# Patient Record
Sex: MALE | Race: WHITE | NOT HISPANIC OR LATINO | Employment: OTHER | URBAN - METROPOLITAN AREA
[De-identification: names, ages, dates, MRNs, and addresses within clinical notes are randomized per-mention and may not be internally consistent; named-entity substitution may affect disease eponyms.]

---

## 2017-02-22 ENCOUNTER — HOSPITAL ENCOUNTER (OUTPATIENT)
Dept: ONCOLOGY | Facility: CLINIC | Age: 62
Discharge: HOME | End: 2017-02-22

## 2017-02-24 LAB
CMV DNA SERPL NAA+PROBE-ACNC: <200 IU/ML
Lab: NORMAL

## 2018-04-17 ENCOUNTER — HOSPITAL ENCOUNTER (OUTPATIENT)
Dept: FAMILY MEDICINE CLINIC | Facility: CLINIC | Age: 63
Setting detail: SPECIMEN
Discharge: HOME | End: 2018-04-17
Attending: FAMILY MEDICINE | Admitting: FAMILY MEDICINE

## 2018-04-17 LAB
ALBUMIN SERPL-MCNC: 3.6 G/DL (ref 3.5–4.8)
ALBUMIN/GLOB SERPL: 1.5 {RATIO} (ref 1–1.7)
ALP SERPL-CCNC: 49 IU/L (ref 32–91)
ALT SERPL-CCNC: 35 IU/L (ref 17–63)
ANION GAP SERPL CALC-SCNC: 11.2 MMOL/L (ref 10–20)
AST SERPL-CCNC: 33 IU/L (ref 15–41)
BASOPHILS # BLD AUTO: 0.1 10*3/UL (ref 0–0.2)
BASOPHILS NFR BLD AUTO: 2 % (ref 0–2)
BILIRUB SERPL-MCNC: 0.7 MG/DL (ref 0.3–1.2)
BUN SERPL-MCNC: 15 MG/DL (ref 8–20)
BUN/CREAT SERPL: 10.7 (ref 6.2–20.3)
CALCIUM SERPL-MCNC: 9.2 MG/DL (ref 8.9–10.3)
CHLORIDE SERPL-SCNC: 103 MMOL/L (ref 101–111)
CHOLEST SERPL-MCNC: 227 MG/DL
CHOLEST/HDLC SERPL: 8.2 {RATIO}
CONV CO2: 28 MMOL/L (ref 22–32)
CONV LDL CHOLESTEROL DIRECT: 141 MG/DL (ref 0–100)
CONV PLATELETS GIANT IN BLOOD BY LIGHT MICROSCOPY: (no result)
CONV POLYCHROMASIA IN BLOOD BY LIGHT MICROSCOPY: SLIGHT
CONV TOTAL PROTEIN: 6 G/DL (ref 6.1–7.9)
CREAT UR-MCNC: 1.4 MG/DL (ref 0.7–1.2)
DIFFERENTIAL METHOD BLD: (no result)
EOSINOPHIL # BLD AUTO: 0.8 10*3/UL (ref 0–0.3)
EOSINOPHIL # BLD AUTO: 21 % (ref 0–3)
ERYTHROCYTE [DISTWIDTH] IN BLOOD BY AUTOMATED COUNT: 13 % (ref 11.5–14.5)
GLOBULIN UR ELPH-MCNC: 2.4 G/DL (ref 2.5–3.8)
GLUCOSE SERPL-MCNC: 92 MG/DL (ref 65–99)
HCT VFR BLD AUTO: 36.3 % (ref 40–54)
HDLC SERPL-MCNC: 28 MG/DL
HGB BLD-MCNC: 12.7 G/DL (ref 14–18)
LDLC/HDLC SERPL: 5.1 {RATIO}
LIPID INTERPRETATION: ABNORMAL
LYMPHOCYTES # BLD AUTO: 1.2 10*3/UL (ref 0.8–4.8)
LYMPHOCYTES NFR BLD AUTO: 29 % (ref 18–42)
MCH RBC QN AUTO: 33.2 PG (ref 26–32)
MCHC RBC AUTO-ENTMCNC: 35.1 G/DL (ref 32–36)
MCV RBC AUTO: 94.3 FL (ref 80–94)
MONOCYTES # BLD AUTO: 0.3 10*3/UL (ref 0.1–1.3)
MONOCYTES NFR BLD AUTO: 7 % (ref 2–11)
NEUTROPHILS # BLD AUTO: 1.6 10*3/UL (ref 2.3–8.6)
NEUTROPHILS NFR BLD AUTO: 41 % (ref 50–75)
PATHOLOGIST REVIEW: (no result)
PATHOLOGIST REVIEW: (no result)
PLATELET # BLD AUTO: 222 10*3/UL (ref 150–450)
PMV BLD AUTO: 7.5 FL (ref 7.4–10.4)
POTASSIUM SERPL-SCNC: 4.2 MMOL/L (ref 3.6–5.1)
RBC # BLD AUTO: 3.84 10*6/UL (ref 4.6–6)
SODIUM SERPL-SCNC: 138 MMOL/L (ref 136–144)
TRIGL SERPL-MCNC: 293 MG/DL
VLDLC SERPL CALC-MCNC: 58.5 MG/DL
WBC # BLD AUTO: 4 10*3/UL (ref 4.5–11.5)

## 2018-04-18 ENCOUNTER — HOSPITAL ENCOUNTER (OUTPATIENT)
Dept: CARDIOLOGY | Facility: HOSPITAL | Age: 63
Discharge: HOME | End: 2018-04-18
Attending: FAMILY MEDICINE | Admitting: FAMILY MEDICINE

## 2018-05-15 ENCOUNTER — HOSPITAL ENCOUNTER (OUTPATIENT)
Dept: CARDIOLOGY | Facility: HOSPITAL | Age: 63
Discharge: HOME | End: 2018-05-15
Attending: INTERNAL MEDICINE | Admitting: INTERNAL MEDICINE

## 2018-05-15 ENCOUNTER — HOSPITAL ENCOUNTER (OUTPATIENT)
Dept: PREADMISSION TESTING | Facility: HOSPITAL | Age: 63
Discharge: HOME | End: 2018-05-15
Attending: INTERNAL MEDICINE | Admitting: INTERNAL MEDICINE

## 2018-05-15 LAB
ANION GAP SERPL CALC-SCNC: 11 MMOL/L (ref 10–20)
BASOPHILS # BLD AUTO: 0 10*3/UL (ref 0–0.2)
BASOPHILS NFR BLD AUTO: 1 % (ref 0–2)
BUN SERPL-MCNC: 15 MG/DL (ref 8–20)
BUN/CREAT SERPL: 12.5 (ref 6.2–20.3)
CALCIUM SERPL-MCNC: 9.2 MG/DL (ref 8.9–10.3)
CHLORIDE SERPL-SCNC: 104 MMOL/L (ref 101–111)
CHOLEST SERPL-MCNC: 203 MG/DL
CHOLEST/HDLC SERPL: 7.8 {RATIO}
CONV CO2: 27 MMOL/L (ref 22–32)
CONV LDL CHOLESTEROL DIRECT: 119 MG/DL (ref 0–100)
CREAT UR-MCNC: 1.2 MG/DL (ref 0.7–1.2)
DIFFERENTIAL METHOD BLD: (no result)
EOSINOPHIL # BLD AUTO: 0.5 10*3/UL (ref 0–0.3)
EOSINOPHIL # BLD AUTO: 12 % (ref 0–3)
ERYTHROCYTE [DISTWIDTH] IN BLOOD BY AUTOMATED COUNT: 13.2 % (ref 11.5–14.5)
GLUCOSE SERPL-MCNC: 89 MG/DL (ref 65–99)
HCT VFR BLD AUTO: 35.5 % (ref 40–54)
HDLC SERPL-MCNC: 26 MG/DL
HGB BLD-MCNC: 12.5 G/DL (ref 14–18)
INR PPP: 1
LDLC/HDLC SERPL: 4.6 {RATIO}
LIPID INTERPRETATION: ABNORMAL
LYMPHOCYTES # BLD AUTO: 1.3 10*3/UL (ref 0.8–4.8)
LYMPHOCYTES NFR BLD AUTO: 31 % (ref 18–42)
MCH RBC QN AUTO: 32.6 PG (ref 26–32)
MCHC RBC AUTO-ENTMCNC: 35.3 G/DL (ref 32–36)
MCV RBC AUTO: 92.4 FL (ref 80–94)
MONOCYTES # BLD AUTO: 0.4 10*3/UL (ref 0.1–1.3)
MONOCYTES NFR BLD AUTO: 10 % (ref 2–11)
NEUTROPHILS # BLD AUTO: 1.9 10*3/UL (ref 2.3–8.6)
NEUTROPHILS NFR BLD AUTO: 46 % (ref 50–75)
NRBC BLD AUTO-RTO: 0 /100{WBCS}
NRBC/RBC NFR BLD MANUAL: 0 10*3/UL
PLATELET # BLD AUTO: 256 10*3/UL (ref 150–450)
PMV BLD AUTO: 7.6 FL (ref 7.4–10.4)
POTASSIUM SERPL-SCNC: 4 MMOL/L (ref 3.6–5.1)
PROTHROMBIN TIME: 11.2 SEC (ref 9.6–11.7)
RBC # BLD AUTO: 3.85 10*6/UL (ref 4.6–6)
SODIUM SERPL-SCNC: 138 MMOL/L (ref 136–144)
TRIGL SERPL-MCNC: 329 MG/DL
VLDLC SERPL CALC-MCNC: 58 MG/DL
WBC # BLD AUTO: 4.1 10*3/UL (ref 4.5–11.5)

## 2018-06-27 ENCOUNTER — HOSPITAL ENCOUNTER (OUTPATIENT)
Dept: HOME HEALTH SERVICES | Facility: HOME HEALTHCARE | Age: 63
Setting detail: SPECIMEN
Discharge: HOME | End: 2018-06-27
Attending: FAMILY MEDICINE | Admitting: FAMILY MEDICINE

## 2018-06-27 LAB
ANION GAP SERPL CALC-SCNC: 10.6 MMOL/L (ref 10–20)
BASOPHILS # BLD AUTO: 0 10*3/UL (ref 0–0.2)
BASOPHILS NFR BLD AUTO: 1 % (ref 0–2)
BUN SERPL-MCNC: 15 MG/DL (ref 8–20)
BUN/CREAT SERPL: 12.5 (ref 6.2–20.3)
CALCIUM SERPL-MCNC: 9.1 MG/DL (ref 8.9–10.3)
CHLORIDE SERPL-SCNC: 103 MMOL/L (ref 101–111)
CONV CO2: 24 MMOL/L (ref 22–32)
CREAT UR-MCNC: 1.2 MG/DL (ref 0.7–1.2)
DIFFERENTIAL METHOD BLD: (no result)
EOSINOPHIL # BLD AUTO: 0.5 10*3/UL (ref 0–0.3)
EOSINOPHIL # BLD AUTO: 10 % (ref 0–3)
ERYTHROCYTE [DISTWIDTH] IN BLOOD BY AUTOMATED COUNT: 14 % (ref 11.5–14.5)
ERYTHROCYTE [SEDIMENTATION RATE] IN BLOOD BY WESTERGREN METHOD: 30 MM/HR (ref 0–20)
GLUCOSE SERPL-MCNC: 135 MG/DL (ref 65–99)
HCT VFR BLD AUTO: 35.2 % (ref 40–54)
HGB BLD-MCNC: 12 G/DL (ref 14–18)
LYMPHOCYTES # BLD AUTO: 1.3 10*3/UL (ref 0.8–4.8)
LYMPHOCYTES NFR BLD AUTO: 26 % (ref 18–42)
MCH RBC QN AUTO: 31 PG (ref 26–32)
MCHC RBC AUTO-ENTMCNC: 34.1 G/DL (ref 32–36)
MCV RBC AUTO: 91 FL (ref 80–94)
MONOCYTES # BLD AUTO: 0.4 10*3/UL (ref 0.1–1.3)
MONOCYTES NFR BLD AUTO: 7 % (ref 2–11)
NEUTROPHILS # BLD AUTO: 2.9 10*3/UL (ref 2.3–8.6)
NEUTROPHILS NFR BLD AUTO: 56 % (ref 50–75)
NRBC BLD AUTO-RTO: 0 /100{WBCS}
NRBC/RBC NFR BLD MANUAL: 0 10*3/UL
PLATELET # BLD AUTO: 359 10*3/UL (ref 150–450)
PMV BLD AUTO: 7.7 FL (ref 7.4–10.4)
POTASSIUM SERPL-SCNC: 3.6 MMOL/L (ref 3.6–5.1)
RBC # BLD AUTO: 3.87 10*6/UL (ref 4.6–6)
SODIUM SERPL-SCNC: 134 MMOL/L (ref 136–144)
VANCOMYCIN TROUGH SERPL-MCNC: 9 UG/ML (ref 10–20)
WBC # BLD AUTO: 5.2 10*3/UL (ref 4.5–11.5)

## 2018-12-21 ENCOUNTER — HOSPITAL ENCOUNTER (OUTPATIENT)
Dept: FAMILY MEDICINE CLINIC | Facility: CLINIC | Age: 63
Setting detail: SPECIMEN
Discharge: HOME | End: 2018-12-21
Attending: PHYSICIAN ASSISTANT | Admitting: PHYSICIAN ASSISTANT

## 2018-12-21 ENCOUNTER — HOSPITAL ENCOUNTER (OUTPATIENT)
Dept: ULTRASOUND IMAGING | Facility: HOSPITAL | Age: 63
Discharge: HOME | End: 2018-12-21
Attending: PHYSICIAN ASSISTANT | Admitting: PHYSICIAN ASSISTANT

## 2018-12-21 LAB
ALBUMIN SERPL-MCNC: 3.8 G/DL (ref 3.5–4.8)
ALBUMIN/GLOB SERPL: 1.5 {RATIO} (ref 1–1.7)
ALP SERPL-CCNC: 58 IU/L (ref 32–91)
ALT SERPL-CCNC: 32 IU/L (ref 17–63)
ANION GAP SERPL CALC-SCNC: 14.8 MMOL/L (ref 10–20)
AST SERPL-CCNC: 33 IU/L (ref 15–41)
BASOPHILS # BLD AUTO: 0.1 10*3/UL (ref 0–0.2)
BASOPHILS NFR BLD AUTO: 1 % (ref 0–2)
BILIRUB SERPL-MCNC: 0.5 MG/DL (ref 0.3–1.2)
BUN SERPL-MCNC: 20 MG/DL (ref 8–20)
BUN/CREAT SERPL: 16.7 (ref 6.2–20.3)
CALCIUM SERPL-MCNC: 9.3 MG/DL (ref 8.9–10.3)
CHLORIDE SERPL-SCNC: 100 MMOL/L (ref 101–111)
CONV CO2: 23 MMOL/L (ref 22–32)
CONV TOTAL PROTEIN: 6.3 G/DL (ref 6.1–7.9)
CREAT UR-MCNC: 1.2 MG/DL (ref 0.7–1.2)
DIFFERENTIAL METHOD BLD: (no result)
EOSINOPHIL # BLD AUTO: 0.4 10*3/UL (ref 0–0.3)
EOSINOPHIL # BLD AUTO: 5 % (ref 0–3)
ERYTHROCYTE [DISTWIDTH] IN BLOOD BY AUTOMATED COUNT: 15 % (ref 11.5–14.5)
GLOBULIN UR ELPH-MCNC: 2.5 G/DL (ref 2.5–3.8)
GLUCOSE SERPL-MCNC: 110 MG/DL (ref 65–99)
HCT VFR BLD AUTO: 38.3 % (ref 40–54)
HETEROPH AB SER QL LA: NEGATIVE
HGB BLD-MCNC: 13.2 G/DL (ref 14–18)
L PNEUMO1 AG UR QL IA: NEGATIVE
LYMPHOCYTES # BLD AUTO: 1.9 10*3/UL (ref 0.8–4.8)
LYMPHOCYTES NFR BLD AUTO: 24 % (ref 18–42)
MCH RBC QN AUTO: 32 PG (ref 26–32)
MCHC RBC AUTO-ENTMCNC: 34.4 G/DL (ref 32–36)
MCV RBC AUTO: 93.2 FL (ref 80–94)
MONOCYTES # BLD AUTO: 0.7 10*3/UL (ref 0.1–1.3)
MONOCYTES NFR BLD AUTO: 9 % (ref 2–11)
NEUTROPHILS # BLD AUTO: 4.7 10*3/UL (ref 2.3–8.6)
NEUTROPHILS NFR BLD AUTO: 61 % (ref 50–75)
NRBC BLD AUTO-RTO: 0 /100{WBCS}
NRBC/RBC NFR BLD MANUAL: 0 10*3/UL
PLATELET # BLD AUTO: 373 10*3/UL (ref 150–450)
PMV BLD AUTO: 7.6 FL (ref 7.4–10.4)
POTASSIUM SERPL-SCNC: 3.8 MMOL/L (ref 3.6–5.1)
RBC # BLD AUTO: 4.11 10*6/UL (ref 4.6–6)
S PNEUM AG SPEC QL LA: NEGATIVE
SODIUM SERPL-SCNC: 134 MMOL/L (ref 136–144)
WBC # BLD AUTO: 7.7 10*3/UL (ref 4.5–11.5)

## 2018-12-22 LAB — CMV IGM SERPL IA-ACNC: NEGATIVE

## 2019-01-02 ENCOUNTER — HOSPITAL ENCOUNTER (OUTPATIENT)
Dept: FAMILY MEDICINE CLINIC | Facility: CLINIC | Age: 64
Setting detail: SPECIMEN
Discharge: HOME | End: 2019-01-02
Attending: FAMILY MEDICINE | Admitting: FAMILY MEDICINE

## 2019-05-30 ENCOUNTER — HOSPITAL ENCOUNTER (OUTPATIENT)
Dept: SLEEP MEDICINE | Facility: HOSPITAL | Age: 64
Discharge: HOME | End: 2019-05-30
Attending: INTERNAL MEDICINE | Admitting: INTERNAL MEDICINE

## 2019-06-06 ENCOUNTER — HOSPITAL ENCOUNTER (OUTPATIENT)
Dept: SLEEP MEDICINE | Facility: HOSPITAL | Age: 64
Discharge: HOME | End: 2019-06-06
Attending: INTERNAL MEDICINE | Admitting: INTERNAL MEDICINE

## 2019-06-24 ENCOUNTER — TRANSCRIBE ORDERS (OUTPATIENT)
Dept: SLEEP MEDICINE | Facility: HOSPITAL | Age: 64
End: 2019-06-24

## 2019-06-24 DIAGNOSIS — G47.33 OBSTRUCTIVE SLEEP APNEA (ADULT) (PEDIATRIC): Primary | ICD-10-CM

## 2019-06-25 ENCOUNTER — HOSPITAL ENCOUNTER (OUTPATIENT)
Dept: SLEEP MEDICINE | Facility: HOSPITAL | Age: 64
Discharge: HOME | End: 2019-06-25
Admitting: INTERNAL MEDICINE

## 2019-06-25 VITALS — HEIGHT: 67 IN | BODY MASS INDEX: 27.47 KG/M2 | WEIGHT: 175 LBS

## 2019-06-25 DIAGNOSIS — G47.33 OBSTRUCTIVE SLEEP APNEA (ADULT) (PEDIATRIC): ICD-10-CM

## 2019-06-25 PROCEDURE — 95811 POLYSOM 6/>YRS CPAP 4/> PARM: CPT

## 2019-06-26 DIAGNOSIS — G47.33 OBSTRUCTIVE SLEEP APNEA, ADULT: Primary | ICD-10-CM

## 2019-06-26 RX ORDER — FENOFIBRATE 145 MG/1
145 TABLET, COATED ORAL EVERY OTHER DAY
COMMUNITY
End: 2022-04-13

## 2019-06-26 RX ORDER — ISOSORBIDE MONONITRATE 30 MG/1
30 TABLET, EXTENDED RELEASE ORAL DAILY
COMMUNITY
End: 2020-02-17

## 2019-06-26 RX ORDER — PANTOPRAZOLE SODIUM 40 MG/1
40 TABLET, DELAYED RELEASE ORAL DAILY
COMMUNITY

## 2019-06-26 RX ORDER — CLONIDINE 0.1 MG/24H
1 PATCH, EXTENDED RELEASE TRANSDERMAL 3 TIMES DAILY
COMMUNITY
End: 2019-08-04

## 2019-06-26 RX ORDER — DAPSONE 100 MG/1
100 TABLET ORAL DAILY
COMMUNITY
End: 2020-02-17

## 2019-06-26 RX ORDER — ACYCLOVIR 800 MG/1
800 TABLET ORAL 2 TIMES DAILY
COMMUNITY
End: 2020-02-17

## 2019-06-26 RX ORDER — HYDROCHLOROTHIAZIDE 50 MG/1
50 TABLET ORAL DAILY
COMMUNITY
End: 2019-08-04

## 2019-06-26 RX ORDER — CLOPIDOGREL BISULFATE 75 MG/1
75 TABLET ORAL DAILY
COMMUNITY
End: 2019-07-27 | Stop reason: SDUPTHER

## 2019-06-26 RX ORDER — VORICONAZOLE 200 MG/1
200 TABLET, FILM COATED ORAL DAILY
COMMUNITY
End: 2022-04-13

## 2019-06-26 RX ORDER — ASPIRIN 81 MG/1
81 TABLET, CHEWABLE ORAL DAILY
COMMUNITY

## 2019-06-26 RX ORDER — LISINOPRIL 10 MG/1
10 TABLET ORAL DAILY
COMMUNITY
End: 2020-02-17

## 2019-06-26 RX ORDER — AZELASTINE 1 MG/ML
2 SPRAY, METERED NASAL DAILY
COMMUNITY
End: 2020-02-17

## 2019-06-26 RX ORDER — PREDNISONE 20 MG/1
10 TABLET ORAL DAILY
COMMUNITY
End: 2020-02-17

## 2019-06-26 RX ORDER — ESCITALOPRAM OXALATE 10 MG/1
10 TABLET ORAL DAILY
COMMUNITY

## 2019-06-26 RX ORDER — ROSUVASTATIN CALCIUM 20 MG/1
20 TABLET, COATED ORAL DAILY
COMMUNITY
End: 2019-11-12 | Stop reason: SDUPTHER

## 2019-06-26 RX ORDER — CARVEDILOL 12.5 MG/1
25 TABLET ORAL 2 TIMES DAILY WITH MEALS
COMMUNITY
End: 2022-04-13

## 2019-07-29 RX ORDER — CLOPIDOGREL BISULFATE 75 MG/1
TABLET ORAL
Qty: 90 TABLET | Refills: 5 | Status: SHIPPED | OUTPATIENT
Start: 2019-07-29 | End: 2020-08-12

## 2019-08-04 ENCOUNTER — HOSPITAL ENCOUNTER (EMERGENCY)
Facility: HOSPITAL | Age: 64
Discharge: HOME | End: 2019-08-05
Attending: EMERGENCY MEDICINE | Admitting: EMERGENCY MEDICINE

## 2019-08-04 DIAGNOSIS — M54.2 NECK PAIN: Primary | ICD-10-CM

## 2019-08-04 PROCEDURE — 93005 ELECTROCARDIOGRAM TRACING: CPT | Performed by: EMERGENCY MEDICINE

## 2019-08-04 PROCEDURE — 99284 EMERGENCY DEPT VISIT MOD MDM: CPT

## 2019-08-04 RX ORDER — ONDANSETRON 2 MG/ML
4 INJECTION INTRAMUSCULAR; INTRAVENOUS ONCE
Status: COMPLETED | OUTPATIENT
Start: 2019-08-04 | End: 2019-08-05

## 2019-08-04 RX ORDER — PANTOPRAZOLE SODIUM 40 MG/1
TABLET, DELAYED RELEASE ORAL
COMMUNITY
Start: 2014-12-15 | End: 2019-08-04 | Stop reason: SDUPTHER

## 2019-08-04 RX ORDER — KETOROLAC TROMETHAMINE 15 MG/ML
10 INJECTION, SOLUTION INTRAMUSCULAR; INTRAVENOUS ONCE
Status: COMPLETED | OUTPATIENT
Start: 2019-08-04 | End: 2019-08-05

## 2019-08-04 RX ORDER — LIFITEGRAST 50 MG/ML
SOLUTION/ DROPS OPHTHALMIC
Refills: 11 | COMMUNITY
Start: 2019-06-06 | End: 2022-04-13

## 2019-08-04 RX ORDER — LOTEPREDNOL ETABONATE 5 MG/ML
SUSPENSION/ DROPS OPHTHALMIC
Refills: 0 | COMMUNITY
Start: 2019-06-05 | End: 2022-04-13

## 2019-08-04 RX ORDER — MORPHINE SULFATE 4 MG/ML
4 INJECTION, SOLUTION INTRAMUSCULAR; INTRAVENOUS ONCE
Status: COMPLETED | OUTPATIENT
Start: 2019-08-04 | End: 2019-08-05

## 2019-08-04 RX ORDER — CLONIDINE HYDROCHLORIDE 0.1 MG/1
0.1 TABLET ORAL 2 TIMES DAILY
COMMUNITY
Start: 2019-04-04 | End: 2022-04-13 | Stop reason: SDUPTHER

## 2019-08-04 RX ORDER — FLUTICASONE PROPIONATE 50 MCG
1 SPRAY, SUSPENSION (ML) NASAL ONCE
COMMUNITY
Start: 2017-01-17

## 2019-08-04 RX ORDER — HYDROCHLOROTHIAZIDE 12.5 MG/1
25 TABLET ORAL DAILY
Refills: 5 | COMMUNITY
Start: 2019-06-14 | End: 2019-08-21 | Stop reason: ALTCHOICE

## 2019-08-04 RX ORDER — SODIUM CHLORIDE 0.9 % (FLUSH) 0.9 %
10 SYRINGE (ML) INJECTION AS NEEDED
Status: DISCONTINUED | OUTPATIENT
Start: 2019-08-04 | End: 2019-08-05 | Stop reason: HOSPADM

## 2019-08-05 ENCOUNTER — APPOINTMENT (OUTPATIENT)
Dept: CT IMAGING | Facility: HOSPITAL | Age: 64
End: 2019-08-05

## 2019-08-05 ENCOUNTER — APPOINTMENT (OUTPATIENT)
Dept: GENERAL RADIOLOGY | Facility: HOSPITAL | Age: 64
End: 2019-08-05

## 2019-08-05 ENCOUNTER — HOSPITAL ENCOUNTER (EMERGENCY)
Facility: HOSPITAL | Age: 64
Discharge: ANOTHER HOSPITAL | End: 2019-08-05
Attending: EMERGENCY MEDICINE | Admitting: EMERGENCY MEDICINE

## 2019-08-05 ENCOUNTER — APPOINTMENT (OUTPATIENT)
Dept: SLEEP MEDICINE | Facility: HOSPITAL | Age: 64
End: 2019-08-05

## 2019-08-05 VITALS
SYSTOLIC BLOOD PRESSURE: 179 MMHG | HEIGHT: 67 IN | TEMPERATURE: 98.7 F | BODY MASS INDEX: 28.35 KG/M2 | OXYGEN SATURATION: 94 % | RESPIRATION RATE: 18 BRPM | DIASTOLIC BLOOD PRESSURE: 81 MMHG | HEART RATE: 79 BPM | WEIGHT: 180.6 LBS

## 2019-08-05 VITALS
WEIGHT: 187 LBS | HEIGHT: 67 IN | OXYGEN SATURATION: 92 % | RESPIRATION RATE: 13 BRPM | HEART RATE: 96 BPM | DIASTOLIC BLOOD PRESSURE: 89 MMHG | SYSTOLIC BLOOD PRESSURE: 199 MMHG | TEMPERATURE: 99.6 F | BODY MASS INDEX: 29.35 KG/M2

## 2019-08-05 DIAGNOSIS — R06.00 DYSPNEA, UNSPECIFIED TYPE: Primary | ICD-10-CM

## 2019-08-05 LAB
ALBUMIN SERPL-MCNC: 3.7 G/DL (ref 3.5–4.8)
ALBUMIN/GLOB SERPL: 1.3 G/DL (ref 1–1.7)
ALP SERPL-CCNC: 43 U/L (ref 32–91)
ALT SERPL W P-5'-P-CCNC: 29 U/L (ref 17–63)
ANION GAP SERPL CALCULATED.3IONS-SCNC: 11.6 MMOL/L (ref 5–15)
ANION GAP SERPL CALCULATED.3IONS-SCNC: 16 MMOL/L (ref 5–15)
AST SERPL-CCNC: 24 U/L (ref 15–41)
BACTERIA UR QL AUTO: ABNORMAL /HPF
BASOPHILS # BLD AUTO: 0.1 10*3/MM3 (ref 0–0.2)
BASOPHILS # BLD AUTO: 0.1 10*3/MM3 (ref 0–0.2)
BASOPHILS NFR BLD AUTO: 0.6 % (ref 0–1.5)
BASOPHILS NFR BLD AUTO: 1.3 % (ref 0–1.5)
BILIRUB SERPL-MCNC: 0.9 MG/DL (ref 0.3–1.2)
BILIRUB UR QL STRIP: ABNORMAL
BNP SERPL-MCNC: 154 PG/ML
BUN BLD-MCNC: 20 MG/DL (ref 8–20)
BUN BLD-MCNC: 20 MG/DL (ref 8–20)
BUN/CREAT SERPL: 15.4 (ref 6.2–20.3)
BUN/CREAT SERPL: 18.2 (ref 6.2–20.3)
CALCIUM SPEC-SCNC: 8.6 MG/DL (ref 8.9–10.3)
CALCIUM SPEC-SCNC: 9 MG/DL (ref 8.9–10.3)
CHLORIDE SERPL-SCNC: 104 MMOL/L (ref 101–111)
CHLORIDE SERPL-SCNC: 99 MMOL/L (ref 101–111)
CK SERPL-CCNC: 48 U/L (ref 49–397)
CLARITY UR: CLEAR
CO2 SERPL-SCNC: 24 MMOL/L (ref 22–32)
CO2 SERPL-SCNC: 28 MMOL/L (ref 22–32)
COLOR UR: ABNORMAL
CREAT BLD-MCNC: 1.1 MG/DL (ref 0.7–1.2)
CREAT BLD-MCNC: 1.3 MG/DL (ref 0.7–1.2)
D-LACTATE SERPL-SCNC: 0.5 MMOL/L (ref 0.5–2)
DEPRECATED RDW RBC AUTO: 59.5 FL (ref 37–54)
DEPRECATED RDW RBC AUTO: 59.9 FL (ref 37–54)
EOSINOPHIL # BLD AUTO: 0.1 10*3/MM3 (ref 0–0.4)
EOSINOPHIL # BLD AUTO: 0.2 10*3/MM3 (ref 0–0.4)
EOSINOPHIL NFR BLD AUTO: 0.7 % (ref 0.3–6.2)
EOSINOPHIL NFR BLD AUTO: 1.6 % (ref 0.3–6.2)
ERYTHROCYTE [DISTWIDTH] IN BLOOD BY AUTOMATED COUNT: 16.6 % (ref 12.3–15.4)
ERYTHROCYTE [DISTWIDTH] IN BLOOD BY AUTOMATED COUNT: 16.7 % (ref 12.3–15.4)
GFR SERPL CREATININE-BSD FRML MDRD: 56 ML/MIN/1.73
GFR SERPL CREATININE-BSD FRML MDRD: 68 ML/MIN/1.73
GLOBULIN UR ELPH-MCNC: 2.8 GM/DL (ref 2.5–3.8)
GLUCOSE BLD-MCNC: 126 MG/DL (ref 65–99)
GLUCOSE BLD-MCNC: 95 MG/DL (ref 65–99)
GLUCOSE UR STRIP-MCNC: NEGATIVE MG/DL
HCT VFR BLD AUTO: 33.7 % (ref 37.5–51)
HCT VFR BLD AUTO: 38.3 % (ref 37.5–51)
HGB BLD-MCNC: 11.6 G/DL (ref 13–17.7)
HGB BLD-MCNC: 12.5 G/DL (ref 13–17.7)
HGB UR QL STRIP.AUTO: NEGATIVE
HYALINE CASTS UR QL AUTO: ABNORMAL /LPF
KETONES UR QL STRIP: NEGATIVE
LEUKOCYTE ESTERASE UR QL STRIP.AUTO: NEGATIVE
LYMPHOCYTES # BLD AUTO: 1.8 10*3/MM3 (ref 0.7–3.1)
LYMPHOCYTES # BLD AUTO: 2 10*3/MM3 (ref 0.7–3.1)
LYMPHOCYTES NFR BLD AUTO: 17 % (ref 19.6–45.3)
LYMPHOCYTES NFR BLD AUTO: 17.7 % (ref 19.6–45.3)
MCH RBC QN AUTO: 33 PG (ref 26.6–33)
MCH RBC QN AUTO: 34.4 PG (ref 26.6–33)
MCHC RBC AUTO-ENTMCNC: 32.5 G/DL (ref 31.5–35.7)
MCHC RBC AUTO-ENTMCNC: 34.4 G/DL (ref 31.5–35.7)
MCV RBC AUTO: 100.2 FL (ref 79–97)
MCV RBC AUTO: 101.5 FL (ref 79–97)
MONOCYTES # BLD AUTO: 1 10*3/MM3 (ref 0.1–0.9)
MONOCYTES # BLD AUTO: 1 10*3/MM3 (ref 0.1–0.9)
MONOCYTES NFR BLD AUTO: 10.1 % (ref 5–12)
MONOCYTES NFR BLD AUTO: 8.6 % (ref 5–12)
NEUTROPHILS # BLD AUTO: 7.2 10*3/MM3 (ref 1.7–7)
NEUTROPHILS # BLD AUTO: 8.4 10*3/MM3 (ref 1.7–7)
NEUTROPHILS NFR BLD AUTO: 70.2 % (ref 42.7–76)
NEUTROPHILS NFR BLD AUTO: 72.2 % (ref 42.7–76)
NITRITE UR QL STRIP: NEGATIVE
NRBC BLD AUTO-RTO: 0.1 /100 WBC (ref 0–0.2)
NRBC BLD AUTO-RTO: 0.1 /100 WBC (ref 0–0.2)
PH UR STRIP.AUTO: 6.5 [PH] (ref 5–8)
PLATELET # BLD AUTO: 361 10*3/MM3 (ref 140–450)
PLATELET # BLD AUTO: 398 10*3/MM3 (ref 140–450)
PMV BLD AUTO: 7.6 FL (ref 6–12)
PMV BLD AUTO: 8.1 FL (ref 6–12)
POTASSIUM BLD-SCNC: 3.6 MMOL/L (ref 3.6–5.1)
POTASSIUM BLD-SCNC: 4 MMOL/L (ref 3.6–5.1)
PROCALCITONIN SERPL-MCNC: 0.38 NG/ML (ref 0–0.5)
PROT SERPL-MCNC: 6.5 G/DL (ref 6.1–7.9)
PROT UR QL STRIP: ABNORMAL
RBC # BLD AUTO: 3.37 10*6/MM3 (ref 4.14–5.8)
RBC # BLD AUTO: 3.77 10*6/MM3 (ref 4.14–5.8)
RBC # UR: ABNORMAL /HPF
REF LAB TEST METHOD: ABNORMAL
SODIUM BLD-SCNC: 136 MMOL/L (ref 136–144)
SODIUM BLD-SCNC: 139 MMOL/L (ref 136–144)
SP GR UR STRIP: 1.02 (ref 1–1.03)
SQUAMOUS #/AREA URNS HPF: ABNORMAL /HPF
TROPONIN I SERPL-MCNC: <0.03 NG/ML (ref 0–0.03)
UROBILINOGEN UR QL STRIP: ABNORMAL
WBC NRBC COR # BLD: 10.3 10*3/MM3 (ref 3.4–10.8)
WBC NRBC COR # BLD: 11.7 10*3/MM3 (ref 3.4–10.8)
WBC UR QL AUTO: ABNORMAL /HPF

## 2019-08-05 PROCEDURE — 25010000002 MORPHINE PER 10 MG: Performed by: EMERGENCY MEDICINE

## 2019-08-05 PROCEDURE — 70450 CT HEAD/BRAIN W/O DYE: CPT

## 2019-08-05 PROCEDURE — 84145 PROCALCITONIN (PCT): CPT | Performed by: EMERGENCY MEDICINE

## 2019-08-05 PROCEDURE — 96374 THER/PROPH/DIAG INJ IV PUSH: CPT

## 2019-08-05 PROCEDURE — 96375 TX/PRO/DX INJ NEW DRUG ADDON: CPT

## 2019-08-05 PROCEDURE — 87040 BLOOD CULTURE FOR BACTERIA: CPT | Performed by: EMERGENCY MEDICINE

## 2019-08-05 PROCEDURE — 81001 URINALYSIS AUTO W/SCOPE: CPT | Performed by: EMERGENCY MEDICINE

## 2019-08-05 PROCEDURE — 71275 CT ANGIOGRAPHY CHEST: CPT

## 2019-08-05 PROCEDURE — 83605 ASSAY OF LACTIC ACID: CPT

## 2019-08-05 PROCEDURE — 82550 ASSAY OF CK (CPK): CPT | Performed by: EMERGENCY MEDICINE

## 2019-08-05 PROCEDURE — 71045 X-RAY EXAM CHEST 1 VIEW: CPT

## 2019-08-05 PROCEDURE — 84484 ASSAY OF TROPONIN QUANT: CPT | Performed by: EMERGENCY MEDICINE

## 2019-08-05 PROCEDURE — 96376 TX/PRO/DX INJ SAME DRUG ADON: CPT

## 2019-08-05 PROCEDURE — 99284 EMERGENCY DEPT VISIT MOD MDM: CPT

## 2019-08-05 PROCEDURE — 72125 CT NECK SPINE W/O DYE: CPT

## 2019-08-05 PROCEDURE — 0 IOPAMIDOL PER 1 ML: Performed by: EMERGENCY MEDICINE

## 2019-08-05 PROCEDURE — 80053 COMPREHEN METABOLIC PANEL: CPT | Performed by: EMERGENCY MEDICINE

## 2019-08-05 PROCEDURE — 25010000002 ONDANSETRON PER 1 MG: Performed by: EMERGENCY MEDICINE

## 2019-08-05 PROCEDURE — 83880 ASSAY OF NATRIURETIC PEPTIDE: CPT | Performed by: EMERGENCY MEDICINE

## 2019-08-05 PROCEDURE — 25010000002 HYDROMORPHONE PER 4 MG: Performed by: EMERGENCY MEDICINE

## 2019-08-05 PROCEDURE — 25010000002 KETOROLAC TROMETHAMINE PER 15 MG: Performed by: EMERGENCY MEDICINE

## 2019-08-05 PROCEDURE — 85025 COMPLETE CBC W/AUTO DIFF WBC: CPT | Performed by: EMERGENCY MEDICINE

## 2019-08-05 RX ORDER — ONDANSETRON 2 MG/ML
4 INJECTION INTRAMUSCULAR; INTRAVENOUS ONCE
Status: COMPLETED | OUTPATIENT
Start: 2019-08-05 | End: 2019-08-05

## 2019-08-05 RX ORDER — LISINOPRIL 5 MG/1
10 TABLET ORAL ONCE
Status: COMPLETED | OUTPATIENT
Start: 2019-08-05 | End: 2019-08-05

## 2019-08-05 RX ORDER — LABETALOL HYDROCHLORIDE 5 MG/ML
10 INJECTION, SOLUTION INTRAVENOUS ONCE
Status: COMPLETED | OUTPATIENT
Start: 2019-08-05 | End: 2019-08-05

## 2019-08-05 RX ORDER — HYDROCODONE BITARTRATE AND ACETAMINOPHEN 5; 325 MG/1; MG/1
1 TABLET ORAL EVERY 6 HOURS PRN
Qty: 12 TABLET | Refills: 0 | Status: SHIPPED | OUTPATIENT
Start: 2019-08-05 | End: 2020-02-17

## 2019-08-05 RX ORDER — HYDROMORPHONE HCL 110MG/55ML
0.5 PATIENT CONTROLLED ANALGESIA SYRINGE INTRAVENOUS ONCE
Status: COMPLETED | OUTPATIENT
Start: 2019-08-05 | End: 2019-08-05

## 2019-08-05 RX ORDER — CYCLOBENZAPRINE HCL 5 MG
5 TABLET ORAL 3 TIMES DAILY PRN
Qty: 12 TABLET | Refills: 0 | Status: SHIPPED | OUTPATIENT
Start: 2019-08-05 | End: 2020-02-17

## 2019-08-05 RX ORDER — HYDROMORPHONE HCL 110MG/55ML
1 PATIENT CONTROLLED ANALGESIA SYRINGE INTRAVENOUS ONCE
Status: COMPLETED | OUTPATIENT
Start: 2019-08-05 | End: 2019-08-05

## 2019-08-05 RX ORDER — SODIUM CHLORIDE 0.9 % (FLUSH) 0.9 %
10 SYRINGE (ML) INJECTION AS NEEDED
Status: DISCONTINUED | OUTPATIENT
Start: 2019-08-05 | End: 2019-08-05 | Stop reason: HOSPADM

## 2019-08-05 RX ORDER — CARVEDILOL 6.25 MG/1
6.25 TABLET ORAL ONCE
Status: COMPLETED | OUTPATIENT
Start: 2019-08-05 | End: 2019-08-05

## 2019-08-05 RX ADMIN — KETOROLAC TROMETHAMINE 10 MG: 15 INJECTION, SOLUTION INTRAMUSCULAR; INTRAVENOUS at 00:07

## 2019-08-05 RX ADMIN — LISINOPRIL 10 MG: 5 TABLET ORAL at 15:49

## 2019-08-05 RX ADMIN — ONDANSETRON 4 MG: 2 INJECTION INTRAMUSCULAR; INTRAVENOUS at 00:07

## 2019-08-05 RX ADMIN — ONDANSETRON 4 MG: 2 INJECTION INTRAMUSCULAR; INTRAVENOUS at 11:32

## 2019-08-05 RX ADMIN — LABETALOL 20 MG/4 ML (5 MG/ML) INTRAVENOUS SYRINGE 10 MG: at 10:57

## 2019-08-05 RX ADMIN — MORPHINE SULFATE 4 MG: 4 INJECTION INTRAVENOUS at 00:06

## 2019-08-05 RX ADMIN — ONDANSETRON 4 MG: 2 INJECTION INTRAMUSCULAR; INTRAVENOUS at 14:46

## 2019-08-05 RX ADMIN — IOPAMIDOL 100 ML: 755 INJECTION, SOLUTION INTRAVENOUS at 11:22

## 2019-08-05 RX ADMIN — HYDROMORPHONE HYDROCHLORIDE 0.5 MG: 2 INJECTION, SOLUTION INTRAMUSCULAR; INTRAVENOUS; SUBCUTANEOUS at 11:32

## 2019-08-05 RX ADMIN — HYDROMORPHONE HYDROCHLORIDE 1 MG: 2 INJECTION, SOLUTION INTRAMUSCULAR; INTRAVENOUS; SUBCUTANEOUS at 14:48

## 2019-08-05 RX ADMIN — CARVEDILOL 6.25 MG: 6.25 TABLET, FILM COATED ORAL at 15:50

## 2019-08-05 NOTE — ED PROVIDER NOTES
Subjective   History of Present Illness  Shortness of breath, low oxygen saturation  63-year-old male with history of leukemia seen last night for some right-sided neck pain was here today for having some low oxygen saturations at home this morning on the home pulse oximeter.  He also reports mild shortness of breath he reports no significant cough or congestion he states he still has some right sided posterior neck pain does not significantly change this morning from last night when he was seen.  He reports no focal numbness or weakness.  He is reported no fevers or chills at home or vomiting or diarrhea.  He denies trauma.  Review of Systems   Constitutional: Negative.    Eyes: Negative.    Respiratory: Positive for shortness of breath.    Cardiovascular: Negative.    Gastrointestinal: Negative.    Endocrine: Negative.    Genitourinary: Negative.    Musculoskeletal: Positive for neck pain.   Skin: Negative.    Neurological: Positive for headaches. Negative for dizziness, speech difficulty, weakness and light-headedness.   Psychiatric/Behavioral: Negative.        Past Medical History:   Diagnosis Date   • GERD (gastroesophageal reflux disease)    • Hyperlipidemia    • Hypertension    • Leukemia (CMS/HCC)        Allergies   Allergen Reactions   • Penicillins Rash   • Sulfa Antibiotics Rash       Past Surgical History:   Procedure Laterality Date   • BONE MARROW TRANSPLANT     • CARDIAC CATHETERIZATION     • SINUS SURGERY     • TONSILLECTOMY     • VASECTOMY         No family history on file.    Social History     Socioeconomic History   • Marital status:      Spouse name: Not on file   • Number of children: Not on file   • Years of education: Not on file   • Highest education level: Not on file   Tobacco Use   • Smoking status: Former Smoker   Substance and Sexual Activity   • Alcohol use: No     Frequency: Never   • Drug use: Defer   • Sexual activity: Defer       BP (!) 193/99   Pulse 93   Temp (!) 100.8  "°F (38.2 °C) (Oral)   Resp 19   Ht 170.2 cm (67\")   Wt 84.8 kg (187 lb)   SpO2 95%   BMI 29.29 kg/m²       Objective   Physical Exam  General: Well-developed well-appearing, no acute distress, alert and appropriate  Eyes: Pupils round and reactive, sclera nonicteric  HEENT: Mucous membranes moist, no mucosal swelling  Neck: Supple, no nuchal rigidity, no lymphadenopathy, he does have some muscular tenderness along the right posterior lateral aspect of his neck along the trapezius and paraspinous muscle there is no swelling or erythema or significant midline tenderness  Respirations: Respirations nonlabored, equal breath sounds bilaterally, clear lungs  Heart regular rate and rhythm, no murmurs rubs or gallops,   Abdomen soft nontender nondistended, no hepatosplenomegaly, no hernia, no mass, normal bowel sounds, no CVA tenderness  Extremities no clubbing cyanosis or edema, calves are symmetric and nontender  Neuro cranial nerves grossly intact, no focal limb deficits  Psych oriented, pleasant affect  Skin no rash, brisk cap refill  Procedures           ED Course      Results for orders placed or performed during the hospital encounter of 08/05/19   Comprehensive Metabolic Panel   Result Value Ref Range    Glucose 95 65 - 99 mg/dL    BUN 20 8 - 20 mg/dL    Creatinine 1.10 0.70 - 1.20 mg/dL    Sodium 139 136 - 144 mmol/L    Potassium 4.0 3.6 - 5.1 mmol/L    Chloride 99 (L) 101 - 111 mmol/L    CO2 28.0 22.0 - 32.0 mmol/L    Calcium 9.0 8.9 - 10.3 mg/dL    Total Protein 6.5 6.1 - 7.9 g/dL    Albumin 3.70 3.50 - 4.80 g/dL    ALT (SGPT) 29 17 - 63 U/L    AST (SGOT) 24 15 - 41 U/L    Alkaline Phosphatase 43 32 - 91 U/L    Total Bilirubin 0.9 0.3 - 1.2 mg/dL    eGFR Non African Amer 68 >60 mL/min/1.73    Globulin 2.8 2.5 - 3.8 gm/dL    A/G Ratio 1.3 1.0 - 1.7 g/dL    BUN/Creatinine Ratio 18.2 6.2 - 20.3    Anion Gap 16.0 (H) 5.0 - 15.0 mmol/L   Procalcitonin   Result Value Ref Range    Procalcitonin 0.38 0.00 - 0.50 " ng/mL   Urinalysis With Culture If Indicated - Urine, Clean Catch   Result Value Ref Range    Color, UA Dark Yellow (A) Yellow, Straw    Appearance, UA Clear Clear    pH, UA 6.5 5.0 - 8.0    Specific Gravity, UA 1.022 1.005 - 1.030    Glucose, UA Negative Negative    Ketones, UA Negative Negative    Bilirubin, UA Small (1+) (A) Negative    Blood, UA Negative Negative    Protein, UA >=300 mg/dL (3+) (A) Negative    Leuk Esterase, UA Negative Negative    Nitrite, UA Negative Negative    Urobilinogen, UA 1.0 E.U./dL 0.2 - 1.0 E.U./dL   CBC Auto Differential   Result Value Ref Range    WBC 11.70 (H) 3.40 - 10.80 10*3/mm3    RBC 3.77 (L) 4.14 - 5.80 10*6/mm3    Hemoglobin 12.5 (L) 13.0 - 17.7 g/dL    Hematocrit 38.3 37.5 - 51.0 %    .5 (H) 79.0 - 97.0 fL    MCH 33.0 26.6 - 33.0 pg    MCHC 32.5 31.5 - 35.7 g/dL    RDW 16.6 (H) 12.3 - 15.4 %    RDW-SD 59.5 (H) 37.0 - 54.0 fl    MPV 8.1 6.0 - 12.0 fL    Platelets 398 140 - 450 10*3/mm3    Neutrophil % 72.2 42.7 - 76.0 %    Lymphocyte % 17.0 (L) 19.6 - 45.3 %    Monocyte % 8.6 5.0 - 12.0 %    Eosinophil % 1.6 0.3 - 6.2 %    Basophil % 0.6 0.0 - 1.5 %    Neutrophils, Absolute 8.40 (H) 1.70 - 7.00 10*3/mm3    Lymphocytes, Absolute 2.00 0.70 - 3.10 10*3/mm3    Monocytes, Absolute 1.00 (H) 0.10 - 0.90 10*3/mm3    Eosinophils, Absolute 0.20 0.00 - 0.40 10*3/mm3    Basophils, Absolute 0.10 0.00 - 0.20 10*3/mm3    nRBC 0.1 0.0 - 0.2 /100 WBC   Urinalysis, Microscopic Only - Urine, Clean Catch   Result Value Ref Range    RBC, UA 0-2 (A) None Seen /HPF    WBC, UA 0-2 (A) None Seen /HPF    Bacteria, UA Trace (A) None Seen /HPF    Squamous Epithelial Cells, UA 0-2 None Seen, 0-2 /HPF    Hyaline Casts, UA 3-6 None Seen /LPF    Methodology Automated Microscopy    BNP   Result Value Ref Range    .0 (H) <=100.0 pg/mL   POC Lactate   Result Value Ref Range    Lactate 0.5 0.5 - 2.0 mmol/L     Ct Head Without Contrast    Result Date: 8/4/2019  CT HEAD IMPRESSION: No acute  intracranial abnormality is identified. CT CERVICAL SPINE IMPRESSION: 1. No acute cervical spine fracture or malalignment is identified. 2. Multilevel degenerative changes as described above. Isaac Yang M.D. Neuroradiologist  Electronically signed by:  Isaac Yang M.D.  8/4/2019 11:00 PM    Ct Cervical Spine Without Contrast    Result Date: 8/4/2019  CT HEAD IMPRESSION: No acute intracranial abnormality is identified. CT CERVICAL SPINE IMPRESSION: 1. No acute cervical spine fracture or malalignment is identified. 2. Multilevel degenerative changes as described above. Isaac Yang M.D. Neuroradiologist  Electronically signed by:  Isaac Yang M.D.  8/4/2019 11:00 PM    Xr Chest 1 View    Result Date: 8/5/2019  Subsegmental atelectasis in the left lower lobe at the costophrenic angle.  Electronically Signed By-Dr. Maritza Bullard MD On:8/5/2019 10:21 AM This report was finalized on [id number] by Dr. Maritza Bullard MD.    Ct Chest Pulmonary Embolism With Contrast    Result Date: 8/5/2019   1. No pulmonary embolism. 2. Bandlike scarring or subsegmental atelectasis in the lingula and left lower lobe. Minimal linear atelectasis in the right middle lobe. 3. Uncomplicated cholelithiasis. 4. Moderate coronary artery calcifications. Correlate with cardiac history.    Electronically Signed By-Dr. Maritza Bullard MD On:8/5/2019 11:27 AM This report was finalized on [id number] by Dr. Maritza Bullard MD.                MDM  Patient presented with history of leukemia and bone marrow transplant with some dyspnea.  He had a borderline low-grade fever.  His neck pain seems muscular skeletal and is very reproducible with palpation over the right trapezius, there is no nuchal rigidity or suggestion of meningitis or spinal tenderness to suggest discitis.  CT scan was negative for pulmonary embolus or pneumonia.  I discussed the findings with Dr. Couch at [facility] bone marrow transplant [facility] who did  advise that it would be best to transfer the patient to their facility under their care.  Patient is agreeable to that plan.  We were awaiting bed availability at the transplant center for transfer.  Patient's lactic acid and procalcitonin were normal.  Blood cultures are pending.  He is daily blood pressure medication was ordered.  He was given pain nausea medication as well.  He will be transferred by ambulance to the transplant center.  He is not having chest pain to suggest cardiac ischemia.    Final diagnoses:   Dyspnea, unspecified type            Vinicio Manzo MD  08/05/19 1002       Vinicio Manzo MD  08/05/19 1538

## 2019-08-05 NOTE — ED NOTES
RN from [facility] called for information on patient for transfer. Per Dr. Jovany Zabala called to get room for patient. Awaiting clean room at [facility]. Updated patient and patient wife.     Melida Swenson RN  08/05/19 2183

## 2019-08-05 NOTE — ED NOTES
"Pt c/o neck pain, SOA. Pt reports being seen in ED overnight and \"everything was normal\". Pt has hx of bone marrow transplant. Pt states his skin is being \"attacked\" which makes his skin thick and his abdomen tight     Melida Swenson RN  08/05/19 0944    "

## 2019-08-05 NOTE — ED PROVIDER NOTES
Subjective   Chief complaint neck pain    History of present illness 63-year-old with multiple health problems who complains of having some pain throbbing in nature in his right lower posterior neck trapezius area it is been present all day.  The patient states that he is taking Advil without relief.  He goes its constant continuous there is no chest pain or shortness of breath fever chills no trouble swallowing he is eating and drinking normally.  Is worse with movement better with rest and nonradiating.  Hurts when he turns his head from side to side.  Patient also states he is been having headaches for several days.  They are mild diffuse there is no dizziness speech difficulty or paralysis or visual changes.  Nothing really makes it better or worse.  No tick bites or rashes otherwise.  Denies any numbness or tingling to his extremities or weakness in his extremities he can walk and function as he normally does.  Moderate severe throbbing continuous nonradiating            Review of Systems   Constitutional: Negative for chills and fever.   HENT: Negative for congestion and sinus pressure.    Eyes: Negative for photophobia and visual disturbance.   Respiratory: Negative for chest tightness and shortness of breath.    Cardiovascular: Negative for chest pain and leg swelling.   Gastrointestinal: Negative for abdominal pain and vomiting.   Endocrine: Negative for cold intolerance and heat intolerance.   Genitourinary: Negative for difficulty urinating and dysuria.   Musculoskeletal: Negative for arthralgias and back pain.   Skin: Negative for color change and pallor.   Neurological: Positive for headaches. Negative for dizziness, speech difficulty and weakness.   Psychiatric/Behavioral: Negative for agitation and behavioral problems.       Past Medical History:   Diagnosis Date   • GERD (gastroesophageal reflux disease)    • Hyperlipidemia    • Hypertension    • Leukemia (CMS/HCC)        Allergies   Allergen  Reactions   • Penicillins Rash   • Sulfa Antibiotics Rash       Past Surgical History:   Procedure Laterality Date   • BONE MARROW TRANSPLANT     • CARDIAC CATHETERIZATION     • SINUS SURGERY     • TONSILLECTOMY     • VASECTOMY         History reviewed. No pertinent family history.    Social History     Socioeconomic History   • Marital status:      Spouse name: Not on file   • Number of children: Not on file   • Years of education: Not on file   • Highest education level: Not on file   Tobacco Use   • Smoking status: Former Smoker   Substance and Sexual Activity   • Alcohol use: No     Frequency: Never   • Drug use: Defer   • Sexual activity: Defer     Prior to Admission medications    Medication Sig Start Date End Date Taking? Authorizing Provider   CloNIDine (CATAPRES) 0.1 MG tablet Take 0.1 mg by mouth Every 8 (Eight) Hours. 4/4/19  Yes Gutierrez Pavon MD   fluticasone (FLONASE) 50 MCG/ACT nasal spray 1 spray into the nostril(s) as directed by provider 1 (One) Time. Once daily 1/17/17  Yes Gutierrez Pavon MD   acyclovir (ZOVIRAX) 800 MG tablet Take 800 mg by mouth 2 (Two) Times a Day.    Gutierrez Pavon MD   aspirin 81 MG chewable tablet Chew 81 mg Daily.    Gutierrez Pavon MD   azelastine (ASTELIN) 0.1 % nasal spray 2 sprays into the nostril(s) as directed by provider Daily. Use in each nostril as directed    Gutierrez Pavon MD   carvedilol (COREG) 12.5 MG tablet Take 12.5 mg by mouth 2 (Two) Times a Day With Meals.    Gutierrez Pavon MD   clopidogrel (PLAVIX) 75 MG tablet TAKE 1 TABLET BY MOUTH EVERY DAY IN THE MORNING 7/29/19   Michelle Hernández MD   dapsone 100 MG tablet Take 100 mg by mouth Daily.    Gutierrez Pavon MD   escitalopram (LEXAPRO) 10 MG tablet Take 10 mg by mouth Daily.    Gutierrez Pavon MD   fenofibrate (TRICOR) 145 MG tablet Take 145 mg by mouth Daily.    Gutierrez Pavon MD   hydrochlorothiazide (HYDRODIURIL) 12.5 MG tablet Take 25  mg by mouth Daily. 6/14/19   Gutierrez Pavon MD   isosorbide mononitrate (IMDUR) 30 MG 24 hr tablet Take 30 mg by mouth Daily.    Gutierrez Pavon MD   lisinopril (PRINIVIL,ZESTRIL) 10 MG tablet Take 10 mg by mouth 3 (Three) Times a Day.    Gutierrez Pavon MD   LOTEMAX 0.5 % ophthalmic suspension 1 DROP INTO BOTH EYES TWICE A DAY 6/5/19   Gutierrez Pavon MD   pantoprazole (PROTONIX) 40 MG EC tablet Take 40 mg by mouth Daily.    Gutierrez Pavon MD   predniSONE (DELTASONE) 20 MG tablet Take 10 mg by mouth Daily.    Gutierrez Pavon MD   rosuvastatin (CRESTOR) 20 MG tablet Take 20 mg by mouth Daily.    Gutierrez Pavon MD   voriconazole (VFEND) 200 MG tablet Take 200 mg by mouth Daily.    Gutierrez Pavon MD   XIIDRA 5 % solution 1 DROP INTO BOTH EYES TWICE A DAY 6/6/19   Gutierrez Pavon MD           Objective   Physical Exam  63-year-old awake alert no distress HEENT extraocular muscles intact pupils equal round react there is no photophobia disks are sharp mouth is clear no exudate abscess stridor drooling normal voice.  Patient has no direct cervical thoracic lumbar spine tenderness noted he is got some tenderness and spasm noted to the right paracervical right trapezius muscle is not red or hot there is no crepitus or subcutaneous air.  Good and equal carotid pulses no bruits trachea midline no evidence of liquids angina.  Lungs clear no retractions heart regular without murmur abdomen soft without tenderness or masses.  Extremities pulses are equal throughout upper and lower extremities no edema cords or Homans sign or evidence of DVT.  Patient's awake alert orientated x4 no facial asymmetry normal speech without focal weakness shoulder shrug is normal bicep tricep wrist  all normal.  Legs motor strength is all normal toes up-and-down including big toe dorsiflex plantarflex out difficulty.  Procedures           ED Course      Results for orders placed or  performed during the hospital encounter of 08/04/19   Basic Metabolic Panel   Result Value Ref Range    Glucose 126 (H) 65 - 99 mg/dL    BUN 20 8 - 20 mg/dL    Creatinine 1.30 (H) 0.70 - 1.20 mg/dL    Sodium 136 136 - 144 mmol/L    Potassium 3.6 3.6 - 5.1 mmol/L    Chloride 104 101 - 111 mmol/L    CO2 24.0 22.0 - 32.0 mmol/L    Calcium 8.6 (L) 8.9 - 10.3 mg/dL    eGFR Non African Amer 56 (L) >60 mL/min/1.73    BUN/Creatinine Ratio 15.4 6.2 - 20.3    Anion Gap 11.6 5.0 - 15.0 mmol/L   Troponin   Result Value Ref Range    Troponin I <0.030 0.000 - 0.030 ng/mL   CK   Result Value Ref Range    Creatine Kinase 48 (L) 49 - 397 U/L   CBC Auto Differential   Result Value Ref Range    WBC 10.30 3.40 - 10.80 10*3/mm3    RBC 3.37 (L) 4.14 - 5.80 10*6/mm3    Hemoglobin 11.6 (L) 13.0 - 17.7 g/dL    Hematocrit 33.7 (L) 37.5 - 51.0 %    .2 (H) 79.0 - 97.0 fL    MCH 34.4 (H) 26.6 - 33.0 pg    MCHC 34.4 31.5 - 35.7 g/dL    RDW 16.7 (H) 12.3 - 15.4 %    RDW-SD 59.9 (H) 37.0 - 54.0 fl    MPV 7.6 6.0 - 12.0 fL    Platelets 361 140 - 450 10*3/mm3    Neutrophil % 70.2 42.7 - 76.0 %    Lymphocyte % 17.7 (L) 19.6 - 45.3 %    Monocyte % 10.1 5.0 - 12.0 %    Eosinophil % 0.7 0.3 - 6.2 %    Basophil % 1.3 0.0 - 1.5 %    Neutrophils, Absolute 7.20 (H) 1.70 - 7.00 10*3/mm3    Lymphocytes, Absolute 1.80 0.70 - 3.10 10*3/mm3    Monocytes, Absolute 1.00 (H) 0.10 - 0.90 10*3/mm3    Eosinophils, Absolute 0.10 0.00 - 0.40 10*3/mm3    Basophils, Absolute 0.10 0.00 - 0.20 10*3/mm3    nRBC 0.1 0.0 - 0.2 /100 WBC     Ct Head Without Contrast    Result Date: 8/4/2019  CT HEAD IMPRESSION: No acute intracranial abnormality is identified. CT CERVICAL SPINE IMPRESSION: 1. No acute cervical spine fracture or malalignment is identified. 2. Multilevel degenerative changes as described above. Isaac Yang M.D. Neuroradiologist  Electronically signed by:  Isaac Yang M.D.  8/4/2019 11:00 PM    Ct Cervical Spine Without Contrast    Result Date:  8/4/2019  CT HEAD IMPRESSION: No acute intracranial abnormality is identified. CT CERVICAL SPINE IMPRESSION: 1. No acute cervical spine fracture or malalignment is identified. 2. Multilevel degenerative changes as described above. Isaac Yang M.D. Neuroradiologist  Electronically signed by:  Isaac Yang M.D.  8/4/2019 11:00 PM    Medications   sodium chloride 0.9 % flush 10 mL (not administered)   Morphine sulfate (PF) injection 4 mg (4 mg Intravenous Given 8/5/19 0006)   ketorolac (TORADOL) injection 10 mg (10 mg Intravenous Given 8/5/19 0007)   ondansetron (ZOFRAN) injection 4 mg (4 mg Intravenous Given 8/5/19 0007)                   MDM  Number of Diagnoses or Management Options  Neck pain:   Diagnosis management comments: Decision-making.  Patient had IV established placed on a monitor and had the above exam and evaluation.  Patient CBC unremarkable hemoglobin 11.9 chemistries troponin negative.  Patient's EKG revealed a normal sinus rhythm rate 74 normal axis no hypertrophy QTC of 4 and 47 normal EKG.  I see no evidence of acute cardiac ischemia CT scan shows a lot of degenerative changes with disc bulges and foraminal narrowing throughout.  Please see full radiology report.  The patient has no fracture or malalignment head CT without was no acute findings.  See no evidence to suggest meningitis.  This seems to be musculoskeletal is very isolated to the trapezius posterior neck see no signs of infection no evidence of cardiac ischemia or dissection or carotid artery dissection.  No evidence of meningitis encephalitis and abscess spinal cord compromise.  The patient was made aware the findings he will be discharged home for outpatient management stable.  Patient had been given morphine and Zofran IV.  He is already on prednisone at home.  Inspect reviewed        Final diagnoses:   Neck pain            Chao Camilo MD  08/05/19 0122

## 2019-08-05 NOTE — DISCHARGE INSTRUCTIONS
Follow-up primary care doctor next 1 to 2 days.  Heating pad.  May apply a pain patch over-the-counter..  Norco will make constipated and sleepy please increase fluid and fiber.  Flexeril will make sleepy.  Return for fever redness swelling chest pain shortness of breath status changes in his extremities or any other new or worsening problems or concerns.

## 2019-08-05 NOTE — ED NOTES
Report called to [facility], spoke with Ila French RN. EMTALA form completed. EMS activated. [place] updated     Swenson, Melida R, RN  08/05/19 4048

## 2019-08-10 LAB
BACTERIA SPEC AEROBE CULT: NORMAL
BACTERIA SPEC AEROBE CULT: NORMAL

## 2019-08-21 ENCOUNTER — OFFICE VISIT (OUTPATIENT)
Dept: SLEEP MEDICINE | Facility: HOSPITAL | Age: 64
End: 2019-08-21

## 2019-08-21 VITALS
DIASTOLIC BLOOD PRESSURE: 68 MMHG | HEART RATE: 68 BPM | SYSTOLIC BLOOD PRESSURE: 126 MMHG | HEIGHT: 67 IN | OXYGEN SATURATION: 96 % | WEIGHT: 166 LBS | BODY MASS INDEX: 26.06 KG/M2

## 2019-08-21 DIAGNOSIS — G47.33 OBSTRUCTIVE SLEEP APNEA, ADULT: Primary | ICD-10-CM

## 2019-08-21 PROCEDURE — G0463 HOSPITAL OUTPT CLINIC VISIT: HCPCS

## 2019-08-21 NOTE — PROGRESS NOTES
SLEEP MEDICINE CONSULT      Patient Identification:     Name: Anthony Ayoub   Age: 63 y.o.   Gender: male   : 1955   MRN: [id number]     Date of consult: 2019    PCP/Referring Physician(s):     PCP: Yifan Elizabeth MD  Referring Provider: Yifan Elizabeth*      Chief Complaint:     Selective sleep apnea.  4 weeks follow-up for compliance.  History of Present Illness:   This is a very pleasant 63 years old  gentleman was accompanied by his wife for this visit.  He underwent an unattended polysomnogram which showed presence of obstructive sleep apnea.  It was followed by CPAP titration.  He was given an auto BiPAP mode of therapy.  He is here today for follow-up for compliance.  Memory card has been downloaded.    He has used a full facemask which is key view.  He finds the mask not very comfortable.  He presumes he is not a mouth breather.  He would prefer a nasal mask.  He finds the pressures too high it for his comfort.  He wants to turn it down.  He uses humidifier on a regular basis.    He has slept poorly with the given pressures in the mask.  As mentioned above he finds the mask quite uncomfortable and pressures too high.  He has use the mask with given pressures for a few days and gave it up.  He has continued to snore at night.  He has felt tired and somnolent during the daytime.    He was recently discharged from [facility] in [place].  He suffered a myocardial infarction.  He remained in intensive care unit for several days.  An IABP pump was placed.  He was diagnosed with severe congestive heart failure.  He is scheduled for a cardiac catheterization and possible stent placement as per his wife.    Allergies, Medications, and Past History:   Allergies:    Allergies   Allergen Reactions   • Penicillins Rash   • Sulfa Antibiotics Rash     Current Medications:    Prior to Admission medications    Medication Sig Start Date End Date Taking? Authorizing  Provider   acyclovir (ZOVIRAX) 800 MG tablet Take 800 mg by mouth 2 (Two) Times a Day.   Yes Gutierrez Pavon MD   aspirin 81 MG chewable tablet Chew 81 mg Daily.   Yes ProviderGutierrez MD   carvedilol (COREG) 12.5 MG tablet Take 12.5 mg by mouth 2 (Two) Times a Day With Meals.   Yes Gutierrez Pavon MD   clopidogrel (PLAVIX) 75 MG tablet TAKE 1 TABLET BY MOUTH EVERY DAY IN THE MORNING 7/29/19  Yes Michelle Hernández MD   dapsone 100 MG tablet Take 100 mg by mouth Daily.   Yes ProviderGutierrez MD   escitalopram (LEXAPRO) 10 MG tablet Take 10 mg by mouth Daily.   Yes ProviderGutierrez MD   fenofibrate (TRICOR) 145 MG tablet Take 145 mg by mouth Every Other Day.   Yes ProviderGutierrez MD   isosorbide mononitrate (IMDUR) 30 MG 24 hr tablet Take 30 mg by mouth Daily.   Yes ProviderGutierrez MD   LOTEMAX 0.5 % ophthalmic suspension 1 DROP INTO BOTH EYES TWICE A DAY 6/5/19  Yes ProviderGutierrez MD   pantoprazole (PROTONIX) 40 MG EC tablet Take 40 mg by mouth Daily.   Yes ProviderGutierrez MD   predniSONE (DELTASONE) 20 MG tablet Take 10 mg by mouth Daily.   Yes ProviderGutierrez MD   rosuvastatin (CRESTOR) 20 MG tablet Take 20 mg by mouth Daily.   Yes ProviderGutierrez MD   voriconazole (VFEND) 200 MG tablet Take 200 mg by mouth Daily.   Yes Gutierrez Pavon MD   XIIDRA 5 % solution 1 DROP INTO BOTH EYES TWICE A DAY 6/6/19  Yes ProviderGutierrez MD   azelastine (ASTELIN) 0.1 % nasal spray 2 sprays into the nostril(s) as directed by provider Daily. Use in each nostril as directed    Gutierrez Pavon MD   CloNIDine (CATAPRES) 0.1 MG tablet Take 0.1 mg by mouth 2 (Two) Times a Day. 4/4/19   Gutierrez Pavon MD   cyclobenzaprine (FLEXERIL) 5 MG tablet Take 1 tablet by mouth 3 (Three) Times a Day As Needed for Muscle Spasms. 8/5/19   Chao Camilo MD   fluticasone (FLONASE) 50 MCG/ACT nasal spray 1 spray into the nostril(s) as directed by provider 1 (One)  Time. Once daily 1/17/17   ProviderGutierrez MD   HYDROcodone-acetaminophen (NORCO) 5-325 MG per tablet Take 1 tablet by mouth Every 6 (Six) Hours As Needed for Severe Pain . 8/5/19   Chao Camilo MD   lisinopril (PRINIVIL,ZESTRIL) 10 MG tablet Take 10 mg by mouth Daily.    Gutierrez Pavon MD   hydrochlorothiazide (HYDRODIURIL) 12.5 MG tablet Take 25 mg by mouth Daily. 6/14/19 8/21/19  ProviderGutierrez MD     Past Medical History:    Past Medical History:   Diagnosis Date   • CHF (congestive heart failure) (CMS/HCC)    • Coronary artery disease    • GERD (gastroesophageal reflux disease)    • Hyperlipidemia    • Hypertension    • Leukemia (CMS/HCC)       Past Surgical History:    Past Surgical History:   Procedure Laterality Date   • BONE MARROW TRANSPLANT     • CARDIAC CATHETERIZATION     • SINUS SURGERY     • TONSILLECTOMY     • VASECTOMY        Social History:    Social History     Tobacco Use   • Smoking status: Former Smoker   Substance Use Topics   • Alcohol use: No     Frequency: Never   • Drug use: Defer     Family Medical History:  No family history on file.      Review of Systems:   Constitutional:  Denies fever or chills and weight gain  Eyes:  Denies change in visual acuity   HENT: He has nasal congestion, sore throat or post nasal drainage takes Claritin on regular basis.  He has perforated left ear drum.  He is followed by ENT.  He also has chronic sinusitis.  Respiratory: Has  cough, shortness of breath or dyspnea on exertion .  He was diagnosed with pleural effusion and congestive heart failure.  Cardiovascular: He has coronary artery disease.  Recently suffered an MI.  He was admitted to [facility] in [place].  He was diagnosed with severe congestive heart failure.  IABP pump was placed.  GI:  Denies abdominal pain, nausea, vomiting, bloody stools or diarrhea   :  Denies dysuria or nocturia   Musculoskeletal: Symptoms of arthritis.  Integument:  Denies rash  "  Neurologic:  Denies headache, focal weakness or sensory changes. No history of stroke or seizures.   Endocrine:  Denies polyuria or polydipsia   Lymphatic: Underwent bone marrow transplant for leukemia  Psychiatric:  Denies depression, anxiety or claustrophobia      Physical Exam:     Vitals:    08/21/19 1406   BP: 126/68   BP Location: Left arm   Pulse: 68   SpO2: 96%   Weight: 75.3 kg (166 lb)   Height: 170.2 cm (67\")     HEENT: Head is normocephalic, atraumatic, normal distribution of hair. Pupils reactive to light. Ocular movements intact.  Mild nasal congestion on sniff test. No deviated nasal septum. No micrognathia.  Throat: Mallapatti stage 4, tongue midline, mucosa moist.  Neck: no JVD, thyromegaly, mass, +midline trachea, No lymphadenopathy.  Lungs: clear, no wheeze, rhonchi, crackles  Cardiovascular: S1, S2, RRR no murmurs, rubs or gallops  Abd: +BS's soft NT/ND, no CVA tenderness.    Ext: no edema, cyanosis, clubbing, pulses intact  Neuro: Awake alert, oriented to time place and person. Grossly intact to motor, sensory cerebral, and cerebellar (without focal deficit)  Psych: No overt josie, depression, psychosis or inappropriate behavior  Skin: No rash, cellulitis, or ulcerations.  No facial rash or erosions    DIAGNOSTIC DATA  1.  An unattended polysomnogram done on 6/6/2019 shows an ELISE of 21.4 events per hour  Oxygen saturation monitoring shows a oxygen saturation of 90% and minimum oxygen saturation of 69%.  2.  CPAP/BiPAP titration was attempted on 6/25/2019.  Respiratory events were not adequately controlled on given pressures.  Therefore an auto BiPAP mode of therapy was recommended with a pressure range between 14-22.  Pressure support between 4 and 5.  Assessment/Plan:   Obstructive sleep apnea:  This is a very pleasant 83 years old  gentleman who is accompanied by his wife for this visit.  He has tried to use the auto BiPAP mode of therapy.  He was unsuccessful in doing so because of " high pressures and uncomfortable mask.    The results of unattended polysomnogram and CPAP titration study was discussed in detail with the patient all questions were answered.  Complications of untreated sleep apnea were also discussed in detail including increased risk of stroke, coronary artery disease with associated myocardial infarction.  Cardiac arrhythmias.  Possibility of dying and sleep.  Increased risk of pulmonary as well as systemic hypertension.  Short-term complications included easy fatigability.  Daytime hypersomnolence with possibility of dozing off during sedentary activities such as driving with all attendant complications.  Memory as well as mood issues.    Different options of masks were discussed in detail with the patient.  Was advised to use a different full facemask.  The patient does not want to use big bulky mask.  He would like to give a try to nasal pillows.  He was shown different nasal pillows.  He chose DreamWear nasal pillows.  A sample has been given to him.      Recommendation.  Advised to use his mask with given pressures on a nightly basis.  Is advised to use the mask with given pressures for at least 4 hours a minimum benefit or as long as he sleeps on maximum benefit.  He is advised to use the mask with given pressures if he takes a nap during the daytime.  The auto BiPAP mode pressures has been decreased to the range of 8-22 CWP.  The pressure support remain between the range of 4-6 CWP.  Nocturnal pulse oximetry is recommended on the given pressures.  Allergic rhinitis:  He has remained symptomatic.    Recommendation.  Aggressive therapy is recommended.  Driving instructions. Patient is advised to avoid driving if tired or somnolent. To avoid all alcoholic beverages or medications causing somnolence.         Diagnosis Plan   1. Obstructive sleep apnea, adult  CPAP Therapy     No orders of the defined types were placed in this encounter.    Orders Placed This Encounter    Procedures   • CPAP Therapy     To  [facility].    Dream ware nasl pillows.    Nocturnal pulse oximetry with  Auto bipap mode.     Order Specific Question:   Equipment     Answer:    BiPAP     Order Specific Question:   PAP Machine Brand     Answer:   Respironics     Order Specific Question:   PAP Tubing (1 per 3 months)     Answer:    6' Standard tubing     Order Specific Question:   PAP Mask (1 per 3 months)     Answer:    Nasal mask     Order Specific Question:   Nasal cushion or pillow (2 per month)     Answer:    Pillow replacement for use on nasal cannula type     Order Specific Question:   PAP Accessories     Answer:    Water Chamber for PAP Humidifier (1 per 6 month) &  Filter PAP Disposable (2 per month) &  Filter PAP Non-Disposable (1 per 6 months) &  Chinstrap to be used with PAP (1 per 6 months) &  Headgear to be used with PAP (1 per 6 mo)     Order Specific Question:   The prescribed settings for the PAP ordered are     Answer:   to derease auto bopap pressure range to 8 to 22 cwp. the pressure support range 4 to 6 cwp.     Order Specific Question:   Does the patient have Obstructive Sleep Apnea or other qualifying diagnosis for PAP ordered?     Answer:   Yes     Order Specific Question:   Does the patient require humidification?     Answer:   Yes     Order Specific Question:   Humidifier     Answer:    Humidifier Heated for CPAP or BiPAP     Order Specific Question:   If ordering a BiPAP for BETY a CPAP has been tried and/or ruled out?     Answer:   Yes     Order Specific Question:   The patient requires a PAP Device & continued use of Supplies to administer effective PAP therapy at the frequency of use ordered above     Answer:   Yes     Order Specific Question:   Initial Supplies and refills authorized for 12 months?     Answer:   Yes     Order Specific Question:   The face to face evaluation was performed on     Answer:   8/21/2019     Order  Specific Question:   Length of Need (99 Months = Lifetime)     Answer:   99 Months = Lifetime         Evonne Corona MD  8/21/2019  7:04 PM

## 2019-09-17 ENCOUNTER — TRANSCRIBE ORDERS (OUTPATIENT)
Dept: CARDIAC REHAB | Facility: HOSPITAL | Age: 64
End: 2019-09-17

## 2019-09-17 DIAGNOSIS — I21.4 NSTEMI (NON-ST ELEVATED MYOCARDIAL INFARCTION) (HCC): Primary | ICD-10-CM

## 2019-09-18 ENCOUNTER — TRANSCRIBE ORDERS (OUTPATIENT)
Dept: CARDIAC REHAB | Facility: HOSPITAL | Age: 64
End: 2019-09-18

## 2019-09-18 DIAGNOSIS — I21.4 NSTEMI (NON-ST ELEVATION MYOCARDIAL INFARCTION) (HCC): Primary | ICD-10-CM

## 2019-09-20 ENCOUNTER — OFFICE VISIT (OUTPATIENT)
Dept: CARDIAC REHAB | Facility: HOSPITAL | Age: 64
End: 2019-09-20

## 2019-09-20 ENCOUNTER — TRANSCRIBE ORDERS (OUTPATIENT)
Dept: CARDIAC REHAB | Facility: HOSPITAL | Age: 64
End: 2019-09-20

## 2019-09-20 DIAGNOSIS — I21.4 NSTEMI (NON-ST ELEVATED MYOCARDIAL INFARCTION) (HCC): Primary | ICD-10-CM

## 2019-09-20 PROCEDURE — 93798 PHYS/QHP OP CAR RHAB W/ECG: CPT

## 2019-09-20 NOTE — PROGRESS NOTES
Initial evaluation. Oriented to department. Placed on schedule. Goals obtained. Pre-outcome measurement tools completed. Six minute walk without difficulty. SR. Cassie RN

## 2019-09-24 ENCOUNTER — TREATMENT (OUTPATIENT)
Dept: CARDIAC REHAB | Facility: HOSPITAL | Age: 64
End: 2019-09-24

## 2019-09-24 DIAGNOSIS — I21.4 NSTEMI (NON-ST ELEVATED MYOCARDIAL INFARCTION) (HCC): Primary | ICD-10-CM

## 2019-09-24 PROCEDURE — 93798 PHYS/QHP OP CAR RHAB W/ECG: CPT

## 2019-09-25 ENCOUNTER — TREATMENT (OUTPATIENT)
Dept: CARDIAC REHAB | Facility: HOSPITAL | Age: 64
End: 2019-09-25

## 2019-09-25 DIAGNOSIS — I21.4 NSTEMI (NON-ST ELEVATED MYOCARDIAL INFARCTION) (HCC): Primary | ICD-10-CM

## 2019-09-25 PROCEDURE — 93798 PHYS/QHP OP CAR RHAB W/ECG: CPT

## 2019-09-26 ENCOUNTER — TREATMENT (OUTPATIENT)
Dept: CARDIAC REHAB | Facility: HOSPITAL | Age: 64
End: 2019-09-26

## 2019-09-26 DIAGNOSIS — I21.4 NSTEMI (NON-ST ELEVATED MYOCARDIAL INFARCTION) (HCC): Primary | ICD-10-CM

## 2019-09-26 PROCEDURE — 93798 PHYS/QHP OP CAR RHAB W/ECG: CPT

## 2019-10-01 ENCOUNTER — TREATMENT (OUTPATIENT)
Dept: CARDIAC REHAB | Facility: HOSPITAL | Age: 64
End: 2019-10-01

## 2019-10-01 DIAGNOSIS — I21.4 NSTEMI (NON-ST ELEVATED MYOCARDIAL INFARCTION) (HCC): Primary | ICD-10-CM

## 2019-10-01 PROCEDURE — 93798 PHYS/QHP OP CAR RHAB W/ECG: CPT

## 2019-10-02 ENCOUNTER — TREATMENT (OUTPATIENT)
Dept: CARDIAC REHAB | Facility: HOSPITAL | Age: 64
End: 2019-10-02

## 2019-10-02 DIAGNOSIS — I21.4 NSTEMI (NON-ST ELEVATED MYOCARDIAL INFARCTION) (HCC): Primary | ICD-10-CM

## 2019-10-02 PROCEDURE — 93798 PHYS/QHP OP CAR RHAB W/ECG: CPT

## 2019-10-03 ENCOUNTER — TREATMENT (OUTPATIENT)
Dept: CARDIAC REHAB | Facility: HOSPITAL | Age: 64
End: 2019-10-03

## 2019-10-03 DIAGNOSIS — I21.4 NSTEMI (NON-ST ELEVATED MYOCARDIAL INFARCTION) (HCC): Primary | ICD-10-CM

## 2019-10-03 PROCEDURE — 93798 PHYS/QHP OP CAR RHAB W/ECG: CPT

## 2019-10-04 ENCOUNTER — APPOINTMENT (OUTPATIENT)
Dept: GENERAL RADIOLOGY | Facility: HOSPITAL | Age: 64
End: 2019-10-04

## 2019-10-04 ENCOUNTER — HOSPITAL ENCOUNTER (EMERGENCY)
Facility: HOSPITAL | Age: 64
Discharge: HOME | End: 2019-10-04
Admitting: EMERGENCY MEDICINE

## 2019-10-04 VITALS
HEIGHT: 67 IN | DIASTOLIC BLOOD PRESSURE: 70 MMHG | BODY MASS INDEX: 25.95 KG/M2 | TEMPERATURE: 97.7 F | OXYGEN SATURATION: 94 % | SYSTOLIC BLOOD PRESSURE: 135 MMHG | HEART RATE: 64 BPM | RESPIRATION RATE: 16 BRPM | WEIGHT: 165.34 LBS

## 2019-10-04 DIAGNOSIS — S16.1XXA STRAIN OF NECK MUSCLE, INITIAL ENCOUNTER: ICD-10-CM
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
## 2019-10-04 PROCEDURE — 72050 X-RAY EXAM NECK SPINE 4/5VWS: CPT

## 2019-10-04 PROCEDURE — 99283 EMERGENCY DEPT VISIT LOW MDM: CPT

## 2019-10-08 ENCOUNTER — APPOINTMENT (OUTPATIENT)
Dept: CARDIAC REHAB | Facility: HOSPITAL | Age: 64
End: 2019-10-08

## 2019-10-09 ENCOUNTER — APPOINTMENT (OUTPATIENT)
Dept: CARDIAC REHAB | Facility: HOSPITAL | Age: 64
End: 2019-10-09

## 2019-10-10 ENCOUNTER — APPOINTMENT (OUTPATIENT)
Dept: CARDIAC REHAB | Facility: HOSPITAL | Age: 64
End: 2019-10-10

## 2019-10-15 ENCOUNTER — TREATMENT (OUTPATIENT)
Dept: CARDIAC REHAB | Facility: HOSPITAL | Age: 64
End: 2019-10-15

## 2019-10-15 DIAGNOSIS — I21.4 NSTEMI (NON-ST ELEVATED MYOCARDIAL INFARCTION) (HCC): Primary | ICD-10-CM

## 2019-10-15 PROCEDURE — 93798 PHYS/QHP OP CAR RHAB W/ECG: CPT

## 2019-10-16 ENCOUNTER — APPOINTMENT (OUTPATIENT)
Dept: CARDIAC REHAB | Facility: HOSPITAL | Age: 64
End: 2019-10-16

## 2019-10-17 ENCOUNTER — OFFICE VISIT (OUTPATIENT)
Dept: FAMILY MEDICINE CLINIC | Facility: CLINIC | Age: 64
End: 2019-10-17

## 2019-10-17 ENCOUNTER — TELEPHONE (OUTPATIENT)
Dept: FAMILY MEDICINE CLINIC | Facility: CLINIC | Age: 64
End: 2019-10-17

## 2019-10-17 ENCOUNTER — TREATMENT (OUTPATIENT)
Dept: CARDIAC REHAB | Facility: HOSPITAL | Age: 64
End: 2019-10-17

## 2019-10-17 ENCOUNTER — OFFICE VISIT (OUTPATIENT)
Dept: SLEEP MEDICINE | Facility: HOSPITAL | Age: 64
End: 2019-10-17

## 2019-10-17 VITALS
HEART RATE: 71 BPM | HEIGHT: 67 IN | BODY MASS INDEX: 27.25 KG/M2 | DIASTOLIC BLOOD PRESSURE: 83 MMHG | OXYGEN SATURATION: 96 % | WEIGHT: 173.6 LBS | SYSTOLIC BLOOD PRESSURE: 170 MMHG

## 2019-10-17 DIAGNOSIS — G44.52 NEW DAILY PERSISTENT HEADACHE: Primary | ICD-10-CM

## 2019-10-17 DIAGNOSIS — G47.33 OBSTRUCTIVE SLEEP APNEA, ADULT: Primary | ICD-10-CM

## 2019-10-17 DIAGNOSIS — I21.4 NSTEMI (NON-ST ELEVATED MYOCARDIAL INFARCTION) (HCC): Primary | ICD-10-CM
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
## 2019-10-17 PROCEDURE — G0463 HOSPITAL OUTPT CLINIC VISIT: HCPCS

## 2019-10-17 PROCEDURE — 99213 OFFICE O/P EST LOW 20 MIN: CPT | Performed by: FAMILY MEDICINE

## 2019-10-17 PROCEDURE — 93798 PHYS/QHP OP CAR RHAB W/ECG: CPT
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
## 2019-10-17 NOTE — PROGRESS NOTES
SLEEP MEDICINE CONSULT      Patient Identification:     Name: Anthony Ayoub   Age: 63 y.o.   Gender: male   : 1955   MRN: [id number]     Date of consult: 10/17/2019    PCP/Referring Physician(s):     PCP: Yifan Elizabeth MD  Referring Provider: Evonne Corona MD      Chief Complaint:     Selective sleep apnea.  8 weeks follow-up for compliance.  History of Present Illness:   This is a very pleasant 63 years old  gentleman was accompanied by his wife for this visit.  He is here today for 2 months follow-up for compliance.  His compliance was somewhat suboptimal during previous visit.  He suffered an acute myocardial infarction and was hospitalized for several days.  Presently undergoing cardiac rehab.      He underwent an unattended polysomnogram which showed presence of obstructive sleep apnea.  It was followed by CPAP titration.  He was given an auto BiPAP mode of therapy.  He is here today for follow-up for compliance.  Memory card has been downloaded.    He uses  nasal pillows which he finds quite comfortable.  Has hasused a full facemask which was key view.  He found the mask very uncomfortable.   He finds the pressures quite comfortable.  He uses humidifier on a regular basis.    He has slept very well with decreasing pressures.  He continues to have dry mouth.  He denies symptoms of headache or heartburn at night or in the morning no symptoms of sinus congestion.  He does not snore with the mask in place he denies any symptoms of heartburn or palpitations at night or in the morning.  As per his wife he has slept much better and more soundly with the given pressures in the mask.      He has woken up in the morning awake alert and rested.  He likes to have 2 cups of half caffeinated coffee.  No soft drinks during the daytime.  Occasionally takes a nap in the afternoon lasting 30 minutes to an hour.  He has felt refreshed after the nap.  He uses his mask with given pressures  during his naps.      Allergies, Medications, and Past History:   Allergies:    Allergies   Allergen Reactions   • Penicillins Rash   • Sulfa Antibiotics Rash     Current Medications:    Prior to Admission medications    Medication Sig Start Date End Date Taking? Authorizing Provider   acyclovir (ZOVIRAX) 800 MG tablet Take 800 mg by mouth 2 (Two) Times a Day.   Yes Gutierrez Pavon MD   aspirin 81 MG chewable tablet Chew 81 mg Daily.   Yes Gutierrez Pavon MD   carvedilol (COREG) 12.5 MG tablet Take 12.5 mg by mouth 2 (Two) Times a Day With Meals.   Yes Gutierrez Pavon MD   clopidogrel (PLAVIX) 75 MG tablet TAKE 1 TABLET BY MOUTH EVERY DAY IN THE MORNING 7/29/19  Yes Michelle Hernández MD   dapsone 100 MG tablet Take 100 mg by mouth Daily.   Yes Gutierrez Pavon MD   escitalopram (LEXAPRO) 10 MG tablet Take 10 mg by mouth Daily.   Yes Gutierrez Pavon MD   fenofibrate (TRICOR) 145 MG tablet Take 145 mg by mouth Every Other Day.   Yes Gutierrez Pavon MD   isosorbide mononitrate (IMDUR) 30 MG 24 hr tablet Take 30 mg by mouth Daily.   Yes Gutierrez Pavon MD   LOTEMAX 0.5 % ophthalmic suspension 1 DROP INTO BOTH EYES TWICE A DAY 6/5/19  Yes Gutierrez Pavon MD   pantoprazole (PROTONIX) 40 MG EC tablet Take 40 mg by mouth Daily.   Yes Gutierrez Pavon MD   predniSONE (DELTASONE) 20 MG tablet Take 10 mg by mouth Daily.   Yes Gutierrez Pavon MD   rosuvastatin (CRESTOR) 20 MG tablet Take 20 mg by mouth Daily.   Yes Gutierrez Pavon MD   voriconazole (VFEND) 200 MG tablet Take 200 mg by mouth Daily.   Yes Gutierrez Pavon MD   XIIDRA 5 % solution 1 DROP INTO BOTH EYES TWICE A DAY 6/6/19  Yes Gutierrez Pavon MD   azelastine (ASTELIN) 0.1 % nasal spray 2 sprays into the nostril(s) as directed by provider Daily. Use in each nostril as directed    Gutierrez Pavon MD   CloNIDine (CATAPRES) 0.1 MG tablet Take 0.1 mg by mouth 2 (Two) Times a Day. 4/4/19    ProviderGutierrez MD   cyclobenzaprine (FLEXERIL) 5 MG tablet Take 1 tablet by mouth 3 (Three) Times a Day As Needed for Muscle Spasms. 8/5/19   Chao Camilo MD   fluticasone (FLONASE) 50 MCG/ACT nasal spray 1 spray into the nostril(s) as directed by provider 1 (One) Time. Once daily 1/17/17   Gutierrez Pavon MD   HYDROcodone-acetaminophen (NORCO) 5-325 MG per tablet Take 1 tablet by mouth Every 6 (Six) Hours As Needed for Severe Pain . 8/5/19   Chao Camilo MD   lisinopril (PRINIVIL,ZESTRIL) 10 MG tablet Take 10 mg by mouth Daily.    Gutierrez Pavon MD   hydrochlorothiazide (HYDRODIURIL) 12.5 MG tablet Take 25 mg by mouth Daily. 6/14/19 8/21/19  Gutierrez Pavon MD     Past Medical History:    Past Medical History:   Diagnosis Date   • CHF (congestive heart failure) (CMS/[facility])    • Coronary artery disease    • GERD (gastroesophageal reflux disease)    • Hyperlipidemia    • Hypertension    • Leukemia (CMS/[facility])    • Sleep apnea       Past Surgical History:    Past Surgical History:   Procedure Laterality Date   • BONE MARROW TRANSPLANT     • CARDIAC CATHETERIZATION     • SINUS SURGERY     • TONSILLECTOMY     • VASECTOMY        Social History:    Social History     Tobacco Use   • Smoking status: Former Smoker     Packs/day: 1.00     Years: 30.00     Pack years: 30.00     Last attempt to quit: 2016     Years since quitting: 3.7   • Smokeless tobacco: Never Used   Substance Use Topics   • Alcohol use: No     Frequency: Never   • Drug use: Defer     Family Medical History:  Family History   Problem Relation Age of Onset   • Hypertension Mother    • Hyperlipidemia Mother    • Heart disease Father    • Heart attack Father    • Hypertension Sister    • Hypertension Brother    • Heart disease Brother          Review of Systems:   Constitutional:  Denies fever or chills and weight gain  Eyes:  Denies change in visual acuity   HENT: He has nasal congestion, sore throat or post nasal drainage takes  "Claritin on regular basis.  He has perforated left ear drum.  He is followed by ENT.  He also has chronic sinusitis.  Respiratory: Has  cough, shortness of breath or dyspnea on exertion .  He was diagnosed with pleural effusion and congestive heart failure.  Cardiovascular: He has coronary artery disease.  Recently suffered an MI.  He was admitted to [facility] in [place].  He was diagnosed with severe congestive heart failure.  IABP pump was placed.  GI:  Denies abdominal pain, nausea, vomiting, bloody stools or diarrhea   :  Denies dysuria or nocturia   Musculoskeletal: Symptoms of arthritis.  Integument:  Denies rash   Neurologic:  Denies headache, focal weakness or sensory changes. No history of stroke or seizures.   Endocrine:  Denies polyuria or polydipsia   Lymphatic: Underwent bone marrow transplant for leukemia  Psychiatric:  Denies depression, anxiety or claustrophobia      Physical Exam:     Vitals:    10/17/19 1125   BP: 170/83   Pulse: 71   SpO2: 96%   Weight: 78.7 kg (173 lb 9.6 oz)   Height: 170.2 cm (67\")     HEENT: Head is normocephalic, atraumatic, normal distribution of hair. Pupils reactive to light. Ocular movements intact.  Mild nasal congestion on sniff test. No deviated nasal septum. No micrognathia.  Throat: Mallapatti stage 4, tongue midline, mucosa moist.  Neck: no JVD, thyromegaly, mass, +midline trachea, No lymphadenopathy.  Lungs: clear, no wheeze, rhonchi, crackles  Cardiovascular: S1, S2, RRR no murmurs, rubs or gallops  Abd: +BS's soft NT/ND, no CVA tenderness.    Ext: no edema, cyanosis, clubbing, pulses intact  Neuro: Awake alert, oriented to time place and person. Grossly intact to motor, sensory cerebral, and cerebellar (without focal deficit)  Psych: No overt josie, depression, psychosis or inappropriate behavior  Skin: No rash, cellulitis, or ulcerations.  No facial rash or erosions    DIAGNOSTIC DATA  1.  An unattended polysomnogram done on 6/6/2019 shows an ELISE " of 21.4 events per hour  Oxygen saturation monitoring shows a oxygen saturation of 90% and minimum oxygen saturation of 69%.  2.  CPAP/BiPAP titration was attempted on 6/25/2019.  Respiratory events were not adequately controlled on given pressures.  Therefore an auto BiPAP mode of therapy was recommended with a pressure range between 14-22.  Pressure support between 4 and 5.  3.  Memory card download shows data from 8/21/2019 to 10/16/2019 overall 57 days of data reviewed.  87.7% use noted.  Maximum hours use 10 hours 10 minutes.  Minimum time use 1 hour 23 minutes.  82.5% of the time the mask has been used for more than 4 hours.  The average apnea hypotony index is 1.4 events per hour on an auto BiPAP mode of therapy with a pressure range between 14-22 with pressure support between 4 and 5.  Assessment/Plan:   Obstructive sleep apnea:  This is a very pleasant 63 years old  gentleman who is accompanied by his wife for this visit.  He has  used the auto BiPAP mode of therapy.    He is compliant with therapy.  He is getting full benefit from it.    Results of memory card download were discussed in detail with the patient all questions were answered.  The pressures were never decreased as per orders during previous visit.  He has slowly acclimatized to the given pressures.    Recommendation.  Advised to use his mask with given pressures on a nightly basis.  Is advised to use the mask with given pressures for at least 4 hours a minimum benefit or as long as he sleeps on maximum benefit.  He is advised to use the mask with given pressures if he takes a nap during the daytime.  To decrease  auto BiPAP mode pressures  to the range of 10-2 CWP.  The pressure support remain between the range of 4-6 CWP.  Nocturnal pulse oximetry is recommended on the given pressures.  Allergic rhinitis:  He has remained symptomatic.    Recommendation.  Aggressive therapy is recommended.  Driving instructions. Patient is advised to  avoid driving if tired or somnolent. To avoid all alcoholic beverages or medications causing somnolence.         Diagnosis Plan   1. Obstructive sleep apnea, adult  CPAP Therapy     No orders of the defined types were placed in this encounter.    Orders Placed This Encounter   Procedures   • CPAP Therapy     Please provide dreamware ffull face mask.  Size to fit.     Order Specific Question:   PAP Tubing (1 per 3 months)     Answer:    6' Standard tubing     Order Specific Question:   PAP Mask (1 per 3 months)     Answer:    Full face mask     Order Specific Question:   Mask interface (1 per month)     Answer:    Face Mask Interface     Order Specific Question:   PAP Accessories     Answer:    Water Chamber for PAP Humidifier (1 per 6 month) &  Filter PAP Disposable (2 per month) &  Filter PAP Non-Disposable (1 per 6 months) &  Chinstrap to be used with PAP (1 per 6 months) &  Headgear to be used with PAP (1 per 6 mo)     Order Specific Question:   Does the patient have Obstructive Sleep Apnea or other qualifying diagnosis for PAP ordered?     Answer:   Yes     Order Specific Question:   Does the patient require humidification?     Answer:   Yes     Order Specific Question:   Humidifier     Answer:    Humidifier Heated for CPAP or BiPAP     Order Specific Question:   If ordering a BiPAP for BETY a CPAP has been tried and/or ruled out?     Answer:   Yes     Order Specific Question:   The patient requires a PAP Device & continued use of Supplies to administer effective PAP therapy at the frequency of use ordered above     Answer:   Yes     Order Specific Question:   Initial Supplies and refills authorized for 12 months?     Answer:   Yes     Order Specific Question:   The face to face evaluation was performed on     Answer:   10/17/2019     Order Specific Question:   Which [employer] company is this being sent to?     Answer:   [employer] [[id number]]     Order Specific Question:    Length of Need (99 Months = Lifetime)     Answer:   99 Months = Lifetime         Evonne Corona MD  10/17/2019  7:35 PM

## 2019-10-18 NOTE — TELEPHONE ENCOUNTER
He did not want a shot, he wanted his testosterone and cholesterol results from months ago. These were given to him

## 2019-10-22 ENCOUNTER — TREATMENT (OUTPATIENT)
Dept: CARDIAC REHAB | Facility: HOSPITAL | Age: 64
End: 2019-10-22

## 2019-10-22 DIAGNOSIS — I21.4 NSTEMI (NON-ST ELEVATED MYOCARDIAL INFARCTION) (HCC): Primary | ICD-10-CM

## 2019-10-22 PROCEDURE — 93798 PHYS/QHP OP CAR RHAB W/ECG: CPT

## 2019-10-23 ENCOUNTER — TREATMENT (OUTPATIENT)
Dept: CARDIAC REHAB | Facility: HOSPITAL | Age: 64
End: 2019-10-23

## 2019-10-23 DIAGNOSIS — I21.4 NSTEMI (NON-ST ELEVATED MYOCARDIAL INFARCTION) (HCC): Primary | ICD-10-CM

## 2019-10-23 PROCEDURE — 93798 PHYS/QHP OP CAR RHAB W/ECG: CPT

## 2019-10-24 ENCOUNTER — TREATMENT (OUTPATIENT)
Dept: CARDIAC REHAB | Facility: HOSPITAL | Age: 64
End: 2019-10-24

## 2019-10-24 DIAGNOSIS — I21.4 NSTEMI (NON-ST ELEVATED MYOCARDIAL INFARCTION) (HCC): Primary | ICD-10-CM

## 2019-10-24 PROCEDURE — 93798 PHYS/QHP OP CAR RHAB W/ECG: CPT

## 2019-10-25 ENCOUNTER — HOSPITAL ENCOUNTER (OUTPATIENT)
Dept: CT IMAGING | Facility: HOSPITAL | Age: 64
Discharge: HOME | End: 2019-10-25
Admitting: FAMILY MEDICINE

## 2019-10-25 ENCOUNTER — APPOINTMENT (OUTPATIENT)
Dept: CT IMAGING | Facility: HOSPITAL | Age: 64
End: 2019-10-25

## 2019-10-25 ENCOUNTER — TELEPHONE (OUTPATIENT)
Dept: FAMILY MEDICINE CLINIC | Facility: CLINIC | Age: 64
End: 2019-10-25

## 2019-10-25 DIAGNOSIS — G44.52 NEW DAILY PERSISTENT HEADACHE: ICD-10-CM
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
## 2019-10-25 PROCEDURE — 70450 CT HEAD/BRAIN W/O DYE: CPT

## 2019-10-27 VITALS
HEART RATE: 62 BPM | SYSTOLIC BLOOD PRESSURE: 148 MMHG | OXYGEN SATURATION: 95 % | BODY MASS INDEX: 27.4 KG/M2 | TEMPERATURE: 97.8 F | DIASTOLIC BLOOD PRESSURE: 78 MMHG | WEIGHT: 174.6 LBS | HEIGHT: 67 IN

## 2019-10-27 PROBLEM — I25.10 CORONARY ARTERY DISEASE: Status: ACTIVE | Noted: 2018-05-29

## 2019-10-27 PROBLEM — C93.10 CHRONIC MYELOMONOCYTIC LEUKEMIA: Status: ACTIVE | Noted: 2019-10-27

## 2019-10-27 PROBLEM — C92.91: Status: ACTIVE | Noted: 2019-10-27

## 2019-10-27 PROBLEM — E78.5 HYPERLIPIDEMIA: Status: ACTIVE | Noted: 2019-10-27

## 2019-10-27 PROBLEM — R94.39 ABNORMAL CARDIOVASCULAR STRESS TEST: Status: ACTIVE | Noted: 2018-05-15

## 2019-10-27 PROBLEM — Z72.0 TOBACCO USE: Status: ACTIVE | Noted: 2019-10-27

## 2019-10-27 PROBLEM — R59.9 ENLARGED LYMPH NODES: Status: ACTIVE | Noted: 2018-12-21

## 2019-10-27 PROBLEM — E03.9 HYPOTHYROIDISM: Status: ACTIVE | Noted: 2019-10-27

## 2019-10-27 PROBLEM — C95.90 LEUKEMIA: Status: ACTIVE | Noted: 2018-04-16

## 2019-10-27 PROBLEM — I10 BENIGN ESSENTIAL HYPERTENSION: Status: ACTIVE | Noted: 2018-12-21

## 2019-10-29 ENCOUNTER — TREATMENT (OUTPATIENT)
Dept: CARDIAC REHAB | Facility: HOSPITAL | Age: 64
End: 2019-10-29

## 2019-10-29 DIAGNOSIS — I21.4 NSTEMI (NON-ST ELEVATED MYOCARDIAL INFARCTION) (HCC): Primary | ICD-10-CM

## 2019-10-29 PROCEDURE — 93798 PHYS/QHP OP CAR RHAB W/ECG: CPT

## 2019-10-30 ENCOUNTER — APPOINTMENT (OUTPATIENT)
Dept: CARDIAC REHAB | Facility: HOSPITAL | Age: 64
End: 2019-10-30

## 2019-10-31 ENCOUNTER — APPOINTMENT (OUTPATIENT)
Dept: CARDIAC REHAB | Facility: HOSPITAL | Age: 64
End: 2019-10-31

## 2019-11-05 ENCOUNTER — TRANSCRIBE ORDERS (OUTPATIENT)
Dept: ADMINISTRATIVE | Facility: HOSPITAL | Age: 64
End: 2019-11-05

## 2019-11-05 ENCOUNTER — LAB (OUTPATIENT)
Dept: LAB | Facility: HOSPITAL | Age: 64
End: 2019-11-05

## 2019-11-05 ENCOUNTER — TREATMENT (OUTPATIENT)
Dept: CARDIAC REHAB | Facility: HOSPITAL | Age: 64
End: 2019-11-05

## 2019-11-05 DIAGNOSIS — Z79.899 ENCOUNTER FOR LONG-TERM (CURRENT) USE OF OTHER MEDICATIONS: Primary | ICD-10-CM

## 2019-11-05 DIAGNOSIS — I21.4 NSTEMI (NON-ST ELEVATED MYOCARDIAL INFARCTION) (HCC): Primary | ICD-10-CM

## 2019-11-05 DIAGNOSIS — Z79.899 ENCOUNTER FOR LONG-TERM (CURRENT) USE OF OTHER MEDICATIONS: ICD-10-CM

## 2019-11-05 LAB
ANION GAP SERPL CALCULATED.3IONS-SCNC: 9.3 MMOL/L (ref 5–15)
BUN BLD-MCNC: 20 MG/DL (ref 8–23)
BUN/CREAT SERPL: 15.3 (ref 7–25)
CALCIUM SPEC-SCNC: 9.5 MG/DL (ref 8.6–10.5)
CHLORIDE SERPL-SCNC: 99 MMOL/L (ref 98–107)
CO2 SERPL-SCNC: 31.7 MMOL/L (ref 22–29)
CREAT BLD-MCNC: 1.31 MG/DL (ref 0.76–1.27)
GFR SERPL CREATININE-BSD FRML MDRD: 56 ML/MIN/1.73
GLUCOSE BLD-MCNC: 110 MG/DL (ref 65–99)
POTASSIUM BLD-SCNC: 4.6 MMOL/L (ref 3.5–5.2)
SODIUM BLD-SCNC: 140 MMOL/L (ref 136–145)

## 2019-11-05 PROCEDURE — 93798 PHYS/QHP OP CAR RHAB W/ECG: CPT

## 2019-11-05 PROCEDURE — 80048 BASIC METABOLIC PNL TOTAL CA: CPT

## 2019-11-05 PROCEDURE — 36415 COLL VENOUS BLD VENIPUNCTURE: CPT

## 2019-11-06 ENCOUNTER — TREATMENT (OUTPATIENT)
Dept: CARDIAC REHAB | Facility: HOSPITAL | Age: 64
End: 2019-11-06

## 2019-11-06 DIAGNOSIS — I21.4 NSTEMI (NON-ST ELEVATED MYOCARDIAL INFARCTION) (HCC): Primary | ICD-10-CM

## 2019-11-06 PROCEDURE — 93798 PHYS/QHP OP CAR RHAB W/ECG: CPT

## 2019-11-07 ENCOUNTER — APPOINTMENT (OUTPATIENT)
Dept: CARDIAC REHAB | Facility: HOSPITAL | Age: 64
End: 2019-11-07

## 2019-11-07 DIAGNOSIS — I21.4 NSTEMI (NON-ST ELEVATED MYOCARDIAL INFARCTION) (HCC): Primary | ICD-10-CM

## 2019-11-07 PROCEDURE — 93798 PHYS/QHP OP CAR RHAB W/ECG: CPT

## 2019-11-12 ENCOUNTER — TREATMENT (OUTPATIENT)
Dept: CARDIAC REHAB | Facility: HOSPITAL | Age: 64
End: 2019-11-12

## 2019-11-12 DIAGNOSIS — I21.4 NSTEMI (NON-ST ELEVATED MYOCARDIAL INFARCTION) (HCC): Primary | ICD-10-CM

## 2019-11-12 PROCEDURE — 93798 PHYS/QHP OP CAR RHAB W/ECG: CPT

## 2019-11-12 RX ORDER — ROSUVASTATIN CALCIUM 20 MG/1
TABLET, COATED ORAL
Qty: 90 TABLET | Refills: 1 | Status: SHIPPED | OUTPATIENT
Start: 2019-11-12 | End: 2020-02-18

## 2019-11-13 ENCOUNTER — APPOINTMENT (OUTPATIENT)
Dept: CARDIAC REHAB | Facility: HOSPITAL | Age: 64
End: 2019-11-13

## 2019-11-13 DIAGNOSIS — I21.4 NSTEMI (NON-ST ELEVATED MYOCARDIAL INFARCTION) (HCC): Primary | ICD-10-CM

## 2019-11-13 PROCEDURE — 93798 PHYS/QHP OP CAR RHAB W/ECG: CPT

## 2019-11-14 ENCOUNTER — TREATMENT (OUTPATIENT)
Dept: CARDIAC REHAB | Facility: HOSPITAL | Age: 64
End: 2019-11-14

## 2019-11-14 DIAGNOSIS — I21.4 NSTEMI (NON-ST ELEVATED MYOCARDIAL INFARCTION) (HCC): Primary | ICD-10-CM

## 2019-11-14 PROCEDURE — 93798 PHYS/QHP OP CAR RHAB W/ECG: CPT

## 2019-11-19 ENCOUNTER — TREATMENT (OUTPATIENT)
Dept: CARDIAC REHAB | Facility: HOSPITAL | Age: 64
End: 2019-11-19

## 2019-11-19 DIAGNOSIS — I21.4 NSTEMI (NON-ST ELEVATED MYOCARDIAL INFARCTION) (HCC): Primary | ICD-10-CM

## 2019-11-19 PROCEDURE — 93798 PHYS/QHP OP CAR RHAB W/ECG: CPT

## 2019-11-20 ENCOUNTER — TREATMENT (OUTPATIENT)
Dept: CARDIAC REHAB | Facility: HOSPITAL | Age: 64
End: 2019-11-20

## 2019-11-20 DIAGNOSIS — I21.4 NSTEMI (NON-ST ELEVATED MYOCARDIAL INFARCTION) (HCC): Primary | ICD-10-CM

## 2019-11-20 PROCEDURE — 93798 PHYS/QHP OP CAR RHAB W/ECG: CPT

## 2019-11-21 ENCOUNTER — TREATMENT (OUTPATIENT)
Dept: CARDIAC REHAB | Facility: HOSPITAL | Age: 64
End: 2019-11-21

## 2019-11-21 DIAGNOSIS — I21.4 NSTEMI (NON-ST ELEVATED MYOCARDIAL INFARCTION) (HCC): Primary | ICD-10-CM

## 2019-11-21 PROCEDURE — 93798 PHYS/QHP OP CAR RHAB W/ECG: CPT

## 2019-11-26 ENCOUNTER — TREATMENT (OUTPATIENT)
Dept: CARDIAC REHAB | Facility: HOSPITAL | Age: 64
End: 2019-11-26

## 2019-11-26 DIAGNOSIS — I21.4 NSTEMI (NON-ST ELEVATED MYOCARDIAL INFARCTION) (HCC): Primary | ICD-10-CM

## 2019-11-26 PROCEDURE — 93798 PHYS/QHP OP CAR RHAB W/ECG: CPT

## 2019-11-27 ENCOUNTER — TREATMENT (OUTPATIENT)
Dept: CARDIAC REHAB | Facility: HOSPITAL | Age: 64
End: 2019-11-27

## 2019-11-27 DIAGNOSIS — I21.4 NSTEMI (NON-ST ELEVATED MYOCARDIAL INFARCTION) (HCC): Primary | ICD-10-CM

## 2019-11-27 PROCEDURE — 93798 PHYS/QHP OP CAR RHAB W/ECG: CPT

## 2019-11-28 ENCOUNTER — APPOINTMENT (OUTPATIENT)
Dept: CARDIAC REHAB | Facility: HOSPITAL | Age: 64
End: 2019-11-28

## 2019-12-03 ENCOUNTER — TREATMENT (OUTPATIENT)
Dept: CARDIAC REHAB | Facility: HOSPITAL | Age: 64
End: 2019-12-03

## 2019-12-03 DIAGNOSIS — I21.4 NSTEMI (NON-ST ELEVATED MYOCARDIAL INFARCTION) (HCC): Primary | ICD-10-CM

## 2019-12-03 PROCEDURE — 93798 PHYS/QHP OP CAR RHAB W/ECG: CPT

## 2019-12-04 ENCOUNTER — TREATMENT (OUTPATIENT)
Dept: CARDIAC REHAB | Facility: HOSPITAL | Age: 64
End: 2019-12-04

## 2019-12-04 DIAGNOSIS — I21.4 NSTEMI (NON-ST ELEVATED MYOCARDIAL INFARCTION) (HCC): Primary | ICD-10-CM

## 2019-12-04 PROCEDURE — 93798 PHYS/QHP OP CAR RHAB W/ECG: CPT

## 2019-12-04 RX ORDER — ISOSORBIDE MONONITRATE 60 MG/1
TABLET, EXTENDED RELEASE ORAL
Qty: 90 TABLET | Refills: 1 | Status: SHIPPED | OUTPATIENT
Start: 2019-12-04 | End: 2022-04-13

## 2019-12-05 ENCOUNTER — TREATMENT (OUTPATIENT)
Dept: CARDIAC REHAB | Facility: HOSPITAL | Age: 64
End: 2019-12-05

## 2019-12-05 DIAGNOSIS — I21.4 NSTEMI (NON-ST ELEVATED MYOCARDIAL INFARCTION) (HCC): Primary | ICD-10-CM

## 2019-12-05 PROCEDURE — 93798 PHYS/QHP OP CAR RHAB W/ECG: CPT

## 2019-12-10 ENCOUNTER — TREATMENT (OUTPATIENT)
Dept: CARDIAC REHAB | Facility: HOSPITAL | Age: 64
End: 2019-12-10

## 2019-12-10 DIAGNOSIS — I21.4 NSTEMI (NON-ST ELEVATED MYOCARDIAL INFARCTION) (HCC): Primary | ICD-10-CM

## 2019-12-10 PROCEDURE — 93798 PHYS/QHP OP CAR RHAB W/ECG: CPT

## 2019-12-12 ENCOUNTER — TREATMENT (OUTPATIENT)
Dept: CARDIAC REHAB | Facility: HOSPITAL | Age: 64
End: 2019-12-12

## 2019-12-12 DIAGNOSIS — I21.4 NSTEMI (NON-ST ELEVATED MYOCARDIAL INFARCTION) (HCC): Primary | ICD-10-CM

## 2019-12-12 PROCEDURE — 93798 PHYS/QHP OP CAR RHAB W/ECG: CPT

## 2019-12-16 ENCOUNTER — APPOINTMENT (OUTPATIENT)
Dept: CARDIAC REHAB | Facility: HOSPITAL | Age: 64
End: 2019-12-16

## 2019-12-17 ENCOUNTER — TREATMENT (OUTPATIENT)
Dept: CARDIAC REHAB | Facility: HOSPITAL | Age: 64
End: 2019-12-17

## 2019-12-17 DIAGNOSIS — I21.4 NSTEMI (NON-ST ELEVATED MYOCARDIAL INFARCTION) (HCC): Primary | ICD-10-CM

## 2019-12-17 PROCEDURE — 93798 PHYS/QHP OP CAR RHAB W/ECG: CPT

## 2019-12-19 ENCOUNTER — TREATMENT (OUTPATIENT)
Dept: CARDIAC REHAB | Facility: HOSPITAL | Age: 64
End: 2019-12-19

## 2019-12-19 DIAGNOSIS — I21.4 NSTEMI (NON-ST ELEVATED MYOCARDIAL INFARCTION) (HCC): Primary | ICD-10-CM

## 2019-12-19 PROCEDURE — 93798 PHYS/QHP OP CAR RHAB W/ECG: CPT

## 2019-12-23 ENCOUNTER — APPOINTMENT (OUTPATIENT)
Dept: CARDIAC REHAB | Facility: HOSPITAL | Age: 64
End: 2019-12-23

## 2019-12-26 ENCOUNTER — TREATMENT (OUTPATIENT)
Dept: CARDIAC REHAB | Facility: HOSPITAL | Age: 64
End: 2019-12-26

## 2019-12-26 DIAGNOSIS — I21.4 NSTEMI (NON-ST ELEVATED MYOCARDIAL INFARCTION) (HCC): Primary | ICD-10-CM

## 2019-12-26 PROCEDURE — 93798 PHYS/QHP OP CAR RHAB W/ECG: CPT

## 2019-12-31 ENCOUNTER — TREATMENT (OUTPATIENT)
Dept: CARDIAC REHAB | Facility: HOSPITAL | Age: 64
End: 2019-12-31

## 2019-12-31 DIAGNOSIS — I21.4 NSTEMI (NON-ST ELEVATED MYOCARDIAL INFARCTION) (HCC): Primary | ICD-10-CM

## 2019-12-31 PROCEDURE — 93798 PHYS/QHP OP CAR RHAB W/ECG: CPT

## 2020-01-02 ENCOUNTER — TREATMENT (OUTPATIENT)
Dept: CARDIAC REHAB | Facility: HOSPITAL | Age: 65
End: 2020-01-02

## 2020-01-02 DIAGNOSIS — I21.4 NSTEMI (NON-ST ELEVATED MYOCARDIAL INFARCTION) (HCC): Primary | ICD-10-CM

## 2020-01-02 PROCEDURE — 93798 PHYS/QHP OP CAR RHAB W/ECG: CPT

## 2020-01-07 ENCOUNTER — TREATMENT (OUTPATIENT)
Dept: CARDIAC REHAB | Facility: HOSPITAL | Age: 65
End: 2020-01-07

## 2020-01-07 DIAGNOSIS — I21.4 NSTEMI (NON-ST ELEVATED MYOCARDIAL INFARCTION) (HCC): Primary | ICD-10-CM

## 2020-01-07 PROCEDURE — 93798 PHYS/QHP OP CAR RHAB W/ECG: CPT

## 2020-02-17 ENCOUNTER — OFFICE VISIT (OUTPATIENT)
Dept: FAMILY MEDICINE CLINIC | Facility: CLINIC | Age: 65
End: 2020-02-17

## 2020-02-17 VITALS
SYSTOLIC BLOOD PRESSURE: 182 MMHG | WEIGHT: 179 LBS | DIASTOLIC BLOOD PRESSURE: 95 MMHG | BODY MASS INDEX: 28.04 KG/M2 | OXYGEN SATURATION: 95 % | HEART RATE: 68 BPM

## 2020-02-17 DIAGNOSIS — E03.9 HYPOTHYROIDISM, UNSPECIFIED TYPE: ICD-10-CM

## 2020-02-17 DIAGNOSIS — E78.2 MIXED HYPERLIPIDEMIA: ICD-10-CM

## 2020-02-17 DIAGNOSIS — M54.2 CERVICALGIA: Primary | ICD-10-CM
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
## 2020-02-17 LAB
ALBUMIN SERPL-MCNC: 4.2 G/DL (ref 3.5–5.2)
ALBUMIN/GLOB SERPL: 2 G/DL
ALP SERPL-CCNC: 76 U/L (ref 39–117)
ALT SERPL W P-5'-P-CCNC: 23 U/L (ref 1–41)
ANION GAP SERPL CALCULATED.3IONS-SCNC: 10.5 MMOL/L (ref 5–15)
ARTICHOKE IGE QN: 154 MG/DL (ref 0–100)
AST SERPL-CCNC: 19 U/L (ref 1–40)
BASOPHILS # BLD AUTO: 0.07 10*3/MM3 (ref 0–0.2)
BASOPHILS NFR BLD AUTO: 0.6 % (ref 0–1.5)
BILIRUB SERPL-MCNC: 0.4 MG/DL (ref 0.2–1.2)
BUN BLD-MCNC: 26 MG/DL (ref 8–23)
BUN/CREAT SERPL: 19.7 (ref 7–25)
CALCIUM SPEC-SCNC: 9.2 MG/DL (ref 8.6–10.5)
CHLORIDE SERPL-SCNC: 100 MMOL/L (ref 98–107)
CHOLEST SERPL-MCNC: 258 MG/DL (ref 0–200)
CO2 SERPL-SCNC: 28.5 MMOL/L (ref 22–29)
CREAT BLD-MCNC: 1.32 MG/DL (ref 0.76–1.27)
DEPRECATED RDW RBC AUTO: 50.4 FL (ref 37–54)
EOSINOPHIL # BLD AUTO: 0.24 10*3/MM3 (ref 0–0.4)
EOSINOPHIL NFR BLD AUTO: 2.1 % (ref 0.3–6.2)
ERYTHROCYTE [DISTWIDTH] IN BLOOD BY AUTOMATED COUNT: 14.1 % (ref 12.3–15.4)
GFR SERPL CREATININE-BSD FRML MDRD: 55 ML/MIN/1.73
GLOBULIN UR ELPH-MCNC: 2.1 GM/DL
GLUCOSE BLD-MCNC: 103 MG/DL (ref 65–99)
HCT VFR BLD AUTO: 33.3 % (ref 37.5–51)
HDLC SERPL-MCNC: 37 MG/DL (ref 40–60)
HGB BLD-MCNC: 11 G/DL (ref 13–17.7)
IMM GRANULOCYTES # BLD AUTO: 0.22 10*3/MM3 (ref 0–0.05)
IMM GRANULOCYTES NFR BLD AUTO: 1.9 % (ref 0–0.5)
LDLC SERPL CALC-MCNC: ABNORMAL MG/DL
LDLC/HDLC SERPL: ABNORMAL {RATIO}
LYMPHOCYTES # BLD AUTO: 2.66 10*3/MM3 (ref 0.7–3.1)
LYMPHOCYTES NFR BLD AUTO: 23.4 % (ref 19.6–45.3)
MCH RBC QN AUTO: 32.8 PG (ref 26.6–33)
MCHC RBC AUTO-ENTMCNC: 33 G/DL (ref 31.5–35.7)
MCV RBC AUTO: 99.4 FL (ref 79–97)
MONOCYTES # BLD AUTO: 0.84 10*3/MM3 (ref 0.1–0.9)
MONOCYTES NFR BLD AUTO: 7.4 % (ref 5–12)
NEUTROPHILS # BLD AUTO: 7.35 10*3/MM3 (ref 1.7–7)
NEUTROPHILS NFR BLD AUTO: 64.6 % (ref 42.7–76)
NRBC BLD AUTO-RTO: 0.1 /100 WBC (ref 0–0.2)
PLATELET # BLD AUTO: 311 10*3/MM3 (ref 140–450)
PMV BLD AUTO: 10.4 FL (ref 6–12)
POTASSIUM BLD-SCNC: 5.2 MMOL/L (ref 3.5–5.2)
PROT SERPL-MCNC: 6.3 G/DL (ref 6–8.5)
RBC # BLD AUTO: 3.35 10*6/MM3 (ref 4.14–5.8)
SODIUM BLD-SCNC: 139 MMOL/L (ref 136–145)
T3FREE SERPL-MCNC: 2.49 PG/ML (ref 2–4.4)
T4 FREE SERPL-MCNC: 0.92 NG/DL (ref 0.93–1.7)
TRIGL SERPL-MCNC: 429 MG/DL (ref 0–150)
TSH SERPL DL<=0.05 MIU/L-ACNC: 2.25 UIU/ML (ref 0.27–4.2)
VLDLC SERPL-MCNC: ABNORMAL MG/DL
WBC NRBC COR # BLD: 11.38 10*3/MM3 (ref 3.4–10.8)

## 2020-02-17 PROCEDURE — 80053 COMPREHEN METABOLIC PANEL: CPT | Performed by: FAMILY MEDICINE

## 2020-02-17 PROCEDURE — 84481 FREE ASSAY (FT-3): CPT | Performed by: FAMILY MEDICINE

## 2020-02-17 PROCEDURE — 85025 COMPLETE CBC W/AUTO DIFF WBC: CPT | Performed by: FAMILY MEDICINE

## 2020-02-17 PROCEDURE — 84439 ASSAY OF FREE THYROXINE: CPT | Performed by: FAMILY MEDICINE

## 2020-02-17 PROCEDURE — 99214 OFFICE O/P EST MOD 30 MIN: CPT | Performed by: FAMILY MEDICINE

## 2020-02-17 PROCEDURE — 84443 ASSAY THYROID STIM HORMONE: CPT | Performed by: FAMILY MEDICINE

## 2020-02-17 PROCEDURE — 83721 ASSAY OF BLOOD LIPOPROTEIN: CPT | Performed by: FAMILY MEDICINE

## 2020-02-17 PROCEDURE — 36415 COLL VENOUS BLD VENIPUNCTURE: CPT | Performed by: FAMILY MEDICINE

## 2020-02-17 PROCEDURE — 80061 LIPID PANEL: CPT | Performed by: FAMILY MEDICINE

## 2020-02-17 RX ORDER — LISINOPRIL 20 MG/1
20 TABLET ORAL DAILY
COMMUNITY
Start: 2020-02-03 | End: 2022-04-13
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
## 2020-02-18 RX ORDER — ROSUVASTATIN CALCIUM 40 MG/1
40 TABLET, COATED ORAL
Qty: 90 TABLET | Refills: 1 | Status: SHIPPED | OUTPATIENT
Start: 2020-02-18 | End: 2020-08-08

## 2020-02-18 RX ORDER — LEVOTHYROXINE SODIUM 0.05 MG/1
50 TABLET ORAL DAILY
Qty: 90 TABLET | Refills: 1 | Status: SHIPPED | OUTPATIENT
Start: 2020-02-18 | End: 2020-08-08

## 2020-02-24 ENCOUNTER — TELEPHONE (OUTPATIENT)
Dept: FAMILY MEDICINE CLINIC | Facility: CLINIC | Age: 65
End: 2020-02-24

## 2020-02-24 DIAGNOSIS — E03.9 HYPOTHYROIDISM, UNSPECIFIED TYPE: Primary | ICD-10-CM

## 2020-02-24 NOTE — TELEPHONE ENCOUNTER
On the lab results it has U/S of the thyroid to check his gland?   The 4 medicine are from his bone marrow transplant and he still takes them.

## 2020-02-24 NOTE — TELEPHONE ENCOUNTER
He I saw my note now about the ultrasound and I have ordered it  He is fine to keep taking those medications, there was no indication that they were related to his transplant or I would not have even remove them from his list

## 2020-02-25 ENCOUNTER — HOSPITAL ENCOUNTER (OUTPATIENT)
Dept: MRI IMAGING | Facility: HOSPITAL | Age: 65
Discharge: HOME | End: 2020-02-25
Admitting: FAMILY MEDICINE

## 2020-02-25 DIAGNOSIS — M54.2 CERVICALGIA: ICD-10-CM

## 2020-02-25 PROCEDURE — 72141 MRI NECK SPINE W/O DYE: CPT

## 2020-02-26 DIAGNOSIS — M54.2 CERVICALGIA: Primary | ICD-10-CM

## 2020-03-02 ENCOUNTER — HOSPITAL ENCOUNTER (OUTPATIENT)
Dept: ULTRASOUND IMAGING | Facility: HOSPITAL | Age: 65
Discharge: HOME | End: 2020-03-02
Admitting: FAMILY MEDICINE

## 2020-03-02 DIAGNOSIS — E03.9 HYPOTHYROIDISM, UNSPECIFIED TYPE: ICD-10-CM

## 2020-03-02 PROCEDURE — 76536 US EXAM OF HEAD AND NECK: CPT

## 2020-05-14 RX ORDER — ROSUVASTATIN CALCIUM 20 MG/1
TABLET, COATED ORAL
Qty: 90 TABLET | Refills: 1 | OUTPATIENT
Start: 2020-05-14

## 2020-07-27 ENCOUNTER — OFFICE VISIT (OUTPATIENT)
Dept: FAMILY MEDICINE CLINIC | Facility: CLINIC | Age: 65
End: 2020-07-27

## 2020-07-27 VITALS
WEIGHT: 177 LBS | TEMPERATURE: 98 F | SYSTOLIC BLOOD PRESSURE: 169 MMHG | BODY MASS INDEX: 27.72 KG/M2 | DIASTOLIC BLOOD PRESSURE: 89 MMHG | OXYGEN SATURATION: 96 % | HEART RATE: 62 BPM

## 2020-07-27 DIAGNOSIS — M47.812 CERVICAL SPONDYLOSIS: Primary | ICD-10-CM

## 2020-07-27 PROCEDURE — 99213 OFFICE O/P EST LOW 20 MIN: CPT | Performed by: FAMILY MEDICINE
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
## 2020-08-08 RX ORDER — ROSUVASTATIN CALCIUM 40 MG/1
TABLET, COATED ORAL
Qty: 90 TABLET | Refills: 1 | Status: SHIPPED | OUTPATIENT
Start: 2020-08-08 | End: 2021-02-02

## 2020-08-08 RX ORDER — LEVOTHYROXINE SODIUM 0.05 MG/1
TABLET ORAL
Qty: 90 TABLET | Refills: 1 | Status: SHIPPED | OUTPATIENT
Start: 2020-08-08 | End: 2021-02-02

## 2020-08-12 RX ORDER — CLOPIDOGREL BISULFATE 75 MG/1
TABLET ORAL
Qty: 30 TABLET | Refills: 0 | Status: SHIPPED | OUTPATIENT
Start: 2020-08-12 | End: 2020-09-03

## 2020-09-03 RX ORDER — CLOPIDOGREL BISULFATE 75 MG/1
TABLET ORAL
Qty: 30 TABLET | Refills: 0 | Status: SHIPPED | OUTPATIENT
Start: 2020-09-03 | End: 2020-10-05

## 2020-09-11 ENCOUNTER — OFFICE VISIT (OUTPATIENT)
Dept: FAMILY MEDICINE CLINIC | Facility: CLINIC | Age: 65
End: 2020-09-11

## 2020-09-11 VITALS
DIASTOLIC BLOOD PRESSURE: 101 MMHG | HEART RATE: 63 BPM | HEIGHT: 67 IN | WEIGHT: 179 LBS | OXYGEN SATURATION: 96 % | SYSTOLIC BLOOD PRESSURE: 196 MMHG | BODY MASS INDEX: 28.09 KG/M2 | TEMPERATURE: 98 F

## 2020-09-11 DIAGNOSIS — R30.0 DYSURIA: Primary | ICD-10-CM

## 2020-09-11 DIAGNOSIS — H61.92 SKIN LESION OF LEFT EAR: ICD-10-CM

## 2020-09-11 DIAGNOSIS — I10 ESSENTIAL HYPERTENSION: ICD-10-CM

## 2020-09-11 LAB
BILIRUB BLD-MCNC: NEGATIVE MG/DL
CLARITY, POC: CLEAR
COLOR UR: YELLOW
GLUCOSE UR STRIP-MCNC: NEGATIVE MG/DL
KETONES UR QL: NEGATIVE
LEUKOCYTE EST, POC: NEGATIVE
NITRITE UR-MCNC: NEGATIVE MG/ML
PH UR: 6.5 [PH] (ref 5–8)
PROT UR STRIP-MCNC: ABNORMAL MG/DL
RBC # UR STRIP: ABNORMAL /UL
SP GR UR: 1.02 (ref 1–1.03)
UROBILINOGEN UR QL: NORMAL

## 2020-09-11 PROCEDURE — 81003 URINALYSIS AUTO W/O SCOPE: CPT | Performed by: NURSE PRACTITIONER

## 2020-09-11 PROCEDURE — 87086 URINE CULTURE/COLONY COUNT: CPT | Performed by: NURSE PRACTITIONER

## 2020-09-11 PROCEDURE — 99214 OFFICE O/P EST MOD 30 MIN: CPT | Performed by: NURSE PRACTITIONER

## 2020-09-11 RX ORDER — NITROFURANTOIN 25; 75 MG/1; MG/1
100 CAPSULE ORAL 2 TIMES DAILY
Qty: 14 CAPSULE | Refills: 0 | Status: CANCELLED | OUTPATIENT
Start: 2020-09-11 | End: 2020-09-18

## 2020-09-11 RX ORDER — PREDNISONE 10 MG/1
10 TABLET ORAL DAILY
COMMUNITY
Start: 2020-08-27 | End: 2020-09-11

## 2020-09-11 RX ORDER — CIPROFLOXACIN 500 MG/1
500 TABLET, FILM COATED ORAL 2 TIMES DAILY
Qty: 14 TABLET | Refills: 0 | Status: SHIPPED | OUTPATIENT
Start: 2020-09-11 | End: 2020-09-18

## 2020-09-11 RX ORDER — DAPSONE 100 MG/1
100 TABLET ORAL DAILY
COMMUNITY
Start: 2020-08-27

## 2020-09-11 RX ORDER — ACYCLOVIR 800 MG/1
800 TABLET ORAL 2 TIMES DAILY
COMMUNITY
Start: 2020-09-07 | End: 2022-04-13

## 2020-09-11 NOTE — PATIENT INSTRUCTIONS
"DASH Eating Plan  DASH stands for \"Dietary Approaches to Stop Hypertension.\" The DASH eating plan is a healthy eating plan that has been shown to reduce high blood pressure (hypertension). It may also reduce your risk for type 2 diabetes, heart disease, and stroke. The DASH eating plan may also help with weight loss.  What are tips for following this plan?    General guidelines  · Avoid eating more than 2,300 mg (milligrams) of salt (sodium) a day. If you have hypertension, you may need to reduce your sodium intake to 1,500 mg a day.  · Limit alcohol intake to no more than 1 drink a day for nonpregnant women and 2 drinks a day for men. One drink equals 12 oz of beer, 5 oz of wine, or 1½ oz of hard liquor.  · Work with your health care provider to maintain a healthy body weight or to lose weight. Ask what an ideal weight is for you.  · Get at least 30 minutes of exercise that causes your heart to beat faster (aerobic exercise) most days of the week. Activities may include walking, swimming, or biking.  · Work with your health care provider or diet and nutrition specialist (dietitian) to adjust your eating plan to your individual calorie needs.  Reading food labels    · Check food labels for the amount of sodium per serving. Choose foods with less than 5 percent of the Daily Value of sodium. Generally, foods with less than 300 mg of sodium per serving fit into this eating plan.  · To find whole grains, look for the word \"whole\" as the first word in the ingredient list.  Shopping  · Buy products labeled as \"low-sodium\" or \"no salt added.\"  · Buy fresh foods. Avoid canned foods and premade or frozen meals.  Cooking  · Avoid adding salt when cooking. Use salt-free seasonings or herbs instead of table salt or sea salt. Check with your health care provider or pharmacist before using salt substitutes.  · Do not wagner foods. Cook foods using healthy methods such as baking, boiling, grilling, and broiling instead.  · Cook with " heart-healthy oils, such as olive, canola, soybean, or sunflower oil.  Meal planning  · Eat a balanced diet that includes:  ? 5 or more servings of fruits and vegetables each day. At each meal, try to fill half of your plate with fruits and vegetables.  ? Up to 6-8 servings of whole grains each day.  ? Less than 6 oz of lean meat, poultry, or fish each day. A 3-oz serving of meat is about the same size as a deck of cards. One egg equals 1 oz.  ? 2 servings of low-fat dairy each day.  ? A serving of nuts, seeds, or beans 5 times each week.  ? Heart-healthy fats. Healthy fats called Omega-3 fatty acids are found in foods such as flaxseeds and coldwater fish, like sardines, salmon, and mackerel.  · Limit how much you eat of the following:  ? Canned or prepackaged foods.  ? Food that is high in trans fat, such as fried foods.  ? Food that is high in saturated fat, such as fatty meat.  ? Sweets, desserts, sugary drinks, and other foods with added sugar.  ? Full-fat dairy products.  · Do not salt foods before eating.  · Try to eat at least 2 vegetarian meals each week.  · Eat more home-cooked food and less restaurant, buffet, and fast food.  · When eating at a restaurant, ask that your food be prepared with less salt or no salt, if possible.  What foods are recommended?  The items listed may not be a complete list. Talk with your dietitian about what dietary choices are best for you.  Grains  Whole-grain or whole-wheat bread. Whole-grain or whole-wheat pasta. Brown rice. Oatmeal. Quinoa. Bulgur. Whole-grain and low-sodium cereals. Tanya bread. Low-fat, low-sodium crackers. Whole-wheat flour tortillas.  Vegetables  Fresh or frozen vegetables (raw, steamed, roasted, or grilled). Low-sodium or reduced-sodium tomato and vegetable juice. Low-sodium or reduced-sodium tomato sauce and tomato paste. Low-sodium or reduced-sodium canned vegetables.  Fruits  All fresh, dried, or frozen fruit. Canned fruit in natural juice (without  added sugar).  Meat and other protein foods  Skinless chicken or turkey. Ground chicken or turkey. Pork with fat trimmed off. Fish and seafood. Egg whites. Dried beans, peas, or lentils. Unsalted nuts, nut butters, and seeds. Unsalted canned beans. Lean cuts of beef with fat trimmed off. Low-sodium, lean deli meat.  Dairy  Low-fat (1%) or fat-free (skim) milk. Fat-free, low-fat, or reduced-fat cheeses. Nonfat, low-sodium ricotta or cottage cheese. Low-fat or nonfat yogurt. Low-fat, low-sodium cheese.  Fats and oils  Soft margarine without trans fats. Vegetable oil. Low-fat, reduced-fat, or light mayonnaise and salad dressings (reduced-sodium). Canola, safflower, olive, soybean, and sunflower oils. Avocado.  Seasoning and other foods  Herbs. Spices. Seasoning mixes without salt. Unsalted popcorn and pretzels. Fat-free sweets.  What foods are not recommended?  The items listed may not be a complete list. Talk with your dietitian about what dietary choices are best for you.  Grains  Baked goods made with fat, such as croissants, muffins, or some breads. Dry pasta or rice meal packs.  Vegetables  Creamed or fried vegetables. Vegetables in a cheese sauce. Regular canned vegetables (not low-sodium or reduced-sodium). Regular canned tomato sauce and paste (not low-sodium or reduced-sodium). Regular tomato and vegetable juice (not low-sodium or reduced-sodium). Pickles. Olives.  Fruits  Canned fruit in a light or heavy syrup. Fried fruit. Fruit in cream or butter sauce.  Meat and other protein foods  Fatty cuts of meat. Ribs. Fried meat. Edouard. Sausage. Bologna and other processed lunch meats. Salami. Fatback. Hotdogs. Bratwurst. Salted nuts and seeds. Canned beans with added salt. Canned or smoked fish. Whole eggs or egg yolks. Chicken or turkey with skin.  Dairy  Whole or 2% milk, cream, and half-and-half. Whole or full-fat cream cheese. Whole-fat or sweetened yogurt. Full-fat cheese. Nondairy creamers. Whipped toppings.  Processed cheese and cheese spreads.  Fats and oils  Butter. Stick margarine. Lard. Shortening. Ghee. Edouard fat. Tropical oils, such as coconut, palm kernel, or palm oil.  Seasoning and other foods  Salted popcorn and pretzels. Onion salt, garlic salt, seasoned salt, table salt, and sea salt. Worcestershire sauce. Tartar sauce. Barbecue sauce. Teriyaki sauce. Soy sauce, including reduced-sodium. Steak sauce. Canned and packaged gravies. Fish sauce. Oyster sauce. Cocktail sauce. Horseradish that you find on the shelf. Ketchup. Mustard. Meat flavorings and tenderizers. Bouillon cubes. Hot sauce and Tabasco sauce. Premade or packaged marinades. Premade or packaged taco seasonings. Relishes. Regular salad dressings.  Where to find more information:  · National Heart, Lung, and Blood [place]: www.nhlbi.nih.gov  · American Heart Association: www.heart.org  Summary  · The DASH eating plan is a healthy eating plan that has been shown to reduce high blood pressure (hypertension). It may also reduce your risk for type 2 diabetes, heart disease, and stroke.  · With the DASH eating plan, you should limit salt (sodium) intake to 2,300 mg a day. If you have hypertension, you may need to reduce your sodium intake to 1,500 mg a day.  · When on the DASH eating plan, aim to eat more fresh fruits and vegetables, whole grains, lean proteins, low-fat dairy, and heart-healthy fats.  · Work with your health care provider or diet and nutrition specialist (dietitian) to adjust your eating plan to your individual calorie needs.  This information is not intended to replace advice given to you by your health care provider. Make sure you discuss any questions you have with your health care provider.  Document Released: 12/06/2012 Document Revised: 11/30/2018 Document Reviewed: 12/11/2017  Elsevier Patient Education © 2020 [employer]

## 2020-09-11 NOTE — PROGRESS NOTES
"Subjective   Anthony Ayoub is a 64 y.o. male.       HPI   Pt. Is here today with concern of a \"spot\" on his left ear.  Has had for several months.  It has been dry but not itchy.  Never goes away completely.  No drainage or bleeding.  Slight itch.  Hasn't noticed change in size.     Also has concern that he may have a UTI.  UA shows trace blood today.   He has noticed a mild lower intermittent back ache and more frequency with urination. No blood seen in urine but sometimes urine is dark.  No fevers.  No abdominal pain, N/V.      BP is also noted to be high today.BP last night at home was 174/78.   He has run out of one BP med (not sure which one) and has to pick it up today at pharmacy.  He denies any CP; palpitations; SOA; dizziness; headache; trouble with vision.    Followed by cardiology in [place] out of [place]; has appt next month.        The following portions of the patient's history were reviewed and updated as appropriate: allergies, current medications, past family history, past medical history, past social history, past surgical history and problem list.    Review of Systems   Constitutional: Negative for activity change, appetite change, chills, diaphoresis, fatigue, fever, unexpected weight gain and unexpected weight loss.   HENT: Negative for congestion, ear discharge, ear pain, postnasal drip, rhinorrhea, sinus pressure, sneezing, sore throat, swollen glands and trouble swallowing.    Eyes: Negative for visual disturbance.   Respiratory: Negative for cough, chest tightness, shortness of breath and wheezing.    Cardiovascular: Negative for chest pain, palpitations and leg swelling.   Gastrointestinal: Negative for abdominal distention, abdominal pain, blood in stool, constipation, diarrhea, nausea, vomiting and indigestion.   Genitourinary: Positive for dysuria and frequency. Negative for decreased urine volume, difficulty urinating, flank pain, hematuria, nocturia and urgency.   Musculoskeletal: " Negative for arthralgias, back pain and myalgias.   Skin: Positive for skin lesions (left ear ).   Neurological: Negative for dizziness, weakness, light-headedness, numbness, headache and confusion.   Psychiatric/Behavioral: Negative for depressed mood. The patient is not nervous/anxious.        Objective   Physical Exam   Constitutional: He is oriented to person, place, and time. He appears well-developed and well-nourished. No distress.   HENT:   Head: Normocephalic and atraumatic.   Nose: Nose normal.   Mouth/Throat: Oropharynx is clear and moist.   Eyes: Pupils are equal, round, and reactive to light. Conjunctivae are normal.   Neck: Normal range of motion. Neck supple. No thyromegaly present.   Cardiovascular: Normal rate, regular rhythm, normal heart sounds and intact distal pulses.   No murmur heard.  Pulmonary/Chest: Effort normal and breath sounds normal. No respiratory distress. He has no wheezes. He exhibits no tenderness.   Abdominal: Soft. Bowel sounds are normal. He exhibits no distension. There is no tenderness.   Musculoskeletal: He exhibits no edema.   Lymphadenopathy:     He has no cervical adenopathy.   Neurological: He is alert and oriented to person, place, and time.   Skin: Skin is warm and dry. Capillary refill takes less than 2 seconds. No erythema.   Left ear:  Yellow crusted lesion on top of left ear; 5-6 mm in size.  No drainage; no bleeding.    Psychiatric: He has a normal mood and affect.   Vitals reviewed.        Assessment/Plan   Anthony was seen today for skin problem and urinary tract infection.    Diagnoses and all orders for this visit:    Dysuria  Comments:  UA shows trace blood and protein. Sent for cx.   Given Cipro.   Increase fluids    Orders:  -     Urine Culture - Urine, Urine, Clean Catch  -     POC Urinalysis Dipstick, Automated  -     ciprofloxacin (Cipro) 500 MG tablet; Take 1 tablet by mouth 2 (Two) Times a Day for 7 days.    Skin lesion of left ear  Comments:  Referral  to Derm for evaluation.   Orders:  -     Ambulatory Referral to Dermatology    Essential hypertension  Comments:  Discussed importance of taking all medications daily as directed.    Pt. to  missing script now and take when he gets home.       Advised to monitor BP at home and call in readings on Monday for review.    Make follow up appt with cardiology.

## 2020-09-12 LAB — BACTERIA SPEC AEROBE CULT: NO GROWTH

## 2020-10-05 RX ORDER — CLOPIDOGREL BISULFATE 75 MG/1
TABLET ORAL
Qty: 30 TABLET | Refills: 5 | Status: SHIPPED | OUTPATIENT
Start: 2020-10-05 | End: 2022-04-13

## 2020-10-19 ENCOUNTER — FLU SHOT (OUTPATIENT)
Dept: FAMILY MEDICINE CLINIC | Facility: CLINIC | Age: 65
End: 2020-10-19

## 2020-10-19 DIAGNOSIS — Z23 NEED FOR IMMUNIZATION AGAINST INFLUENZA: Primary | ICD-10-CM

## 2020-10-19 PROCEDURE — G0008 ADMIN INFLUENZA VIRUS VAC: HCPCS | Performed by: FAMILY MEDICINE

## 2020-10-19 PROCEDURE — 90686 IIV4 VACC NO PRSV 0.5 ML IM: CPT | Performed by: FAMILY MEDICINE

## 2020-12-09 ENCOUNTER — TELEPHONE (OUTPATIENT)
Dept: SLEEP MEDICINE | Facility: HOSPITAL | Age: 65
End: 2020-12-09

## 2020-12-09 NOTE — TELEPHONE ENCOUNTER
PT'S WIFE, CHAPARRO, CALLED REQUESTING A NEW MASK FOR PT. HE RECEIVED A SAMPLE MASK (MEDIUM-DREAMWEAR FULL FACE MASK)  FROM PREVIOUS VISIT AND WOULD LIKE AN ORDER FOR THIS  MASK. PT USES [employer]. NOTE ROUTED TO DR. WALTER

## 2020-12-10 ENCOUNTER — TELEPHONE (OUTPATIENT)
Dept: SLEEP MEDICINE | Facility: HOSPITAL | Age: 65
End: 2020-12-10

## 2020-12-10 DIAGNOSIS — G47.33 OBSTRUCTIVE SLEEP APNEA, ADULT: Primary | ICD-10-CM

## 2021-02-02 RX ORDER — LEVOTHYROXINE SODIUM 0.05 MG/1
TABLET ORAL
Qty: 90 TABLET | Refills: 1 | Status: SHIPPED | OUTPATIENT
Start: 2021-02-02 | End: 2022-04-13

## 2021-02-02 RX ORDER — ROSUVASTATIN CALCIUM 40 MG/1
TABLET, COATED ORAL
Qty: 90 TABLET | Refills: 1 | Status: SHIPPED | OUTPATIENT
Start: 2021-02-02 | End: 2021-08-08

## 2021-03-01 ENCOUNTER — TRANSCRIBE ORDERS (OUTPATIENT)
Dept: LAB | Facility: HOSPITAL | Age: 66
End: 2021-03-01

## 2021-03-01 ENCOUNTER — LAB (OUTPATIENT)
Dept: LAB | Facility: HOSPITAL | Age: 66
End: 2021-03-01

## 2021-03-01 DIAGNOSIS — Z94.81 STATUS POST BONE MARROW TRANSPLANT (HCC): Primary | ICD-10-CM

## 2021-03-01 DIAGNOSIS — Z94.81 STATUS POST BONE MARROW TRANSPLANT (HCC): ICD-10-CM

## 2021-03-01 LAB
ALBUMIN SERPL-MCNC: 3.9 G/DL (ref 3.5–5.2)
ALBUMIN/GLOB SERPL: 2 G/DL
ALP SERPL-CCNC: 77 U/L (ref 39–117)
ALT SERPL W P-5'-P-CCNC: 12 U/L (ref 1–41)
ANION GAP SERPL CALCULATED.3IONS-SCNC: 7.2 MMOL/L (ref 5–15)
AST SERPL-CCNC: 15 U/L (ref 1–40)
BASOPHILS # BLD AUTO: 0.05 10*3/MM3 (ref 0–0.2)
BASOPHILS NFR BLD AUTO: 0.5 % (ref 0–1.5)
BILIRUB SERPL-MCNC: 0.5 MG/DL (ref 0–1.2)
BUN SERPL-MCNC: 20 MG/DL (ref 8–23)
BUN/CREAT SERPL: 14.1 (ref 7–25)
CALCIUM SPEC-SCNC: 9.2 MG/DL (ref 8.6–10.5)
CHLORIDE SERPL-SCNC: 101 MMOL/L (ref 98–107)
CO2 SERPL-SCNC: 29.8 MMOL/L (ref 22–29)
CREAT SERPL-MCNC: 1.42 MG/DL (ref 0.76–1.27)
DEPRECATED RDW RBC AUTO: 52.1 FL (ref 37–54)
EOSINOPHIL # BLD AUTO: 0.19 10*3/MM3 (ref 0–0.4)
EOSINOPHIL NFR BLD AUTO: 1.9 % (ref 0.3–6.2)
ERYTHROCYTE [DISTWIDTH] IN BLOOD BY AUTOMATED COUNT: 15 % (ref 12.3–15.4)
GFR SERPL CREATININE-BSD FRML MDRD: 50 ML/MIN/1.73
GLOBULIN UR ELPH-MCNC: 2 GM/DL
GLUCOSE SERPL-MCNC: 82 MG/DL (ref 65–99)
HCT VFR BLD AUTO: 33.9 % (ref 37.5–51)
HGB BLD-MCNC: 11.4 G/DL (ref 13–17.7)
IMM GRANULOCYTES # BLD AUTO: 0.15 10*3/MM3 (ref 0–0.05)
IMM GRANULOCYTES NFR BLD AUTO: 1.5 % (ref 0–0.5)
LYMPHOCYTES # BLD AUTO: 2.22 10*3/MM3 (ref 0.7–3.1)
LYMPHOCYTES NFR BLD AUTO: 22.8 % (ref 19.6–45.3)
MCH RBC QN AUTO: 32.7 PG (ref 26.6–33)
MCHC RBC AUTO-ENTMCNC: 33.6 G/DL (ref 31.5–35.7)
MCV RBC AUTO: 97.1 FL (ref 79–97)
MONOCYTES # BLD AUTO: 0.89 10*3/MM3 (ref 0.1–0.9)
MONOCYTES NFR BLD AUTO: 9.1 % (ref 5–12)
NEUTROPHILS NFR BLD AUTO: 6.25 10*3/MM3 (ref 1.7–7)
NEUTROPHILS NFR BLD AUTO: 64.2 % (ref 42.7–76)
NRBC BLD AUTO-RTO: 0.1 /100 WBC (ref 0–0.2)
PLATELET # BLD AUTO: 228 10*3/MM3 (ref 140–450)
PMV BLD AUTO: 10.8 FL (ref 6–12)
POTASSIUM SERPL-SCNC: 4 MMOL/L (ref 3.5–5.2)
PROT SERPL-MCNC: 5.9 G/DL (ref 6–8.5)
RBC # BLD AUTO: 3.49 10*6/MM3 (ref 4.14–5.8)
SODIUM SERPL-SCNC: 138 MMOL/L (ref 136–145)
WBC # BLD AUTO: 9.75 10*3/MM3 (ref 3.4–10.8)

## 2021-03-01 PROCEDURE — 80053 COMPREHEN METABOLIC PANEL: CPT

## 2021-03-01 PROCEDURE — 36415 COLL VENOUS BLD VENIPUNCTURE: CPT

## 2021-03-01 PROCEDURE — 85025 COMPLETE CBC W/AUTO DIFF WBC: CPT

## 2021-03-04 LAB
CMV DNA SERPL NAA+PROBE-ACNC: NEGATIVE IU/ML
CMV DNA SERPL NAA+PROBE-LOG IU: NORMAL {LOG_IU}/ML

## 2021-08-08 RX ORDER — ROSUVASTATIN CALCIUM 40 MG/1
TABLET, COATED ORAL
Qty: 90 TABLET | Refills: 1 | Status: SHIPPED | OUTPATIENT
Start: 2021-08-08 | End: 2022-01-06

## 2021-09-20 ENCOUNTER — TRANSCRIBE ORDERS (OUTPATIENT)
Dept: ADMINISTRATIVE | Facility: HOSPITAL | Age: 66
End: 2021-09-20

## 2021-09-20 ENCOUNTER — LAB (OUTPATIENT)
Dept: LAB | Facility: HOSPITAL | Age: 66
End: 2021-09-20

## 2021-09-20 DIAGNOSIS — Z94.81 STATUS POST BONE MARROW TRANSPLANT (HCC): ICD-10-CM

## 2021-09-20 DIAGNOSIS — D89.811 CHRONIC GRAFT-VERSUS-HOST DISEASE (HCC): ICD-10-CM

## 2021-09-20 DIAGNOSIS — C93.10 CHRONIC MONOCYTOID LEUKEMIA (HCC): Primary | ICD-10-CM

## 2021-09-20 DIAGNOSIS — C93.10 CHRONIC MONOCYTOID LEUKEMIA (HCC): ICD-10-CM

## 2021-09-20 LAB
ALBUMIN SERPL-MCNC: 4.2 G/DL (ref 3.5–5.2)
ALBUMIN/GLOB SERPL: 2.1 G/DL
ALP SERPL-CCNC: 98 U/L (ref 39–117)
ALT SERPL W P-5'-P-CCNC: 10 U/L (ref 1–41)
ANION GAP SERPL CALCULATED.3IONS-SCNC: 10.3 MMOL/L (ref 5–15)
AST SERPL-CCNC: 17 U/L (ref 1–40)
BILIRUB SERPL-MCNC: 0.4 MG/DL (ref 0–1.2)
BUN SERPL-MCNC: 24 MG/DL (ref 8–23)
BUN/CREAT SERPL: 16.7 (ref 7–25)
CALCIUM SPEC-SCNC: 8.9 MG/DL (ref 8.6–10.5)
CHLORIDE SERPL-SCNC: 100 MMOL/L (ref 98–107)
CO2 SERPL-SCNC: 24.7 MMOL/L (ref 22–29)
CREAT SERPL-MCNC: 1.44 MG/DL (ref 0.76–1.27)
GFR SERPL CREATININE-BSD FRML MDRD: 50 ML/MIN/1.73
GLOBULIN UR ELPH-MCNC: 2 GM/DL
GLUCOSE SERPL-MCNC: 92 MG/DL (ref 65–99)
POTASSIUM SERPL-SCNC: 4.6 MMOL/L (ref 3.5–5.2)
PROT SERPL-MCNC: 6.2 G/DL (ref 6–8.5)
SODIUM SERPL-SCNC: 135 MMOL/L (ref 136–145)

## 2021-09-20 PROCEDURE — 36415 COLL VENOUS BLD VENIPUNCTURE: CPT

## 2021-09-20 PROCEDURE — 80053 COMPREHEN METABOLIC PANEL: CPT

## 2021-10-19 ENCOUNTER — HOSPITAL ENCOUNTER (OUTPATIENT)
Dept: GENERAL RADIOLOGY | Facility: HOSPITAL | Age: 66
Discharge: HOME | End: 2021-10-19
Admitting: FAMILY MEDICINE

## 2021-10-19 ENCOUNTER — OFFICE VISIT (OUTPATIENT)
Dept: FAMILY MEDICINE CLINIC | Facility: CLINIC | Age: 66
End: 2021-10-19

## 2021-10-19 ENCOUNTER — LAB (OUTPATIENT)
Dept: FAMILY MEDICINE CLINIC | Facility: CLINIC | Age: 66
End: 2021-10-19

## 2021-10-19 ENCOUNTER — PATIENT MESSAGE (OUTPATIENT)
Dept: FAMILY MEDICINE CLINIC | Facility: CLINIC | Age: 66
End: 2021-10-19

## 2021-10-19 VITALS
SYSTOLIC BLOOD PRESSURE: 181 MMHG | DIASTOLIC BLOOD PRESSURE: 91 MMHG | OXYGEN SATURATION: 97 % | WEIGHT: 177.4 LBS | TEMPERATURE: 98 F | HEART RATE: 61 BPM | BODY MASS INDEX: 27.78 KG/M2

## 2021-10-19 DIAGNOSIS — M54.42 CHRONIC MIDLINE LOW BACK PAIN WITH BILATERAL SCIATICA: ICD-10-CM

## 2021-10-19 DIAGNOSIS — N40.1 BENIGN PROSTATIC HYPERPLASIA WITH NOCTURIA: ICD-10-CM

## 2021-10-19 DIAGNOSIS — M54.41 CHRONIC MIDLINE LOW BACK PAIN WITH BILATERAL SCIATICA: ICD-10-CM

## 2021-10-19 DIAGNOSIS — R35.1 BENIGN PROSTATIC HYPERPLASIA WITH NOCTURIA: ICD-10-CM

## 2021-10-19 DIAGNOSIS — R30.0 DYSURIA: Primary | ICD-10-CM

## 2021-10-19 DIAGNOSIS — I10 ESSENTIAL HYPERTENSION: ICD-10-CM

## 2021-10-19 DIAGNOSIS — G89.29 CHRONIC MIDLINE LOW BACK PAIN WITH BILATERAL SCIATICA: ICD-10-CM

## 2021-10-19 LAB
ALBUMIN SERPL-MCNC: 4.4 G/DL (ref 3.5–5.2)
ALBUMIN/GLOB SERPL: 2.3 G/DL
ALP SERPL-CCNC: 119 U/L (ref 39–117)
ALT SERPL W P-5'-P-CCNC: 10 U/L (ref 1–41)
ANION GAP SERPL CALCULATED.3IONS-SCNC: 11 MMOL/L (ref 5–15)
AST SERPL-CCNC: 20 U/L (ref 1–40)
BASOPHILS # BLD AUTO: 0.08 10*3/MM3 (ref 0–0.2)
BASOPHILS NFR BLD AUTO: 1 % (ref 0–1.5)
BILIRUB BLD-MCNC: NEGATIVE MG/DL
BILIRUB SERPL-MCNC: 0.4 MG/DL (ref 0–1.2)
BUN SERPL-MCNC: 22 MG/DL (ref 8–23)
BUN/CREAT SERPL: 16.1 (ref 7–25)
CALCIUM SPEC-SCNC: 9.2 MG/DL (ref 8.6–10.5)
CHLORIDE SERPL-SCNC: 104 MMOL/L (ref 98–107)
CHOLEST SERPL-MCNC: 202 MG/DL (ref 0–200)
CLARITY, POC: CLEAR
CO2 SERPL-SCNC: 24 MMOL/L (ref 22–29)
COLOR UR: YELLOW
CREAT SERPL-MCNC: 1.37 MG/DL (ref 0.76–1.27)
DEPRECATED RDW RBC AUTO: 51.7 FL (ref 37–54)
EOSINOPHIL # BLD AUTO: 0.48 10*3/MM3 (ref 0–0.4)
EOSINOPHIL NFR BLD AUTO: 6.1 % (ref 0.3–6.2)
ERYTHROCYTE [DISTWIDTH] IN BLOOD BY AUTOMATED COUNT: 15 % (ref 12.3–15.4)
EXPIRATION DATE: ABNORMAL
GFR SERPL CREATININE-BSD FRML MDRD: 52 ML/MIN/1.73
GLOBULIN UR ELPH-MCNC: 1.9 GM/DL
GLUCOSE SERPL-MCNC: 128 MG/DL (ref 65–99)
GLUCOSE UR STRIP-MCNC: NEGATIVE MG/DL
HCT VFR BLD AUTO: 30.1 % (ref 37.5–51)
HDLC SERPL-MCNC: 25 MG/DL (ref 40–60)
HGB BLD-MCNC: 10 G/DL (ref 13–17.7)
IMM GRANULOCYTES # BLD AUTO: 0.06 10*3/MM3 (ref 0–0.05)
IMM GRANULOCYTES NFR BLD AUTO: 0.8 % (ref 0–0.5)
KETONES UR QL: NEGATIVE
LDLC SERPL CALC-MCNC: 82 MG/DL (ref 0–100)
LDLC/HDLC SERPL: 2.39 {RATIO}
LEUKOCYTE EST, POC: NEGATIVE
LYMPHOCYTES # BLD AUTO: 1.43 10*3/MM3 (ref 0.7–3.1)
LYMPHOCYTES NFR BLD AUTO: 18.3 % (ref 19.6–45.3)
Lab: ABNORMAL
MCH RBC QN AUTO: 31.7 PG (ref 26.6–33)
MCHC RBC AUTO-ENTMCNC: 33.2 G/DL (ref 31.5–35.7)
MCV RBC AUTO: 95.6 FL (ref 79–97)
MONOCYTES # BLD AUTO: 0.67 10*3/MM3 (ref 0.1–0.9)
MONOCYTES NFR BLD AUTO: 8.6 % (ref 5–12)
NEUTROPHILS NFR BLD AUTO: 5.1 10*3/MM3 (ref 1.7–7)
NEUTROPHILS NFR BLD AUTO: 65.2 % (ref 42.7–76)
NITRITE UR-MCNC: NEGATIVE MG/ML
NRBC BLD AUTO-RTO: 0 /100 WBC (ref 0–0.2)
PH UR: 5 [PH] (ref 5–8)
PLATELET # BLD AUTO: 258 10*3/MM3 (ref 140–450)
PMV BLD AUTO: 10.4 FL (ref 6–12)
POTASSIUM SERPL-SCNC: 4.4 MMOL/L (ref 3.5–5.2)
PROT SERPL-MCNC: 6.3 G/DL (ref 6–8.5)
PROT UR STRIP-MCNC: ABNORMAL MG/DL
PSA SERPL-MCNC: 3.83 NG/ML (ref 0–4)
RBC # BLD AUTO: 3.15 10*6/MM3 (ref 4.14–5.8)
RBC # UR STRIP: ABNORMAL /UL
SODIUM SERPL-SCNC: 139 MMOL/L (ref 136–145)
SP GR UR: 1.02 (ref 1–1.03)
TRIGL SERPL-MCNC: 586 MG/DL (ref 0–150)
UROBILINOGEN UR QL: NORMAL
VLDLC SERPL-MCNC: 95 MG/DL (ref 5–40)
WBC # BLD AUTO: 7.82 10*3/MM3 (ref 3.4–10.8)

## 2021-10-19 PROCEDURE — 99214 OFFICE O/P EST MOD 30 MIN: CPT | Performed by: FAMILY MEDICINE

## 2021-10-19 PROCEDURE — 84153 ASSAY OF PSA TOTAL: CPT | Performed by: FAMILY MEDICINE

## 2021-10-19 PROCEDURE — 36415 COLL VENOUS BLD VENIPUNCTURE: CPT | Performed by: FAMILY MEDICINE

## 2021-10-19 PROCEDURE — 80053 COMPREHEN METABOLIC PANEL: CPT | Performed by: FAMILY MEDICINE

## 2021-10-19 PROCEDURE — 85025 COMPLETE CBC W/AUTO DIFF WBC: CPT | Performed by: FAMILY MEDICINE

## 2021-10-19 PROCEDURE — 87086 URINE CULTURE/COLONY COUNT: CPT | Performed by: FAMILY MEDICINE

## 2021-10-19 PROCEDURE — 80061 LIPID PANEL: CPT | Performed by: FAMILY MEDICINE

## 2021-10-19 PROCEDURE — 81003 URINALYSIS AUTO W/O SCOPE: CPT | Performed by: FAMILY MEDICINE

## 2021-10-19 PROCEDURE — 72110 X-RAY EXAM L-2 SPINE 4/>VWS: CPT

## 2021-10-19 NOTE — PROGRESS NOTES
Subjective   Anthony Ayoub is a 65 y.o. male.     Anthony Ayoub is in for a couple concerns.  He has been having nighttime urination and daytime urinary frequency for several weeks.  He has not noticed any blood in his urine.  He has some chronic low back pain and he has numbness in both feet in the toe boxes.  He has not had any fever or chills.  He has not had any night sweats or weight loss of note.  He does take his medication regularly but continues to run high blood pressures. There is no history of chest pain or dyspnea. There is no history of issue with bowel  dysfunction. There is no history of dizziness or lightheadedness. There is no history of issue with sleep or mood. There is no history of issue with present medication.            BP (!) 181/91 (BP Location: Left arm, Patient Position: Sitting, Cuff Size: Large Adult)   Pulse 61   Temp 98 °F (36.7 °C) (Temporal)   Wt 80.5 kg (177 lb 6.4 oz)   SpO2 97%   BMI 27.78 kg/m²       Chief Complaint   Patient presents with   • Urinary Frequency     problem - says several months - comes and goes - urges to go in the middle of time all the time    • Back Pain     lower back/hip area where it meets - mostly in the mornings - sets on heating pad for awhile - once he moves around feels better    • Numbness     both feet all of his toes - neuropathy            Current Outpatient Medications:   •  acyclovir (ZOVIRAX) 800 MG tablet, Take 800 mg by mouth 2 (Two) Times a Day., Disp: , Rfl:   •  aspirin 81 MG chewable tablet, Chew 81 mg Daily., Disp: , Rfl:   •  carvedilol (COREG) 12.5 MG tablet, Take 12.5 mg by mouth 2 (Two) Times a Day With Meals., Disp: , Rfl:   •  CloNIDine (CATAPRES) 0.1 MG tablet, Take 0.1 mg by mouth 2 (Two) Times a Day., Disp: , Rfl:   •  dapsone 100 MG tablet, Take 100 mg by mouth Daily., Disp: , Rfl:   •  escitalopram (LEXAPRO) 10 MG tablet, Take 10 mg by mouth Daily., Disp: , Rfl:   •  fluticasone (FLONASE) 50 MCG/ACT nasal spray,  1 spray into the nostril(s) as directed by provider 1 (One) Time. Once daily, Disp: , Rfl:   •  isosorbide mononitrate (IMDUR) 60 MG 24 hr tablet, TAKE 1 TABLET BY MOUTH EVERY DAY IN THE MORNING (Patient taking differently: 30 mg Daily.), Disp: 90 tablet, Rfl: 1  •  lisinopril (PRINIVIL,ZESTRIL) 20 MG tablet, Take 20 mg by mouth Daily., Disp: , Rfl:   •  pantoprazole (PROTONIX) 40 MG EC tablet, Take 40 mg by mouth Daily., Disp: , Rfl:   •  rosuvastatin (CRESTOR) 40 MG tablet, TAKE 1 TABLET BY MOUTH EVERYDAY AT BEDTIME, Disp: 90 tablet, Rfl: 1  •  voriconazole (VFEND) 200 MG tablet, Take 200 mg by mouth Daily., Disp: , Rfl:   •  clopidogrel (PLAVIX) 75 MG tablet, TAKE 1 TABLET BY MOUTH EVERY DAY IN THE MORNING, Disp: 30 tablet, Rfl: 5  •  fenofibrate (TRICOR) 145 MG tablet, Take 145 mg by mouth Every Other Day., Disp: , Rfl:   •  levothyroxine (SYNTHROID, LEVOTHROID) 50 MCG tablet, TAKE 1 TABLET BY MOUTH EVERY DAY, Disp: 90 tablet, Rfl: 1  •  LOTEMAX 0.5 % ophthalmic suspension, 1 DROP INTO BOTH EYES TWICE A DAY, Disp: , Rfl: 0  •  XIIDRA 5 % solution, 1 DROP INTO BOTH EYES TWICE A DAY, Disp: , Rfl: 11        The following portions of the patient's history were reviewed and updated as appropriate: allergies, current medications, past family history, past medical history, past social history, past surgical history, and problem list.    Review of Systems   Constitutional: Negative for activity change, fatigue and fever.   HENT: Negative for congestion, sinus pressure, sinus pain, sore throat and trouble swallowing.    Eyes: Negative for visual disturbance.   Respiratory: Negative for chest tightness, shortness of breath and wheezing.    Cardiovascular: Negative for chest pain.   Gastrointestinal: Negative for abdominal distention, abdominal pain, constipation, diarrhea, nausea and vomiting.   Genitourinary: Positive for frequency and urgency. Negative for difficulty urinating and dysuria.   Musculoskeletal: Positive  for back pain and neck pain.   Neurological: Positive for numbness. Negative for dizziness, weakness, light-headedness and headaches.   Psychiatric/Behavioral: Positive for sleep disturbance. Negative for agitation, hallucinations and suicidal ideas.       Objective   Physical Exam  Vitals and nursing note reviewed.   Constitutional:       Appearance: Normal appearance.   Cardiovascular:      Rate and Rhythm: Normal rate and regular rhythm.      Heart sounds: Normal heart sounds. No murmur heard.      Pulmonary:      Effort: Pulmonary effort is normal.      Breath sounds: No wheezing or rales.   Abdominal:      General: Bowel sounds are normal.      Palpations: Abdomen is soft.      Tenderness: There is no abdominal tenderness. There is no guarding.   Genitourinary:     Comments: Prostate enlarged but no nodules or suspicious findings  Musculoskeletal:      Cervical back: Neck supple.      Right lower leg: No edema.      Left lower leg: No edema.   Lymphadenopathy:      Cervical: No cervical adenopathy.   Skin:     Findings: No bruising, lesion or rash.   Neurological:      General: No focal deficit present.      Mental Status: He is alert and oriented to person, place, and time.   Psychiatric:         Mood and Affect: Mood normal.           Assessment/Plan   Problems Addressed this Visit     None      Visit Diagnoses     Dysuria    -  Primary    Relevant Orders    POC Urinalysis Dipstick, Automated (Completed)    Urine Culture - Urine, Urine, Clean Catch    CBC w AUTO Differential    Benign prostatic hyperplasia with nocturia        Relevant Orders    PSA DIAGNOSTIC ONLY    Essential hypertension        Relevant Orders    Comprehensive metabolic panel    Lipid panel    Chronic midline low back pain with bilateral sciatica        Relevant Orders    XR Spine Lumbar 4+ View      Diagnoses       Codes Comments    Dysuria    -  Primary ICD-10-CM: R30.0  ICD-9-CM: 788.1     Benign prostatic hyperplasia with nocturia      ICD-10-CM: N40.1, R35.1  ICD-9-CM: 600.01, 788.43     Essential hypertension     ICD-10-CM: I10  ICD-9-CM: 401.9     Chronic midline low back pain with bilateral sciatica     ICD-10-CM: M54.41, M54.42, G89.29  ICD-9-CM: 724.2, 724.3, 338.29         I will send for x-rays of the low back issue  I will get some labs to explore the enlarged prostate  I will treat once have seen his lab results or refer if indicated  I will see him back in a few weeks to make sure we have made adequate progress in his symptom relief and he is to call for new concerns

## 2021-10-20 ENCOUNTER — PATIENT MESSAGE (OUTPATIENT)
Dept: FAMILY MEDICINE CLINIC | Facility: CLINIC | Age: 66
End: 2021-10-20

## 2021-10-20 LAB — BACTERIA SPEC AEROBE CULT: NO GROWTH

## 2021-10-28 DIAGNOSIS — N18.30 STAGE 3 CHRONIC KIDNEY DISEASE, UNSPECIFIED WHETHER STAGE 3A OR 3B CKD (HCC): Primary | ICD-10-CM

## 2021-10-28 DIAGNOSIS — M54.41 CHRONIC MIDLINE LOW BACK PAIN WITH BILATERAL SCIATICA: ICD-10-CM

## 2021-10-28 DIAGNOSIS — M54.42 CHRONIC MIDLINE LOW BACK PAIN WITH BILATERAL SCIATICA: ICD-10-CM

## 2021-10-28 DIAGNOSIS — G89.29 CHRONIC MIDLINE LOW BACK PAIN WITH BILATERAL SCIATICA: ICD-10-CM

## 2021-10-28 RX ORDER — EZETIMIBE 10 MG/1
10 TABLET ORAL DAILY
Qty: 30 TABLET | Refills: 3 | Status: SHIPPED | OUTPATIENT
Start: 2021-10-28 | End: 2021-11-27

## 2021-10-28 RX ORDER — PNV NO.95/FERROUS FUM/FOLIC AC 28MG-0.8MG
325 TABLET ORAL
Qty: 30 TABLET | Refills: 3 | Status: SHIPPED | OUTPATIENT
Start: 2021-10-28 | End: 2022-04-13

## 2021-11-17 ENCOUNTER — PATIENT MESSAGE (OUTPATIENT)
Dept: FAMILY MEDICINE CLINIC | Facility: CLINIC | Age: 66
End: 2021-11-17

## 2021-11-23 ENCOUNTER — TELEPHONE (OUTPATIENT)
Dept: PHYSICAL THERAPY | Facility: CLINIC | Age: 66
End: 2021-11-23

## 2021-11-27 RX ORDER — EZETIMIBE 10 MG/1
TABLET ORAL
Qty: 30 TABLET | Refills: 3 | Status: SHIPPED | OUTPATIENT
Start: 2021-11-27 | End: 2022-01-11

## 2021-12-14 ENCOUNTER — LAB (OUTPATIENT)
Dept: LAB | Facility: HOSPITAL | Age: 66
End: 2021-12-14

## 2021-12-14 ENCOUNTER — TRANSCRIBE ORDERS (OUTPATIENT)
Dept: ADMINISTRATIVE | Facility: HOSPITAL | Age: 66
End: 2021-12-14

## 2021-12-14 DIAGNOSIS — N40.1 ENLARGED PROSTATE WITH URINARY OBSTRUCTION: ICD-10-CM

## 2021-12-14 DIAGNOSIS — N13.8 ENLARGED PROSTATE WITH URINARY OBSTRUCTION: ICD-10-CM

## 2021-12-14 DIAGNOSIS — N18.31 CHRONIC KIDNEY DISEASE (CKD) STAGE G3A/A1, MODERATELY DECREASED GLOMERULAR FILTRATION RATE (GFR) BETWEEN 45-59 ML/MIN/1.73 SQUARE METER AND ALBUMINURIA CREATININE RATIO LESS THAN 30 MG/G (CMS/H* (HCC): Primary | ICD-10-CM

## 2021-12-14 DIAGNOSIS — Z85.6 PERSONAL HISTORY OF UNSPECIFIED LEUKEMIA: ICD-10-CM

## 2021-12-14 DIAGNOSIS — I10 ESSENTIAL HYPERTENSION: ICD-10-CM

## 2021-12-14 DIAGNOSIS — N18.31 CHRONIC KIDNEY DISEASE (CKD) STAGE G3A/A1, MODERATELY DECREASED GLOMERULAR FILTRATION RATE (GFR) BETWEEN 45-59 ML/MIN/1.73 SQUARE METER AND ALBUMINURIA CREATININE RATIO LESS THAN 30 MG/G (CMS/H* (HCC): ICD-10-CM

## 2021-12-14 LAB
ALBUMIN SERPL-MCNC: 4.3 G/DL (ref 3.5–5.2)
ALBUMIN UR-MCNC: 115.4 MG/DL
ANION GAP SERPL CALCULATED.3IONS-SCNC: 9.5 MMOL/L (ref 5–15)
BACTERIA UR QL AUTO: NORMAL /HPF
BASOPHILS # BLD AUTO: 0.07 10*3/MM3 (ref 0–0.2)
BASOPHILS NFR BLD AUTO: 0.9 % (ref 0–1.5)
BILIRUB UR QL STRIP: NEGATIVE
BUN SERPL-MCNC: 22 MG/DL (ref 8–23)
BUN/CREAT SERPL: 17.7 (ref 7–25)
CALCIUM SPEC-SCNC: 9.1 MG/DL (ref 8.6–10.5)
CHLORIDE SERPL-SCNC: 104 MMOL/L (ref 98–107)
CLARITY UR: CLEAR
CO2 SERPL-SCNC: 25.5 MMOL/L (ref 22–29)
COLOR UR: YELLOW
CREAT SERPL-MCNC: 1.24 MG/DL (ref 0.76–1.27)
CREAT UR-MCNC: 82.4 MG/DL
DEPRECATED RDW RBC AUTO: 49.9 FL (ref 37–54)
EOSINOPHIL # BLD AUTO: 0.39 10*3/MM3 (ref 0–0.4)
EOSINOPHIL NFR BLD AUTO: 5.2 % (ref 0.3–6.2)
ERYTHROCYTE [DISTWIDTH] IN BLOOD BY AUTOMATED COUNT: 14.5 % (ref 12.3–15.4)
GFR SERPL CREATININE-BSD FRML MDRD: 59 ML/MIN/1.73
GLUCOSE SERPL-MCNC: 107 MG/DL (ref 65–99)
GLUCOSE UR STRIP-MCNC: NEGATIVE MG/DL
HCT VFR BLD AUTO: 32.7 % (ref 37.5–51)
HGB BLD-MCNC: 10.7 G/DL (ref 13–17.7)
HGB UR QL STRIP.AUTO: NEGATIVE
HYALINE CASTS UR QL AUTO: NORMAL /LPF
IMM GRANULOCYTES # BLD AUTO: 0.04 10*3/MM3 (ref 0–0.05)
IMM GRANULOCYTES NFR BLD AUTO: 0.5 % (ref 0–0.5)
KETONES UR QL STRIP: NEGATIVE
LEUKOCYTE ESTERASE UR QL STRIP.AUTO: NEGATIVE
LYMPHOCYTES # BLD AUTO: 1.69 10*3/MM3 (ref 0.7–3.1)
LYMPHOCYTES NFR BLD AUTO: 22.4 % (ref 19.6–45.3)
MCH RBC QN AUTO: 30.8 PG (ref 26.6–33)
MCHC RBC AUTO-ENTMCNC: 32.7 G/DL (ref 31.5–35.7)
MCV RBC AUTO: 94.2 FL (ref 79–97)
MICROALBUMIN/CREAT UR: 1400.5 MG/G
MONOCYTES # BLD AUTO: 0.64 10*3/MM3 (ref 0.1–0.9)
MONOCYTES NFR BLD AUTO: 8.5 % (ref 5–12)
NEUTROPHILS NFR BLD AUTO: 4.7 10*3/MM3 (ref 1.7–7)
NEUTROPHILS NFR BLD AUTO: 62.5 % (ref 42.7–76)
NITRITE UR QL STRIP: NEGATIVE
NRBC BLD AUTO-RTO: 0.1 /100 WBC (ref 0–0.2)
PH UR STRIP.AUTO: 6 [PH] (ref 5–8)
PHOSPHATE SERPL-MCNC: 4.3 MG/DL (ref 2.5–4.5)
PLATELET # BLD AUTO: 268 10*3/MM3 (ref 140–450)
PMV BLD AUTO: 10.6 FL (ref 6–12)
POTASSIUM SERPL-SCNC: 4.7 MMOL/L (ref 3.5–5.2)
PROT UR QL STRIP: ABNORMAL
PSA SERPL-MCNC: 4.08 NG/ML (ref 0–4)
RBC # BLD AUTO: 3.47 10*6/MM3 (ref 4.14–5.8)
RBC # UR STRIP: NORMAL /HPF
REF LAB TEST METHOD: NORMAL
SODIUM SERPL-SCNC: 139 MMOL/L (ref 136–145)
SP GR UR STRIP: 1.01 (ref 1–1.03)
SQUAMOUS #/AREA URNS HPF: NORMAL /HPF
UROBILINOGEN UR QL STRIP: ABNORMAL
WBC # UR STRIP: NORMAL /HPF
WBC NRBC COR # BLD: 7.53 10*3/MM3 (ref 3.4–10.8)

## 2021-12-14 PROCEDURE — 82043 UR ALBUMIN QUANTITATIVE: CPT

## 2021-12-14 PROCEDURE — 84165 PROTEIN E-PHORESIS SERUM: CPT

## 2021-12-14 PROCEDURE — 36415 COLL VENOUS BLD VENIPUNCTURE: CPT

## 2021-12-14 PROCEDURE — 81001 URINALYSIS AUTO W/SCOPE: CPT

## 2021-12-14 PROCEDURE — 80069 RENAL FUNCTION PANEL: CPT

## 2021-12-14 PROCEDURE — 86334 IMMUNOFIX E-PHORESIS SERUM: CPT

## 2021-12-14 PROCEDURE — 82570 ASSAY OF URINE CREATININE: CPT

## 2021-12-14 PROCEDURE — 85025 COMPLETE CBC W/AUTO DIFF WBC: CPT

## 2021-12-14 PROCEDURE — 82784 ASSAY IGA/IGD/IGG/IGM EACH: CPT

## 2021-12-14 PROCEDURE — 84155 ASSAY OF PROTEIN SERUM: CPT

## 2021-12-14 PROCEDURE — G0103 PSA SCREENING: HCPCS

## 2021-12-15 LAB
ALBUMIN SERPL ELPH-MCNC: 3.9 G/DL (ref 2.9–4.4)
ALBUMIN/GLOB SERPL: 1.7 {RATIO} (ref 0.7–1.7)
ALPHA1 GLOB SERPL ELPH-MCNC: 0.2 G/DL (ref 0–0.4)
ALPHA2 GLOB SERPL ELPH-MCNC: 0.7 G/DL (ref 0.4–1)
B-GLOBULIN SERPL ELPH-MCNC: 0.8 G/DL (ref 0.7–1.3)
GAMMA GLOB SERPL ELPH-MCNC: 0.6 G/DL (ref 0.4–1.8)
GLOBULIN SER-MCNC: 2.4 G/DL (ref 2.2–3.9)
IGA SERPL-MCNC: 42 MG/DL (ref 61–437)
IGG SERPL-MCNC: 617 MG/DL (ref 603–1613)
IGM SERPL-MCNC: 47 MG/DL (ref 20–172)
INTERPRETATION SERPL IEP-IMP: ABNORMAL
LABORATORY COMMENT REPORT: ABNORMAL
M PROTEIN SERPL ELPH-MCNC: ABNORMAL G/DL
PROT SERPL-MCNC: 6.3 G/DL (ref 6–8.5)

## 2021-12-21 ENCOUNTER — TREATMENT (OUTPATIENT)
Dept: PHYSICAL THERAPY | Facility: CLINIC | Age: 66
End: 2021-12-21

## 2021-12-21 DIAGNOSIS — M54.42 CHRONIC MIDLINE LOW BACK PAIN WITH BILATERAL SCIATICA: Primary | ICD-10-CM

## 2021-12-21 DIAGNOSIS — M54.41 CHRONIC MIDLINE LOW BACK PAIN WITH BILATERAL SCIATICA: Primary | ICD-10-CM

## 2021-12-21 DIAGNOSIS — G89.29 CHRONIC MIDLINE LOW BACK PAIN WITH BILATERAL SCIATICA: Primary | ICD-10-CM

## 2021-12-21 PROCEDURE — 97161 PT EVAL LOW COMPLEX 20 MIN: CPT | Performed by: PHYSICAL THERAPIST

## 2021-12-21 PROCEDURE — 97140 MANUAL THERAPY 1/> REGIONS: CPT | Performed by: PHYSICAL THERAPIST

## 2021-12-21 PROCEDURE — 97110 THERAPEUTIC EXERCISES: CPT | Performed by: PHYSICAL THERAPIST

## 2021-12-21 NOTE — PROGRESS NOTES
Physical Therapy Initial Evaluation and Plan of Care    Patient: Anthony Ayoub   : 1955  Diagnosis/ICD-10 Code:  Chronic midline low back pain with bilateral sciatica [M54.41, M54.42, G89.29]  Referring practitioner: Yifan Elizabeth,*  Date of Initial Visit: 2021  Today's Date: 2021  Patient seen for 1 sessions           Subjective Questionnaire: ESTELA 14%      Subjective 65 yo male with c/o LBP. Pt with a long history of LBP with exacerbation ~2 years ago. Symptoms never really improved with time. Symptoms worse in the AM, prolonged stooping. Symptoms improve with heat. Primary pain is along the bilateral and central belt line. Denies LE pain. Does have forefoot numbness and tingling, relates this to dx neuropathy. 0/10 pain now 8/10 at worst.  MD follow up: 21  Social hx: On disability      Objective          Neurological Testing     Sensation     Lumbar   Left   Intact: light touch    Right   Intact: light touch    Active Range of Motion     Additional Active Range of Motion Details  Mildly limited with forward and bilateral SB, central pain provocation. Moderately limited with extension.    Strength/Myotome Testing     Lumbar   Left   Normal strength    Right   Normal strength    Tests     Lumbar     Left   Negative passive SLR.     Right   Negative passive SLR.     Additional Tests Details  Negative SI provocation signs. Further testing negative.   Bilateral hip extension and IR PROM limited.  Guarded posture.       Assessment & Plan     Assessment  Impairments: abnormal coordination, abnormal gait, abnormal muscle tone, abnormal or restricted ROM, activity intolerance, impaired physical strength, lacks appropriate home exercise program and pain with function  Functional Limitations: carrying objects, lifting, sleeping, walking, pulling, pushing, moving in bed, sitting, standing, stooping and unable to perform repetitive tasks  Assessment details: 65 yo male with c/o LBP. Pt  is with a good response to treatment this date and would benefit from further evaluation and treatment to address the above impairments.    Prognosis: good    Goals  Plan Goals: Short term goals, 1 week: Tolerate HEP progression.  Voice compliance with activity modification.  Report improvement in symptoms.    Long term goals, 6 weeks: Improve ESTELA score to 6%.  AROM to be wfl.  Rate worst pain at 4/10.  Independent with HEP.    Plan  Therapy options: will be seen for skilled therapy services  Other planned modality interventions: modalities prn  Planned therapy interventions: abdominal trunk stabilization, body mechanics training, flexibility, functional ROM exercises, home exercise program, manual therapy, neuromuscular re-education, postural training, strengthening, stretching and therapeutic activities  Frequency: 1x week  Duration in weeks: 6  Treatment plan discussed with: patient and family      Timed:         Manual Therapy:    8     mins  30023;     Therapeutic Exercise:    15     mins  24841;     Neuromuscular Roney:        mins  65865;    Therapeutic Activity:          mins  56669;     Gait Training:           mins  29774;     Ultrasound:          mins  11123;    Ionto                                   mins   75244  Self Care                            mins   92104    Un-Timed:  Electrical Stimulation:         mins  06191 ( );  Traction          mins 81142  Low Eval     15     Mins  67888  Mod Eval          Mins  39800  High Eval                            Mins  80678  Re-Eval                               mins  25021        Timed Treatment:   23   mins   Total Treatment:     38   mins      PT SIGNATURE: Georges Adkins PT, DPT, OCS  [place] license: [id number]  DATE TREATMENT INITIATED: 12/21/2021    Initial Certification  Certification Period: 3/21/2022  I certify that the therapy services are furnished while this patient is under my care.  The services outlined above are required for this patient and will  be reviewed every 90 days.     PHYSICIAN: _____________________________    Yifan Elizabeth MD      DATE:     Please sign and return via fax to [phone]. Thank you, [facility].

## 2021-12-27 ENCOUNTER — TRANSCRIBE ORDERS (OUTPATIENT)
Dept: ADMINISTRATIVE | Facility: HOSPITAL | Age: 66
End: 2021-12-27

## 2021-12-27 DIAGNOSIS — N18.31 CHRONIC KIDNEY DISEASE (CKD) STAGE G3A/A1, MODERATELY DECREASED GLOMERULAR FILTRATION RATE (GFR) BETWEEN 45-59 ML/MIN/1.73 SQUARE METER AND ALBUMINURIA CREATININE RATIO LESS THAN 30 MG/G (CMS/H* (HCC): Primary | ICD-10-CM

## 2021-12-28 ENCOUNTER — TREATMENT (OUTPATIENT)
Dept: PHYSICAL THERAPY | Facility: CLINIC | Age: 66
End: 2021-12-28

## 2021-12-28 DIAGNOSIS — M54.42 CHRONIC MIDLINE LOW BACK PAIN WITH BILATERAL SCIATICA: Primary | ICD-10-CM

## 2021-12-28 DIAGNOSIS — G89.29 CHRONIC MIDLINE LOW BACK PAIN WITH BILATERAL SCIATICA: Primary | ICD-10-CM

## 2021-12-28 DIAGNOSIS — M54.41 CHRONIC MIDLINE LOW BACK PAIN WITH BILATERAL SCIATICA: Primary | ICD-10-CM

## 2021-12-28 PROCEDURE — 97112 NEUROMUSCULAR REEDUCATION: CPT | Performed by: PHYSICAL THERAPIST

## 2021-12-28 PROCEDURE — 97110 THERAPEUTIC EXERCISES: CPT | Performed by: PHYSICAL THERAPIST

## 2021-12-28 PROCEDURE — 97140 MANUAL THERAPY 1/> REGIONS: CPT | Performed by: PHYSICAL THERAPIST

## 2021-12-28 NOTE — PROGRESS NOTES
Physical Therapy Daily Progress Note  Anthony Ayoub   1955   Primary Diagnosis: Chronic midline low back pain with bilateral sciatica [M54.41, M54.42, G89.29]   Type: THERAPY  Noted: 12/21/2021 12/28/2021   Visits: 2       Subjective   Pt reports: LBP and stiffness improving. HEP going well.      Objective     See Exercise, Manual, and Modality Logs for complete treatment.     Patient Education: HEP    Assessment/Plan Less stiffness post visit. Progressing well.      Progress per Plan of Care            Timed:         Manual Therapy:    10     mins  93997;     Therapeutic Exercise:    15     mins  22064;     Neuromuscular Roney:    15    mins  97153;    Therapeutic Activity:          mins  48221;     Gait Training:           mins  39192;     Ultrasound:          mins  23782;    Ionto                                   mins   34513  Self Care                            mins   04404    Un-Timed:  Electrical Stimulation:         mins  93093 ( );  Traction          mins 47446  Low Eval          Mins  35233  Mod Eval          Mins  81503  High Eval                            Mins  81582  Re-Eval                               mins  46943    Timed Treatment:   40   mins   Total Treatment:     40   mins    Georges Adkins, PT, DPT, OCS  [place] license: [id number]  Physical Therapist

## 2021-12-29 ENCOUNTER — OFFICE VISIT (OUTPATIENT)
Dept: FAMILY MEDICINE CLINIC | Facility: CLINIC | Age: 66
End: 2021-12-29

## 2021-12-29 VITALS
SYSTOLIC BLOOD PRESSURE: 182 MMHG | WEIGHT: 173 LBS | OXYGEN SATURATION: 95 % | TEMPERATURE: 100 F | HEART RATE: 60 BPM | DIASTOLIC BLOOD PRESSURE: 91 MMHG | BODY MASS INDEX: 27.1 KG/M2

## 2021-12-29 DIAGNOSIS — M54.42 CHRONIC MIDLINE LOW BACK PAIN WITH BILATERAL SCIATICA: Primary | ICD-10-CM

## 2021-12-29 DIAGNOSIS — M54.41 CHRONIC MIDLINE LOW BACK PAIN WITH BILATERAL SCIATICA: Primary | ICD-10-CM

## 2021-12-29 DIAGNOSIS — G89.29 CHRONIC MIDLINE LOW BACK PAIN WITH BILATERAL SCIATICA: Primary | ICD-10-CM

## 2021-12-29 DIAGNOSIS — N18.30 STAGE 3 CHRONIC KIDNEY DISEASE, UNSPECIFIED WHETHER STAGE 3A OR 3B CKD (HCC): ICD-10-CM

## 2021-12-29 PROCEDURE — 99213 OFFICE O/P EST LOW 20 MIN: CPT | Performed by: FAMILY MEDICINE

## 2021-12-29 RX ORDER — PREDNISONE 10 MG/1
10 TABLET ORAL
COMMUNITY
End: 2022-04-13

## 2021-12-29 RX ORDER — TERAZOSIN 5 MG/1
5 CAPSULE ORAL NIGHTLY
COMMUNITY
Start: 2021-12-14 | End: 2022-12-14

## 2021-12-29 NOTE — PROGRESS NOTES
Subjective   Anthony Ayoub is a 66 y.o. male.     Anthony Ayoub is in for follow up on his chronic low back pain and CKD.  He has an appointment for a renal ultrasound tomorrow that should give us some more information.  In general he feels well.  He has tolerated some physical therapy and has improved his mobility.  There is no history of chest pain or dyspnea. There is no history of issue with bowel or bladder dysfunction. There is no history of dizziness or lightheadedness. There is no history of issue with sleep or mood. There is no history of issue with present medication.            BP (!) 182/91 (BP Location: Left arm, Patient Position: Sitting, Cuff Size: Large Adult)   Pulse 60   Temp 100 °F (37.8 °C) (Temporal)   Wt 78.5 kg (173 lb)   SpO2 95%   BMI 27.10 kg/m²       Chief Complaint   Patient presents with   • Difficulty Urinating     4 wk f/u            Current Outpatient Medications:   •  acyclovir (ZOVIRAX) 800 MG tablet, Take 800 mg by mouth 2 (Two) Times a Day., Disp: , Rfl:   •  aspirin 81 MG chewable tablet, Chew 81 mg Daily., Disp: , Rfl:   •  carvedilol (COREG) 12.5 MG tablet, Take 25 mg by mouth 2 (Two) Times a Day With Meals., Disp: , Rfl:   •  CloNIDine (CATAPRES) 0.1 MG tablet, Take 0.1 mg by mouth 2 (Two) Times a Day., Disp: , Rfl:   •  clopidogrel (PLAVIX) 75 MG tablet, TAKE 1 TABLET BY MOUTH EVERY DAY IN THE MORNING, Disp: 30 tablet, Rfl: 5  •  dapsone 100 MG tablet, Take 100 mg by mouth Daily., Disp: , Rfl:   •  escitalopram (LEXAPRO) 10 MG tablet, Take 10 mg by mouth Daily., Disp: , Rfl:   •  ezetimibe (ZETIA) 10 MG tablet, TAKE 1 TABLET BY MOUTH EVERY DAY, Disp: 30 tablet, Rfl: 3  •  fluticasone (FLONASE) 50 MCG/ACT nasal spray, 1 spray into the nostril(s) as directed by provider 1 (One) Time. Once daily, Disp: , Rfl:   •  isosorbide mononitrate (IMDUR) 60 MG 24 hr tablet, TAKE 1 TABLET BY MOUTH EVERY DAY IN THE MORNING (Patient taking differently: 30 mg Daily.), Disp: 90  tablet, Rfl: 1  •  lisinopril (PRINIVIL,ZESTRIL) 20 MG tablet, Take 20 mg by mouth Daily. Half a tablet a day, Disp: , Rfl:   •  pantoprazole (PROTONIX) 40 MG EC tablet, Take 40 mg by mouth Daily., Disp: , Rfl:   •  predniSONE (DELTASONE) 10 MG tablet, Take 10 mg by mouth., Disp: , Rfl:   •  rosuvastatin (CRESTOR) 40 MG tablet, TAKE 1 TABLET BY MOUTH EVERYDAY AT BEDTIME, Disp: 90 tablet, Rfl: 1  •  terazosin (HYTRIN) 5 MG capsule, Take 5 mg by mouth., Disp: , Rfl:   •  fenofibrate (TRICOR) 145 MG tablet, Take 145 mg by mouth Every Other Day., Disp: , Rfl:   •  ferrous sulfate 325 (65 Fe) MG tablet, Take 1 tablet by mouth Daily With Breakfast., Disp: 30 tablet, Rfl: 3  •  levothyroxine (SYNTHROID, LEVOTHROID) 50 MCG tablet, TAKE 1 TABLET BY MOUTH EVERY DAY, Disp: 90 tablet, Rfl: 1  •  LOTEMAX 0.5 % ophthalmic suspension, 1 DROP INTO BOTH EYES TWICE A DAY, Disp: , Rfl: 0  •  voriconazole (VFEND) 200 MG tablet, Take 200 mg by mouth Daily., Disp: , Rfl:   •  XIIDRA 5 % solution, 1 DROP INTO BOTH EYES TWICE A DAY, Disp: , Rfl: 11        The following portions of the patient's history were reviewed and updated as appropriate: allergies, current medications, past family history, past medical history, past social history, past surgical history, and problem list.    Review of Systems   Constitutional: Negative for activity change, fatigue and fever.   HENT: Negative for congestion, sinus pressure, sinus pain, sore throat and trouble swallowing.    Eyes: Negative for visual disturbance.   Respiratory: Negative for chest tightness, shortness of breath and wheezing.    Cardiovascular: Negative for chest pain.   Gastrointestinal: Negative for abdominal distention, abdominal pain, constipation, diarrhea, nausea and vomiting.   Genitourinary: Positive for frequency and urgency. Negative for difficulty urinating and dysuria.   Musculoskeletal: Positive for back pain and neck pain.   Neurological: Positive for numbness. Negative for  dizziness, weakness, light-headedness and headaches.   Psychiatric/Behavioral: Positive for sleep disturbance. Negative for agitation, hallucinations and suicidal ideas.       Objective   Physical Exam  Vitals and nursing note reviewed.   Constitutional:       Appearance: Normal appearance.   Cardiovascular:      Rate and Rhythm: Normal rate and regular rhythm.      Heart sounds: Normal heart sounds. No murmur heard.      Pulmonary:      Effort: Pulmonary effort is normal.      Breath sounds: No wheezing or rales.   Abdominal:      General: Bowel sounds are normal.      Palpations: Abdomen is soft.      Tenderness: There is no abdominal tenderness. There is no guarding.   Genitourinary:     Comments: Prostate enlarged but no nodules or suspicious findings  Musculoskeletal:      Cervical back: Neck supple.      Right lower leg: No edema.      Left lower leg: No edema.   Lymphadenopathy:      Cervical: No cervical adenopathy.   Skin:     Findings: No bruising, lesion or rash.   Neurological:      General: No focal deficit present.      Mental Status: He is alert and oriented to person, place, and time.   Psychiatric:         Mood and Affect: Mood normal.           Assessment/Plan   Problems Addressed this Visit     None      Visit Diagnoses     Chronic midline low back pain with bilateral sciatica    -  Primary    Stage 3 chronic kidney disease, unspecified whether stage 3a or 3b CKD ([facility])          Diagnoses       Codes Comments    Chronic midline low back pain with bilateral sciatica    -  Primary ICD-10-CM: M54.41, M54.42, G89.29  ICD-9-CM: 724.2, 724.3, 338.29     Stage 3 chronic kidney disease, unspecified whether stage 3a or 3b CKD ([facility])     ICD-10-CM: N18.30  ICD-9-CM: 585.3           I will ask him to be more physically active, especially getting some exercise  He is fully vaccinated for Covid  I will not change medication today but I will ask him to come back in the spring and bring his blood pressure cuff  with him  I am hopeful that with some exercise he will see a natural reduction in his blood pressure and may even lead to a reduction in his medication

## 2021-12-30 ENCOUNTER — HOSPITAL ENCOUNTER (OUTPATIENT)
Dept: ULTRASOUND IMAGING | Facility: HOSPITAL | Age: 66
Discharge: HOME | End: 2021-12-30
Admitting: INTERNAL MEDICINE

## 2021-12-30 DIAGNOSIS — N18.31 CHRONIC KIDNEY DISEASE (CKD) STAGE G3A/A1, MODERATELY DECREASED GLOMERULAR FILTRATION RATE (GFR) BETWEEN 45-59 ML/MIN/1.73 SQUARE METER AND ALBUMINURIA CREATININE RATIO LESS THAN 30 MG/G (CMS/H* (HCC): ICD-10-CM

## 2021-12-30 PROCEDURE — 76775 US EXAM ABDO BACK WALL LIM: CPT

## 2022-01-05 ENCOUNTER — TELEPHONE (OUTPATIENT)
Dept: FAMILY MEDICINE CLINIC | Facility: CLINIC | Age: 67
End: 2022-01-05

## 2022-01-05 ENCOUNTER — TREATMENT (OUTPATIENT)
Dept: PHYSICAL THERAPY | Facility: CLINIC | Age: 67
End: 2022-01-05

## 2022-01-05 DIAGNOSIS — G89.29 CHRONIC MIDLINE LOW BACK PAIN WITH BILATERAL SCIATICA: Primary | ICD-10-CM

## 2022-01-05 DIAGNOSIS — M54.41 CHRONIC MIDLINE LOW BACK PAIN WITH BILATERAL SCIATICA: Primary | ICD-10-CM

## 2022-01-05 DIAGNOSIS — M54.42 CHRONIC MIDLINE LOW BACK PAIN WITH BILATERAL SCIATICA: Primary | ICD-10-CM

## 2022-01-05 PROCEDURE — 97140 MANUAL THERAPY 1/> REGIONS: CPT | Performed by: PHYSICAL THERAPIST

## 2022-01-05 PROCEDURE — 97110 THERAPEUTIC EXERCISES: CPT | Performed by: PHYSICAL THERAPIST

## 2022-01-05 NOTE — PROGRESS NOTES
Physical Therapy Daily Progress Note  Anthony Ayoub   1955   Primary Diagnosis: Chronic midline low back pain with bilateral sciatica [M54.41, M54.42, G89.29]   Type: THERAPY  Noted: 12/21/2021 1/5/2022   Visits: 3       Subjective   Pt reports: Stiffness improving. Limited HEP participation.      Objective     See Exercise, Manual, and Modality Logs for complete treatment.     Patient Education: HEP    Assessment/Plan Flexibility improving. Encouraging pt to improve HEP participation.      Progress per Plan of Care            Timed:         Manual Therapy:    15     mins  69171;     Therapeutic Exercise:    23     mins  24392;     Neuromuscular Roney:        mins  37618;    Therapeutic Activity:          mins  03403;     Gait Training:           mins  08451;     Ultrasound:          mins  43916;    Ionto                                   mins   50950  Self Care                            mins   46842    Un-Timed:  Electrical Stimulation:         mins  82404 ( );  Traction          mins 56022  Low Eval          Mins  01172  Mod Eval          Mins  84617  High Eval                            Mins  69300  Re-Eval                               mins  90853    Timed Treatment:   38   mins   Total Treatment:     38   mins    Georges Adkins, PT, DPT, OCS  [place] license: [id number]  Physical Therapist

## 2022-01-06 ENCOUNTER — TELEPHONE (OUTPATIENT)
Dept: FAMILY MEDICINE CLINIC | Facility: CLINIC | Age: 67
End: 2022-01-06

## 2022-01-06 RX ORDER — ROSUVASTATIN CALCIUM 40 MG/1
TABLET, COATED ORAL
Qty: 90 TABLET | Refills: 1 | Status: SHIPPED | OUTPATIENT
Start: 2022-01-06 | End: 2022-08-02 | Stop reason: SDUPTHER

## 2022-01-10 ENCOUNTER — TRANSCRIBE ORDERS (OUTPATIENT)
Dept: ADMINISTRATIVE | Facility: HOSPITAL | Age: 67
End: 2022-01-10

## 2022-01-10 ENCOUNTER — LAB (OUTPATIENT)
Dept: LAB | Facility: HOSPITAL | Age: 67
End: 2022-01-10

## 2022-01-10 DIAGNOSIS — D89.811 CHRONIC GRAFT-VERSUS-HOST DISEASE: ICD-10-CM

## 2022-01-10 DIAGNOSIS — C93.10 CHRONIC MONOCYTOID LEUKEMIA: Primary | ICD-10-CM

## 2022-01-10 DIAGNOSIS — C93.10 CHRONIC MYELOMONOCYTIC LEUKEMIA NOT HAVING ACHIEVED REMISSION: ICD-10-CM

## 2022-01-10 DIAGNOSIS — Z94.81 BONE MARROW TRANSPLANT STATUS: ICD-10-CM

## 2022-01-10 LAB
ALBUMIN SERPL-MCNC: 4.2 G/DL (ref 3.5–5.2)
ALBUMIN/GLOB SERPL: 2.1 G/DL
ALP SERPL-CCNC: 132 U/L (ref 39–117)
ALT SERPL W P-5'-P-CCNC: 9 U/L (ref 1–41)
ANION GAP SERPL CALCULATED.3IONS-SCNC: 7.6 MMOL/L (ref 5–15)
AST SERPL-CCNC: 17 U/L (ref 1–40)
BASOPHILS # BLD AUTO: 0.08 10*3/MM3 (ref 0–0.2)
BASOPHILS NFR BLD AUTO: 1 % (ref 0–1.5)
BILIRUB SERPL-MCNC: 0.5 MG/DL (ref 0–1.2)
BUN SERPL-MCNC: 28 MG/DL (ref 8–23)
BUN/CREAT SERPL: 20.6 (ref 7–25)
CALCIUM SPEC-SCNC: 9.1 MG/DL (ref 8.6–10.5)
CHLORIDE SERPL-SCNC: 100 MMOL/L (ref 98–107)
CO2 SERPL-SCNC: 26.4 MMOL/L (ref 22–29)
CREAT SERPL-MCNC: 1.36 MG/DL (ref 0.76–1.27)
DEPRECATED RDW RBC AUTO: 52.2 FL (ref 37–54)
EOSINOPHIL # BLD AUTO: 0.41 10*3/MM3 (ref 0–0.4)
EOSINOPHIL NFR BLD AUTO: 5.1 % (ref 0.3–6.2)
ERYTHROCYTE [DISTWIDTH] IN BLOOD BY AUTOMATED COUNT: 15.2 % (ref 12.3–15.4)
GFR SERPL CREATININE-BSD FRML MDRD: 53 ML/MIN/1.73
GLOBULIN UR ELPH-MCNC: 2 GM/DL
GLUCOSE SERPL-MCNC: 106 MG/DL (ref 65–99)
HCT VFR BLD AUTO: 31.2 % (ref 37.5–51)
HGB BLD-MCNC: 9.9 G/DL (ref 13–17.7)
IMM GRANULOCYTES # BLD AUTO: 0.05 10*3/MM3 (ref 0–0.05)
IMM GRANULOCYTES NFR BLD AUTO: 0.6 % (ref 0–0.5)
LYMPHOCYTES # BLD AUTO: 1.65 10*3/MM3 (ref 0.7–3.1)
LYMPHOCYTES NFR BLD AUTO: 20.6 % (ref 19.6–45.3)
MCH RBC QN AUTO: 30.2 PG (ref 26.6–33)
MCHC RBC AUTO-ENTMCNC: 31.7 G/DL (ref 31.5–35.7)
MCV RBC AUTO: 95.1 FL (ref 79–97)
MONOCYTES # BLD AUTO: 0.59 10*3/MM3 (ref 0.1–0.9)
MONOCYTES NFR BLD AUTO: 7.4 % (ref 5–12)
NEUTROPHILS NFR BLD AUTO: 5.23 10*3/MM3 (ref 1.7–7)
NEUTROPHILS NFR BLD AUTO: 65.3 % (ref 42.7–76)
NRBC BLD AUTO-RTO: 0.1 /100 WBC (ref 0–0.2)
PLATELET # BLD AUTO: 237 10*3/MM3 (ref 140–450)
PMV BLD AUTO: 10.8 FL (ref 6–12)
POTASSIUM SERPL-SCNC: 5.4 MMOL/L (ref 3.5–5.2)
PROT SERPL-MCNC: 6.2 G/DL (ref 6–8.5)
RBC # BLD AUTO: 3.28 10*6/MM3 (ref 4.14–5.8)
SODIUM SERPL-SCNC: 134 MMOL/L (ref 136–145)
WBC NRBC COR # BLD: 8.01 10*3/MM3 (ref 3.4–10.8)

## 2022-01-10 PROCEDURE — 85025 COMPLETE CBC W/AUTO DIFF WBC: CPT

## 2022-01-10 PROCEDURE — 80053 COMPREHEN METABOLIC PANEL: CPT

## 2022-01-10 PROCEDURE — 36415 COLL VENOUS BLD VENIPUNCTURE: CPT

## 2022-01-11 RX ORDER — EZETIMIBE 10 MG/1
TABLET ORAL
Qty: 90 TABLET | Refills: 1 | Status: SHIPPED | OUTPATIENT
Start: 2022-01-11 | End: 2022-10-10

## 2022-01-12 LAB
CMV DNA SERPL NAA+PROBE-ACNC: NEGATIVE IU/ML
CMV DNA SERPL NAA+PROBE-LOG IU: NORMAL {LOG_IU}/ML

## 2022-01-19 ENCOUNTER — TREATMENT (OUTPATIENT)
Dept: PHYSICAL THERAPY | Facility: CLINIC | Age: 67
End: 2022-01-19

## 2022-01-19 DIAGNOSIS — M54.41 CHRONIC MIDLINE LOW BACK PAIN WITH BILATERAL SCIATICA: Primary | ICD-10-CM

## 2022-01-19 DIAGNOSIS — G89.29 CHRONIC MIDLINE LOW BACK PAIN WITH BILATERAL SCIATICA: Primary | ICD-10-CM

## 2022-01-19 DIAGNOSIS — M54.42 CHRONIC MIDLINE LOW BACK PAIN WITH BILATERAL SCIATICA: Primary | ICD-10-CM

## 2022-01-19 PROCEDURE — 97110 THERAPEUTIC EXERCISES: CPT | Performed by: PHYSICAL THERAPIST

## 2022-01-19 PROCEDURE — 97140 MANUAL THERAPY 1/> REGIONS: CPT | Performed by: PHYSICAL THERAPIST

## 2022-01-19 NOTE — PROGRESS NOTES
Physical Therapy Daily Treatment Note  Patient: Anthony Ayoub   : 1955   Referring practitioner: Yifan Elizabeth,*  Date of initial visit: Type: THERAPY  Noted: 2021   Today's date: 2022   Patient seen for 4 visits    Visit Diagnoses:    ICD-10-CM ICD-9-CM   1. Chronic midline low back pain with bilateral sciatica  M54.41 724.2    M54.42 724.3    G89.29 338.29       Subjective   Pt reports: Pt feels he is near his prior level of function and HEP is going well.      Objective     See Exercise, Manual, and Modality Logs for complete treatment.     Patient Education: HEP    Assessment/Plan Pt doing well to this point. Pt to try 2-3 weeks of independent HEP then follow up here if needed. Will DC if this goes well.      Progress per Plan of Care            Timed:         Manual Therapy:    15     mins  82192;     Therapeutic Exercise:    25     mins  05263;     Neuromuscular Roney:        mins  06416;    Therapeutic Activity:          mins  94285;     Gait Training:           mins  78594;     Ultrasound:          mins  69798;    Ionto                                   mins   13248  Self Care                            mins   82819    Un-Timed:  Electrical Stimulation:         mins  81187 ( );  Traction          mins 28414  Low Eval          Mins  06732  Mod Eval          Mins  35366  High Eval                            Mins  53803  Re-Eval                               mins  62842    Timed Treatment:   40   mins   Total Treatment:     40   mins      Georges Adkins, PT, DPT, OCS  [place] license: [id number]  Physical Therapist

## 2022-03-08 ENCOUNTER — LAB (OUTPATIENT)
Dept: LAB | Facility: HOSPITAL | Age: 67
End: 2022-03-08

## 2022-03-08 ENCOUNTER — TRANSCRIBE ORDERS (OUTPATIENT)
Dept: ADMINISTRATIVE | Facility: HOSPITAL | Age: 67
End: 2022-03-08

## 2022-03-08 DIAGNOSIS — N13.8 ENLARGED PROSTATE WITH URINARY OBSTRUCTION: ICD-10-CM

## 2022-03-08 DIAGNOSIS — N40.1 ENLARGED PROSTATE WITH URINARY OBSTRUCTION: ICD-10-CM

## 2022-03-08 DIAGNOSIS — I10 ESSENTIAL HYPERTENSION: ICD-10-CM

## 2022-03-08 DIAGNOSIS — Z85.6 PERSONAL HISTORY OF UNSPECIFIED LEUKEMIA: ICD-10-CM

## 2022-03-08 DIAGNOSIS — N18.31 CHRONIC KIDNEY DISEASE (CKD) STAGE G3A/A1, MODERATELY DECREASED GLOMERULAR FILTRATION RATE (GFR) BETWEEN 45-59 ML/MIN/1.73 SQUARE METER AND ALBUMINURIA CREATININE RATIO LESS THAN 30 MG/G (CMS/H*: ICD-10-CM

## 2022-03-08 DIAGNOSIS — N18.31 CHRONIC KIDNEY DISEASE (CKD) STAGE G3A/A1, MODERATELY DECREASED GLOMERULAR FILTRATION RATE (GFR) BETWEEN 45-59 ML/MIN/1.73 SQUARE METER AND ALBUMINURIA CREATININE RATIO LESS THAN 30 MG/G (CMS/H*: Primary | ICD-10-CM

## 2022-03-08 LAB
ALBUMIN SERPL-MCNC: 4.1 G/DL (ref 3.5–5.2)
ANION GAP SERPL CALCULATED.3IONS-SCNC: 10.6 MMOL/L (ref 5–15)
BACTERIA UR QL AUTO: NORMAL /HPF
BILIRUB UR QL STRIP: NEGATIVE
BUN SERPL-MCNC: 23 MG/DL (ref 8–23)
BUN/CREAT SERPL: 16 (ref 7–25)
CALCIUM SPEC-SCNC: 9.2 MG/DL (ref 8.6–10.5)
CHLORIDE SERPL-SCNC: 103 MMOL/L (ref 98–107)
CLARITY UR: CLEAR
CO2 SERPL-SCNC: 24.4 MMOL/L (ref 22–29)
COLOR UR: YELLOW
CREAT SERPL-MCNC: 1.44 MG/DL (ref 0.76–1.27)
EGFRCR SERPLBLD CKD-EPI 2021: 54.3 ML/MIN/1.73
GLUCOSE SERPL-MCNC: 101 MG/DL (ref 65–99)
GLUCOSE UR STRIP-MCNC: NEGATIVE MG/DL
HGB UR QL STRIP.AUTO: NEGATIVE
HYALINE CASTS UR QL AUTO: NORMAL /LPF
KETONES UR QL STRIP: NEGATIVE
LEUKOCYTE ESTERASE UR QL STRIP.AUTO: NEGATIVE
NITRITE UR QL STRIP: NEGATIVE
PH UR STRIP.AUTO: 6 [PH] (ref 5–8)
PHOSPHATE SERPL-MCNC: 3.5 MG/DL (ref 2.5–4.5)
POTASSIUM SERPL-SCNC: 4.7 MMOL/L (ref 3.5–5.2)
PROT UR QL STRIP: ABNORMAL
RBC # UR STRIP: NORMAL /HPF
REF LAB TEST METHOD: NORMAL
SODIUM SERPL-SCNC: 138 MMOL/L (ref 136–145)
SP GR UR STRIP: 1.02 (ref 1–1.03)
SQUAMOUS #/AREA URNS HPF: NORMAL /HPF
UROBILINOGEN UR QL STRIP: ABNORMAL
WBC # UR STRIP: NORMAL /HPF

## 2022-03-08 PROCEDURE — 80069 RENAL FUNCTION PANEL: CPT

## 2022-03-08 PROCEDURE — 81001 URINALYSIS AUTO W/SCOPE: CPT

## 2022-03-08 PROCEDURE — 36415 COLL VENOUS BLD VENIPUNCTURE: CPT

## 2022-03-22 ENCOUNTER — OFFICE VISIT (OUTPATIENT)
Dept: SLEEP MEDICINE | Facility: CLINIC | Age: 67
End: 2022-03-22

## 2022-03-22 VITALS
HEIGHT: 67 IN | OXYGEN SATURATION: 95 % | DIASTOLIC BLOOD PRESSURE: 80 MMHG | WEIGHT: 173 LBS | SYSTOLIC BLOOD PRESSURE: 149 MMHG | HEART RATE: 63 BPM | BODY MASS INDEX: 27.15 KG/M2

## 2022-03-22 DIAGNOSIS — G47.33 OSA TREATED WITH BIPAP: Primary | ICD-10-CM

## 2022-03-22 PROCEDURE — G0463 HOSPITAL OUTPT CLINIC VISIT: HCPCS

## 2022-03-22 PROCEDURE — 99203 OFFICE O/P NEW LOW 30 MIN: CPT | Performed by: INTERNAL MEDICINE

## 2022-03-22 NOTE — PROGRESS NOTES
[address] [address]  Phone   Fax       Anthony Ayoub  1955  66 y.o.  male      PCP:Yifan Elizabeth MD    Type of service: Initial New Patient Office Visit  Date of service: 3/22/2022    Chief Complaint   Patient presents with   • Sleep Apnea       History of present illness;  Anthony Ayoub 66 y.o.  is a new patient for me and was seen today for management of obstructive sleep apnea.  He had a sleep test in 2019 found to have moderate sleep apnea with AHI of 21.4/h.  He underwent a in lab BiPAP titration he is on auto BiPAP.  Unfortunately has not had a follow-up.  Patient also has a bone marrow transplant for leukemia used to use the machine on a regular basis but last month came to know that the machine is on recall and stopped using it.    Patient gives the following sleep history.  Sleep schedule:  Bedtime: 11:30 PM  Wake time: 8 AM  Normally takes about 20-30 minutes to fall asleep    The Smart card downloaded on 3/22/2022 has been independently reviewed by me and discussed the data with the patient. It shows the following..  Month of January compliance was excellent  Residual AHI: 0.6 /hr (normal less than 5)  Mask type: Nasal mask  Device: [employer]Station BiPAP  DME: [employer]    Past medical history: (Relevant to sleep medicine)  1. Leukemia  2. Sleep apnea  3. Hypothyroidism  4. Hypertension    • Medications are reviewed by me and documented in the encounter  • Allergies reviewed and documented in encounter    Social history:  Do you drive a commercial vehicle:  No   Shift work:  No   Tobacco use:  No   Alcohol use:  0 per week  Caffeinated drinks: 2    FAMILY HISTORY (Your mother, father, brothers and sisters)  1. No sleep apnea    REVIEW OF SYSTEMS.  Full review of systems available on the intake form which is scanned in the media tab.  The relevant positive are noted below  1. Daytime excessive sleepiness with  "[place] Sleepiness Scale :Total score: 6   2. Snoring, resolved  3. Dry nose and mouth      Physical exam:  Vitals:    03/22/22 1036   BP: 149/80   BP Location: Left arm   Patient Position: Sitting   Cuff Size: Adult   Pulse: 63   SpO2: 95%   Weight: 78.5 kg (173 lb)   Height: 170.2 cm (67\")    Body mass index is 27.1 kg/m².    HEENT: Head is atraumatic, normocephalic  Eyes: pupils are round equal and reacting to light and accommodation, conjunctiva normal  Nose: no nasal septal defects or deviation and the nasal passages are clear, no nasal polyps,  Throat: tonsils are not enlarged, tongue normal, oral airway Mallampati class 3  NECK: , trachea is in the midline, thyroid not enlarged  RESPIRATORY SYSTEM: Breath sounds are equal on both sides, there are no wheezes   CARDIOVASULAR SYSTEM: Heart sounds are regular rhythm and yuliana rate, no edema  EXTREMITES: No cyanosis, clubbing  NEUROLOGICAL SYSTEM: Oriented x 3, no gross motor defects, gait normal      Assessment and plan:  Obstructive sleep apnea ( G 47.33).  The symptoms of sleep apnea have improved with the device and the treatment.  Patient's compliance with the device is excellent for treatment of sleep apnea.  I have independently reviewed the smart card down load and discussed with the patient the download data and encouarged the patient to continue to use the device.The residual AHI is acceptable. The device is benefiting the patient and the device is medically necessary. Without proper control of sleep apnea and good compliance there is a increased risk for hypertension, diabetes mellitus and nonrestorative sleep with hypersomnia which can increase risk for motor vehicle accidents.  Untreated sleep apnea is also a risk factor for development of atrial fibrillation, pulmonary hypertension and stroke.The patient is also instructed to get the supplies from the [employer] and and change them on a regular basis.  A prescription for supplies has been sent to " the DME company.  I have also discussed the good sleep hygiene habits and adequate amount of sleep needed for good health.  I have turned on the humidity as the patient was complaining of dry mouth and nose but the humidity was off.  I have also decreased the BiPAP pressure to 17/13 for maximum pressures on the BiPAP  · t status post bone marrow transplant for leukemia,     I have also discussed with the patient the following  • Sleep hygiene: Maintaining a regular bedtime and wake time, not to watch television or work in bed, limit caffeine-containing beverages before bed time and avoid naps during the day  • Adequate amount of sleep.  Generally most people needs about 7 to 8 hours of sleep.  • Return in about 1 year (around 3/22/2023) for Annual visit with smartcard download..  Patient's questions were answered.        Peggy Solis MD  Sleep Medicine.  Medical Director, [facility] sleep [facility]  3/22/2022 ,

## 2022-03-25 ENCOUNTER — TRANSCRIBE ORDERS (OUTPATIENT)
Dept: ADMINISTRATIVE | Facility: HOSPITAL | Age: 67
End: 2022-03-25

## 2022-03-25 DIAGNOSIS — N28.89 RENAL MASS: Primary | ICD-10-CM

## 2022-03-30 ENCOUNTER — HOSPITAL ENCOUNTER (OUTPATIENT)
Dept: CT IMAGING | Facility: HOSPITAL | Age: 67
Discharge: HOME | End: 2022-03-30
Admitting: INTERNAL MEDICINE

## 2022-03-30 ENCOUNTER — HOSPITAL ENCOUNTER (OUTPATIENT)
Dept: CT IMAGING | Facility: HOSPITAL | Age: 67
End: 2022-03-30

## 2022-03-30 ENCOUNTER — TRANSCRIBE ORDERS (OUTPATIENT)
Dept: ADMINISTRATIVE | Facility: HOSPITAL | Age: 67
End: 2022-03-30

## 2022-03-30 DIAGNOSIS — N28.89 RENAL MASS: ICD-10-CM

## 2022-03-30 DIAGNOSIS — N28.89 RENAL MASS: Primary | ICD-10-CM

## 2022-03-30 PROCEDURE — 74170 CT ABD WO CNTRST FLWD CNTRST: CPT

## 2022-03-30 PROCEDURE — 0 IOPAMIDOL PER 1 ML: Performed by: INTERNAL MEDICINE

## 2022-03-30 RX ADMIN — IOPAMIDOL 100 ML: 755 INJECTION, SOLUTION INTRAVENOUS at 15:58

## 2022-04-12 ENCOUNTER — TELEPHONE (OUTPATIENT)
Dept: SLEEP MEDICINE | Facility: CLINIC | Age: 67
End: 2022-04-12

## 2022-04-12 NOTE — TELEPHONE ENCOUNTER
Pt's wife (Mildred) left vm that Mr. Ayoub would like BiPAP changed back to prior pressure settings before recent office visit.    12:11 pm:  I spoke with wife and patient.  He reports he feels like he isn't getting enough air, states it is difficult to both inhale and exhale on current pressures.  Would like changed back to what they were before visit.  Most recent compliance report and this note given to Dr. Solis to review.

## 2022-04-13 ENCOUNTER — HOSPITAL ENCOUNTER (OUTPATIENT)
Dept: GENERAL RADIOLOGY | Facility: HOSPITAL | Age: 67
Discharge: HOME | End: 2022-04-13
Admitting: FAMILY MEDICINE

## 2022-04-13 ENCOUNTER — OFFICE VISIT (OUTPATIENT)
Dept: FAMILY MEDICINE CLINIC | Facility: CLINIC | Age: 67
End: 2022-04-13

## 2022-04-13 ENCOUNTER — TELEPHONE (OUTPATIENT)
Dept: FAMILY MEDICINE CLINIC | Facility: CLINIC | Age: 67
End: 2022-04-13

## 2022-04-13 VITALS
SYSTOLIC BLOOD PRESSURE: 131 MMHG | HEART RATE: 62 BPM | HEIGHT: 67 IN | WEIGHT: 165 LBS | BODY MASS INDEX: 25.9 KG/M2 | DIASTOLIC BLOOD PRESSURE: 71 MMHG | OXYGEN SATURATION: 93 % | TEMPERATURE: 98.4 F
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
## 2022-04-13 DIAGNOSIS — K80.20 GALLSTONES: ICD-10-CM

## 2022-04-13 DIAGNOSIS — N30.00 ACUTE CYSTITIS WITHOUT HEMATURIA: Primary | ICD-10-CM

## 2022-04-13 DIAGNOSIS — R07.81 RIB PAIN ON RIGHT SIDE: ICD-10-CM

## 2022-04-13 DIAGNOSIS — M54.42 CHRONIC MIDLINE LOW BACK PAIN WITH BILATERAL SCIATICA: ICD-10-CM

## 2022-04-13 DIAGNOSIS — N12 PYELONEPHRITIS: ICD-10-CM

## 2022-04-13 DIAGNOSIS — G89.29 CHRONIC MIDLINE LOW BACK PAIN WITH BILATERAL SCIATICA: ICD-10-CM

## 2022-04-13 DIAGNOSIS — M54.41 CHRONIC MIDLINE LOW BACK PAIN WITH BILATERAL SCIATICA: ICD-10-CM

## 2022-04-13 PROBLEM — H02.889 MEIBOMIAN GLAND DYSFUNCTION (MGD): Status: ACTIVE | Noted: 2022-04-13

## 2022-04-13 PROBLEM — H16.143 PUNCTATE KERATITIS OF BOTH EYES: Status: ACTIVE | Noted: 2022-04-13

## 2022-04-13 PROBLEM — M35.00 SICCA (HCC): Status: ACTIVE | Noted: 2022-04-13

## 2022-04-13 PROBLEM — I25.2 H/O NON-ST ELEVATION MYOCARDIAL INFARCTION (NSTEMI): Status: ACTIVE | Noted: 2022-04-13

## 2022-04-13 PROBLEM — I10 HYPERTENSION: Status: ACTIVE | Noted: 2022-03-14

## 2022-04-13 PROBLEM — H16.9 KERATITIS: Status: ACTIVE | Noted: 2022-04-13

## 2022-04-13 PROBLEM — I50.32 CHRONIC DIASTOLIC HEART FAILURE: Status: ACTIVE | Noted: 2022-04-13

## 2022-04-13 PROBLEM — R80.9 PROTEINURIA: Status: ACTIVE | Noted: 2022-03-14

## 2022-04-13 PROBLEM — M35.01 KERATOCONJUNCTIVITIS SICCA: Status: ACTIVE | Noted: 2022-04-13

## 2022-04-13 PROBLEM — Z86.72 HISTORY OF PHLEBITIS: Status: ACTIVE | Noted: 2022-04-13

## 2022-04-13 PROBLEM — E78.00 PURE HYPERCHOLESTEROLEMIA: Status: ACTIVE | Noted: 2022-04-13

## 2022-04-13 PROBLEM — G47.33 OBSTRUCTIVE SLEEP APNEA SYNDROME: Status: ACTIVE | Noted: 2022-04-13

## 2022-04-13 PROBLEM — N40.1 LOWER URINARY TRACT SYMPTOMS DUE TO BENIGN PROSTATIC HYPERPLASIA: Status: ACTIVE | Noted: 2022-03-14

## 2022-04-13 PROBLEM — H02.883 MEIBOMIAN GLAND DYSFUNCTION (MGD) OF BOTH EYES: Status: ACTIVE | Noted: 2022-04-13

## 2022-04-13 PROBLEM — H16.149 PUNCTATE KERATITIS: Status: ACTIVE | Noted: 2022-04-13

## 2022-04-13 PROBLEM — H16.229 KERATOCONJUNCTIVITIS SICCA: Status: ACTIVE | Noted: 2022-04-13

## 2022-04-13 PROBLEM — H02.886 MEIBOMIAN GLAND DYSFUNCTION (MGD) OF BOTH EYES: Status: ACTIVE | Noted: 2022-04-13

## 2022-04-13 PROBLEM — N28.89 RENAL MASS: Status: ACTIVE | Noted: 2022-03-14

## 2022-04-13 PROBLEM — Z94.81 S/P ALLOGENEIC BONE MARROW TRANSPLANT: Status: ACTIVE | Noted: 2020-05-06

## 2022-04-13 PROBLEM — N18.31 STAGE 3A CHRONIC KIDNEY DISEASE: Status: ACTIVE | Noted: 2022-03-14

## 2022-04-13 PROBLEM — Z85.6 HISTORY OF LEUKEMIA: Status: ACTIVE | Noted: 2022-03-14

## 2022-04-13 PROBLEM — D89.811 CHRONIC GVHD: Status: ACTIVE | Noted: 2020-05-06

## 2022-04-13 PROBLEM — Z96.1 PSEUDOPHAKIA: Status: ACTIVE | Noted: 2022-04-13

## 2022-04-13 LAB
BILIRUB BLD-MCNC: ABNORMAL MG/DL
CLARITY, POC: CLEAR
COLOR UR: ABNORMAL
EXPIRATION DATE: ABNORMAL
GLUCOSE UR STRIP-MCNC: NEGATIVE MG/DL
KETONES UR QL: ABNORMAL
LEUKOCYTE EST, POC: NEGATIVE
Lab: ABNORMAL
NITRITE UR-MCNC: POSITIVE MG/ML
PH UR: 5.5 [PH] (ref 5–8)
PROT UR STRIP-MCNC: ABNORMAL MG/DL
RBC # UR STRIP: NEGATIVE /UL
SP GR UR: 1.03 (ref 1–1.03)
UROBILINOGEN UR QL: NORMAL

## 2022-04-13 PROCEDURE — 71046 X-RAY EXAM CHEST 2 VIEWS: CPT

## 2022-04-13 PROCEDURE — 87086 URINE CULTURE/COLONY COUNT: CPT | Performed by: FAMILY MEDICINE

## 2022-04-13 PROCEDURE — 99213 OFFICE O/P EST LOW 20 MIN: CPT | Performed by: FAMILY MEDICINE

## 2022-04-13 PROCEDURE — 81003 URINALYSIS AUTO W/O SCOPE: CPT | Performed by: FAMILY MEDICINE

## 2022-04-13 RX ORDER — CARVEDILOL 25 MG/1
25 TABLET ORAL 2 TIMES DAILY
COMMUNITY
Start: 2022-03-03

## 2022-04-13 RX ORDER — NITROFURANTOIN 25; 75 MG/1; MG/1
100 CAPSULE ORAL 2 TIMES DAILY
Qty: 14 CAPSULE | Refills: 0 | Status: SHIPPED | OUTPATIENT
Start: 2022-04-13 | End: 2022-04-20

## 2022-04-13 RX ORDER — VORICONAZOLE 200 MG/1
200 TABLET, FILM COATED ORAL 2 TIMES DAILY
COMMUNITY
End: 2022-10-17

## 2022-04-13 RX ORDER — ISOSORBIDE MONONITRATE 30 MG/1
30 TABLET, EXTENDED RELEASE ORAL DAILY
COMMUNITY
Start: 2022-02-11

## 2022-04-13 RX ORDER — LISINOPRIL 40 MG/1
40 TABLET ORAL DAILY
COMMUNITY
Start: 2022-04-05

## 2022-04-13 RX ORDER — CIPROFLOXACIN AND DEXAMETHASONE 3; 1 MG/ML; MG/ML
4 SUSPENSION/ DROPS AURICULAR (OTIC) AS NEEDED
COMMUNITY
Start: 2022-04-06

## 2022-04-13 RX ORDER — CLONIDINE HYDROCHLORIDE 0.1 MG/1
0.1 TABLET ORAL 2 TIMES DAILY
COMMUNITY
End: 2022-04-26 | Stop reason: HOSPADM

## 2022-04-13 NOTE — PROGRESS NOTES
Subjective   Anthony Ayoub is a 66 y.o. male.     History of Present Illness   The patient is present today with complaints of abdominal pain that's in his left side up under his ribs for the last 3 days. He admits that he had a fall where he slipped in the shower and is now having right rib pain. He also admits that he is having some elevation of his blood pressure which started last night. He states that he is also having back pain and would like to get a referral to receive shots in his hips and back for pain management. He admits that it does hurt when he takes a deep breath. He admits that he took some  Gas-X and Advil before he went to bed and that has helped.  He denies feeling nauseated, vomiting, diarrhea or constipation. He admits that he did take a mild laxative yesterday.  He admits that he did have a CT scan done on his stomach and pelvis showing that he has gallstones. He admits that he has had chronic arthritis pain across his lower back and  top of his hips.     The following portions of the patient's history were reviewed and updated as appropriate: allergies, current medications, past family history, past medical history, past social history, past surgical history, and problem list.  Past Medical History:   Diagnosis Date   • CHF (congestive heart failure) (HCC)    • Coronary artery disease    • GERD (gastroesophageal reflux disease)    • Hyperlipidemia    • Hypertension    • Leukemia (HCC)    • Sleep apnea      Past Surgical History:   Procedure Laterality Date   • BONE MARROW TRANSPLANT     • CARDIAC CATHETERIZATION     • SINUS SURGERY     • TONSILLECTOMY     • VASECTOMY       Family History   Problem Relation Age of Onset   • Hypertension Mother    • Hyperlipidemia Mother    • Heart disease Father    • Heart attack Father    • Hypertension Sister    • Hypertension Brother    • Heart disease Brother      Social History     Socioeconomic History   • Marital status:    Tobacco Use   •  Smoking status: Former Smoker     Packs/day: 1.00     Years: 30.00     Pack years: 30.00     Quit date:      Years since quittin.2   • Smokeless tobacco: Never Used   Substance and Sexual Activity   • Alcohol use: No   • Drug use: Defer   • Sexual activity: Defer         Current Outpatient Medications:   •  aspirin 81 MG chewable tablet, Chew 81 mg Daily., Disp: , Rfl:   •  carvedilol (COREG) 25 MG tablet, Take 25 mg by mouth 2 (Two) Times a Day., Disp: , Rfl:   •  ciprofloxacin-dexamethasone (CIPRODEX) 0.3-0.1 % otic suspension, INSTILL 4 DROPS INTO AFFECTED EAR(S) EVERY 12 HOURS FOR 7 DAYS, Disp: , Rfl:   •  cloNIDine (CATAPRES) 0.1 MG tablet, Take 0.1 mg by mouth 2 (Two) Times a Day., Disp: , Rfl:   •  dapsone 100 MG tablet, Take 100 mg by mouth Daily., Disp: , Rfl:   •  escitalopram (LEXAPRO) 10 MG tablet, Take 10 mg by mouth Daily., Disp: , Rfl:   •  ezetimibe (ZETIA) 10 MG tablet, TAKE 1 TABLET BY MOUTH EVERY DAY, Disp: 90 tablet, Rfl: 1  •  fluticasone (FLONASE) 50 MCG/ACT nasal spray, 1 spray into the nostril(s) as directed by provider 1 (One) Time. Once daily, Disp: , Rfl:   •  isosorbide mononitrate (IMDUR) 30 MG 24 hr tablet, Take 30 mg by mouth Daily., Disp: , Rfl:   •  lisinopril (PRINIVIL,ZESTRIL) 40 MG tablet, Take 40 mg by mouth Daily., Disp: , Rfl:   •  pantoprazole (PROTONIX) 40 MG EC tablet, Take 40 mg by mouth Daily., Disp: , Rfl:   •  rosuvastatin (CRESTOR) 40 MG tablet, TAKE 1 TABLET BY MOUTH EVERYDAY AT BEDTIME, Disp: 90 tablet, Rfl: 1  •  terazosin (HYTRIN) 5 MG capsule, Take 5 mg by mouth., Disp: , Rfl:   •  voriconazole (VFEND) 200 MG tablet, Take 200 mg by mouth 2 (Two) Times a Day., Disp: , Rfl:   •  nitrofurantoin, macrocrystal-monohydrate, (Macrobid) 100 MG capsule, Take 1 capsule by mouth 2 (Two) Times a Day for 7 days., Disp: 14 capsule, Rfl: 0    Review of Systems   Review of systems was performed, and pertinent findings are noted in the HPI.    /71 (BP Location: Right  "arm, Patient Position: Sitting, Cuff Size: Adult)   Pulse 62   Temp 98.4 °F (36.9 °C) (Temporal)   Ht 170.2 cm (67\")   Wt 74.8 kg (165 lb)   SpO2 93%   BMI 25.84 kg/m²       Objective   Physical Exam  Vitals and nursing note reviewed.   Constitutional:       General: He is not in acute distress.     Appearance: He is well-developed and well-groomed.   HENT:      Head: Normocephalic and atraumatic.   Neck:      Thyroid: No thyromegaly.   Cardiovascular:      Rate and Rhythm: Normal rate and regular rhythm.      Heart sounds: Normal heart sounds. No murmur heard.    No friction rub. No gallop.   Pulmonary:      Effort: Pulmonary effort is normal. No respiratory distress.      Breath sounds: Normal breath sounds. No wheezing or rales.   Abdominal:      General: Bowel sounds are normal.      Palpations: Abdomen is soft. There is no mass.      Tenderness: There is no right CVA tenderness, left CVA tenderness, guarding or rebound.      Comments: No focal tenderness. He is tender over the mid axillary line on the right rib cage. There is no bruising over that area.   Musculoskeletal:      Cervical back: Neck supple.      Right lower leg: No edema.      Left lower leg: No edema.   Lymphadenopathy:      Cervical: No cervical adenopathy.   Skin:     General: Skin is warm and dry.   Neurological:      Mental Status: He is alert.      Comments: He is moving well.    Psychiatric:         Mood and Affect: Mood normal.         Behavior: Behavior is cooperative.          Brief Urine Lab Results  (Last result in the past 365 days)      Color   Clarity   Blood   Leuk Est   Nitrite   Protein   CREAT   Urine HCG        04/13/22 1454 Orange   Clear   Negative   Negative   Positive   300 mg/dL                 DATE OF EXAM:  3/30/2022 3:30 PM     PROCEDURE:  CT ABDOMEN W WO CONTRAST-     INDICATIONS:   Abnormal renal ultrasound, suggestion of a hypoechoic mass in the right  kidney.     COMPARISON:   Renal ultrasound performed on " 12/30/2021 and a previous CT of the  abdomen from 11/07/2015.     TECHNIQUE:  Routine transaxial slices were obtained through the abdomen before and  after the intravenous administration of 100 mL of Isovue 370.  Reconstructed coronal and sagittal images were also obtained. Automated  exposure control and iterative construction methods were used.        FINDINGS:  There is no mass in the right kidney. There is an incidental tiny  cortical cyst in the upper pole of the left kidney measuring 8 mm in  diameter. On the unenhanced images, there are no renal calculi. The  patient does have several calcified gallstones within a contracted  gallbladder. The gallbladder wall may be slightly thickened with  questionable pericholecystic inflammatory changes. If the patient has  right upper quadrant abdominal pain, this could signify acute  cholecystitis. The liver, spleen, adrenal glands, and pancreas are  normal. There are atherosclerotic vascular calcifications within the  abdomen. The common iliac arteries are ectatic bilaterally. The appendix  is normal. There is mild colonic diverticulosis without acute  diverticulitis. There are no enlarged lymph nodes or abnormal fluid  collections. There are patchy densities in the dependent portions of the  lung bases felt to represent subsegmental atelectasis. There are no  suspicious osteolytic or sclerotic lesions within the bony structures.      IMPRESSION:     1. No right renal mass. There is a tiny cortical cyst in the upper pole  left kidney.  2. Cholelithiasis. The gallbladder is contracted and the wall appears  slightly thickened with questionable pericholecystic inflammatory  changes. I would recommend clinical correlation as this could signify  acute cholecystitis.  3. Additional incidental findings as noted above.     Electronically Signed By-Sandeep Prabhakar MD On:3/30/2022 4:13 PM  This report was finalized on [id number] by  Sandeep Prabhakar MD.    Assessment/Plan   Problems  Addressed this Visit        Gastrointestinal Abdominal     Gallstones  - I reassured him at this point.      Other Visit Diagnoses     Acute cystitis without hematuria    -  Primary  -Urinalysis shows positive nitrates, 300+ protein.  -His recent CT scan of his abdomen with and without contrast showed no evidence of any renal mass but did show cholelithiasis with possible cholecystitis. At this time, I doubt that his gallstones are causing his pain but we will continue to monitor that.     Relevant Orders    POCT urinalysis dipstick, automated (Completed)    Urine Culture - Urine, Urine, Clean Catch    Rib pain on right side    - I have sent him for an x-ray of his chest attention to the right ribs.    Relevant Orders    XR Chest 2 View    Accidental fall, initial encounter    - I have sent him for an x-ray of his chest attention to the right ribs.    Relevant Orders    XR Chest 2 View        Low back pain         -He has had an x-ray and has completed several weeks of physical therapy. I will discuss his request for       pain management with Dr. Horta and see how he wants to proceed.   Diagnoses       Codes Comments    Acute cystitis without hematuria    -  Primary ICD-10-CM: N30.00  ICD-9-CM: 595.0     Rib pain on right side     ICD-10-CM: R07.81  ICD-9-CM: 786.50     Accidental fall, initial encounter     ICD-10-CM: W19.XXXA  ICD-9-CM: E888.9     Gallstones     ICD-10-CM: K80.20  ICD-9-CM: 574.20                   Transcribed from ambient dictation for Cheryl Elizabeth MD by Elida Horton.  04/13/22   18:02 EDT    Patient verbalized consent to the visit recording.

## 2022-04-14 LAB — BACTERIA SPEC AEROBE CULT: NO GROWTH

## 2022-04-14 NOTE — TELEPHONE ENCOUNTER
"Per Dr. Solis, \"it is back to previous pressure\", pt's wife notified.  She reports he has been ill for the past couple days and unable to use his PAP, but they will let us know if he has any issues once he resumes use.  "

## 2022-04-22 ENCOUNTER — HOSPITAL ENCOUNTER (OUTPATIENT)
Facility: HOSPITAL | Age: 67
Discharge: HOME | End: 2022-04-26
Attending: INTERNAL MEDICINE | Admitting: SURGERY

## 2022-04-22 ENCOUNTER — APPOINTMENT (OUTPATIENT)
Dept: MRI IMAGING | Facility: HOSPITAL | Age: 67
End: 2022-04-22

## 2022-04-22 ENCOUNTER — APPOINTMENT (OUTPATIENT)
Dept: CT IMAGING | Facility: HOSPITAL | Age: 67
End: 2022-04-22

## 2022-04-22 DIAGNOSIS — K81.0 ACUTE CHOLECYSTITIS: Primary | ICD-10-CM

## 2022-04-22 DIAGNOSIS — D64.9 ANEMIA, UNSPECIFIED TYPE: ICD-10-CM

## 2022-04-22 DIAGNOSIS — K81.9 CHOLECYSTITIS: ICD-10-CM

## 2022-04-22 DIAGNOSIS — R10.11 RIGHT UPPER QUADRANT ABDOMINAL PAIN: ICD-10-CM

## 2022-04-22 LAB
ALBUMIN SERPL-MCNC: 4.2 G/DL (ref 3.5–5.2)
ALBUMIN/GLOB SERPL: 1.4 G/DL
ALP SERPL-CCNC: 201 U/L (ref 39–117)
ALT SERPL W P-5'-P-CCNC: 10 U/L (ref 1–41)
ANION GAP SERPL CALCULATED.3IONS-SCNC: 12 MMOL/L (ref 5–15)
AST SERPL-CCNC: 22 U/L (ref 1–40)
BACTERIA UR QL AUTO: ABNORMAL /HPF
BASOPHILS # BLD AUTO: 0.1 10*3/MM3 (ref 0–0.2)
BASOPHILS NFR BLD AUTO: 1.2 % (ref 0–1.5)
BILIRUB SERPL-MCNC: 0.6 MG/DL (ref 0–1.2)
BILIRUB UR QL STRIP: NEGATIVE
BUN SERPL-MCNC: 23 MG/DL (ref 8–23)
BUN/CREAT SERPL: 16.4 (ref 7–25)
CALCIUM SPEC-SCNC: 9 MG/DL (ref 8.6–10.5)
CHLORIDE SERPL-SCNC: 101 MMOL/L (ref 98–107)
CLARITY UR: CLEAR
CO2 SERPL-SCNC: 23 MMOL/L (ref 22–29)
COLOR UR: ABNORMAL
CREAT SERPL-MCNC: 1.4 MG/DL (ref 0.76–1.27)
DEPRECATED RDW RBC AUTO: 52.5 FL (ref 37–54)
EGFRCR SERPLBLD CKD-EPI 2021: 56.1 ML/MIN/1.73
EOSINOPHIL # BLD AUTO: 0.4 10*3/MM3 (ref 0–0.4)
EOSINOPHIL NFR BLD AUTO: 3.7 % (ref 0.3–6.2)
ERYTHROCYTE [DISTWIDTH] IN BLOOD BY AUTOMATED COUNT: 16.1 % (ref 12.3–15.4)
GLOBULIN UR ELPH-MCNC: 3 GM/DL
GLUCOSE SERPL-MCNC: 126 MG/DL (ref 65–99)
GLUCOSE UR STRIP-MCNC: NEGATIVE MG/DL
HCT VFR BLD AUTO: 28.9 % (ref 37.5–51)
HGB BLD-MCNC: 9.4 G/DL (ref 13–17.7)
HGB UR QL STRIP.AUTO: NEGATIVE
HYALINE CASTS UR QL AUTO: ABNORMAL /LPF
KETONES UR QL STRIP: ABNORMAL
LEUKOCYTE ESTERASE UR QL STRIP.AUTO: ABNORMAL
LIPASE SERPL-CCNC: 24 U/L (ref 13–60)
LYMPHOCYTES # BLD AUTO: 1.4 10*3/MM3 (ref 0.7–3.1)
LYMPHOCYTES NFR BLD AUTO: 13.9 % (ref 19.6–45.3)
MCH RBC QN AUTO: 30.2 PG (ref 26.6–33)
MCHC RBC AUTO-ENTMCNC: 32.6 G/DL (ref 31.5–35.7)
MCV RBC AUTO: 92.5 FL (ref 79–97)
MONOCYTES # BLD AUTO: 0.8 10*3/MM3 (ref 0.1–0.9)
MONOCYTES NFR BLD AUTO: 8.1 % (ref 5–12)
NEUTROPHILS NFR BLD AUTO: 7.5 10*3/MM3 (ref 1.7–7)
NEUTROPHILS NFR BLD AUTO: 73.1 % (ref 42.7–76)
NITRITE UR QL STRIP: NEGATIVE
NRBC BLD AUTO-RTO: 0.1 /100 WBC (ref 0–0.2)
PH UR STRIP.AUTO: 5.5 [PH] (ref 5–8)
PLATELET # BLD AUTO: 307 10*3/MM3 (ref 140–450)
PMV BLD AUTO: 7.8 FL (ref 6–12)
POTASSIUM SERPL-SCNC: 4.7 MMOL/L (ref 3.5–5.2)
PROT SERPL-MCNC: 7.2 G/DL (ref 6–8.5)
PROT UR QL STRIP: ABNORMAL
RBC # BLD AUTO: 3.13 10*6/MM3 (ref 4.14–5.8)
RBC # UR STRIP: ABNORMAL /HPF
REF LAB TEST METHOD: ABNORMAL
SARS-COV-2 RNA RESP QL NAA+PROBE: NOT DETECTED
SODIUM SERPL-SCNC: 136 MMOL/L (ref 136–145)
SP GR UR STRIP: 1.02 (ref 1–1.03)
SQUAMOUS #/AREA URNS HPF: ABNORMAL /HPF
UROBILINOGEN UR QL STRIP: ABNORMAL
WBC # UR STRIP: ABNORMAL /HPF
WBC NRBC COR # BLD: 10.2 10*3/MM3 (ref 3.4–10.8)

## 2022-04-22 PROCEDURE — 99204 OFFICE O/P NEW MOD 45 MIN: CPT | Performed by: SURGERY

## 2022-04-22 PROCEDURE — C9803 HOPD COVID-19 SPEC COLLECT: HCPCS

## 2022-04-22 PROCEDURE — G0378 HOSPITAL OBSERVATION PER HR: HCPCS

## 2022-04-22 PROCEDURE — 96376 TX/PRO/DX INJ SAME DRUG ADON: CPT

## 2022-04-22 PROCEDURE — 25010000002 MORPHINE PER 10 MG: Performed by: NURSE PRACTITIONER

## 2022-04-22 PROCEDURE — 96375 TX/PRO/DX INJ NEW DRUG ADDON: CPT

## 2022-04-22 PROCEDURE — 0 IOPAMIDOL PER 1 ML

## 2022-04-22 PROCEDURE — 99285 EMERGENCY DEPT VISIT HI MDM: CPT

## 2022-04-22 PROCEDURE — 83690 ASSAY OF LIPASE: CPT

## 2022-04-22 PROCEDURE — 85025 COMPLETE CBC W/AUTO DIFF WBC: CPT

## 2022-04-22 PROCEDURE — 74177 CT ABD & PELVIS W/CONTRAST: CPT

## 2022-04-22 PROCEDURE — 96365 THER/PROPH/DIAG IV INF INIT: CPT

## 2022-04-22 PROCEDURE — 80053 COMPREHEN METABOLIC PANEL: CPT

## 2022-04-22 PROCEDURE — 99220 PR INITIAL OBSERVATION CARE/DAY 70 MINUTES: CPT | Performed by: INTERNAL MEDICINE

## 2022-04-22 PROCEDURE — 25010000002 ONDANSETRON PER 1 MG

## 2022-04-22 PROCEDURE — 81001 URINALYSIS AUTO W/SCOPE: CPT

## 2022-04-22 PROCEDURE — U0003 INFECTIOUS AGENT DETECTION BY NUCLEIC ACID (DNA OR RNA); SEVERE ACUTE RESPIRATORY SYNDROME CORONAVIRUS 2 (SARS-COV-2) (CORONAVIRUS DISEASE [COVID-19]), AMPLIFIED PROBE TECHNIQUE, MAKING USE OF HIGH THROUGHPUT TECHNOLOGIES AS DESCRIBED BY CMS-2020-01-R: HCPCS | Performed by: INTERNAL MEDICINE

## 2022-04-22 PROCEDURE — 0 MORPHINE SULFATE 4 MG/ML SOLUTION

## 2022-04-22 PROCEDURE — 74181 MRI ABDOMEN W/O CONTRAST: CPT

## 2022-04-22 PROCEDURE — 25010000002 CEFTRIAXONE PER 250 MG

## 2022-04-22 PROCEDURE — 96374 THER/PROPH/DIAG INJ IV PUSH: CPT

## 2022-04-22 RX ORDER — ACETAMINOPHEN 325 MG/1
650 TABLET ORAL EVERY 4 HOURS PRN
Status: DISCONTINUED | OUTPATIENT
Start: 2022-04-22 | End: 2022-04-23

## 2022-04-22 RX ORDER — ONDANSETRON 2 MG/ML
4 INJECTION INTRAMUSCULAR; INTRAVENOUS EVERY 6 HOURS PRN
Status: DISCONTINUED | OUTPATIENT
Start: 2022-04-22 | End: 2022-04-26 | Stop reason: HOSPADM

## 2022-04-22 RX ORDER — SODIUM CHLORIDE 0.9 % (FLUSH) 0.9 %
10 SYRINGE (ML) INJECTION EVERY 12 HOURS SCHEDULED
Status: DISCONTINUED | OUTPATIENT
Start: 2022-04-22 | End: 2022-04-26 | Stop reason: HOSPADM

## 2022-04-22 RX ORDER — METOPROLOL TARTRATE 5 MG/5ML
5 INJECTION INTRAVENOUS EVERY 6 HOURS PRN
Status: DISCONTINUED | OUTPATIENT
Start: 2022-04-22 | End: 2022-04-26 | Stop reason: HOSPADM

## 2022-04-22 RX ORDER — ACETAMINOPHEN 650 MG/1
650 SUPPOSITORY RECTAL EVERY 4 HOURS PRN
Status: DISCONTINUED | OUTPATIENT
Start: 2022-04-22 | End: 2022-04-23

## 2022-04-22 RX ORDER — SODIUM CHLORIDE 0.9 % (FLUSH) 0.9 %
10 SYRINGE (ML) INJECTION AS NEEDED
Status: DISCONTINUED | OUTPATIENT
Start: 2022-04-22 | End: 2022-04-26 | Stop reason: HOSPADM

## 2022-04-22 RX ORDER — MORPHINE SULFATE 2 MG/ML
2 INJECTION, SOLUTION INTRAMUSCULAR; INTRAVENOUS ONCE AS NEEDED
Status: DISCONTINUED | OUTPATIENT
Start: 2022-04-22 | End: 2022-04-26 | Stop reason: HOSPADM

## 2022-04-22 RX ORDER — MORPHINE SULFATE 2 MG/ML
2 INJECTION, SOLUTION INTRAMUSCULAR; INTRAVENOUS EVERY 4 HOURS PRN
Status: DISPENSED | OUTPATIENT
Start: 2022-04-22 | End: 2022-04-24

## 2022-04-22 RX ORDER — ONDANSETRON 2 MG/ML
4 INJECTION INTRAMUSCULAR; INTRAVENOUS ONCE
Status: COMPLETED | OUTPATIENT
Start: 2022-04-22 | End: 2022-04-22

## 2022-04-22 RX ORDER — ACETAMINOPHEN 500 MG
1000 TABLET ORAL ONCE
Status: COMPLETED | OUTPATIENT
Start: 2022-04-22 | End: 2022-04-22

## 2022-04-22 RX ORDER — ACETAMINOPHEN 160 MG/5ML
650 SOLUTION ORAL EVERY 4 HOURS PRN
Status: DISCONTINUED | OUTPATIENT
Start: 2022-04-22 | End: 2022-04-23

## 2022-04-22 RX ORDER — MORPHINE SULFATE 2 MG/ML
2 INJECTION, SOLUTION INTRAMUSCULAR; INTRAVENOUS ONCE
Status: DISCONTINUED | OUTPATIENT
Start: 2022-04-22 | End: 2022-04-26 | Stop reason: HOSPADM

## 2022-04-22 RX ORDER — ONDANSETRON 4 MG/1
4 TABLET, FILM COATED ORAL EVERY 6 HOURS PRN
Status: DISCONTINUED | OUTPATIENT
Start: 2022-04-22 | End: 2022-04-26 | Stop reason: HOSPADM

## 2022-04-22 RX ORDER — MORPHINE SULFATE 4 MG/ML
4 INJECTION, SOLUTION INTRAMUSCULAR; INTRAVENOUS ONCE
Status: DISCONTINUED | OUTPATIENT
Start: 2022-04-22 | End: 2022-04-22

## 2022-04-22 RX ADMIN — MORPHINE SULFATE 2 MG: 4 INJECTION INTRAVENOUS at 11:48

## 2022-04-22 RX ADMIN — ACETAMINOPHEN 1000 MG: 500 TABLET, FILM COATED ORAL at 11:48

## 2022-04-22 RX ADMIN — MORPHINE SULFATE 2 MG: 2 INJECTION, SOLUTION INTRAMUSCULAR; INTRAVENOUS at 18:11

## 2022-04-22 RX ADMIN — Medication 10 ML: at 20:21

## 2022-04-22 RX ADMIN — CEFTRIAXONE 1 G: 1 INJECTION, POWDER, FOR SOLUTION INTRAMUSCULAR; INTRAVENOUS at 18:13

## 2022-04-22 RX ADMIN — METOPROLOL TARTRATE 5 MG: 5 INJECTION, SOLUTION INTRAVENOUS at 21:24

## 2022-04-22 RX ADMIN — ACETAMINOPHEN 650 MG: 325 TABLET ORAL at 20:19

## 2022-04-22 RX ADMIN — IOPAMIDOL 100 ML: 755 INJECTION, SOLUTION INTRAVENOUS at 13:01

## 2022-04-22 RX ADMIN — ONDANSETRON 4 MG: 2 INJECTION INTRAMUSCULAR; INTRAVENOUS at 11:47

## 2022-04-23 ENCOUNTER — ANESTHESIA EVENT (OUTPATIENT)
Dept: PERIOP | Facility: HOSPITAL | Age: 67
End: 2022-04-23

## 2022-04-23 ENCOUNTER — APPOINTMENT (OUTPATIENT)
Dept: MRI IMAGING | Facility: HOSPITAL | Age: 67
End: 2022-04-23

## 2022-04-23 ENCOUNTER — ANESTHESIA (OUTPATIENT)
Dept: PERIOP | Facility: HOSPITAL | Age: 67
End: 2022-04-23

## 2022-04-23 LAB
ABO GROUP BLD: NORMAL
ANION GAP SERPL CALCULATED.3IONS-SCNC: 14 MMOL/L (ref 5–15)
APTT PPP: 29.9 SECONDS (ref 24–31)
BASOPHILS # BLD AUTO: 0.1 10*3/MM3 (ref 0–0.2)
BASOPHILS NFR BLD AUTO: 0.6 % (ref 0–1.5)
BLD GP AB SCN SERPL QL: NEGATIVE
BUN SERPL-MCNC: 23 MG/DL (ref 8–23)
BUN/CREAT SERPL: 15.8 (ref 7–25)
CALCIUM SPEC-SCNC: 9.2 MG/DL (ref 8.6–10.5)
CHLORIDE SERPL-SCNC: 102 MMOL/L (ref 98–107)
CO2 SERPL-SCNC: 23 MMOL/L (ref 22–29)
CREAT SERPL-MCNC: 1.46 MG/DL (ref 0.76–1.27)
DEPRECATED RDW RBC AUTO: 51.6 FL (ref 37–54)
EGFRCR SERPLBLD CKD-EPI 2021: 53.4 ML/MIN/1.73
EOSINOPHIL # BLD AUTO: 0.5 10*3/MM3 (ref 0–0.4)
EOSINOPHIL NFR BLD AUTO: 4.1 % (ref 0.3–6.2)
ERYTHROCYTE [DISTWIDTH] IN BLOOD BY AUTOMATED COUNT: 15.7 % (ref 12.3–15.4)
GLUCOSE SERPL-MCNC: 97 MG/DL (ref 65–99)
HCT VFR BLD AUTO: 28.8 % (ref 37.5–51)
HGB BLD-MCNC: 9.5 G/DL (ref 13–17.7)
INR PPP: 1.11 (ref 0.93–1.1)
LYMPHOCYTES # BLD AUTO: 1.5 10*3/MM3 (ref 0.7–3.1)
LYMPHOCYTES NFR BLD AUTO: 13.8 % (ref 19.6–45.3)
MCH RBC QN AUTO: 30.6 PG (ref 26.6–33)
MCHC RBC AUTO-ENTMCNC: 33 G/DL (ref 31.5–35.7)
MCV RBC AUTO: 92.6 FL (ref 79–97)
MONOCYTES # BLD AUTO: 1 10*3/MM3 (ref 0.1–0.9)
MONOCYTES NFR BLD AUTO: 8.9 % (ref 5–12)
NEUTROPHILS NFR BLD AUTO: 72.6 % (ref 42.7–76)
NEUTROPHILS NFR BLD AUTO: 8.1 10*3/MM3 (ref 1.7–7)
NRBC BLD AUTO-RTO: 0 /100 WBC (ref 0–0.2)
PLATELET # BLD AUTO: 303 10*3/MM3 (ref 140–450)
PMV BLD AUTO: 7.7 FL (ref 6–12)
POTASSIUM SERPL-SCNC: 5 MMOL/L (ref 3.5–5.2)
PROTHROMBIN TIME: 11.4 SECONDS (ref 9.6–11.7)
RBC # BLD AUTO: 3.11 10*6/MM3 (ref 4.14–5.8)
RH BLD: POSITIVE
SODIUM SERPL-SCNC: 139 MMOL/L (ref 136–145)
T&S EXPIRATION DATE: NORMAL
WBC NRBC COR # BLD: 11.2 10*3/MM3 (ref 3.4–10.8)

## 2022-04-23 PROCEDURE — 99226 PR SBSQ OBSERVATION CARE/DAY 35 MINUTES: CPT | Performed by: INTERNAL MEDICINE

## 2022-04-23 PROCEDURE — 88304 TISSUE EXAM BY PATHOLOGIST: CPT | Performed by: SURGERY

## 2022-04-23 PROCEDURE — 96375 TX/PRO/DX INJ NEW DRUG ADDON: CPT

## 2022-04-23 PROCEDURE — 87070 CULTURE OTHR SPECIMN AEROBIC: CPT | Performed by: SURGERY

## 2022-04-23 PROCEDURE — C1889 IMPLANT/INSERT DEVICE, NOC: HCPCS | Performed by: SURGERY

## 2022-04-23 PROCEDURE — 96376 TX/PRO/DX INJ SAME DRUG ADON: CPT

## 2022-04-23 PROCEDURE — 25010000002 FENTANYL CITRATE (PF) 100 MCG/2ML SOLUTION: Performed by: ANESTHESIOLOGY

## 2022-04-23 PROCEDURE — 85025 COMPLETE CBC W/AUTO DIFF WBC: CPT | Performed by: NURSE PRACTITIONER

## 2022-04-23 PROCEDURE — A9270 NON-COVERED ITEM OR SERVICE: HCPCS | Performed by: SURGERY

## 2022-04-23 PROCEDURE — 96361 HYDRATE IV INFUSION ADD-ON: CPT

## 2022-04-23 PROCEDURE — G0378 HOSPITAL OBSERVATION PER HR: HCPCS

## 2022-04-23 PROCEDURE — 63710000001 ACETAMINOPHEN 500 MG TABLET: Performed by: INTERNAL MEDICINE

## 2022-04-23 PROCEDURE — 63710000001 CLONIDINE 0.1 MG TABLET: Performed by: SURGERY

## 2022-04-23 PROCEDURE — 63710000001 TERAZOSIN 5 MG CAPSULE: Performed by: SURGERY

## 2022-04-23 PROCEDURE — 36415 COLL VENOUS BLD VENIPUNCTURE: CPT | Performed by: NURSE PRACTITIONER

## 2022-04-23 PROCEDURE — 25010000002 CEFTRIAXONE PER 250 MG: Performed by: SURGERY

## 2022-04-23 PROCEDURE — 63710000001 CARVEDILOL 25 MG TABLET: Performed by: SURGERY

## 2022-04-23 PROCEDURE — 85610 PROTHROMBIN TIME: CPT | Performed by: SURGERY

## 2022-04-23 PROCEDURE — 87015 SPECIMEN INFECT AGNT CONCNTJ: CPT | Performed by: SURGERY

## 2022-04-23 PROCEDURE — 80048 BASIC METABOLIC PNL TOTAL CA: CPT | Performed by: NURSE PRACTITIONER

## 2022-04-23 PROCEDURE — 86901 BLOOD TYPING SEROLOGIC RH(D): CPT | Performed by: SURGERY

## 2022-04-23 PROCEDURE — 85730 THROMBOPLASTIN TIME PARTIAL: CPT | Performed by: SURGERY

## 2022-04-23 PROCEDURE — 47562 LAPAROSCOPIC CHOLECYSTECTOMY: CPT | Performed by: SURGERY

## 2022-04-23 PROCEDURE — 25010000002 ONDANSETRON PER 1 MG: Performed by: NURSE PRACTITIONER

## 2022-04-23 PROCEDURE — 87077 CULTURE AEROBIC IDENTIFY: CPT | Performed by: SURGERY

## 2022-04-23 PROCEDURE — 25010000002 MORPHINE PER 10 MG: Performed by: NURSE PRACTITIONER

## 2022-04-23 PROCEDURE — 86850 RBC ANTIBODY SCREEN: CPT | Performed by: SURGERY

## 2022-04-23 PROCEDURE — 63710000001 ESCITALOPRAM 10 MG TABLET: Performed by: SURGERY

## 2022-04-23 PROCEDURE — 86901 BLOOD TYPING SEROLOGIC RH(D): CPT

## 2022-04-23 PROCEDURE — 25010000002 PROPOFOL 200 MG/20ML EMULSION: Performed by: ANESTHESIOLOGY

## 2022-04-23 PROCEDURE — 63710000001 PANTOPRAZOLE 40 MG TABLET DELAYED-RELEASE: Performed by: SURGERY

## 2022-04-23 PROCEDURE — 86900 BLOOD TYPING SEROLOGIC ABO: CPT | Performed by: SURGERY

## 2022-04-23 PROCEDURE — 25010000002 HYDRALAZINE PER 20 MG: Performed by: NURSE PRACTITIONER

## 2022-04-23 PROCEDURE — 87205 SMEAR GRAM STAIN: CPT | Performed by: SURGERY

## 2022-04-23 PROCEDURE — 25010000002 DEXAMETHASONE PER 1 MG: Performed by: ANESTHESIOLOGY

## 2022-04-23 PROCEDURE — 87186 SC STD MICRODIL/AGAR DIL: CPT | Performed by: SURGERY

## 2022-04-23 PROCEDURE — 86900 BLOOD TYPING SEROLOGIC ABO: CPT

## 2022-04-23 PROCEDURE — 0 MORPHINE SULFATE 4 MG/ML SOLUTION: Performed by: ANESTHESIOLOGY

## 2022-04-23 PROCEDURE — 63710000001 ROSUVASTATIN 10 MG TABLET: Performed by: SURGERY

## 2022-04-23 PROCEDURE — A9270 NON-COVERED ITEM OR SERVICE: HCPCS | Performed by: INTERNAL MEDICINE

## 2022-04-23 DEVICE — SEAL HEMO SURG ARISTA/AH ABS/PWDR 3GM: Type: IMPLANTABLE DEVICE | Site: ABDOMEN | Status: FUNCTIONAL

## 2022-04-23 RX ORDER — IPRATROPIUM BROMIDE AND ALBUTEROL SULFATE 2.5; .5 MG/3ML; MG/3ML
3 SOLUTION RESPIRATORY (INHALATION) ONCE AS NEEDED
Status: DISCONTINUED | OUTPATIENT
Start: 2022-04-23 | End: 2022-04-23 | Stop reason: HOSPADM

## 2022-04-23 RX ORDER — ISOSORBIDE MONONITRATE 30 MG/1
30 TABLET, EXTENDED RELEASE ORAL DAILY
Status: DISCONTINUED | OUTPATIENT
Start: 2022-04-23 | End: 2022-04-26 | Stop reason: HOSPADM

## 2022-04-23 RX ORDER — DEXAMETHASONE SODIUM PHOSPHATE 4 MG/ML
8 INJECTION, SOLUTION INTRA-ARTICULAR; INTRALESIONAL; INTRAMUSCULAR; INTRAVENOUS; SOFT TISSUE ONCE AS NEEDED
Status: DISCONTINUED | OUTPATIENT
Start: 2022-04-23 | End: 2022-04-23 | Stop reason: HOSPADM

## 2022-04-23 RX ORDER — DEXAMETHASONE SODIUM PHOSPHATE 4 MG/ML
INJECTION, SOLUTION INTRA-ARTICULAR; INTRALESIONAL; INTRAMUSCULAR; INTRAVENOUS; SOFT TISSUE AS NEEDED
Status: DISCONTINUED | OUTPATIENT
Start: 2022-04-23 | End: 2022-04-23 | Stop reason: SURG

## 2022-04-23 RX ORDER — SODIUM CHLORIDE 9 MG/ML
75 INJECTION, SOLUTION INTRAVENOUS CONTINUOUS
Status: DISCONTINUED | OUTPATIENT
Start: 2022-04-23 | End: 2022-04-26

## 2022-04-23 RX ORDER — ROSUVASTATIN CALCIUM 10 MG/1
40 TABLET, COATED ORAL NIGHTLY
Status: DISCONTINUED | OUTPATIENT
Start: 2022-04-23 | End: 2022-04-26 | Stop reason: HOSPADM

## 2022-04-23 RX ORDER — PROPOFOL 10 MG/ML
INJECTION, EMULSION INTRAVENOUS AS NEEDED
Status: DISCONTINUED | OUTPATIENT
Start: 2022-04-23 | End: 2022-04-23 | Stop reason: SURG

## 2022-04-23 RX ORDER — CARVEDILOL 25 MG/1
25 TABLET ORAL
Status: DISCONTINUED | OUTPATIENT
Start: 2022-04-23 | End: 2022-04-26 | Stop reason: HOSPADM

## 2022-04-23 RX ORDER — ESCITALOPRAM OXALATE 10 MG/1
10 TABLET ORAL DAILY
Status: DISCONTINUED | OUTPATIENT
Start: 2022-04-23 | End: 2022-04-26 | Stop reason: HOSPADM

## 2022-04-23 RX ORDER — FENTANYL CITRATE 50 UG/ML
50 INJECTION, SOLUTION INTRAMUSCULAR; INTRAVENOUS
Status: DISCONTINUED | OUTPATIENT
Start: 2022-04-23 | End: 2022-04-23 | Stop reason: HOSPADM

## 2022-04-23 RX ORDER — SODIUM CHLORIDE, SODIUM LACTATE, POTASSIUM CHLORIDE, CALCIUM CHLORIDE 600; 310; 30; 20 MG/100ML; MG/100ML; MG/100ML; MG/100ML
INJECTION, SOLUTION INTRAVENOUS CONTINUOUS PRN
Status: DISCONTINUED | OUTPATIENT
Start: 2022-04-23 | End: 2022-04-23 | Stop reason: SURG

## 2022-04-23 RX ORDER — ROCURONIUM BROMIDE 10 MG/ML
INJECTION, SOLUTION INTRAVENOUS AS NEEDED
Status: DISCONTINUED | OUTPATIENT
Start: 2022-04-23 | End: 2022-04-23 | Stop reason: SURG

## 2022-04-23 RX ORDER — ONDANSETRON 2 MG/ML
4 INJECTION INTRAMUSCULAR; INTRAVENOUS ONCE AS NEEDED
Status: DISCONTINUED | OUTPATIENT
Start: 2022-04-23 | End: 2022-04-23 | Stop reason: HOSPADM

## 2022-04-23 RX ORDER — MORPHINE SULFATE 4 MG/ML
INJECTION, SOLUTION INTRAMUSCULAR; INTRAVENOUS AS NEEDED
Status: DISCONTINUED | OUTPATIENT
Start: 2022-04-23 | End: 2022-04-23 | Stop reason: SURG

## 2022-04-23 RX ORDER — TERAZOSIN 5 MG/1
5 CAPSULE ORAL NIGHTLY
Status: DISCONTINUED | OUTPATIENT
Start: 2022-04-23 | End: 2022-04-26 | Stop reason: HOSPADM

## 2022-04-23 RX ORDER — LISINOPRIL 20 MG/1
40 TABLET ORAL DAILY
Status: DISCONTINUED | OUTPATIENT
Start: 2022-04-23 | End: 2022-04-23

## 2022-04-23 RX ORDER — METRONIDAZOLE 500 MG/100ML
500 INJECTION, SOLUTION INTRAVENOUS EVERY 8 HOURS
Status: DISCONTINUED | OUTPATIENT
Start: 2022-04-23 | End: 2022-04-26 | Stop reason: HOSPADM

## 2022-04-23 RX ORDER — CIPROFLOXACIN AND DEXAMETHASONE 3; 1 MG/ML; MG/ML
4 SUSPENSION/ DROPS AURICULAR (OTIC) 2 TIMES DAILY
Status: DISCONTINUED | OUTPATIENT
Start: 2022-04-23 | End: 2022-04-23

## 2022-04-23 RX ORDER — MEPERIDINE HYDROCHLORIDE 25 MG/ML
12.5 INJECTION INTRAMUSCULAR; INTRAVENOUS; SUBCUTANEOUS
Status: DISCONTINUED | OUTPATIENT
Start: 2022-04-23 | End: 2022-04-23 | Stop reason: HOSPADM

## 2022-04-23 RX ORDER — PHENYLEPHRINE HCL IN 0.9% NACL 1 MG/10 ML
SYRINGE (ML) INTRAVENOUS AS NEEDED
Status: DISCONTINUED | OUTPATIENT
Start: 2022-04-23 | End: 2022-04-23 | Stop reason: SURG

## 2022-04-23 RX ORDER — FENTANYL CITRATE 50 UG/ML
INJECTION, SOLUTION INTRAMUSCULAR; INTRAVENOUS AS NEEDED
Status: DISCONTINUED | OUTPATIENT
Start: 2022-04-23 | End: 2022-04-23 | Stop reason: SURG

## 2022-04-23 RX ORDER — HYDRALAZINE HYDROCHLORIDE 20 MG/ML
10 INJECTION INTRAMUSCULAR; INTRAVENOUS ONCE
Status: COMPLETED | OUTPATIENT
Start: 2022-04-23 | End: 2022-04-23

## 2022-04-23 RX ORDER — BUPIVACAINE HYDROCHLORIDE 2.5 MG/ML
INJECTION, SOLUTION EPIDURAL; INFILTRATION; INTRACAUDAL AS NEEDED
Status: DISCONTINUED | OUTPATIENT
Start: 2022-04-23 | End: 2022-04-23 | Stop reason: HOSPADM

## 2022-04-23 RX ORDER — CLONIDINE HYDROCHLORIDE 0.1 MG/1
0.1 TABLET ORAL 2 TIMES DAILY
Status: DISCONTINUED | OUTPATIENT
Start: 2022-04-23 | End: 2022-04-26

## 2022-04-23 RX ORDER — PANTOPRAZOLE SODIUM 40 MG/1
40 TABLET, DELAYED RELEASE ORAL DAILY
Status: DISCONTINUED | OUTPATIENT
Start: 2022-04-23 | End: 2022-04-26 | Stop reason: HOSPADM

## 2022-04-23 RX ORDER — ASPIRIN 81 MG/1
81 TABLET, CHEWABLE ORAL DAILY
Status: DISCONTINUED | OUTPATIENT
Start: 2022-04-24 | End: 2022-04-26 | Stop reason: HOSPADM

## 2022-04-23 RX ORDER — ACETAMINOPHEN 500 MG
1000 TABLET ORAL EVERY 8 HOURS
Status: DISCONTINUED | OUTPATIENT
Start: 2022-04-23 | End: 2022-04-26 | Stop reason: HOSPADM

## 2022-04-23 RX ADMIN — SODIUM CHLORIDE 100 ML/HR: 9 INJECTION, SOLUTION INTRAVENOUS at 10:49

## 2022-04-23 RX ADMIN — MORPHINE SULFATE 2 MG: 2 INJECTION, SOLUTION INTRAMUSCULAR; INTRAVENOUS at 11:03

## 2022-04-23 RX ADMIN — DEXAMETHASONE SODIUM PHOSPHATE 4 MG: 4 INJECTION, SOLUTION INTRAMUSCULAR; INTRAVENOUS at 15:45

## 2022-04-23 RX ADMIN — SODIUM CHLORIDE 100 ML/HR: 9 INJECTION, SOLUTION INTRAVENOUS at 14:41

## 2022-04-23 RX ADMIN — PROPOFOL 200 MG: 10 INJECTION, EMULSION INTRAVENOUS at 15:10

## 2022-04-23 RX ADMIN — ONDANSETRON 4 MG: 2 INJECTION INTRAMUSCULAR; INTRAVENOUS at 16:13

## 2022-04-23 RX ADMIN — Medication 100 MCG: at 15:27

## 2022-04-23 RX ADMIN — CARVEDILOL 25 MG: 25 TABLET, FILM COATED ORAL at 11:00

## 2022-04-23 RX ADMIN — SODIUM CHLORIDE, SODIUM LACTATE, POTASSIUM CHLORIDE, AND CALCIUM CHLORIDE: .6; .31; .03; .02 INJECTION, SOLUTION INTRAVENOUS at 15:05

## 2022-04-23 RX ADMIN — METRONIDAZOLE 500 MG: 500 INJECTION, SOLUTION INTRAVENOUS at 10:55

## 2022-04-23 RX ADMIN — TERAZOSIN HYDROCHLORIDE 5 MG: 5 CAPSULE ORAL at 21:43

## 2022-04-23 RX ADMIN — Medication 100 MCG: at 15:24

## 2022-04-23 RX ADMIN — MORPHINE SULFATE 4 MG: 4 INJECTION INTRAVENOUS at 15:44

## 2022-04-23 RX ADMIN — HYDRALAZINE HYDROCHLORIDE 10 MG: 20 INJECTION INTRAMUSCULAR; INTRAVENOUS at 01:14

## 2022-04-23 RX ADMIN — ROSUVASTATIN 40 MG: 10 TABLET, FILM COATED ORAL at 21:43

## 2022-04-23 RX ADMIN — CARVEDILOL 25 MG: 25 TABLET, FILM COATED ORAL at 17:57

## 2022-04-23 RX ADMIN — CEFAZOLIN SODIUM 2 G: 1 INJECTION, POWDER, FOR SOLUTION INTRAMUSCULAR; INTRAVENOUS at 15:05

## 2022-04-23 RX ADMIN — Medication 10 ML: at 21:43

## 2022-04-23 RX ADMIN — ESCITALOPRAM OXALATE 10 MG: 10 TABLET ORAL at 17:57

## 2022-04-23 RX ADMIN — METOPROLOL TARTRATE 5 MG: 5 INJECTION, SOLUTION INTRAVENOUS at 08:53

## 2022-04-23 RX ADMIN — CLONIDINE HYDROCHLORIDE 0.1 MG: 0.1 TABLET ORAL at 21:43

## 2022-04-23 RX ADMIN — ACETAMINOPHEN 1000 MG: 500 TABLET, FILM COATED ORAL at 21:42

## 2022-04-23 RX ADMIN — PANTOPRAZOLE SODIUM 40 MG: 40 TABLET, DELAYED RELEASE ORAL at 17:57

## 2022-04-23 RX ADMIN — Medication 10 ML: at 08:54

## 2022-04-23 RX ADMIN — FENTANYL CITRATE 100 MCG: 50 INJECTION, SOLUTION INTRAMUSCULAR; INTRAVENOUS at 15:05

## 2022-04-23 RX ADMIN — CLONIDINE HYDROCHLORIDE 0.1 MG: 0.1 TABLET ORAL at 11:00

## 2022-04-23 RX ADMIN — METRONIDAZOLE 500 MG: 500 INJECTION, SOLUTION INTRAVENOUS at 22:45

## 2022-04-23 RX ADMIN — CEFTRIAXONE 1 G: 1 INJECTION, POWDER, FOR SOLUTION INTRAMUSCULAR; INTRAVENOUS at 21:42

## 2022-04-23 RX ADMIN — SUGAMMADEX 200 MG: 100 INJECTION, SOLUTION INTRAVENOUS at 16:15

## 2022-04-23 RX ADMIN — ACETAMINOPHEN 650 MG: 325 TABLET ORAL at 02:47

## 2022-04-23 RX ADMIN — ISOSORBIDE MONONITRATE 30 MG: 30 TABLET, EXTENDED RELEASE ORAL at 11:00

## 2022-04-23 RX ADMIN — ROCURONIUM BROMIDE 50 MG: 10 SOLUTION INTRAVENOUS at 15:11

## 2022-04-23 NOTE — ANESTHESIA POSTPROCEDURE EVALUATION
Patient: Anthony Ayoub    Procedure Summary     Date: 04/23/22 Room / Location: [facility] OR 06 / [facility] MAIN OR    Anesthesia Start: 1505 Anesthesia Stop: 1630    Procedure: CHOLECYSTECTOMY LAPAROSCOPIC (N/A Abdomen) Diagnosis:       Acute cholecystitis      (Acute cholecystitis [K81.0])    Surgeons: Kale Acuna MD Provider: Benoit Harris MD    Anesthesia Type: general ASA Status: 3          Anesthesia Type: general    Vitals  Vitals Value Taken Time   /75 04/23/22 1727   Temp 99.1 °F (37.3 °C) 04/23/22 1725   Pulse 86 04/23/22 1729   Resp 16 04/23/22 1715   SpO2 93 % 04/23/22 1729   Vitals shown include unvalidated device data.        Post Anesthesia Care and Evaluation    Patient location during evaluation: PACU  Patient participation: complete - patient participated  Level of consciousness: awake  Pain scale: See nurse's notes for pain score.  Pain management: adequate  Airway patency: patent  Anesthetic complications: No anesthetic complications  PONV Status: none  Cardiovascular status: acceptable  Respiratory status: acceptable  Hydration status: acceptable    Comments: Patient seen and examined postoperatively; vital signs stable; SpO2 greater than or equal to 90%; cardiopulmonary status stable; nausea/vomiting adequately controlled; pain adequately controlled; no apparent anesthesia complications; patient discharged from anesthesia care when discharge criteria were met

## 2022-04-23 NOTE — ANESTHESIA PREPROCEDURE EVALUATION
Anesthesia Evaluation     NPO Solid Status: > 8 hours  NPO Liquid Status: > 8 hours           Airway   Mallampati: II  TM distance: >3 FB  No difficulty expected  Dental      Pulmonary    (+) a smoker Former, sleep apnea,   Cardiovascular     (+) hypertension, CAD, CHF Diastolic >=55%, hyperlipidemia,       Neuro/Psych  (+) psychiatric history,    GI/Hepatic/Renal/Endo    (+)  GERD,  renal disease CRI, thyroid problem     Musculoskeletal     Abdominal    Substance History      OB/GYN          Other   arthritis, blood dyscrasia anemia,   history of cancer    ROS/Med Hx Other: CML    Echo  Comments  Left ventricular size and contractility is within normal limits. Ejection  Fraction is 55-60%. The right ventricle is normal in size and function. The  left atrial size is normal. Right atrial size is normal. The mitral valve is  normal. There is no mitral regurgitation noted. The tricuspid valve is normal.  No tricuspid regurgitation. Aortic valve is normal. No aortic regurgitation is  present. The pulmonic valve is not well visualized. The aortic root is normal  size. No pericardial effusion or intra-cardiac thrombus is seen.        Interpretation  Admitted left ventricular ejection fraction is 60%  Normal echo-doppler study.     [facility]  BONE MARROW TRANSPLANT CARDIAC CATHETERIZATION  VASECTOMY SINUS SURGERY  TONSILLECTOMY                     Anesthesia Plan    ASA 3     general     intravenous induction     Anesthetic plan, all risks, benefits, and alternatives have been provided, discussed and informed consent has been obtained with: patient.    Plan discussed with CAA.        CODE STATUS:    Level Of Support Discussed With: Patient  Code Status (Patient has no pulse and is not breathing): CPR (Attempt to Resuscitate)  Medical Interventions (Patient has pulse or is breathing): Full Support

## 2022-04-24 PROCEDURE — G0378 HOSPITAL OBSERVATION PER HR: HCPCS

## 2022-04-24 PROCEDURE — 63710000001 VORICONAZOLE 200 MG TABLET: Performed by: INTERNAL MEDICINE

## 2022-04-24 PROCEDURE — 63710000001 CARVEDILOL 25 MG TABLET: Performed by: SURGERY

## 2022-04-24 PROCEDURE — A9270 NON-COVERED ITEM OR SERVICE: HCPCS | Performed by: SURGERY

## 2022-04-24 PROCEDURE — 63710000001: Performed by: INTERNAL MEDICINE

## 2022-04-24 PROCEDURE — A9270 NON-COVERED ITEM OR SERVICE: HCPCS | Performed by: INTERNAL MEDICINE

## 2022-04-24 PROCEDURE — 63710000001 ROSUVASTATIN 10 MG TABLET: Performed by: SURGERY

## 2022-04-24 PROCEDURE — 63710000001 TERAZOSIN 5 MG CAPSULE: Performed by: SURGERY

## 2022-04-24 PROCEDURE — 63710000001 CLONIDINE 0.1 MG TABLET: Performed by: SURGERY

## 2022-04-24 PROCEDURE — 63710000001 ASPIRIN 81 MG CHEWABLE TABLET: Performed by: SURGERY

## 2022-04-24 PROCEDURE — 63710000001 ISOSORBIDE MONONITRATE 30 MG TABLET SUSTAINED-RELEASE 24 HOUR: Performed by: SURGERY

## 2022-04-24 PROCEDURE — 63710000001 PANTOPRAZOLE 40 MG TABLET DELAYED-RELEASE: Performed by: SURGERY

## 2022-04-24 PROCEDURE — 63710000001 ESCITALOPRAM 10 MG TABLET: Performed by: SURGERY

## 2022-04-24 PROCEDURE — 63710000001 ACETAMINOPHEN 500 MG TABLET: Performed by: INTERNAL MEDICINE

## 2022-04-24 PROCEDURE — 99225 PR SBSQ OBSERVATION CARE/DAY 25 MINUTES: CPT | Performed by: INTERNAL MEDICINE

## 2022-04-24 PROCEDURE — 99024 POSTOP FOLLOW-UP VISIT: CPT | Performed by: SURGERY

## 2022-04-24 RX ORDER — DAPSONE 100 MG/1
100 TABLET ORAL
Status: DISCONTINUED | OUTPATIENT
Start: 2022-04-24 | End: 2022-04-26 | Stop reason: HOSPADM

## 2022-04-24 RX ORDER — VORICONAZOLE 200 MG/1
200 TABLET, FILM COATED ORAL EVERY 12 HOURS SCHEDULED
Status: DISCONTINUED | OUTPATIENT
Start: 2022-04-24 | End: 2022-04-26 | Stop reason: HOSPADM

## 2022-04-24 RX ADMIN — CARVEDILOL 25 MG: 25 TABLET, FILM COATED ORAL at 09:21

## 2022-04-24 RX ADMIN — METRONIDAZOLE 500 MG: 500 INJECTION, SOLUTION INTRAVENOUS at 04:43

## 2022-04-24 RX ADMIN — TERAZOSIN HYDROCHLORIDE 5 MG: 5 CAPSULE ORAL at 22:42

## 2022-04-24 RX ADMIN — ISOSORBIDE MONONITRATE 30 MG: 30 TABLET, EXTENDED RELEASE ORAL at 09:20

## 2022-04-24 RX ADMIN — DAPSONE 100 MG: 100 TABLET ORAL at 16:58

## 2022-04-24 RX ADMIN — CLONIDINE HYDROCHLORIDE 0.1 MG: 0.1 TABLET ORAL at 09:21

## 2022-04-24 RX ADMIN — VORICONAZOLE 200 MG: 200 TABLET, COATED ORAL at 22:42

## 2022-04-24 RX ADMIN — CARVEDILOL 25 MG: 25 TABLET, FILM COATED ORAL at 16:58

## 2022-04-24 RX ADMIN — ROSUVASTATIN 40 MG: 10 TABLET, FILM COATED ORAL at 22:42

## 2022-04-24 RX ADMIN — CLONIDINE HYDROCHLORIDE 0.1 MG: 0.1 TABLET ORAL at 19:46

## 2022-04-24 RX ADMIN — METRONIDAZOLE 500 MG: 500 INJECTION, SOLUTION INTRAVENOUS at 09:20

## 2022-04-24 RX ADMIN — ACETAMINOPHEN 1000 MG: 500 TABLET, FILM COATED ORAL at 19:35

## 2022-04-24 RX ADMIN — PANTOPRAZOLE SODIUM 40 MG: 40 TABLET, DELAYED RELEASE ORAL at 09:21

## 2022-04-24 RX ADMIN — Medication 10 ML: at 22:42

## 2022-04-24 RX ADMIN — ACETAMINOPHEN 1000 MG: 500 TABLET, FILM COATED ORAL at 04:44

## 2022-04-24 RX ADMIN — METRONIDAZOLE 500 MG: 500 INJECTION, SOLUTION INTRAVENOUS at 19:35

## 2022-04-24 RX ADMIN — METOPROLOL TARTRATE 5 MG: 5 INJECTION, SOLUTION INTRAVENOUS at 19:23

## 2022-04-24 RX ADMIN — ASPIRIN 81 MG: 81 TABLET, CHEWABLE ORAL at 09:20

## 2022-04-24 RX ADMIN — ESCITALOPRAM OXALATE 10 MG: 10 TABLET ORAL at 09:21

## 2022-04-25 LAB
ALBUMIN SERPL-MCNC: 3.4 G/DL (ref 3.5–5.2)
ALBUMIN/GLOB SERPL: 1.3 G/DL
ALP SERPL-CCNC: 165 U/L (ref 39–117)
ALT SERPL W P-5'-P-CCNC: 17 U/L (ref 1–41)
ANION GAP SERPL CALCULATED.3IONS-SCNC: 12 MMOL/L (ref 5–15)
AST SERPL-CCNC: 37 U/L (ref 1–40)
BASOPHILS # BLD AUTO: 0.1 10*3/MM3 (ref 0–0.2)
BASOPHILS NFR BLD AUTO: 0.9 % (ref 0–1.5)
BILIRUB SERPL-MCNC: 0.3 MG/DL (ref 0–1.2)
BUN SERPL-MCNC: 31 MG/DL (ref 8–23)
BUN/CREAT SERPL: 27.2 (ref 7–25)
CALCIUM SPEC-SCNC: 8.1 MG/DL (ref 8.6–10.5)
CHLORIDE SERPL-SCNC: 110 MMOL/L (ref 98–107)
CO2 SERPL-SCNC: 21 MMOL/L (ref 22–29)
CREAT SERPL-MCNC: 1.14 MG/DL (ref 0.76–1.27)
DEPRECATED RDW RBC AUTO: 53.8 FL (ref 37–54)
EGFRCR SERPLBLD CKD-EPI 2021: 71.8 ML/MIN/1.73
EOSINOPHIL # BLD AUTO: 0.1 10*3/MM3 (ref 0–0.4)
EOSINOPHIL NFR BLD AUTO: 0.6 % (ref 0.3–6.2)
ERYTHROCYTE [DISTWIDTH] IN BLOOD BY AUTOMATED COUNT: 16.3 % (ref 12.3–15.4)
GLOBULIN UR ELPH-MCNC: 2.6 GM/DL
GLUCOSE SERPL-MCNC: 133 MG/DL (ref 65–99)
HCT VFR BLD AUTO: 24.2 % (ref 37.5–51)
HGB BLD-MCNC: 7.9 G/DL (ref 13–17.7)
LYMPHOCYTES # BLD AUTO: 1.3 10*3/MM3 (ref 0.7–3.1)
LYMPHOCYTES NFR BLD AUTO: 13 % (ref 19.6–45.3)
MCH RBC QN AUTO: 30.3 PG (ref 26.6–33)
MCHC RBC AUTO-ENTMCNC: 32.6 G/DL (ref 31.5–35.7)
MCV RBC AUTO: 93.1 FL (ref 79–97)
MONOCYTES # BLD AUTO: 0.6 10*3/MM3 (ref 0.1–0.9)
MONOCYTES NFR BLD AUTO: 6.5 % (ref 5–12)
NEUTROPHILS NFR BLD AUTO: 7.6 10*3/MM3 (ref 1.7–7)
NEUTROPHILS NFR BLD AUTO: 79 % (ref 42.7–76)
NRBC BLD AUTO-RTO: 0.1 /100 WBC (ref 0–0.2)
PLATELET # BLD AUTO: 309 10*3/MM3 (ref 140–450)
PMV BLD AUTO: 8 FL (ref 6–12)
POTASSIUM SERPL-SCNC: 4.5 MMOL/L (ref 3.5–5.2)
PROT SERPL-MCNC: 6 G/DL (ref 6–8.5)
RBC # BLD AUTO: 2.6 10*6/MM3 (ref 4.14–5.8)
SODIUM SERPL-SCNC: 143 MMOL/L (ref 136–145)
WBC NRBC COR # BLD: 9.7 10*3/MM3 (ref 3.4–10.8)

## 2022-04-25 PROCEDURE — A9270 NON-COVERED ITEM OR SERVICE: HCPCS | Performed by: SURGERY

## 2022-04-25 PROCEDURE — 63710000001 ROSUVASTATIN 10 MG TABLET: Performed by: SURGERY

## 2022-04-25 PROCEDURE — 99024 POSTOP FOLLOW-UP VISIT: CPT | Performed by: SURGERY

## 2022-04-25 PROCEDURE — 63710000001 VORICONAZOLE 200 MG TABLET: Performed by: INTERNAL MEDICINE

## 2022-04-25 PROCEDURE — 63710000001 ISOSORBIDE MONONITRATE 30 MG TABLET SUSTAINED-RELEASE 24 HOUR: Performed by: SURGERY

## 2022-04-25 PROCEDURE — A9270 NON-COVERED ITEM OR SERVICE: HCPCS | Performed by: INTERNAL MEDICINE

## 2022-04-25 PROCEDURE — 63710000001 ESCITALOPRAM 10 MG TABLET: Performed by: SURGERY

## 2022-04-25 PROCEDURE — 97161 PT EVAL LOW COMPLEX 20 MIN: CPT

## 2022-04-25 PROCEDURE — 63710000001 TERAZOSIN 5 MG CAPSULE: Performed by: SURGERY

## 2022-04-25 PROCEDURE — 63710000001 CLONIDINE 0.1 MG TABLET: Performed by: SURGERY

## 2022-04-25 PROCEDURE — 63710000001 ASPIRIN 81 MG CHEWABLE TABLET: Performed by: SURGERY

## 2022-04-25 PROCEDURE — 25010000002 HYDRALAZINE PER 20 MG: Performed by: INTERNAL MEDICINE

## 2022-04-25 PROCEDURE — 80053 COMPREHEN METABOLIC PANEL: CPT | Performed by: SURGERY

## 2022-04-25 PROCEDURE — 63710000001 LISINOPRIL 20 MG TABLET: Performed by: INTERNAL MEDICINE

## 2022-04-25 PROCEDURE — 63710000001 PANTOPRAZOLE 40 MG TABLET DELAYED-RELEASE: Performed by: SURGERY

## 2022-04-25 PROCEDURE — 63710000001 ACETAMINOPHEN 500 MG TABLET: Performed by: INTERNAL MEDICINE

## 2022-04-25 PROCEDURE — 63710000001: Performed by: INTERNAL MEDICINE

## 2022-04-25 PROCEDURE — 63710000001 CARVEDILOL 25 MG TABLET: Performed by: SURGERY

## 2022-04-25 PROCEDURE — 99225 PR SBSQ OBSERVATION CARE/DAY 25 MINUTES: CPT | Performed by: INTERNAL MEDICINE

## 2022-04-25 PROCEDURE — G0378 HOSPITAL OBSERVATION PER HR: HCPCS

## 2022-04-25 PROCEDURE — 85025 COMPLETE CBC W/AUTO DIFF WBC: CPT | Performed by: SURGERY

## 2022-04-25 PROCEDURE — 25010000002 CEFTRIAXONE PER 250 MG: Performed by: SURGERY

## 2022-04-25 RX ORDER — CEFDINIR 300 MG/1
300 CAPSULE ORAL 2 TIMES DAILY
Qty: 10 CAPSULE | Refills: 0 | Status: CANCELLED | OUTPATIENT
Start: 2022-04-25 | End: 2022-04-30

## 2022-04-25 RX ORDER — ONDANSETRON 4 MG/1
4 TABLET, FILM COATED ORAL EVERY 6 HOURS PRN
Qty: 30 TABLET | Refills: 0 | Status: CANCELLED | OUTPATIENT
Start: 2022-04-25

## 2022-04-25 RX ORDER — METRONIDAZOLE 500 MG/1
500 TABLET ORAL 3 TIMES DAILY
Qty: 21 TABLET | Refills: 0 | Status: CANCELLED | OUTPATIENT
Start: 2022-04-25 | End: 2022-05-02

## 2022-04-25 RX ORDER — LISINOPRIL 20 MG/1
20 TABLET ORAL
Status: DISCONTINUED | OUTPATIENT
Start: 2022-04-25 | End: 2022-04-26

## 2022-04-25 RX ORDER — HYDRALAZINE HYDROCHLORIDE 20 MG/ML
10 INJECTION INTRAMUSCULAR; INTRAVENOUS EVERY 6 HOURS PRN
Status: DISCONTINUED | OUTPATIENT
Start: 2022-04-25 | End: 2022-04-26 | Stop reason: HOSPADM

## 2022-04-25 RX ORDER — HYDROCODONE BITARTRATE AND ACETAMINOPHEN 5; 325 MG/1; MG/1
1 TABLET ORAL EVERY 6 HOURS PRN
Qty: 20 TABLET | Refills: 0 | Status: CANCELLED | OUTPATIENT
Start: 2022-04-25

## 2022-04-25 RX ORDER — HYDRALAZINE HYDROCHLORIDE 10 MG/1
10 TABLET, FILM COATED ORAL 3 TIMES DAILY
Qty: 90 TABLET | Refills: 0 | Status: CANCELLED | OUTPATIENT
Start: 2022-04-25 | End: 2022-05-25

## 2022-04-25 RX ADMIN — HYDRALAZINE HYDROCHLORIDE 10 MG: 20 INJECTION INTRAMUSCULAR; INTRAVENOUS at 09:49

## 2022-04-25 RX ADMIN — ACETAMINOPHEN 1000 MG: 500 TABLET, FILM COATED ORAL at 18:38

## 2022-04-25 RX ADMIN — CEFTRIAXONE 1 G: 1 INJECTION, POWDER, FOR SOLUTION INTRAMUSCULAR; INTRAVENOUS at 18:38

## 2022-04-25 RX ADMIN — METRONIDAZOLE 500 MG: 500 INJECTION, SOLUTION INTRAVENOUS at 21:39

## 2022-04-25 RX ADMIN — ESCITALOPRAM OXALATE 10 MG: 10 TABLET ORAL at 09:31

## 2022-04-25 RX ADMIN — ACETAMINOPHEN 1000 MG: 500 TABLET, FILM COATED ORAL at 03:55

## 2022-04-25 RX ADMIN — TERAZOSIN HYDROCHLORIDE 5 MG: 5 CAPSULE ORAL at 21:39

## 2022-04-25 RX ADMIN — LISINOPRIL 20 MG: 20 TABLET ORAL at 15:06

## 2022-04-25 RX ADMIN — ASPIRIN 81 MG: 81 TABLET, CHEWABLE ORAL at 09:31

## 2022-04-25 RX ADMIN — ISOSORBIDE MONONITRATE 30 MG: 30 TABLET, EXTENDED RELEASE ORAL at 07:51

## 2022-04-25 RX ADMIN — CLONIDINE HYDROCHLORIDE 0.1 MG: 0.1 TABLET ORAL at 07:51

## 2022-04-25 RX ADMIN — ROSUVASTATIN 40 MG: 10 TABLET, FILM COATED ORAL at 21:39

## 2022-04-25 RX ADMIN — PANTOPRAZOLE SODIUM 40 MG: 40 TABLET, DELAYED RELEASE ORAL at 09:31

## 2022-04-25 RX ADMIN — ACETAMINOPHEN 1000 MG: 500 TABLET, FILM COATED ORAL at 11:27

## 2022-04-25 RX ADMIN — METRONIDAZOLE 500 MG: 500 INJECTION, SOLUTION INTRAVENOUS at 11:27

## 2022-04-25 RX ADMIN — METOPROLOL TARTRATE 5 MG: 5 INJECTION, SOLUTION INTRAVENOUS at 03:55

## 2022-04-25 RX ADMIN — METRONIDAZOLE 500 MG: 500 INJECTION, SOLUTION INTRAVENOUS at 03:56

## 2022-04-25 RX ADMIN — VORICONAZOLE 200 MG: 200 TABLET, COATED ORAL at 09:31

## 2022-04-25 RX ADMIN — CARVEDILOL 25 MG: 25 TABLET, FILM COATED ORAL at 07:51

## 2022-04-25 RX ADMIN — VORICONAZOLE 200 MG: 200 TABLET, COATED ORAL at 21:39

## 2022-04-25 RX ADMIN — Medication 10 ML: at 09:31

## 2022-04-25 RX ADMIN — CARVEDILOL 25 MG: 25 TABLET, FILM COATED ORAL at 18:38

## 2022-04-25 RX ADMIN — CLONIDINE HYDROCHLORIDE 0.1 MG: 0.1 TABLET ORAL at 21:39

## 2022-04-25 RX ADMIN — DAPSONE 100 MG: 100 TABLET ORAL at 09:31

## 2022-04-26 ENCOUNTER — READMISSION MANAGEMENT (OUTPATIENT)
Dept: CALL CENTER | Facility: HOSPITAL | Age: 67
End: 2022-04-26

## 2022-04-26 VITALS
WEIGHT: 162.04 LBS | DIASTOLIC BLOOD PRESSURE: 80 MMHG | HEART RATE: 73 BPM | RESPIRATION RATE: 19 BRPM | BODY MASS INDEX: 26.04 KG/M2 | OXYGEN SATURATION: 93 % | TEMPERATURE: 98.7 F | HEIGHT: 66 IN | SYSTOLIC BLOOD PRESSURE: 164 MMHG

## 2022-04-26 LAB
ALBUMIN SERPL-MCNC: 3.5 G/DL (ref 3.5–5.2)
ANION GAP SERPL CALCULATED.3IONS-SCNC: 11 MMOL/L (ref 5–15)
BUN SERPL-MCNC: 23 MG/DL (ref 8–23)
BUN/CREAT SERPL: 23 (ref 7–25)
CALCIUM SPEC-SCNC: 8.8 MG/DL (ref 8.6–10.5)
CHLORIDE SERPL-SCNC: 109 MMOL/L (ref 98–107)
CO2 SERPL-SCNC: 23 MMOL/L (ref 22–29)
CREAT SERPL-MCNC: 1 MG/DL (ref 0.76–1.27)
DEPRECATED RDW RBC AUTO: 52.9 FL (ref 37–54)
EGFRCR SERPLBLD CKD-EPI 2021: 84 ML/MIN/1.73
ERYTHROCYTE [DISTWIDTH] IN BLOOD BY AUTOMATED COUNT: 16 % (ref 12.3–15.4)
GLUCOSE SERPL-MCNC: 98 MG/DL (ref 65–99)
HCT VFR BLD AUTO: 26.1 % (ref 37.5–51)
HGB BLD-MCNC: 8.4 G/DL (ref 13–17.7)
LAB AP CASE REPORT: NORMAL
MCH RBC QN AUTO: 29.7 PG (ref 26.6–33)
MCHC RBC AUTO-ENTMCNC: 32.1 G/DL (ref 31.5–35.7)
MCV RBC AUTO: 92.3 FL (ref 79–97)
PATH REPORT.FINAL DX SPEC: NORMAL
PATH REPORT.GROSS SPEC: NORMAL
PHOSPHATE SERPL-MCNC: 2.3 MG/DL (ref 2.5–4.5)
PLATELET # BLD AUTO: 373 10*3/MM3 (ref 140–450)
PMV BLD AUTO: 8.3 FL (ref 6–12)
POTASSIUM SERPL-SCNC: 4.1 MMOL/L (ref 3.5–5.2)
RBC # BLD AUTO: 2.83 10*6/MM3 (ref 4.14–5.8)
SODIUM SERPL-SCNC: 143 MMOL/L (ref 136–145)
WBC NRBC COR # BLD: 10.3 10*3/MM3 (ref 3.4–10.8)

## 2022-04-26 PROCEDURE — 63710000001: Performed by: INTERNAL MEDICINE

## 2022-04-26 PROCEDURE — 63710000001 PANTOPRAZOLE 40 MG TABLET DELAYED-RELEASE: Performed by: SURGERY

## 2022-04-26 PROCEDURE — A9270 NON-COVERED ITEM OR SERVICE: HCPCS | Performed by: SURGERY

## 2022-04-26 PROCEDURE — A9270 NON-COVERED ITEM OR SERVICE: HCPCS | Performed by: INTERNAL MEDICINE

## 2022-04-26 PROCEDURE — 63710000001 LISINOPRIL 20 MG TABLET: Performed by: INTERNAL MEDICINE

## 2022-04-26 PROCEDURE — 63710000001 CLONIDINE 0.1 MG TABLET: Performed by: SURGERY

## 2022-04-26 PROCEDURE — 99024 POSTOP FOLLOW-UP VISIT: CPT | Performed by: SURGERY

## 2022-04-26 PROCEDURE — 63710000001 CARVEDILOL 25 MG TABLET: Performed by: SURGERY

## 2022-04-26 PROCEDURE — 99217 PR OBSERVATION CARE DISCHARGE MANAGEMENT: CPT | Performed by: INTERNAL MEDICINE

## 2022-04-26 PROCEDURE — 63710000001 ACETAMINOPHEN 500 MG TABLET: Performed by: INTERNAL MEDICINE

## 2022-04-26 PROCEDURE — G0378 HOSPITAL OBSERVATION PER HR: HCPCS

## 2022-04-26 PROCEDURE — 63710000001 ISOSORBIDE MONONITRATE 30 MG TABLET SUSTAINED-RELEASE 24 HOUR: Performed by: SURGERY

## 2022-04-26 PROCEDURE — 63710000001 HYDRALAZINE 25 MG TABLET: Performed by: INTERNAL MEDICINE

## 2022-04-26 PROCEDURE — 80069 RENAL FUNCTION PANEL: CPT | Performed by: INTERNAL MEDICINE

## 2022-04-26 PROCEDURE — 85027 COMPLETE CBC AUTOMATED: CPT | Performed by: INTERNAL MEDICINE

## 2022-04-26 PROCEDURE — 25010000002 HYDRALAZINE PER 20 MG: Performed by: INTERNAL MEDICINE

## 2022-04-26 PROCEDURE — 63710000001 ASPIRIN 81 MG CHEWABLE TABLET: Performed by: SURGERY

## 2022-04-26 PROCEDURE — 63710000001 VORICONAZOLE 200 MG TABLET: Performed by: INTERNAL MEDICINE

## 2022-04-26 PROCEDURE — 63710000001 ESCITALOPRAM 10 MG TABLET: Performed by: SURGERY

## 2022-04-26 RX ORDER — CLONIDINE HYDROCHLORIDE 0.2 MG/1
0.2 TABLET ORAL 2 TIMES DAILY
Qty: 60 TABLET | Refills: 0 | Status: SHIPPED | OUTPATIENT
Start: 2022-04-26 | End: 2022-05-10 | Stop reason: DRUGHIGH

## 2022-04-26 RX ORDER — LISINOPRIL 20 MG/1
20 TABLET ORAL ONCE
Status: COMPLETED | OUTPATIENT
Start: 2022-04-26 | End: 2022-04-26

## 2022-04-26 RX ORDER — HYDRALAZINE HYDROCHLORIDE 25 MG/1
25 TABLET, FILM COATED ORAL EVERY 8 HOURS SCHEDULED
Status: DISCONTINUED | OUTPATIENT
Start: 2022-04-26 | End: 2022-04-26 | Stop reason: HOSPADM

## 2022-04-26 RX ORDER — CLONIDINE HYDROCHLORIDE 0.1 MG/1
0.2 TABLET ORAL 2 TIMES DAILY
Status: DISCONTINUED | OUTPATIENT
Start: 2022-04-26 | End: 2022-04-26 | Stop reason: HOSPADM

## 2022-04-26 RX ORDER — HYDROCODONE BITARTRATE AND ACETAMINOPHEN 5; 325 MG/1; MG/1
1 TABLET ORAL EVERY 6 HOURS PRN
Qty: 15 TABLET | Refills: 0 | Status: SHIPPED | OUTPATIENT
Start: 2022-04-26 | End: 2022-05-10

## 2022-04-26 RX ORDER — CEFDINIR 300 MG/1
300 CAPSULE ORAL 2 TIMES DAILY
Qty: 14 CAPSULE | Refills: 0 | Status: SHIPPED | OUTPATIENT
Start: 2022-04-26 | End: 2022-05-03

## 2022-04-26 RX ORDER — LISINOPRIL 20 MG/1
40 TABLET ORAL
Status: DISCONTINUED | OUTPATIENT
Start: 2022-04-27 | End: 2022-04-26 | Stop reason: HOSPADM

## 2022-04-26 RX ORDER — ONDANSETRON 4 MG/1
4 TABLET, FILM COATED ORAL EVERY 6 HOURS PRN
Qty: 30 TABLET | Refills: 0 | Status: SHIPPED | OUTPATIENT
Start: 2022-04-26 | End: 2022-05-10

## 2022-04-26 RX ADMIN — SODIUM CHLORIDE 75 ML/HR: 9 INJECTION, SOLUTION INTRAVENOUS at 06:13

## 2022-04-26 RX ADMIN — DAPSONE 100 MG: 100 TABLET ORAL at 07:22

## 2022-04-26 RX ADMIN — PANTOPRAZOLE SODIUM 40 MG: 40 TABLET, DELAYED RELEASE ORAL at 07:21

## 2022-04-26 RX ADMIN — LISINOPRIL 20 MG: 20 TABLET ORAL at 07:21

## 2022-04-26 RX ADMIN — ESCITALOPRAM OXALATE 10 MG: 10 TABLET ORAL at 07:22

## 2022-04-26 RX ADMIN — CLONIDINE HYDROCHLORIDE 0.1 MG: 0.1 TABLET ORAL at 07:22

## 2022-04-26 RX ADMIN — LISINOPRIL 20 MG: 20 TABLET ORAL at 09:40

## 2022-04-26 RX ADMIN — METRONIDAZOLE 500 MG: 500 INJECTION, SOLUTION INTRAVENOUS at 02:21

## 2022-04-26 RX ADMIN — HYDRALAZINE HYDROCHLORIDE 10 MG: 20 INJECTION INTRAMUSCULAR; INTRAVENOUS at 01:00

## 2022-04-26 RX ADMIN — ISOSORBIDE MONONITRATE 30 MG: 30 TABLET, EXTENDED RELEASE ORAL at 07:21

## 2022-04-26 RX ADMIN — ASPIRIN 81 MG: 81 TABLET, CHEWABLE ORAL at 07:21

## 2022-04-26 RX ADMIN — METRONIDAZOLE 500 MG: 500 INJECTION, SOLUTION INTRAVENOUS at 11:52

## 2022-04-26 RX ADMIN — METOPROLOL TARTRATE 5 MG: 5 INJECTION, SOLUTION INTRAVENOUS at 01:29

## 2022-04-26 RX ADMIN — CARVEDILOL 25 MG: 25 TABLET, FILM COATED ORAL at 07:21

## 2022-04-26 RX ADMIN — HYDRALAZINE HYDROCHLORIDE 25 MG: 25 TABLET, FILM COATED ORAL at 11:52

## 2022-04-26 RX ADMIN — ACETAMINOPHEN 1000 MG: 500 TABLET, FILM COATED ORAL at 02:21

## 2022-04-26 RX ADMIN — ACETAMINOPHEN 1000 MG: 500 TABLET, FILM COATED ORAL at 09:39

## 2022-04-26 RX ADMIN — VORICONAZOLE 200 MG: 200 TABLET, COATED ORAL at 07:22

## 2022-04-26 RX ADMIN — HYDRALAZINE HYDROCHLORIDE 10 MG: 20 INJECTION INTRAMUSCULAR; INTRAVENOUS at 09:40

## 2022-04-27 ENCOUNTER — TELEPHONE (OUTPATIENT)
Dept: SURGERY | Facility: CLINIC | Age: 67
End: 2022-04-27

## 2022-04-27 ENCOUNTER — TRANSITIONAL CARE MANAGEMENT TELEPHONE ENCOUNTER (OUTPATIENT)
Dept: CALL CENTER | Facility: HOSPITAL | Age: 67
End: 2022-04-27

## 2022-04-27 LAB
BACTERIA FLD CULT: ABNORMAL
BACTERIA FLD CULT: ABNORMAL
GRAM STN SPEC: ABNORMAL
GRAM STN SPEC: ABNORMAL

## 2022-04-27 NOTE — TELEPHONE ENCOUNTER
PO FU Call- spoke with pt and his wife. States doing alright. Changed the dressing for the drain and it looked fine. Having some BP issues with it being elevated. Stated discussing with Dr. Lawson. Informed them to continue to do that or even call Dr. Horta their PCP to see what they would recommend. 2 wk po appt already royal. Will fu then. Knows to call with any questions or concerns.

## 2022-04-27 NOTE — OUTREACH NOTE
Call Center TCM Note    Flowsheet Row Responses   [facility] patient discharged from? [facility]   Does the patient have one of the following disease processes/diagnoses(primary or secondary)? General Surgery   TCM attempt successful? Yes   Call start time 1126   Call end time 1134   Discharge diagnosis Cholecystecolmy lap   Is patient permission given to speak with other caregiver? Yes   List who call center can speak with CHAPARRO BENITESJOE   Person spoke with today (if not patient) and relationship CHAPARRO PAGAN- WIFE   Meds reviewed with patient/caregiver? Yes   Is the patient having any side effects they believe may be caused by any medication additions or changes? No   Does the patient have all medications related to this admission filled (includes all antibiotics, pain medications, etc.) Yes   Is the patient taking all medications as directed (includes completed medication regime)? Yes   Does the patient have a follow up appointment scheduled with their surgeon? Yes   Has the patient kept scheduled appointments due by today? N/A   Comments PCP FOLLOW UP SCHEDULED FOR FIRST AVAILABLE DATE OF 5/6/22 WITH JAY DK. WIFE WOULD LIKE A SOONER APPOINTMENT.    Has home health visited the patient within 72 hours of discharge? N/A   Psychosocial issues? No   Did the patient receive a copy of their discharge instructions? Yes   Nursing interventions Reviewed instructions with patient   What is the patient's perception of their health status since discharge? New symptoms unrelated to diagnosis  [PATIENT HAD A VERY HIGH BLOOD PRESSURE THIS MORNING, BUT SHE CALLED DR. WEST AND GOT MEDICATION ORDERS. HIS BLOOD PRESSURE IS DOWN NOW. ]   Is the patient /caregiver able to teach back basic post-op care? Drive as instructed by MD in discharge instructions, Take showers only when approved by MD-sponge bathe until then, No tub bath, swimming, or hot tub until instructed by MD, Keep incision areas clean,dry and protected, Do not  remove steri-strips, Lifting as instructed by MD in discharge instructions   Is the patient/caregiver able to teach back signs and symptoms of incisional infection? Increased redness, swelling or pain at the incisonal site, Increased drainage or bleeding, Incisional warmth, Fever, Pus or odor from incision   Is the patient/caregiver able to teach back steps to recovery at home? Set small, achievable goals for return to baseline health, Rest and rebuild strength, gradually increase activity, Make a list of questions for surgeon's appointment, Eat a well-balance diet, Practice good oral hygiene   If the patient is a current smoker, are they able to teach back resources for cessation? Not a smoker   Is the patient/caregiver able to teach back the hierarchy of who to call/visit for symptoms/problems? PCP, Specialist, Home health nurse, Urgent Care, ED, 911 Yes   TCM call completed? Yes          Maria Eugenia Restrepo LPN    4/27/2022, 11:39 EDT

## 2022-04-27 NOTE — OUTREACH NOTE
Prep Survey    Flowsheet Row Responses   [facility] patient discharged from? [facility]   Is LACE score < 7 ? No   Emergency Room discharge w/ pulse ox? No   Eligibility TCM   Hospital [facility]   Date of Admission 04/22/22   Date of Discharge 04/26/22   Discharge Disposition Home or Self Care   Discharge diagnosis Cholecystecolmy lap   Does the patient have one of the following disease processes/diagnoses(primary or secondary)? General Surgery   Does the patient have Home health ordered? No   Is there a DME ordered? No   Prep survey completed? Yes          JIM REYNOLDS - Registered Nurse

## 2022-04-29 LAB — BACTERIA SPEC ANAEROBE CULT: NORMAL

## 2022-05-02 NOTE — PROGRESS NOTES
"Transitional Care Follow Up Visit  Subjective     Anthony Ayoub is a 66 y.o. male who presents for a transitional care management visit.    Within 48 business hours after discharge our office contacted him via telephone to coordinate his care and needs.      I reviewed and discussed the details of that call along with the discharge summary, hospital problems, inpatient lab results, inpatient diagnostic studies, and consultation reports with Anthony.     Current outpatient and discharge medications have been reconciled for the patient.  Reviewed by: LISA Reynolds      Date of TCM Phone Call 4/26/2022   Hospital [facility]   Date of Admission 4/22/2022   Date of Discharge 4/26/2022   Discharge Disposition Home or Self Care     Risk for Readmission (LACE) Score: 10 (4/26/2022  6:01 AM)      History of Present Illness   Course During Hospital Stay: Hospital course per hospital record: \"Anthony Ayoub is a 66 y.o. male presented with right upper quadrant pain with nausea and vomiting.  The patient is status post bone marrow transplant at  several years ago.  The patient presented with nausea vomiting and some renal insufficiency.  The renal insufficiency improved with cautious IV rehydration.  The patient was started on Rocephin and Flagyl.  The patient was transitioned to Omnicef at the time of discharge as he grew Klebsiella pneumonia of his cultures which was sensitive to the drug.     The patient underwent cholecystectomy on 4/23/2022.  The patient did well postoperatively with the exception of his blood pressure.  The patient did have difficult to control blood pressure following his cholecystectomy which was treated with as needed hydralazine.  Initially the patient's lisinopril was held due to renal insufficiency.  Nephrology did see the patient during his hospital stay and on the date of discharge increased the patient back to his previous dose of lisinopril (40 mg).  The patient also had his Catapres " "increased from 0.1 mg twice daily to 0.2 mg twice daily by nephrology.  It is for that reason that the patient was not sent home on hydralazine which can be considered should the problem not resolve once he has been discharged.  The patient was doing well and was tolerating a diet and was cleared by surgery for discharge.     The patient is a full code at the time of discharge.  The patient's medications are as listed in that section of this report.  The patient is on a low residue diet.  The patient has pathology from his cholecystectomy which is pending at the time of discharge.  The patient is to follow-up with surgery in 1 week's time, nephrology in a week's time with a basic metabolic profile and his primary care provider in 1 week's time.  The patient is discharged in satisfactory condition.\"     Currently on lisinopril 40 mg daily; carvedilol 25 mg bid; clonidine 0.1 mg bid for HTN.  BP stable. Denies any CP; palpitations; SOA; dizziness; headache; trouble with vision.   Denies any abdominal pain, N/V/D.    Scheduled for follow up with the surgeon on 5/10.   Hx of anemia; would like H & H checked today.     All toes are numb for several months.  Cardiologist ruled out any circulation issues.  Could be related to previous chemo. No color changes or swelling.      The following portions of the patient's history were reviewed and updated as appropriate: allergies, current medications, past family history, past medical history, past social history, past surgical history and problem list.    Review of Systems   Constitutional: Negative for chills, fatigue and fever.   Respiratory: Negative for cough, chest tightness, shortness of breath and wheezing.    Cardiovascular: Negative for chest pain and palpitations.   Gastrointestinal: Negative for abdominal pain, blood in stool, constipation, diarrhea, nausea and vomiting.   Endocrine: Negative for polydipsia, polyphagia and polyuria.   Genitourinary: Negative for " flank pain, frequency, hematuria and urgency.   Musculoskeletal: Negative for arthralgias and myalgias.   Skin: Negative for color change and rash.   Neurological: Positive for numbness.   Psychiatric/Behavioral: Negative for dysphoric mood. The patient is not nervous/anxious.        Objective   Physical Exam  Vitals reviewed.   Constitutional:       General: He is not in acute distress.     Appearance: Normal appearance.   Cardiovascular:      Rate and Rhythm: Normal rate and regular rhythm.      Pulses: Normal pulses.      Heart sounds: Normal heart sounds. No murmur heard.  Pulmonary:      Effort: Pulmonary effort is normal. No respiratory distress.      Breath sounds: Normal breath sounds. No wheezing or rhonchi.   Chest:      Chest wall: No tenderness.   Abdominal:      General: Bowel sounds are normal. There is no distension.      Palpations: Abdomen is soft.      Tenderness: There is no abdominal tenderness. There is no right CVA tenderness or left CVA tenderness.      Comments: 4 punctures sites on abdomen are clean and dry; no surrounding erythema.     Musculoskeletal:      Right foot: Normal range of motion and normal capillary refill. No swelling or tenderness. Normal pulse.      Left foot: Normal range of motion and normal capillary refill. No swelling or tenderness. Normal pulse.   Skin:     General: Skin is warm and dry.      Findings: No erythema.   Neurological:      General: No focal deficit present.      Mental Status: He is alert and oriented to person, place, and time.   Psychiatric:         Mood and Affect: Mood normal.         Assessment/Plan   Diagnoses and all orders for this visit:    1. Essential hypertension (Primary)  Comments:  Stable.   Cont. current medication.   Cont. monitoring BP at home.     2. S/P cholecystectomy  Comments:  Stable.   Surgical f/u is scheduled    3. Tingling of both feet  Comments:  Labs today.   Consider a B12 supplement.   Orders:  -     Vitamin B12; Future  -      Basic metabolic panel; Future    4. Iron deficiency anemia, unspecified iron deficiency anemia type  Comments:  Cpnt. current medication.    Will check CBC to monitor.   Orders:  -     Vitamin B12; Future  -     CBC w AUTO Differential; Future

## 2022-05-03 ENCOUNTER — OFFICE VISIT (OUTPATIENT)
Dept: FAMILY MEDICINE CLINIC | Facility: CLINIC | Age: 67
End: 2022-05-03

## 2022-05-03 ENCOUNTER — LAB (OUTPATIENT)
Dept: FAMILY MEDICINE CLINIC | Facility: CLINIC | Age: 67
End: 2022-05-03

## 2022-05-03 VITALS
HEART RATE: 66 BPM | BODY MASS INDEX: 24.75 KG/M2 | HEIGHT: 66 IN | OXYGEN SATURATION: 96 % | WEIGHT: 154 LBS | DIASTOLIC BLOOD PRESSURE: 77 MMHG | SYSTOLIC BLOOD PRESSURE: 138 MMHG

## 2022-05-03 DIAGNOSIS — Z90.49 S/P CHOLECYSTECTOMY: ICD-10-CM

## 2022-05-03 DIAGNOSIS — D50.9 IRON DEFICIENCY ANEMIA, UNSPECIFIED IRON DEFICIENCY ANEMIA TYPE: ICD-10-CM

## 2022-05-03 DIAGNOSIS — R20.2 TINGLING OF BOTH FEET: ICD-10-CM

## 2022-05-03 DIAGNOSIS — D64.9 ANEMIA, UNSPECIFIED TYPE: ICD-10-CM

## 2022-05-03 DIAGNOSIS — I10 ESSENTIAL HYPERTENSION: Primary | ICD-10-CM

## 2022-05-03 LAB
ANION GAP SERPL CALCULATED.3IONS-SCNC: 12.1 MMOL/L (ref 5–15)
BASOPHILS # BLD AUTO: 0.08 10*3/MM3 (ref 0–0.2)
BASOPHILS NFR BLD AUTO: 0.9 % (ref 0–1.5)
BUN SERPL-MCNC: 26 MG/DL (ref 8–23)
BUN/CREAT SERPL: 17.1 (ref 7–25)
CALCIUM SPEC-SCNC: 9.4 MG/DL (ref 8.6–10.5)
CHLORIDE SERPL-SCNC: 102 MMOL/L (ref 98–107)
CO2 SERPL-SCNC: 24.9 MMOL/L (ref 22–29)
CREAT SERPL-MCNC: 1.52 MG/DL (ref 0.76–1.27)
DEPRECATED RDW RBC AUTO: 49.4 FL (ref 37–54)
EGFRCR SERPLBLD CKD-EPI 2021: 50.9 ML/MIN/1.73
EOSINOPHIL # BLD AUTO: 0.45 10*3/MM3 (ref 0–0.4)
EOSINOPHIL NFR BLD AUTO: 5 % (ref 0.3–6.2)
ERYTHROCYTE [DISTWIDTH] IN BLOOD BY AUTOMATED COUNT: 14.5 % (ref 12.3–15.4)
GLUCOSE SERPL-MCNC: 111 MG/DL (ref 65–99)
HCT VFR BLD AUTO: 25.3 % (ref 37.5–51)
HGB BLD-MCNC: 8.1 G/DL (ref 13–17.7)
IMM GRANULOCYTES # BLD AUTO: 0.11 10*3/MM3 (ref 0–0.05)
IMM GRANULOCYTES NFR BLD AUTO: 1.2 % (ref 0–0.5)
LYMPHOCYTES # BLD AUTO: 1.52 10*3/MM3 (ref 0.7–3.1)
LYMPHOCYTES NFR BLD AUTO: 17 % (ref 19.6–45.3)
MCH RBC QN AUTO: 29.7 PG (ref 26.6–33)
MCHC RBC AUTO-ENTMCNC: 32 G/DL (ref 31.5–35.7)
MCV RBC AUTO: 92.7 FL (ref 79–97)
MONOCYTES # BLD AUTO: 0.78 10*3/MM3 (ref 0.1–0.9)
MONOCYTES NFR BLD AUTO: 8.7 % (ref 5–12)
NEUTROPHILS NFR BLD AUTO: 6.01 10*3/MM3 (ref 1.7–7)
NEUTROPHILS NFR BLD AUTO: 67.2 % (ref 42.7–76)
NRBC BLD AUTO-RTO: 0.1 /100 WBC (ref 0–0.2)
PLATELET # BLD AUTO: 520 10*3/MM3 (ref 140–450)
PMV BLD AUTO: 10.3 FL (ref 6–12)
POTASSIUM SERPL-SCNC: 4.7 MMOL/L (ref 3.5–5.2)
RBC # BLD AUTO: 2.73 10*6/MM3 (ref 4.14–5.8)
SODIUM SERPL-SCNC: 139 MMOL/L (ref 136–145)
VIT B12 BLD-MCNC: 772 PG/ML (ref 211–946)
WBC NRBC COR # BLD: 8.95 10*3/MM3 (ref 3.4–10.8)

## 2022-05-03 PROCEDURE — 85025 COMPLETE CBC W/AUTO DIFF WBC: CPT | Performed by: NURSE PRACTITIONER

## 2022-05-03 PROCEDURE — 83010 ASSAY OF HAPTOGLOBIN QUANT: CPT | Performed by: NURSE PRACTITIONER

## 2022-05-03 PROCEDURE — 80048 BASIC METABOLIC PNL TOTAL CA: CPT | Performed by: NURSE PRACTITIONER

## 2022-05-03 PROCEDURE — 82728 ASSAY OF FERRITIN: CPT | Performed by: NURSE PRACTITIONER

## 2022-05-03 PROCEDURE — 82607 VITAMIN B-12: CPT | Performed by: NURSE PRACTITIONER

## 2022-05-03 PROCEDURE — 36415 COLL VENOUS BLD VENIPUNCTURE: CPT

## 2022-05-03 PROCEDURE — 99495 TRANSJ CARE MGMT MOD F2F 14D: CPT | Performed by: NURSE PRACTITIONER

## 2022-05-03 PROCEDURE — 82746 ASSAY OF FOLIC ACID SERUM: CPT | Performed by: NURSE PRACTITIONER

## 2022-05-03 PROCEDURE — 1111F DSCHRG MED/CURRENT MED MERGE: CPT | Performed by: NURSE PRACTITIONER

## 2022-05-03 RX ORDER — CLONIDINE HYDROCHLORIDE 0.1 MG/1
0.1 TABLET ORAL 2 TIMES DAILY
COMMUNITY
Start: 2022-05-01 | End: 2022-09-06

## 2022-05-04 DIAGNOSIS — D64.9 ANEMIA, UNSPECIFIED TYPE: Primary | ICD-10-CM

## 2022-05-04 LAB
FERRITIN SERPL-MCNC: 1355 NG/ML (ref 30–400)
FOLATE SERPL-MCNC: 2.41 NG/ML (ref 4.78–24.2)
HAPTOGLOB SERPL-MCNC: 261 MG/DL (ref 30–200)

## 2022-05-05 ENCOUNTER — READMISSION MANAGEMENT (OUTPATIENT)
Dept: CALL CENTER | Facility: HOSPITAL | Age: 67
End: 2022-05-05

## 2022-05-05 NOTE — OUTREACH NOTE
General Surgery Week 2 Survey    Flowsheet Row Responses   [facility] patient discharged from? [facility]   Does the patient have one of the following disease processes/diagnoses(primary or secondary)? General Surgery   Week 2 attempt successful? Yes   Call start time 1659   Call end time 1703   Discharge diagnosis Cholecystecolmy lap   Person spoke with today (if not patient) and relationship CHAPARRO PAGAN- WIFE   Meds reviewed with patient/caregiver? Yes   Is the patient having any side effects they believe may be caused by any medication additions or changes? No   Does the patient have all medications related to this admission filled (includes all antibiotics, pain medications, etc.) Yes   Is the patient taking all medications as directed (includes completed medication regime)? Yes   Does the patient have a follow up appointment scheduled with their surgeon? Yes   Has the patient kept scheduled appointments due by today? Yes   Comments Seen by PCP yesterday, and has appt with surgeon on 5/10/2022   Has home health visited the patient within 72 hours of discharge? N/A   Psychosocial issues? No   Did the patient receive a copy of their discharge instructions? Yes   Nursing interventions Reviewed instructions with patient   What is the patient's perception of their health status since discharge? Improving   Nursing interventions Nurse provided patient education   Is the patient /caregiver able to teach back basic post-op care? Keep incision areas clean,dry and protected   Is the patient/caregiver able to teach back signs and symptoms of incisional infection? Increased redness, swelling or pain at the incisonal site, Increased drainage or bleeding, Incisional warmth, Fever, Pus or odor from incision   Is the patient/caregiver able to teach back steps to recovery at home? Rest and rebuild strength, gradually increase activity, Set small, achievable goals for return to baseline health   If the patient is a current  smoker, are they able to teach back resources for cessation? Not a smoker   Is the patient/caregiver able to teach back the hierarchy of who to call/visit for symptoms/problems? PCP, Specialist, Home health nurse, Urgent Care, ED, 911 Yes   Week 2 call completed? Yes   Wrap up additional comments Doing well. Watching diet. Healing well.           GREYSON MARROQUIN - Registered Nurse

## 2022-05-10 ENCOUNTER — OFFICE VISIT (OUTPATIENT)
Dept: SURGERY | Facility: CLINIC | Age: 67
End: 2022-05-10

## 2022-05-10 VITALS
BODY MASS INDEX: 24.56 KG/M2 | DIASTOLIC BLOOD PRESSURE: 57 MMHG | OXYGEN SATURATION: 96 % | RESPIRATION RATE: 18 BRPM | SYSTOLIC BLOOD PRESSURE: 97 MMHG | WEIGHT: 152.8 LBS | HEART RATE: 59 BPM | TEMPERATURE: 98.4 F | HEIGHT: 66 IN

## 2022-05-10 DIAGNOSIS — K81.9 CHOLECYSTITIS: Primary | ICD-10-CM

## 2022-05-10 PROCEDURE — 99024 POSTOP FOLLOW-UP VISIT: CPT | Performed by: SURGERY

## 2022-05-10 NOTE — PROGRESS NOTES
"Subjective   Anthony Ayoub is a 66 y.o. male.   Couple weeks out from laparoscopic cholecystectomy for severe acute cholecystitis.  Recovery has gone pretty well.  Initially had some fevers and was feeling unwell during the first couple of days after discharge but that has dramatically improved he is tolerating diet having regular bowel function about once a day minimal incisional discomfort and pain.    Objective   BP 97/57 (BP Location: Left arm, Patient Position: Sitting, Cuff Size: Adult)   Pulse 59   Temp 98.4 °F (36.9 °C) (Infrared)   Resp 18   Ht 167.6 cm (66\")   Wt 69.3 kg (152 lb 12.8 oz)   SpO2 96%   BMI 24.66 kg/m²   Physical Exam  Abdomen is soft and nondistended nontender to palpation incisions are healing well without erythema or drainage    Diagnoses and all orders for this visit:    1. Cholecystitis (Primary)    Pathology reviewed consistent with acute on chronic cholecystitis.  Gradually increase activity no lifting restrictions.  Okay for diet as tolerated.  Follow-up as needed.    Kale Acuna MD  5/10/2022  9:48 AM EDT    This note was created using Dragon Voice Recognition software.  "

## 2022-05-13 ENCOUNTER — READMISSION MANAGEMENT (OUTPATIENT)
Dept: CALL CENTER | Facility: HOSPITAL | Age: 67
End: 2022-05-13

## 2022-05-13 NOTE — OUTREACH NOTE
General Surgery Week 3 Survey    Flowsheet Row Responses   [facility] patient discharged from? [facility]   Does the patient have one of the following disease processes/diagnoses(primary or secondary)? General Surgery   Week 3 attempt successful? No   Unsuccessful attempts Attempt 1          PRATEEK Estevez Registered Nurse

## 2022-05-17 ENCOUNTER — OFFICE VISIT (OUTPATIENT)
Dept: FAMILY MEDICINE CLINIC | Facility: CLINIC | Age: 67
End: 2022-05-17

## 2022-05-17 VITALS
OXYGEN SATURATION: 96 % | BODY MASS INDEX: 24.86 KG/M2 | TEMPERATURE: 100.4 F | DIASTOLIC BLOOD PRESSURE: 71 MMHG | SYSTOLIC BLOOD PRESSURE: 142 MMHG | HEART RATE: 61 BPM | WEIGHT: 154 LBS

## 2022-05-17 DIAGNOSIS — R20.2 TINGLING OF BOTH FEET: Primary | ICD-10-CM

## 2022-05-17 DIAGNOSIS — N18.30 STAGE 3 CHRONIC KIDNEY DISEASE, UNSPECIFIED WHETHER STAGE 3A OR 3B CKD: ICD-10-CM

## 2022-05-17 PROCEDURE — 99214 OFFICE O/P EST MOD 30 MIN: CPT | Performed by: FAMILY MEDICINE

## 2022-05-17 RX ORDER — NITROGLYCERIN 0.6 MG/1
TABLET SUBLINGUAL
COMMUNITY
Start: 2022-05-13

## 2022-05-17 RX ORDER — FOLIC ACID 1 MG/1
1 TABLET ORAL DAILY
Qty: 90 TABLET | Refills: 1 | Status: SHIPPED | OUTPATIENT
Start: 2022-05-17 | End: 2022-11-08

## 2022-05-18 NOTE — PROGRESS NOTES
Subjective   Anthony Ayoub is a 66 y.o. male.     Anthony Ayoub is in for follow up on his chronic back pain. He has developed some numbness in his feet and it is getting worse. He has not had any swelling. He has not had any trauma. He has not had any difficulty walking yet. There is no history of chest pain or dyspnea. There is no history of issue with bowel or bladder dysfunction, despite recent gall bladder removal. There is no history of dizziness or lightheadedness. There is no history of issue with sleep or mood. There is no history of issue with present medication.            /71 (BP Location: Left arm, Patient Position: Sitting, Cuff Size: Large Adult)   Pulse 61   Temp 100.4 °F (38 °C) (Temporal)   Wt 69.9 kg (154 lb)   SpO2 96%   BMI 24.86 kg/m²       Chief Complaint   Patient presents with   • Back Pain     4 month Follow up            Current Outpatient Medications:   •  aspirin 81 MG chewable tablet, Chew 81 mg Daily., Disp: , Rfl:   •  carvedilol (COREG) 25 MG tablet, Take 25 mg by mouth 2 (Two) Times a Day., Disp: , Rfl:   •  ciprofloxacin-dexamethasone (CIPRODEX) 0.3-0.1 % otic suspension, Administer 4 drops into both ears As Needed., Disp: , Rfl:   •  cloNIDine (CATAPRES) 0.1 MG tablet, 0.1 mg 2 (Two) Times a Day., Disp: , Rfl:   •  dapsone 100 MG tablet, Take 100 mg by mouth Daily., Disp: , Rfl:   •  escitalopram (LEXAPRO) 10 MG tablet, Take 10 mg by mouth Daily., Disp: , Rfl:   •  ezetimibe (ZETIA) 10 MG tablet, TAKE 1 TABLET BY MOUTH EVERY DAY, Disp: 90 tablet, Rfl: 1  •  fluticasone (FLONASE) 50 MCG/ACT nasal spray, 1 spray into the nostril(s) as directed by provider 1 (One) Time. prn, Disp: , Rfl:   •  isosorbide mononitrate (IMDUR) 30 MG 24 hr tablet, Take 30 mg by mouth Daily., Disp: , Rfl:   •  lisinopril (PRINIVIL,ZESTRIL) 40 MG tablet, Take 40 mg by mouth Daily., Disp: , Rfl:   •  nitroglycerin (NITROSTAT) 0.6 MG SL tablet, , Disp: , Rfl:   •  pantoprazole (PROTONIX) 40  MG EC tablet, Take 40 mg by mouth Daily., Disp: , Rfl:   •  rosuvastatin (CRESTOR) 40 MG tablet, TAKE 1 TABLET BY MOUTH EVERYDAY AT BEDTIME, Disp: 90 tablet, Rfl: 1  •  terazosin (HYTRIN) 5 MG capsule, Take 5 mg by mouth Every Night. Takes at hs, Disp: , Rfl:   •  voriconazole (VFEND) 200 MG tablet, Take 200 mg by mouth 2 (Two) Times a Day., Disp: , Rfl:   •  folic acid (FOLVITE) 1 MG tablet, Take 1 tablet by mouth Daily., Disp: 90 tablet, Rfl: 1        The following portions of the patient's history were reviewed and updated as appropriate: allergies, current medications, past family history, past medical history, past social history, past surgical history, and problem list.    Review of Systems   Constitutional: Positive for fatigue. Negative for chills and fever.   Respiratory: Negative for cough, chest tightness, shortness of breath and wheezing.    Cardiovascular: Negative for chest pain and palpitations.   Gastrointestinal: Negative for abdominal pain, blood in stool, constipation, diarrhea, nausea and vomiting.   Endocrine: Negative for polydipsia, polyphagia and polyuria.   Genitourinary: Negative for flank pain, frequency, hematuria and urgency.   Musculoskeletal: Negative for arthralgias and myalgias.   Skin: Negative for color change and rash.   Neurological: Positive for weakness and numbness. Negative for dizziness and light-headedness.   Psychiatric/Behavioral: Negative for dysphoric mood. The patient is not nervous/anxious.        Objective   Physical Exam  Vitals and nursing note reviewed.   Constitutional:       Appearance: Normal appearance.   HENT:      Right Ear: Tympanic membrane and ear canal normal.      Left Ear: Tympanic membrane and ear canal normal.   Cardiovascular:      Rate and Rhythm: Normal rate.      Heart sounds: Normal heart sounds. No murmur heard.  Pulmonary:      Effort: Pulmonary effort is normal.   Abdominal:      General: Bowel sounds are normal.      Palpations: Abdomen is  soft.   Musculoskeletal:      Cervical back: Neck supple.      Right lower leg: No edema.      Left lower leg: No edema.   Lymphadenopathy:      Cervical: No cervical adenopathy.   Skin:     Findings: No rash.   Neurological:      Mental Status: He is alert and oriented to person, place, and time. Mental status is at baseline.   Psychiatric:         Mood and Affect: Mood normal.           Assessment & Plan   Problems Addressed this Visit    None     Visit Diagnoses     Tingling of both feet    -  Primary    Relevant Orders    EMG 2 Limbs    Stage 3 chronic kidney disease, unspecified whether stage 3a or 3b CKD (HCC)        Relevant Orders    Vitamin D 25 hydroxy      Diagnoses       Codes Comments    Tingling of both feet    -  Primary ICD-10-CM: R20.2  ICD-9-CM: 782.0     Stage 3 chronic kidney disease, unspecified whether stage 3a or 3b CKD (HCC)     ICD-10-CM: N18.30  ICD-9-CM: 585.3         I think the source of the numbness is metabolic  I will have him take a folic acid supplement  I will check vit d levels due to his CKD  I will check EMG to make sure this is not related to DDD in the spine  I will follow up later in the year  He will continue to see hematology for his chronic anemia problems

## 2022-05-19 ENCOUNTER — LAB (OUTPATIENT)
Dept: FAMILY MEDICINE CLINIC | Facility: CLINIC | Age: 67
End: 2022-05-19

## 2022-05-19 PROCEDURE — 36415 COLL VENOUS BLD VENIPUNCTURE: CPT | Performed by: FAMILY MEDICINE

## 2022-05-19 PROCEDURE — 82306 VITAMIN D 25 HYDROXY: CPT | Performed by: FAMILY MEDICINE

## 2022-05-20 LAB — 25(OH)D3 SERPL-MCNC: 15 NG/ML (ref 30–100)

## 2022-05-22 ENCOUNTER — PATIENT MESSAGE (OUTPATIENT)
Dept: FAMILY MEDICINE CLINIC | Facility: CLINIC | Age: 67
End: 2022-05-22

## 2022-05-22 RX ORDER — ERGOCALCIFEROL 1.25 MG/1
50000 CAPSULE ORAL WEEKLY
Qty: 4 CAPSULE | Refills: 5 | Status: SHIPPED | OUTPATIENT
Start: 2022-05-22 | End: 2022-12-06

## 2022-05-28 ENCOUNTER — HOSPITAL ENCOUNTER (EMERGENCY)
Facility: HOSPITAL | Age: 67
Discharge: HOME | End: 2022-05-29
Attending: EMERGENCY MEDICINE | Admitting: EMERGENCY MEDICINE

## 2022-05-28 DIAGNOSIS — Z94.81 HISTORY OF BONE MARROW TRANSPLANT: ICD-10-CM

## 2022-05-28 DIAGNOSIS — U07.1 PNEUMONIA DUE TO COVID-19 VIRUS: Primary | ICD-10-CM

## 2022-05-28 DIAGNOSIS — J12.82 PNEUMONIA DUE TO COVID-19 VIRUS: Primary | ICD-10-CM

## 2022-05-28 PROCEDURE — 99283 EMERGENCY DEPT VISIT LOW MDM: CPT

## 2022-05-28 RX ORDER — BEBTELOVIMAB 87.5 MG/ML
175 INJECTION, SOLUTION INTRAVENOUS ONCE
Status: COMPLETED | OUTPATIENT
Start: 2022-05-29 | End: 2022-05-29

## 2022-05-28 RX ORDER — EPINEPHRINE 1 MG/ML
0.3 INJECTION, SOLUTION, CONCENTRATE INTRAVENOUS ONCE AS NEEDED
Status: DISCONTINUED | OUTPATIENT
Start: 2022-05-28 | End: 2022-05-29 | Stop reason: HOSPADM

## 2022-05-28 RX ORDER — METHYLPREDNISOLONE SODIUM SUCCINATE 125 MG/2ML
125 INJECTION, POWDER, LYOPHILIZED, FOR SOLUTION INTRAMUSCULAR; INTRAVENOUS ONCE AS NEEDED
Status: DISCONTINUED | OUTPATIENT
Start: 2022-05-28 | End: 2022-05-29 | Stop reason: HOSPADM

## 2022-05-28 RX ORDER — DIPHENHYDRAMINE HYDROCHLORIDE 50 MG/ML
50 INJECTION INTRAMUSCULAR; INTRAVENOUS ONCE AS NEEDED
Status: DISCONTINUED | OUTPATIENT
Start: 2022-05-28 | End: 2022-05-29 | Stop reason: HOSPADM

## 2022-05-28 RX ORDER — SODIUM CHLORIDE 9 MG/ML
30 INJECTION, SOLUTION INTRAVENOUS ONCE
Status: DISCONTINUED | OUTPATIENT
Start: 2022-05-29 | End: 2022-05-29 | Stop reason: HOSPADM

## 2022-05-28 RX ORDER — DIPHENHYDRAMINE HCL 25 MG
50 CAPSULE ORAL ONCE AS NEEDED
Status: DISCONTINUED | OUTPATIENT
Start: 2022-05-28 | End: 2022-05-29 | Stop reason: HOSPADM

## 2022-05-29 ENCOUNTER — APPOINTMENT (OUTPATIENT)
Dept: GENERAL RADIOLOGY | Facility: HOSPITAL | Age: 67
End: 2022-05-29

## 2022-05-29 VITALS
RESPIRATION RATE: 16 BRPM | WEIGHT: 155.65 LBS | SYSTOLIC BLOOD PRESSURE: 146 MMHG | TEMPERATURE: 99.2 F | BODY MASS INDEX: 25.01 KG/M2 | HEIGHT: 66 IN | OXYGEN SATURATION: 93 % | DIASTOLIC BLOOD PRESSURE: 74 MMHG | HEART RATE: 62 BPM

## 2022-05-29 LAB
ALBUMIN SERPL-MCNC: 3.8 G/DL (ref 3.5–5.2)
ALBUMIN/GLOB SERPL: 1.8 G/DL
ALP SERPL-CCNC: 163 U/L (ref 39–117)
ALT SERPL W P-5'-P-CCNC: 10 U/L (ref 1–41)
ANION GAP SERPL CALCULATED.3IONS-SCNC: 12 MMOL/L (ref 5–15)
AST SERPL-CCNC: 23 U/L (ref 1–40)
BASOPHILS # BLD AUTO: 0.1 10*3/MM3 (ref 0–0.2)
BASOPHILS NFR BLD AUTO: 1.5 % (ref 0–1.5)
BILIRUB SERPL-MCNC: 0.2 MG/DL (ref 0–1.2)
BUN SERPL-MCNC: 22 MG/DL (ref 8–23)
BUN/CREAT SERPL: 14.7 (ref 7–25)
CALCIUM SPEC-SCNC: 8.6 MG/DL (ref 8.6–10.5)
CHLORIDE SERPL-SCNC: 103 MMOL/L (ref 98–107)
CO2 SERPL-SCNC: 22 MMOL/L (ref 22–29)
CREAT SERPL-MCNC: 1.5 MG/DL (ref 0.76–1.27)
DEPRECATED RDW RBC AUTO: 56 FL (ref 37–54)
EGFRCR SERPLBLD CKD-EPI 2021: 51.7 ML/MIN/1.73
EOSINOPHIL # BLD AUTO: 0.3 10*3/MM3 (ref 0–0.4)
EOSINOPHIL NFR BLD AUTO: 6.7 % (ref 0.3–6.2)
ERYTHROCYTE [DISTWIDTH] IN BLOOD BY AUTOMATED COUNT: 17.6 % (ref 12.3–15.4)
GLOBULIN UR ELPH-MCNC: 2.1 GM/DL
GLUCOSE SERPL-MCNC: 130 MG/DL (ref 65–99)
HCT VFR BLD AUTO: 26.4 % (ref 37.5–51)
HGB BLD-MCNC: 8.5 G/DL (ref 13–17.7)
LYMPHOCYTES # BLD AUTO: 1.3 10*3/MM3 (ref 0.7–3.1)
LYMPHOCYTES NFR BLD AUTO: 33.1 % (ref 19.6–45.3)
MCH RBC QN AUTO: 29.1 PG (ref 26.6–33)
MCHC RBC AUTO-ENTMCNC: 32.2 G/DL (ref 31.5–35.7)
MCV RBC AUTO: 90.2 FL (ref 79–97)
MONOCYTES # BLD AUTO: 0.5 10*3/MM3 (ref 0.1–0.9)
MONOCYTES NFR BLD AUTO: 12 % (ref 5–12)
NEUTROPHILS NFR BLD AUTO: 1.8 10*3/MM3 (ref 1.7–7)
NEUTROPHILS NFR BLD AUTO: 46.7 % (ref 42.7–76)
NRBC BLD AUTO-RTO: 0.1 /100 WBC (ref 0–0.2)
PLATELET # BLD AUTO: 198 10*3/MM3 (ref 140–450)
PMV BLD AUTO: 7.7 FL (ref 6–12)
POTASSIUM SERPL-SCNC: 4.5 MMOL/L (ref 3.5–5.2)
PROT SERPL-MCNC: 5.9 G/DL (ref 6–8.5)
RBC # BLD AUTO: 2.93 10*6/MM3 (ref 4.14–5.8)
SODIUM SERPL-SCNC: 137 MMOL/L (ref 136–145)
WBC NRBC COR # BLD: 3.8 10*3/MM3 (ref 3.4–10.8)

## 2022-05-29 PROCEDURE — 80053 COMPREHEN METABOLIC PANEL: CPT | Performed by: EMERGENCY MEDICINE

## 2022-05-29 PROCEDURE — 25010000002 INJECTION, BEBTELOVIMAB, 175 MG: Performed by: EMERGENCY MEDICINE

## 2022-05-29 PROCEDURE — M0222 HC INJECTION BEBTELOVIMAB: HCPCS | Performed by: EMERGENCY MEDICINE

## 2022-05-29 PROCEDURE — 85025 COMPLETE CBC W/AUTO DIFF WBC: CPT | Performed by: EMERGENCY MEDICINE

## 2022-05-29 PROCEDURE — 71045 X-RAY EXAM CHEST 1 VIEW: CPT

## 2022-05-29 PROCEDURE — 87040 BLOOD CULTURE FOR BACTERIA: CPT | Performed by: EMERGENCY MEDICINE

## 2022-05-29 RX ORDER — GUAIFENESIN AND CODEINE PHOSPHATE 100; 10 MG/5ML; MG/5ML
5 SOLUTION ORAL 4 TIMES DAILY PRN
Qty: 120 ML | Refills: 0 | Status: SHIPPED | OUTPATIENT
Start: 2022-05-29 | End: 2023-03-24

## 2022-05-29 RX ORDER — DEXAMETHASONE 2 MG/1
2 TABLET ORAL 3 TIMES DAILY
Qty: 15 TABLET | Refills: 0 | Status: SHIPPED | OUTPATIENT
Start: 2022-05-29 | End: 2022-05-29 | Stop reason: SDUPTHER

## 2022-05-29 RX ORDER — DEXAMETHASONE 2 MG/1
2 TABLET ORAL 3 TIMES DAILY
Qty: 15 TABLET | Refills: 0 | Status: SHIPPED | OUTPATIENT
Start: 2022-05-29

## 2022-05-29 RX ORDER — ALBUTEROL SULFATE 90 UG/1
2 AEROSOL, METERED RESPIRATORY (INHALATION) EVERY 4 HOURS PRN
Qty: 8 G | Refills: 0 | Status: SHIPPED | OUTPATIENT
Start: 2022-05-29 | End: 2022-05-29 | Stop reason: SDUPTHER

## 2022-05-29 RX ORDER — GUAIFENESIN AND CODEINE PHOSPHATE 100; 10 MG/5ML; MG/5ML
5 SOLUTION ORAL 4 TIMES DAILY PRN
Qty: 120 ML | Refills: 0 | Status: SHIPPED | OUTPATIENT
Start: 2022-05-29 | End: 2022-05-29 | Stop reason: SDUPTHER

## 2022-05-29 RX ORDER — ALBUTEROL SULFATE 90 UG/1
2 AEROSOL, METERED RESPIRATORY (INHALATION) EVERY 4 HOURS PRN
Qty: 8 G | Refills: 0 | Status: SHIPPED | OUTPATIENT
Start: 2022-05-29

## 2022-05-29 RX ADMIN — BEBTELOVIMAB 175 MG: 87.5 INJECTION, SOLUTION INTRAVENOUS at 00:33

## 2022-06-03 LAB — BACTERIA SPEC AEROBE CULT: NORMAL

## 2022-06-20 ENCOUNTER — TRANSCRIBE ORDERS (OUTPATIENT)
Dept: ADMINISTRATIVE | Facility: HOSPITAL | Age: 67
End: 2022-06-20

## 2022-06-20 ENCOUNTER — LAB (OUTPATIENT)
Dept: LAB | Facility: HOSPITAL | Age: 67
End: 2022-06-20

## 2022-06-20 DIAGNOSIS — N28.89 URETERAL FISTULA: ICD-10-CM

## 2022-06-20 DIAGNOSIS — Z85.6 PERSONAL HISTORY OF UNSPECIFIED LEUKEMIA: ICD-10-CM

## 2022-06-20 DIAGNOSIS — N13.8 ENLARGED PROSTATE WITH URINARY OBSTRUCTION: ICD-10-CM

## 2022-06-20 DIAGNOSIS — N40.1 ENLARGED PROSTATE WITH URINARY OBSTRUCTION: ICD-10-CM

## 2022-06-20 DIAGNOSIS — R80.9 PROTEINURIA, UNSPECIFIED TYPE: ICD-10-CM

## 2022-06-20 DIAGNOSIS — I10 ESSENTIAL HYPERTENSION, MALIGNANT: ICD-10-CM

## 2022-06-20 DIAGNOSIS — N18.31 CHRONIC KIDNEY DISEASE (CKD) STAGE G3A/A1, MODERATELY DECREASED GLOMERULAR FILTRATION RATE (GFR) BETWEEN 45-59 ML/MIN/1.73 SQUARE METER AND ALBUMINURIA CREATININE RATIO LESS THAN 30 MG/G (CMS/H*: Primary | ICD-10-CM

## 2022-06-20 DIAGNOSIS — N18.31 CHRONIC KIDNEY DISEASE (CKD) STAGE G3A/A1, MODERATELY DECREASED GLOMERULAR FILTRATION RATE (GFR) BETWEEN 45-59 ML/MIN/1.73 SQUARE METER AND ALBUMINURIA CREATININE RATIO LESS THAN 30 MG/G (CMS/H*: ICD-10-CM

## 2022-06-20 LAB
ALBUMIN SERPL-MCNC: 4.2 G/DL (ref 3.5–5.2)
ALBUMIN UR-MCNC: 56.3 MG/DL
ANION GAP SERPL CALCULATED.3IONS-SCNC: 11.1 MMOL/L (ref 5–15)
BACTERIA UR QL AUTO: ABNORMAL /HPF
BILIRUB UR QL STRIP: NEGATIVE
BUN SERPL-MCNC: 22 MG/DL (ref 8–23)
BUN/CREAT SERPL: 14 (ref 7–25)
C3 SERPL-MCNC: 161 MG/DL (ref 82–167)
C4 SERPL-MCNC: 38 MG/DL (ref 14–44)
CALCIUM SPEC-SCNC: 9.4 MG/DL (ref 8.6–10.5)
CHLORIDE SERPL-SCNC: 105 MMOL/L (ref 98–107)
CLARITY UR: CLEAR
CO2 SERPL-SCNC: 23.9 MMOL/L (ref 22–29)
COLOR UR: YELLOW
CREAT SERPL-MCNC: 1.57 MG/DL (ref 0.76–1.27)
CREAT UR-MCNC: 76.7 MG/DL
EGFRCR SERPLBLD CKD-EPI 2021: 48.9 ML/MIN/1.73
GLUCOSE SERPL-MCNC: 101 MG/DL (ref 65–99)
GLUCOSE UR STRIP-MCNC: NEGATIVE MG/DL
HBV SURFACE AG SERPL QL IA: NORMAL
HCV AB SER DONR QL: NORMAL
HGB UR QL STRIP.AUTO: NEGATIVE
HYALINE CASTS UR QL AUTO: ABNORMAL /LPF
KETONES UR QL STRIP: NEGATIVE
LEUKOCYTE ESTERASE UR QL STRIP.AUTO: NEGATIVE
MICROALBUMIN/CREAT UR: 734 MG/G
NITRITE UR QL STRIP: NEGATIVE
PH UR STRIP.AUTO: 6 [PH] (ref 5–8)
PHOSPHATE SERPL-MCNC: 4.2 MG/DL (ref 2.5–4.5)
POTASSIUM SERPL-SCNC: 4.6 MMOL/L (ref 3.5–5.2)
PROT UR QL STRIP: ABNORMAL
RBC # UR STRIP: ABNORMAL /HPF
REF LAB TEST METHOD: ABNORMAL
SODIUM SERPL-SCNC: 140 MMOL/L (ref 136–145)
SP GR UR STRIP: 1.01 (ref 1–1.03)
SQUAMOUS #/AREA URNS HPF: ABNORMAL /HPF
UROBILINOGEN UR QL STRIP: ABNORMAL
WBC # UR STRIP: ABNORMAL /HPF

## 2022-06-20 PROCEDURE — 86038 ANTINUCLEAR ANTIBODIES: CPT

## 2022-06-20 PROCEDURE — 86037 ANCA TITER EACH ANTIBODY: CPT

## 2022-06-20 PROCEDURE — 86160 COMPLEMENT ANTIGEN: CPT

## 2022-06-20 PROCEDURE — 86225 DNA ANTIBODY NATIVE: CPT

## 2022-06-20 PROCEDURE — 87340 HEPATITIS B SURFACE AG IA: CPT

## 2022-06-20 PROCEDURE — 86803 HEPATITIS C AB TEST: CPT

## 2022-06-20 PROCEDURE — 81001 URINALYSIS AUTO W/SCOPE: CPT

## 2022-06-20 PROCEDURE — 80069 RENAL FUNCTION PANEL: CPT

## 2022-06-20 PROCEDURE — 82043 UR ALBUMIN QUANTITATIVE: CPT

## 2022-06-20 PROCEDURE — 83516 IMMUNOASSAY NONANTIBODY: CPT

## 2022-06-20 PROCEDURE — 82570 ASSAY OF URINE CREATININE: CPT

## 2022-06-20 PROCEDURE — 36415 COLL VENOUS BLD VENIPUNCTURE: CPT

## 2022-06-21 LAB — DSDNA AB SER-ACNC: <1 IU/ML (ref 0–9)

## 2022-06-22 LAB
ANA SER QL: NEGATIVE
C-ANCA TITR SER IF: NORMAL TITER
MYELOPEROXIDASE AB SER IA-ACNC: <0.2 UNITS (ref 0–0.9)
P-ANCA ATYPICAL TITR SER IF: NORMAL TITER
P-ANCA TITR SER IF: NORMAL TITER
PROTEINASE3 AB SER IA-ACNC: <0.2 UNITS (ref 0–0.9)

## 2022-06-29 ENCOUNTER — TELEPHONE (OUTPATIENT)
Dept: FAMILY MEDICINE CLINIC | Facility: CLINIC | Age: 67
End: 2022-06-29

## 2022-06-29 NOTE — TELEPHONE ENCOUNTER
Caller: Mildred Ayoub    Relationship to patient: Emergency Contact    Best call back number: [phone]    Chief complaint: VERY FATIGUED, NO ENERGY, HIS HEMOGLOBIN WAS 8.1 A MONTH AGO AND HE IS WONDERING IF HIS NUMBERS ARE GETTING LOWER. HE REALLY WANTS A VISIT AND LABS TO CHECK.     Type of visit: OFFICE    Requested date: 6/29    Additional notes: PLEASE CALL BACK TO ADVISE IF PATIENT CAN GET IN TODAY OR WHAT DR BAE WOULD LIKE TO DO.

## 2022-08-02 ENCOUNTER — OFFICE VISIT (OUTPATIENT)
Dept: FAMILY MEDICINE CLINIC | Facility: CLINIC | Age: 67
End: 2022-08-02

## 2022-08-02 VITALS
TEMPERATURE: 100.2 F | WEIGHT: 150 LBS | SYSTOLIC BLOOD PRESSURE: 150 MMHG | DIASTOLIC BLOOD PRESSURE: 78 MMHG | HEART RATE: 60 BPM | OXYGEN SATURATION: 96 % | BODY MASS INDEX: 24.21 KG/M2

## 2022-08-02 DIAGNOSIS — G89.29 CHRONIC MIDLINE LOW BACK PAIN WITH BILATERAL SCIATICA: Primary | ICD-10-CM

## 2022-08-02 DIAGNOSIS — M54.41 CHRONIC MIDLINE LOW BACK PAIN WITH BILATERAL SCIATICA: Primary | ICD-10-CM

## 2022-08-02 DIAGNOSIS — M54.42 CHRONIC MIDLINE LOW BACK PAIN WITH BILATERAL SCIATICA: Primary | ICD-10-CM

## 2022-08-02 PROCEDURE — 99213 OFFICE O/P EST LOW 20 MIN: CPT | Performed by: FAMILY MEDICINE

## 2022-08-02 RX ORDER — ROSUVASTATIN CALCIUM 40 MG/1
40 TABLET, COATED ORAL
Qty: 90 TABLET | Refills: 1 | Status: SHIPPED | OUTPATIENT
Start: 2022-08-02 | End: 2023-01-16

## 2022-08-02 NOTE — PROGRESS NOTES
Mr. Ivey is a 80 year old Black male who presents to clinic after last visit  3/17/21 with complaint of general abdominal gas/bloat pain since his bowel surgery and hospitalization for cardiac surgery earlier this year.  Denies constipation or diarrhea.  No rectal bleeding or melena.  Remains on Eliquis daily after cardiovascular surgery.  Denies nausea, vomiting.  Has a good and preserved appetite but states he is not gaining the weight that he would like.  Eating regular diet.  Fairly balanced diet reportedly.  Maintains that gas bothers him most and he has begun taking Gas-X recently which has been mildly effective.  [facility] since our last colonoscopy with him but is unable to tell me the results.  We do not have any records to review today. Mr. Ivey had a colonoscopy back on September 23, 2015 with Dr. Ji where he large 35 mm polyp was found in the ascending colon.  It was mildly depressed in some areas and semi-sessile in others.  This was biopsied and tattooed.  He also had internal hemorrhoids on exam.  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  The patient was supposed to have resection of polyp through the [facility]. He has a history of colon polyps.  It was recommended he follow-up with surgery for complete resection of the lesion.  Had osteomyelitis in the last several months. Recent admission for aortic valve vegetation seen on [facility]ANTOS, right lower lobe pneumonia placed on vancomycin and Rocephin, completed back in February.  History of prostate cancer.  History of CAD with PCI in 2011- 2012, hypertension, hyperlipidemia, DVT on Eliquis.  Vertebral osteomyelitis and discitis.  COVID-19 - January 2, 2021 as well as back in December and July. Recent small-bowel obstruction. This was secondary to internal hernia secondary to adhesion with contused bowel. S/p diagnostic laparoscopy, laparoscopic lysis of adhesions, open small bowel resection and reduction of internal hernia by Dr. Cason. Normal Abd Xray 3/17/21, ordered by our office. Reports normal colonoscopy through [facility] in 2020. Reportedly abnormal LFTs. Blood work forwarded to our office which was completed on March 1, 2021 with findings of normal transaminases but mildly elevated alk phos of 128.  Albumin 3.6.  CBC with hemoglobin low of 9.4 hematocrit 29.4 and normal MCV.  Platelets 123k.  Normal sedimentation rate and CRP.  From a CT of the chest without IV contrast which I reviewed completed on January 2, 2021, he had findings of hypodense lesions in the right hepatic lobe and left hepatic lobe of the dome of the liver which was unchanged.  A CT of the abdomen and pelvis back in 2020 with findings of small amount of ascites in the setting of a bowel obstruction prior to his surgery.  Several small hepatic cysts with unremarkable gallbladder noted.  He is also having some new nonspecific right flank pain.  Denies nausea, vomiting, fever or chills.  Believes his gas/ bloat symptoms have improved.  Continues to take a probiotic daily since he is had his bowel surgery earlier in the year.  No recent imaging of the liver, specifically. Denies personal history of hepatitis, no previous testing for hepatitis. Subjective   Anthony Ayoub is a 66 y.o. male.     Anthony Ayoub is in for follow up on some chronic low back pain that causes radicular symptoms into both legs.  He has an EMG scheduled but the earliest we can get it done as October.  Symptoms in his feet are getting worse, to the extent that he is having more trouble walking than ever.  His legs do not feel weak, he just does not trust his gait.  He is interested in a referral to pain management to discuss options for treatment but he has not had an MRI yet to look at his spine.  He is willing to do some physical therapy as well.. There is no history of chest pain or dyspnea. There is no history of issue with bowel or bladder dysfunction. There is no history of dizziness or lightheadedness. There is no history of issue with sleep or mood. There is no history of issue with present medication.            /78 (BP Location: Left arm, Patient Position: Sitting, Cuff Size: Large Adult)   Pulse 60   Temp 100.2 °F (37.9 °C) (Temporal)   Wt 68 kg (150 lb)   SpO2 96%   BMI 24.21 kg/m²       Chief Complaint   Patient presents with   • Back Pain     Nerve pain and pain in feet - EMG testing scheduled on 10/07/22           Current Outpatient Medications:   •  aspirin 81 MG chewable tablet, Chew 81 mg Daily., Disp: , Rfl:   •  carvedilol (COREG) 25 MG tablet, Take 25 mg by mouth 2 (Two) Times a Day., Disp: , Rfl:   •  ciprofloxacin-dexamethasone (CIPRODEX) 0.3-0.1 % otic suspension, Administer 4 drops into both ears As Needed., Disp: , Rfl:   •  cloNIDine (CATAPRES) 0.1 MG tablet, 0.1 mg 2 (Two) Times a Day., Disp: , Rfl:   •  dapsone 100 MG tablet, Take 100 mg by mouth Daily., Disp: , Rfl:   •  escitalopram (LEXAPRO) 10 MG tablet, Take 10 mg by mouth Daily., Disp: , Rfl:   •  ezetimibe (ZETIA) 10 MG tablet, TAKE 1 TABLET BY MOUTH EVERY DAY, Disp: 90 tablet, Rfl: 1  •  fluticasone (FLONASE) 50 MCG/ACT nasal spray, 1 spray into the nostril(s) as directed by  provider 1 (One) Time. prn, Disp: , Rfl:   •  folic acid (FOLVITE) 1 MG tablet, Take 1 tablet by mouth Daily., Disp: 90 tablet, Rfl: 1  •  isosorbide mononitrate (IMDUR) 30 MG 24 hr tablet, Take 30 mg by mouth Daily., Disp: , Rfl:   •  lisinopril (PRINIVIL,ZESTRIL) 40 MG tablet, Take 40 mg by mouth Daily., Disp: , Rfl:   •  nitroglycerin (NITROSTAT) 0.6 MG SL tablet, , Disp: , Rfl:   •  pantoprazole (PROTONIX) 40 MG EC tablet, Take 40 mg by mouth Daily., Disp: , Rfl:   •  rosuvastatin (CRESTOR) 40 MG tablet, Take 1 tablet by mouth every night at bedtime., Disp: 90 tablet, Rfl: 1  •  terazosin (HYTRIN) 5 MG capsule, Take 5 mg by mouth Every Night. Takes at hs, Disp: , Rfl:   •  vitamin D (ERGOCALCIFEROL) 1.25 MG (97935 UT) capsule capsule, Take 1 capsule by mouth 1 (One) Time Per Week., Disp: 4 capsule, Rfl: 5  •  voriconazole (VFEND) 200 MG tablet, Take 200 mg by mouth 2 (Two) Times a Day., Disp: , Rfl:   •  albuterol sulfate  (90 Base) MCG/ACT inhaler, Inhale 2 puffs Every 4 (Four) Hours As Needed for Wheezing or Shortness of Air., Disp: 8 g, Rfl: 0  •  dexamethasone (DECADRON) 2 MG tablet, Take 1 tablet by mouth 3 (Three) Times a Day., Disp: 15 tablet, Rfl: 0  •  guaiFENesin-codeine (GUAIFENESIN AC) 100-10 MG/5ML liquid, Take 5 mL by mouth 4 (Four) Times a Day As Needed for Cough or Congestion (And chest wall discomfort)., Disp: 120 mL, Rfl: 0        The following portions of the patient's history were reviewed and updated as appropriate: allergies, current medications, past family history, past medical history, past social history, past surgical history, and problem list.    Review of Systems   Unable to perform ROS: Acuity of condition       Objective   Physical Exam  Vitals and nursing note reviewed.   Constitutional:       Appearance: Normal appearance.   Cardiovascular:      Rate and Rhythm: Normal rate and regular rhythm.      Heart sounds: Normal heart sounds.   Pulmonary:      Effort: Pulmonary  effort is normal.   Musculoskeletal:      Cervical back: Neck supple.      Right lower leg: No edema.      Left lower leg: No edema.      Comments: Not tender over the spine  Negative straight leg raise bilaterally  Peripheral neuropathic findings in both feet   Lymphadenopathy:      Cervical: No cervical adenopathy.   Neurological:      Mental Status: He is alert and oriented to person, place, and time. Mental status is at baseline.   Psychiatric:         Mood and Affect: Mood normal.           Assessment & Plan   Problems Addressed this Visit    None     Visit Diagnoses     Chronic midline low back pain with bilateral sciatica    -  Primary    Relevant Orders    MRI Lumbar Spine Without Contrast    Ambulatory Referral to Physical Therapy Evaluate and treat    Ambulatory Referral to Pain Management      Diagnoses       Codes Comments    Chronic midline low back pain with bilateral sciatica    -  Primary ICD-10-CM: M54.41, M54.42, G89.29  ICD-9-CM: 724.2, 724.3, 338.29         I will give him the referral to pain management to discuss options for treatment  I will order an MRI because I know they will want to see 1 so they can give him his full options  I will also refer to physical therapy for treatment of his lower back  I have asked him to see me again as indicated and I will follow-up on the upcoming EMG

## 2022-08-29 ENCOUNTER — TREATMENT (OUTPATIENT)
Dept: PHYSICAL THERAPY | Facility: CLINIC | Age: 67
End: 2022-08-29

## 2022-08-29 DIAGNOSIS — M54.41 CHRONIC MIDLINE LOW BACK PAIN WITH BILATERAL SCIATICA: Primary | ICD-10-CM

## 2022-08-29 DIAGNOSIS — G89.29 CHRONIC MIDLINE LOW BACK PAIN WITH BILATERAL SCIATICA: Primary | ICD-10-CM

## 2022-08-29 DIAGNOSIS — M54.42 CHRONIC MIDLINE LOW BACK PAIN WITH BILATERAL SCIATICA: Primary | ICD-10-CM

## 2022-08-29 PROCEDURE — 97161 PT EVAL LOW COMPLEX 20 MIN: CPT | Performed by: PHYSICAL THERAPIST

## 2022-08-29 PROCEDURE — 97110 THERAPEUTIC EXERCISES: CPT | Performed by: PHYSICAL THERAPIST

## 2022-08-29 PROCEDURE — 97140 MANUAL THERAPY 1/> REGIONS: CPT | Performed by: PHYSICAL THERAPIST

## 2022-08-29 NOTE — PROGRESS NOTES
Physical Therapy Initial Evaluation and Plan of Care    Patient: Anthony Ayoub   : 1955  Diagnosis/ICD-10 Code:  Chronic midline low back pain with bilateral sciatica [M54.41, M54.42, G89.29]  Referring practitioner: Yifan Elizabeth,*  Date of Initial Visit: 2022  Today's Date: 2022  Patient seen for 1 sessions           Visit Diagnoses:    ICD-10-CM ICD-9-CM   1. Chronic midline low back pain with bilateral sciatica  M54.41 724.2    M54.42 724.3    G89.29 338.29       Subjective Questionnaire: Oswestry: 26% (14% on 22 initial evaluation)      Subjective 65 yo male with c/o LBP. Pt previously seen here last winter, DC'd with a good outcome. Reports limited HEP participation. Pt now reports he is not sure that therapy helped. Long history of LBP and had an exacerbation in pain ~2 years ago. Referred for an MRI per pt. Primary pain now is along the bilateral and central belt line. Denies LE symptoms but was dx with bilateral LE neuropathy but does not know the cause. 3/10 pain now and 8/10 at worst. Symptoms worse in the AM, prolonged stooping. Symptoms improve with heat.  MD follow up: prn, pain management consult 22  Social hx: Retired      Objective          Active Range of Motion     Lumbar   Flexion: with pain  Extension: with pain  Left lateral flexion: with pain  Right lateral flexion: with pain    Additional Active Range of Motion Details  Limited flexion and bilateral sidebending    Strength/Myotome Testing     Lumbar   Left   Normal strength    Right   Normal strength    Tests     Lumbar     Left   Negative passive SLR.     Right   Negative passive SLR.     Additional Tests Details  Positive piriformis test   Hip flexor flexibility limited bilaterally. Limited hip IR PROM bilaterally.  Positive [place]'s sign  Guarded posture       Assessment & Plan     Assessment    Assessment details: Impairments: abnormal coordination, abnormal gait, abnormal muscle tone, abnormal or  restricted ROM, activity intolerance, impaired physical strength, lacks appropriate home exercise program and pain with function  Functional Limitations: carrying objects, lifting, sleeping, walking, pulling, pushing, moving in bed, sitting, standing, stooping and unable to perform repetitive tasks  Assessment details: 65 yo male with c/o LBP. Pt is with a good response to treatment this date and would benefit from further evaluation and treatment to address the above impairments.    Prognosis: good   Goals  Plan Goals: Short term goals, 1 week: Tolerate HEP progression.  Voice compliance with activity modification.  Report improvement in symptoms.    Long term goals, 6 weeks: Improve ESTELA score to 14%.  AROM to be wfl.  Rate worst pain at 4/10.  Independent with HEP.  History # of Personal Factors and/or Comorbidities: MODERATE (1-2)  Examination of Body System(s): # of elements: LOW (1-2)  Clinical Presentation: EVOLVING  Clinical Decision Making: LOW      Plan  Therapy options: will be seen for skilled therapy services  Other planned modality interventions: modalities prn  Planned therapy interventions: abdominal trunk stabilization, body mechanics training, flexibility, functional ROM exercises, home exercise program, manual therapy, neuromuscular re-education, postural training, strengthening, stretching and therapeutic activities  Frequency: 2x week  Duration in weeks: 6  Treatment plan discussed with: patient          Timed:         Manual Therapy:    15     mins  22362;     Therapeutic Exercise:    15     mins  34589;     Neuromuscular Roney:        mins  32483;    Therapeutic Activity:          mins  88364;     Gait Training:           mins  56698;     Ultrasound:          mins  66919;    Ionto                                   mins   99030  Self Care                            mins   62337    Un-Timed:  Electrical Stimulation:         mins  49079 (MC );  Traction          mins 62999  Low Eval     15      Mins  30399  Mod Eval          Mins  04925  High Eval                            Mins  21250  Re-Eval                               mins  30190        Timed Treatment:   30   mins   Total Treatment:     45   mins      PT SIGNATURE: Georges Adkins PT, DPT, OCS  [place] license: [id number]  DATE TREATMENT INITIATED: 8/29/2022    Certification Period: @TDA @thru 11/26/2022  I certify that the therapy services are furnished while this patient is under my care.  The services outlined above are required for this patient and will be reviewed every 90 days.     PHYSICIAN: _____________________________    Yifan Elizabeth MD      DATE:     Please sign and return via fax to [unfilled] . Thank you, [facility].

## 2022-08-30 ENCOUNTER — TELEPHONE (OUTPATIENT)
Dept: FAMILY MEDICINE CLINIC | Facility: CLINIC | Age: 67
End: 2022-08-30

## 2022-08-30 NOTE — TELEPHONE ENCOUNTER
[facility] department called saying the patient MRI Lumbar spine was denied with insurances. The reason is that patient hasn't had xray and has done 6 week on physical therapy and then come follow up with doctor show its still not better.     Please advise

## 2022-08-30 NOTE — TELEPHONE ENCOUNTER
Please let him know that insurance is not can let him have the MRI at this time  He should still try to see pain management and continue to do the physical therapy, or start the physical therapy if he has not done so yet  He will have to do physical therapy for several weeks before they will even consider an MRI

## 2022-08-31 NOTE — TELEPHONE ENCOUNTER
Called pt to let know and he said just started physical therapy. He understood everything I told him

## 2022-09-01 ENCOUNTER — TREATMENT (OUTPATIENT)
Dept: PHYSICAL THERAPY | Facility: CLINIC | Age: 67
End: 2022-09-01

## 2022-09-01 ENCOUNTER — APPOINTMENT (OUTPATIENT)
Dept: MRI IMAGING | Facility: HOSPITAL | Age: 67
End: 2022-09-01

## 2022-09-01 DIAGNOSIS — G89.29 CHRONIC MIDLINE LOW BACK PAIN WITH BILATERAL SCIATICA: Primary | ICD-10-CM

## 2022-09-01 DIAGNOSIS — M54.41 CHRONIC MIDLINE LOW BACK PAIN WITH BILATERAL SCIATICA: Primary | ICD-10-CM

## 2022-09-01 DIAGNOSIS — M54.42 CHRONIC MIDLINE LOW BACK PAIN WITH BILATERAL SCIATICA: Primary | ICD-10-CM

## 2022-09-01 PROCEDURE — 97110 THERAPEUTIC EXERCISES: CPT | Performed by: PHYSICAL THERAPIST

## 2022-09-01 PROCEDURE — 97140 MANUAL THERAPY 1/> REGIONS: CPT | Performed by: PHYSICAL THERAPIST

## 2022-09-01 NOTE — PROGRESS NOTES
Physical Therapy Daily Progress Note      Patient: Anthony Ayoub   : 1955  Diagnosis/ICD-10 Code:  Chronic midline low back pain with bilateral sciatica [M54.41, M54.42, G89.29]  Referring practitioner: No ref. provider found  Date of Initial Visit: Type: THERAPY  Noted: 2022  Today's Date: 2022  Patient seen for 2 sessions             Subjective Pt was mowing his grass yesterday and after hitting multiple dips/holes in his yard his back became more sore.  It was sore this morning when he woke up but has slowly felt a little better since he has been up and moving around.    Objective   See Exercise, Manual, and Modality Logs for complete treatment.       Assessment/Plan  Pt responded well to progression and addition of strengthening and stretching exercises, not having any increased c/o pain.    Progress per Plan of Care           Timed:         Manual Therapy:    8     mins  05584;     Therapeutic Exercise:    35     mins  57653;         Timed Treatment:   43   mins   Total Treatment:     43   mins        Yifan Ponce PTA  Physical Therapist Assistant

## 2022-09-06 ENCOUNTER — HOSPITAL ENCOUNTER (OUTPATIENT)
Dept: GENERAL RADIOLOGY | Facility: HOSPITAL | Age: 67
Discharge: HOME | End: 2022-09-06

## 2022-09-06 ENCOUNTER — OFFICE VISIT (OUTPATIENT)
Dept: PAIN MEDICINE | Facility: CLINIC | Age: 67
End: 2022-09-06

## 2022-09-06 VITALS
SYSTOLIC BLOOD PRESSURE: 121 MMHG | OXYGEN SATURATION: 94 % | RESPIRATION RATE: 16 BRPM | DIASTOLIC BLOOD PRESSURE: 62 MMHG | HEART RATE: 62 BPM

## 2022-09-06 DIAGNOSIS — M54.16 LUMBAR RADICULITIS: ICD-10-CM

## 2022-09-06 DIAGNOSIS — G89.4 CHRONIC PAIN SYNDROME: Primary | ICD-10-CM

## 2022-09-06 DIAGNOSIS — M79.2 NEUROPATHIC PAIN: ICD-10-CM

## 2022-09-06 DIAGNOSIS — M48.062 LUMBAR STENOSIS WITH NEUROGENIC CLAUDICATION: ICD-10-CM

## 2022-09-06 DIAGNOSIS — M47.817 LUMBOSACRAL SPONDYLOSIS WITHOUT MYELOPATHY: ICD-10-CM

## 2022-09-06 PROCEDURE — 72100 X-RAY EXAM L-S SPINE 2/3 VWS: CPT

## 2022-09-06 PROCEDURE — 99204 OFFICE O/P NEW MOD 45 MIN: CPT | Performed by: ANESTHESIOLOGY

## 2022-09-06 RX ORDER — CLONIDINE HYDROCHLORIDE 0.2 MG/1
1 TABLET ORAL
COMMUNITY
Start: 2022-04-26 | End: 2023-01-18 | Stop reason: SDUPTHER

## 2022-09-06 NOTE — PROGRESS NOTES
Subjective   CC back pain  Anthony Ayoub is a 66 y.o. male with multiple comorbidity including CKD stage III, history of leukemia post bone marrow transplant 2016, chronic polyarthralgia, chronic back pain here for initial evaluation.  Referred by PCP.  Complains of several months of worsening back pain mostly axial but radiating to both hips and both lower extremity with cramping weakness feeling in the legs constant but worse with standing walking or prolonged activity.  Denies saddle anesthesia, bladder incontinence.  Also complains of bilateral feet neuropathic pain.  EMG/NCS pending.  Participating in physical therapy with marginal relief.  He had tried Tylenol, topical anti-inflammatories without relief.  Back pain is impairing ADL, interfering with sleep.      Pain Assessment   Location of Pain: Lower Back, R Hip, L Hip,   Description of Pain: Dull/Aching, Throbbing, Stabbing  Previous Pain Rating :4  Current Pain Ratin  Aggravating Factors: Activity  Alleviating Factors: Rest, Medication  Pain onset over 12 weeks  Interferes with ADL's.   [place] back pain disability scale on chart    PEG Assessment   What number best describes your pain on average in the past week?4  What number best describes how, during the past week, pain has interfered with your enjoyment of life?4  What number best describes how, during the past week, pain has interfered with your general activity?  8    The following portions of the patient's history were reviewed and updated as appropriate: allergies, current medications, past family history, past medical history, past social history, past surgical history and problem list.     has a past medical history of CHF (congestive heart failure) (HCC), Coronary artery disease, GERD (gastroesophageal reflux disease), Hyperlipidemia, Hypertension, Leukemia (HCC), Low back pain, and Sleep apnea.   has a past surgical history that includes Bone marrow transplant; Cardiac catheterization;  Vasectomy; Sinus surgery; Tonsillectomy; and Cholecystectomy (N/A, 2022).  family history includes Heart attack in his father; Heart disease in his brother and father; Hyperlipidemia in his mother; Hypertension in his brother, mother, and sister.  Social History     Tobacco Use   • Smoking status: Former Smoker     Packs/day: 1.00     Years: 30.00     Pack years: 30.00     Quit date:      Years since quittin.6   • Smokeless tobacco: Never Used   Substance Use Topics   • Alcohol use: No       Review of Systems   Musculoskeletal: Positive for back pain.   All other systems reviewed and are negative.      Objective   Physical Exam  Vitals reviewed.   Constitutional:       General: He is not in acute distress.  Musculoskeletal:      Lumbar back: Tenderness present. Decreased range of motion. Positive right straight leg raise test and positive left straight leg raise test.      Comments: Lumbar loading positive, pain on extension of low back past 5 degrees.  TTP on the lumbar facets noted.         /62   Pulse 62   Resp 16   SpO2 94%     PHQ 9 on chart  Opioid risk tool low risk    Assessment & Plan   Diagnoses and all orders for this visit:    1. Chronic pain syndrome (Primary)    2. Lumbosacral spondylosis without myelopathy  -     XR Spine Lumbar 2 or 3 View; Future    3. Lumbar radiculitis  -     XR Spine Lumbar 2 or 3 View; Future  -     Epidural Block    4. Lumbar stenosis with neurogenic claudication    5. Neuropathic pain      Summary  Anthony Ayoub is a 66 y.o. male with multiple comorbidity including CKD stage III, history of leukemia post bone marrow transplant 2016, chronic polyarthralgia, chronic back pain here for initial evaluation.  Referred by PCP.  Complains of several months of worsening back pain mostly axial but radiating to both hips and both lower extremity with cramping weakness feeling in the legs constant but worse with standing walking or prolonged activity.  Denies  saddle anesthesia, bladder incontinence.  Also complains of bilateral feet neuropathic pain.  EMG/NCS pending.  Participating in physical therapy with marginal relief.  He had tried Tylenol, topical anti-inflammatories without relief.  Back pain is impairing ADL, interfering with sleep.      Symptoms are consistent with a combination of axial back pain from DDD spondylosis with occasional radicular pain and neurogenic location symptoms.  Tried conservative management with physical therapy, Tylenol, rest without relief.  L-spine x-ray ordered for further evaluation.  L-spine MRI declined by insurance.    We will schedule for LESI.  Risks and benefits discussed.  Consider facet blocks/RFA.    RTC for procedure

## 2022-09-07 ENCOUNTER — TREATMENT (OUTPATIENT)
Dept: PHYSICAL THERAPY | Facility: CLINIC | Age: 67
End: 2022-09-07

## 2022-09-07 DIAGNOSIS — M54.42 CHRONIC MIDLINE LOW BACK PAIN WITH BILATERAL SCIATICA: Primary | ICD-10-CM

## 2022-09-07 DIAGNOSIS — M54.41 CHRONIC MIDLINE LOW BACK PAIN WITH BILATERAL SCIATICA: Primary | ICD-10-CM

## 2022-09-07 DIAGNOSIS — G89.29 CHRONIC MIDLINE LOW BACK PAIN WITH BILATERAL SCIATICA: Primary | ICD-10-CM

## 2022-09-07 PROCEDURE — 97112 NEUROMUSCULAR REEDUCATION: CPT | Performed by: PHYSICAL THERAPIST

## 2022-09-07 PROCEDURE — 97110 THERAPEUTIC EXERCISES: CPT | Performed by: PHYSICAL THERAPIST

## 2022-09-07 PROCEDURE — 97140 MANUAL THERAPY 1/> REGIONS: CPT | Performed by: PHYSICAL THERAPIST

## 2022-09-07 NOTE — PROGRESS NOTES
Physical Therapy Daily Treatment Note  Patient: Anthony Ayoub   : 1955   Referring practitioner: Yifan Elizabeth,*  Date of initial visit: Type: THERAPY  Noted: 2022   Today's date: 2022   Patient seen for 3 visits    Visit Diagnoses:    ICD-10-CM ICD-9-CM   1. Chronic midline low back pain with bilateral sciatica  M54.41 724.2    M54.42 724.3    G89.29 338.29       Subjective   Pt reports: No new complaints. HEP going well.      Objective     See Exercise, Manual, and Modality Logs for complete treatment.     Patient Education: HEP    Assessment/Plan Progressing well.      Progress per Plan of Care            Timed:         Manual Therapy:    15     mins  91713;     Therapeutic Exercise:    15     mins  41991;     Neuromuscular Roney:    15    mins  46167;    Therapeutic Activity:          mins  85016;     Gait Training:           mins  86650;     Ultrasound:          mins  97351;    Ionto                                   mins   83725  Self Care                            mins   38714    Un-Timed:  Electrical Stimulation:         mins  30436 ( );  Traction          mins 25739  Low Eval          Mins  24626  Mod Eval          Mins  09763  High Eval                            Mins  99107  Re-Eval                               mins  62827    Timed Treatment:   45   mins   Total Treatment:     45   mins      Georges Adkins, PT, DPT, OCS  [place] license: [id number]  Physical Therapist

## 2022-09-09 ENCOUNTER — TREATMENT (OUTPATIENT)
Dept: PHYSICAL THERAPY | Facility: CLINIC | Age: 67
End: 2022-09-09

## 2022-09-09 ENCOUNTER — PATIENT ROUNDING (BHMG ONLY) (OUTPATIENT)
Dept: PAIN MEDICINE | Facility: CLINIC | Age: 67
End: 2022-09-09

## 2022-09-09 DIAGNOSIS — M54.41 CHRONIC MIDLINE LOW BACK PAIN WITH BILATERAL SCIATICA: Primary | ICD-10-CM

## 2022-09-09 DIAGNOSIS — G89.29 CHRONIC MIDLINE LOW BACK PAIN WITH BILATERAL SCIATICA: Primary | ICD-10-CM

## 2022-09-09 DIAGNOSIS — M54.42 CHRONIC MIDLINE LOW BACK PAIN WITH BILATERAL SCIATICA: Primary | ICD-10-CM

## 2022-09-09 PROCEDURE — 97110 THERAPEUTIC EXERCISES: CPT | Performed by: PHYSICAL THERAPIST

## 2022-09-09 PROCEDURE — 97140 MANUAL THERAPY 1/> REGIONS: CPT | Performed by: PHYSICAL THERAPIST

## 2022-09-09 NOTE — PROGRESS NOTES
Physical Therapy Daily Progress Note      Patient: Anthony Ayoub   : 1955  Diagnosis/ICD-10 Code:  Chronic midline low back pain with bilateral sciatica [M54.41, M54.42, G89.29]  Referring practitioner: Yifan Elizabeth,*  Date of Initial Visit: Type: THERAPY  Noted: 2022  Today's Date: 2022  Patient seen for 4 sessions             Subjective Pt continues to report back pain but does feel that he is getting better.    Objective   See Exercise, Manual, and Modality Logs for complete treatment.       Assessment/Plan  Good tolerance with exercises and manual techniques.  Progressing well.  Pt was late for appointment    Progress per Plan of Care           Timed:         Manual Therapy:    15     mins  66864;     Therapeutic Exercise:    10     mins  20049;     Neuromuscular Roney:    5    mins  36171;        Timed Treatment:   30   mins   Total Treatment:     30   mins        Yifan Ponce PTA  Physical Therapist Assistant

## 2022-09-09 NOTE — PROGRESS NOTES
September 9, 2022    Hello, may I speak with Anthony Ayoub?    My name is Nemo      I am  with [facility]  [address]  [place] [address].    Before we get started may I verify your date of birth? 1955    I am calling to officially welcome you to our practice and ask about your recent visit. Is this a good time to talk? yes    Tell me about your visit with us. What things went well?  Everything went well was very impressed with Dr. Plasencia has no complaints all good things about his visit.        We're always looking for ways to make our patients' experiences even better. Do you have recommendations on ways we may improve?  no    Overall were you satisfied with your first visit to our practice? yes       I appreciate you taking the time to speak with me today. Is there anything else I can do for you? no      Thank you, and have a great day.

## 2022-09-13 ENCOUNTER — TREATMENT (OUTPATIENT)
Dept: PHYSICAL THERAPY | Facility: CLINIC | Age: 67
End: 2022-09-13

## 2022-09-13 DIAGNOSIS — M54.41 CHRONIC MIDLINE LOW BACK PAIN WITH BILATERAL SCIATICA: Primary | ICD-10-CM

## 2022-09-13 DIAGNOSIS — M54.42 CHRONIC MIDLINE LOW BACK PAIN WITH BILATERAL SCIATICA: Primary | ICD-10-CM

## 2022-09-13 DIAGNOSIS — G89.29 CHRONIC MIDLINE LOW BACK PAIN WITH BILATERAL SCIATICA: Primary | ICD-10-CM

## 2022-09-13 PROCEDURE — 97112 NEUROMUSCULAR REEDUCATION: CPT | Performed by: PHYSICAL THERAPIST

## 2022-09-13 PROCEDURE — 97110 THERAPEUTIC EXERCISES: CPT | Performed by: PHYSICAL THERAPIST

## 2022-09-13 NOTE — PROGRESS NOTES
Physical Therapy Daily Treatment Note  Patient: Anthony Ayoub   : 1955   Referring practitioner: Yifan Elizabeth,*  Date of initial visit: Type: THERAPY  Noted: 2022   Today's date: 2022   Patient seen for 5 visits    Visit Diagnoses:    ICD-10-CM ICD-9-CM   1. Chronic midline low back pain with bilateral sciatica  M54.41 724.2    M54.42 724.3    G89.29 338.29       Subjective   Pt reports: Pain symptoms continuing to improve per pt. HEP going well.      Objective     See Exercise, Manual, and Modality Logs for complete treatment.     Patient Education: HEP    Assessment/Plan Progressing well, tolerating treatment.      Progress per Plan of Care            Timed:         Manual Therapy:         mins  32186;     Therapeutic Exercise:    15     mins  94739;     Neuromuscular Roney:    30    mins  76362;    Therapeutic Activity:          mins  22329;     Gait Training:           mins  13274;     Ultrasound:          mins  80803;    Ionto                                   mins   49942  Self Care                            mins   17707    Un-Timed:  Electrical Stimulation:         mins  92870 ( );  Traction          mins 08287  Low Eval          Mins  54016  Mod Eval          Mins  86339  High Eval                            Mins  69930  Re-Eval                               mins  14551    Timed Treatment:   45   mins   Total Treatment:     45   mins      Georges Adkins, PT, DPT, OCS  [place] license: [id number]  Physical Therapist

## 2022-09-15 ENCOUNTER — TREATMENT (OUTPATIENT)
Dept: PHYSICAL THERAPY | Facility: CLINIC | Age: 67
End: 2022-09-15

## 2022-09-15 ENCOUNTER — LAB (OUTPATIENT)
Dept: LAB | Facility: HOSPITAL | Age: 67
End: 2022-09-15

## 2022-09-15 ENCOUNTER — TRANSCRIBE ORDERS (OUTPATIENT)
Dept: ADMINISTRATIVE | Facility: HOSPITAL | Age: 67
End: 2022-09-15

## 2022-09-15 DIAGNOSIS — N13.8 ENLARGED PROSTATE WITH URINARY OBSTRUCTION: ICD-10-CM

## 2022-09-15 DIAGNOSIS — Z85.6 PERSONAL HISTORY OF UNSPECIFIED LEUKEMIA: ICD-10-CM

## 2022-09-15 DIAGNOSIS — N40.1 ENLARGED PROSTATE WITH URINARY OBSTRUCTION: ICD-10-CM

## 2022-09-15 DIAGNOSIS — I10 ESSENTIAL HYPERTENSION, MALIGNANT: ICD-10-CM

## 2022-09-15 DIAGNOSIS — R80.9 PROTEINURIA, UNSPECIFIED TYPE: ICD-10-CM

## 2022-09-15 DIAGNOSIS — M54.42 CHRONIC MIDLINE LOW BACK PAIN WITH BILATERAL SCIATICA: Primary | ICD-10-CM

## 2022-09-15 DIAGNOSIS — N18.31 CHRONIC KIDNEY DISEASE (CKD) STAGE G3A/A1, MODERATELY DECREASED GLOMERULAR FILTRATION RATE (GFR) BETWEEN 45-59 ML/MIN/1.73 SQUARE METER AND ALBUMINURIA CREATININE RATIO LESS THAN 30 MG/G (CMS/H*: Primary | ICD-10-CM

## 2022-09-15 DIAGNOSIS — G89.29 CHRONIC MIDLINE LOW BACK PAIN WITH BILATERAL SCIATICA: Primary | ICD-10-CM

## 2022-09-15 DIAGNOSIS — M54.41 CHRONIC MIDLINE LOW BACK PAIN WITH BILATERAL SCIATICA: Primary | ICD-10-CM

## 2022-09-15 DIAGNOSIS — N18.31 CHRONIC KIDNEY DISEASE (CKD) STAGE G3A/A1, MODERATELY DECREASED GLOMERULAR FILTRATION RATE (GFR) BETWEEN 45-59 ML/MIN/1.73 SQUARE METER AND ALBUMINURIA CREATININE RATIO LESS THAN 30 MG/G (CMS/H*: ICD-10-CM

## 2022-09-15 LAB
ALBUMIN SERPL-MCNC: 4.2 G/DL (ref 3.5–5.2)
ANION GAP SERPL CALCULATED.3IONS-SCNC: 8.7 MMOL/L (ref 5–15)
BACTERIA UR QL AUTO: NORMAL /HPF
BILIRUB UR QL STRIP: NEGATIVE
BUN SERPL-MCNC: 25 MG/DL (ref 8–23)
BUN/CREAT SERPL: 17.1 (ref 7–25)
CALCIUM SPEC-SCNC: 9 MG/DL (ref 8.6–10.5)
CHLORIDE SERPL-SCNC: 101 MMOL/L (ref 98–107)
CLARITY UR: CLEAR
CO2 SERPL-SCNC: 26.3 MMOL/L (ref 22–29)
COLOR UR: YELLOW
CREAT SERPL-MCNC: 1.46 MG/DL (ref 0.76–1.27)
CREAT UR-MCNC: 121.2 MG/DL
DEPRECATED RDW RBC AUTO: 46.5 FL (ref 37–54)
EGFRCR SERPLBLD CKD-EPI 2021: 53.4 ML/MIN/1.73
ERYTHROCYTE [DISTWIDTH] IN BLOOD BY AUTOMATED COUNT: 13.8 % (ref 12.3–15.4)
GLUCOSE SERPL-MCNC: 126 MG/DL (ref 65–99)
GLUCOSE UR STRIP-MCNC: NEGATIVE MG/DL
HCT VFR BLD AUTO: 28 % (ref 37.5–51)
HGB BLD-MCNC: 9.3 G/DL (ref 13–17.7)
HGB UR QL STRIP.AUTO: NEGATIVE
HYALINE CASTS UR QL AUTO: NORMAL /LPF
KETONES UR QL STRIP: ABNORMAL
LEUKOCYTE ESTERASE UR QL STRIP.AUTO: NEGATIVE
MCH RBC QN AUTO: 30.6 PG (ref 26.6–33)
MCHC RBC AUTO-ENTMCNC: 33.2 G/DL (ref 31.5–35.7)
MCV RBC AUTO: 92.1 FL (ref 79–97)
NITRITE UR QL STRIP: NEGATIVE
PH UR STRIP.AUTO: 5.5 [PH] (ref 5–8)
PHOSPHATE SERPL-MCNC: 4 MG/DL (ref 2.5–4.5)
PLATELET # BLD AUTO: 214 10*3/MM3 (ref 140–450)
PMV BLD AUTO: 10.6 FL (ref 6–12)
POTASSIUM SERPL-SCNC: 4.7 MMOL/L (ref 3.5–5.2)
PROT ?TM UR-MCNC: 156.9 MG/DL
PROT UR QL STRIP: ABNORMAL
PROT/CREAT UR: 1294.6 MG/G CREA (ref 0–200)
RBC # BLD AUTO: 3.04 10*6/MM3 (ref 4.14–5.8)
RBC # UR STRIP: NORMAL /HPF
REF LAB TEST METHOD: NORMAL
SODIUM SERPL-SCNC: 136 MMOL/L (ref 136–145)
SP GR UR STRIP: 1.02 (ref 1–1.03)
SQUAMOUS #/AREA URNS HPF: NORMAL /HPF
UROBILINOGEN UR QL STRIP: ABNORMAL
WBC # UR STRIP: NORMAL /HPF
WBC NRBC COR # BLD: 5.68 10*3/MM3 (ref 3.4–10.8)

## 2022-09-15 PROCEDURE — 80069 RENAL FUNCTION PANEL: CPT

## 2022-09-15 PROCEDURE — 84156 ASSAY OF PROTEIN URINE: CPT

## 2022-09-15 PROCEDURE — 85027 COMPLETE CBC AUTOMATED: CPT

## 2022-09-15 PROCEDURE — 82570 ASSAY OF URINE CREATININE: CPT

## 2022-09-15 PROCEDURE — 81001 URINALYSIS AUTO W/SCOPE: CPT

## 2022-09-15 PROCEDURE — 36415 COLL VENOUS BLD VENIPUNCTURE: CPT

## 2022-09-15 PROCEDURE — 97110 THERAPEUTIC EXERCISES: CPT | Performed by: PHYSICAL THERAPIST

## 2022-09-15 PROCEDURE — 97112 NEUROMUSCULAR REEDUCATION: CPT | Performed by: PHYSICAL THERAPIST

## 2022-09-15 NOTE — PROGRESS NOTES
Physical Therapy Daily Progress Note      Patient: Anthony Ayoub   : 1955  Diagnosis/ICD-10 Code:  Chronic midline low back pain with bilateral sciatica [M54.41, M54.42, G89.29]  Referring practitioner: Yifan Elizabeth,*  Date of Initial Visit: Type: THERAPY  Noted: 2022  Today's Date: 9/15/2022  Patient seen for 6 sessions             Subjective Pt states he is feeling a little better today.    Objective   See Exercise, Manual, and Modality Logs for complete treatment.       Assessment/Plan Overall, pt responded well with progression and addition of exercises today.  Soreness in back was reported during addition of bridges on SB, but c/o was short lived.    Progress per Plan of Care           Timed:           Therapeutic Exercise:    15     mins  55533;     Neuromuscular Roney:    23    mins  61741;        Timed Treatment:   38   mins   Total Treatment:     38   mins        Yifan Ponce PTA  Physical Therapist Assistant

## 2022-09-22 ENCOUNTER — TREATMENT (OUTPATIENT)
Dept: PHYSICAL THERAPY | Facility: CLINIC | Age: 67
End: 2022-09-22

## 2022-09-22 DIAGNOSIS — M54.42 CHRONIC MIDLINE LOW BACK PAIN WITH BILATERAL SCIATICA: Primary | ICD-10-CM

## 2022-09-22 DIAGNOSIS — M54.41 CHRONIC MIDLINE LOW BACK PAIN WITH BILATERAL SCIATICA: Primary | ICD-10-CM

## 2022-09-22 DIAGNOSIS — G89.29 CHRONIC MIDLINE LOW BACK PAIN WITH BILATERAL SCIATICA: Primary | ICD-10-CM

## 2022-09-22 PROCEDURE — 97140 MANUAL THERAPY 1/> REGIONS: CPT | Performed by: PHYSICAL THERAPIST

## 2022-09-22 PROCEDURE — 97112 NEUROMUSCULAR REEDUCATION: CPT | Performed by: PHYSICAL THERAPIST

## 2022-09-22 PROCEDURE — 97110 THERAPEUTIC EXERCISES: CPT | Performed by: PHYSICAL THERAPIST

## 2022-09-22 NOTE — PROGRESS NOTES
Physical Therapy Daily Treatment Note  Patient: Anthony Ayoub   : 1955   Referring practitioner: Yifan Elizabeth,*  Date of initial visit: Type: THERAPY  Noted: 2022   Today's date: 2022   Patient seen for 7 visits    Visit Diagnoses:    ICD-10-CM ICD-9-CM   1. Chronic midline low back pain with bilateral sciatica  M54.41 724.2    M54.42 724.3    G89.29 338.29       Subjective   Pt reports: Pain symptoms continuing to improve. HEP going well.      Objective     See Exercise, Manual, and Modality Logs for complete treatment.     Patient Education: HEP    Assessment/Plan Progressing well.      Progress per Plan of Care            Timed:         Manual Therapy:    15     mins  93763;     Therapeutic Exercise:    15     mins  28645;     Neuromuscular Roney:    15    mins  88311;    Therapeutic Activity:          mins  55133;     Gait Training:           mins  16972;     Ultrasound:          mins  70448;    Ionto                                   mins   40172  Self Care                            mins   42262    Un-Timed:  Electrical Stimulation:         mins  09395 ( );  Traction          mins 67340  Low Eval          Mins  97730  Mod Eval          Mins  29424  High Eval                            Mins  16137  Re-Eval                               mins  58250    Timed Treatment:   45   mins   Total Treatment:     45   mins      Georges Adkins, PT, DPT, OCS  [place] license: [id number]  Physical Therapist

## 2022-09-27 ENCOUNTER — TREATMENT (OUTPATIENT)
Dept: PHYSICAL THERAPY | Facility: CLINIC | Age: 67
End: 2022-09-27

## 2022-09-27 DIAGNOSIS — G89.29 CHRONIC MIDLINE LOW BACK PAIN WITH BILATERAL SCIATICA: Primary | ICD-10-CM

## 2022-09-27 DIAGNOSIS — M54.41 CHRONIC MIDLINE LOW BACK PAIN WITH BILATERAL SCIATICA: Primary | ICD-10-CM

## 2022-09-27 DIAGNOSIS — M54.42 CHRONIC MIDLINE LOW BACK PAIN WITH BILATERAL SCIATICA: Primary | ICD-10-CM

## 2022-09-27 PROCEDURE — 97112 NEUROMUSCULAR REEDUCATION: CPT | Performed by: PHYSICAL THERAPIST

## 2022-09-27 PROCEDURE — 97110 THERAPEUTIC EXERCISES: CPT | Performed by: PHYSICAL THERAPIST

## 2022-09-27 NOTE — PROGRESS NOTES
Physical Therapy Daily Progress Note      Patient: Anthony Ayoub   : 1955  Diagnosis/ICD-10 Code:  Chronic midline low back pain with bilateral sciatica [M54.41, M54.42, G89.29]  Referring practitioner: Yifan Elizabeth,*  Date of Initial Visit: Type: THERAPY  Noted: 2022  Today's Date: 2022  Patient seen for 8 sessions             Subjective Pt reports overall improvement in his back pain.  However, he still notices pain in his right, center and left LB.     Objective   See Exercise, Manual, and Modality Logs for complete treatment.       Assessment/Plan  Pt was late to appointment so exercise reps were altered.  Overall, pt responded well to therapy. He did have some c/o soreness in his back with hip rotation ROM manual techniques.    Progress per Plan of Care           Timed:         Manual Therapy:   8     mins  66106;     Therapeutic Exercise:    12     mins  51087;     Neuromuscular Roney:    10    mins  08126;        Timed Treatment:   30   mins   Total Treatment:     30   mins        Yifan Ponce PTA  Physical Therapist Assistant

## 2022-09-29 ENCOUNTER — TREATMENT (OUTPATIENT)
Dept: PHYSICAL THERAPY | Facility: CLINIC | Age: 67
End: 2022-09-29

## 2022-09-29 DIAGNOSIS — M54.41 CHRONIC MIDLINE LOW BACK PAIN WITH BILATERAL SCIATICA: Primary | ICD-10-CM

## 2022-09-29 DIAGNOSIS — G89.29 CHRONIC MIDLINE LOW BACK PAIN WITH BILATERAL SCIATICA: Primary | ICD-10-CM

## 2022-09-29 DIAGNOSIS — M54.42 CHRONIC MIDLINE LOW BACK PAIN WITH BILATERAL SCIATICA: Primary | ICD-10-CM

## 2022-09-29 PROCEDURE — 97110 THERAPEUTIC EXERCISES: CPT | Performed by: PHYSICAL THERAPIST

## 2022-09-29 PROCEDURE — 97112 NEUROMUSCULAR REEDUCATION: CPT | Performed by: PHYSICAL THERAPIST

## 2022-09-29 PROCEDURE — 97140 MANUAL THERAPY 1/> REGIONS: CPT | Performed by: PHYSICAL THERAPIST

## 2022-09-29 NOTE — PROGRESS NOTES
Physical Therapy Daily Treatment Note  Patient: Anthony Ayoub   : 1955   Referring practitioner: Yifan Elizabeth,*  Date of initial visit: Type: THERAPY  Noted: 2022   Today's date: 2022   Patient seen for 9 visits    Visit Diagnoses:    ICD-10-CM ICD-9-CM   1. Chronic midline low back pain with bilateral sciatica  M54.41 724.2    M54.42 724.3    G89.29 338.29       Subjective   Pt reports: Primarily limited in the AM due to stiffness. HEP going well.      Objective     See Exercise, Manual, and Modality Logs for complete treatment.     Patient Education: HEP    Assessment/Plan Fatigued post visit. Progressing well.      Progress per Plan of Care            Timed:         Manual Therapy:    15     mins  08855;     Therapeutic Exercise:    15     mins  27742;     Neuromuscular Roney:    15    mins  56849;    Therapeutic Activity:          mins  77942;     Gait Training:           mins  87284;     Ultrasound:          mins  51521;    Ionto                                   mins   12028  Self Care                            mins   01489    Un-Timed:  Electrical Stimulation:         mins  18123 ( );  Traction          mins 90470  Low Eval          Mins  96624  Mod Eval          Mins  17595  High Eval                            Mins  22835  Re-Eval                               mins  17738    Timed Treatment:   45   mins   Total Treatment:     45   mins      Georges Adkins, PT, DPT, OCS  [place] license: [id number]  Physical Therapist

## 2022-10-06 ENCOUNTER — TREATMENT (OUTPATIENT)
Dept: PHYSICAL THERAPY | Facility: CLINIC | Age: 67
End: 2022-10-06

## 2022-10-06 DIAGNOSIS — M54.42 CHRONIC MIDLINE LOW BACK PAIN WITH BILATERAL SCIATICA: Primary | ICD-10-CM

## 2022-10-06 DIAGNOSIS — M54.41 CHRONIC MIDLINE LOW BACK PAIN WITH BILATERAL SCIATICA: Primary | ICD-10-CM

## 2022-10-06 DIAGNOSIS — G89.29 CHRONIC MIDLINE LOW BACK PAIN WITH BILATERAL SCIATICA: Primary | ICD-10-CM

## 2022-10-06 PROCEDURE — 97112 NEUROMUSCULAR REEDUCATION: CPT | Performed by: PHYSICAL THERAPIST

## 2022-10-06 PROCEDURE — 97140 MANUAL THERAPY 1/> REGIONS: CPT | Performed by: PHYSICAL THERAPIST

## 2022-10-06 PROCEDURE — 97110 THERAPEUTIC EXERCISES: CPT | Performed by: PHYSICAL THERAPIST

## 2022-10-06 NOTE — PROGRESS NOTES
Physical Therapy Daily Progress Note      Patient: Anthony Ayoub   : 1955  Diagnosis/ICD-10 Code:  Chronic midline low back pain with bilateral sciatica [M54.41, M54.42, G89.29]  Referring practitioner: Yifan Elizabeth,*  Date of Initial Visit: Type: THERAPY  Noted: 2022  Today's Date: 10/6/2022  Patient seen for 10 sessions             Subjective Pt states he is feeling pretty good.  He has his morning aches in his back.  He feels more flexible since doing therapy.    Objective   See Exercise, Manual, and Modality Logs for complete treatment.       Assessment/Plan  Tolerating exercises and progressing well.    Progress per Plan of Care           Timed:         Manual Therapy:   8   mins  35640;     Therapeutic Exercise:   15     mins  63665;     Neuromuscular Roney:    15    mins  44400;          Timed Treatment:   38   mins   Total Treatment:     38   mins        Yifan Ponce PTA  Physical Therapist Assistant

## 2022-10-07 ENCOUNTER — HOSPITAL ENCOUNTER (OUTPATIENT)
Dept: NEUROLOGY | Facility: HOSPITAL | Age: 67
Discharge: HOME | End: 2022-10-07
Admitting: FAMILY MEDICINE

## 2022-10-07 ENCOUNTER — PATIENT MESSAGE (OUTPATIENT)
Dept: FAMILY MEDICINE CLINIC | Facility: CLINIC | Age: 67
End: 2022-10-07

## 2022-10-07 DIAGNOSIS — R20.2 TINGLING OF BOTH FEET: ICD-10-CM

## 2022-10-07 DIAGNOSIS — G89.29 CHRONIC MIDLINE LOW BACK PAIN WITH BILATERAL SCIATICA: Primary | ICD-10-CM

## 2022-10-07 DIAGNOSIS — M54.41 CHRONIC MIDLINE LOW BACK PAIN WITH BILATERAL SCIATICA: Primary | ICD-10-CM

## 2022-10-07 DIAGNOSIS — M54.42 CHRONIC MIDLINE LOW BACK PAIN WITH BILATERAL SCIATICA: Primary | ICD-10-CM

## 2022-10-07 PROCEDURE — 95908 NRV CNDJ TST 3-4 STUDIES: CPT | Performed by: PSYCHIATRY & NEUROLOGY

## 2022-10-07 PROCEDURE — 95886 MUSC TEST DONE W/N TEST COMP: CPT

## 2022-10-07 PROCEDURE — 95886 MUSC TEST DONE W/N TEST COMP: CPT | Performed by: PSYCHIATRY & NEUROLOGY

## 2022-10-07 PROCEDURE — 95908 NRV CNDJ TST 3-4 STUDIES: CPT

## 2022-10-10 RX ORDER — EZETIMIBE 10 MG/1
TABLET ORAL
Qty: 95 TABLET | Refills: 0 | Status: SHIPPED | OUTPATIENT
Start: 2022-10-10 | End: 2023-01-28

## 2022-10-11 ENCOUNTER — TREATMENT (OUTPATIENT)
Dept: PHYSICAL THERAPY | Facility: CLINIC | Age: 67
End: 2022-10-11

## 2022-10-11 DIAGNOSIS — M54.42 CHRONIC MIDLINE LOW BACK PAIN WITH BILATERAL SCIATICA: Primary | ICD-10-CM

## 2022-10-11 DIAGNOSIS — G89.29 CHRONIC MIDLINE LOW BACK PAIN WITH BILATERAL SCIATICA: Primary | ICD-10-CM

## 2022-10-11 DIAGNOSIS — M54.41 CHRONIC MIDLINE LOW BACK PAIN WITH BILATERAL SCIATICA: Primary | ICD-10-CM

## 2022-10-11 PROCEDURE — 97140 MANUAL THERAPY 1/> REGIONS: CPT | Performed by: PHYSICAL THERAPIST

## 2022-10-11 PROCEDURE — 97112 NEUROMUSCULAR REEDUCATION: CPT | Performed by: PHYSICAL THERAPIST

## 2022-10-11 PROCEDURE — 97110 THERAPEUTIC EXERCISES: CPT | Performed by: PHYSICAL THERAPIST

## 2022-10-11 NOTE — PROGRESS NOTES
Re-Assessment / Re-Certification        Patient: Anthony Ayoub   : 1955  Diagnosis/ICD-10 Code:  Chronic midline low back pain with bilateral sciatica [M54.41, M54.42, G89.29]  Referring practitioner: Yifan Elizabeth,*  Date of Initial Visit: Type: THERAPY  Noted: 2022  Today's Date: 10/11/2022  Patient seen for 11 sessions      Subjective:     Subjective Questionnaire: Oswestry: 22%  Clinical Progress: improved  Home Program Compliance: Yes  Treatment has included: therapeutic exercise, neuromuscular re-education, manual therapy and moist heat    Subjective Pt feels his symptoms are improved with treatment but continues to be limited by his pain. HEP is going well. 8/10 pain at worst.  Objective   Active Range of Motion      Lumbar wfl     Strength/Myotome Testing      Lumbar   Left   Normal strength     Right   Normal strength     Tests      Lumbar      Left   Negative passive SLR.      Right   Negative passive SLR.     Additional Tests Details  Positive piriformis test   Hip flexor flexibility limited bilaterally. Limited hip IR PROM bilaterally.  Negative Tina's sign  Guarded posture   Assessment/Plan   Pt is making some progress re: function and ROM; however, he continues to be limited by his pain. Recommend continuing with PT evaluation and treatment.    Short term goals, 1 week: Tolerate HEP progression. met  Voice compliance with activity modification. met  Report improvement in symptoms. met    Long term goals, 6 weeks: Improve ESTELA score to 14%. progressing  AROM to be wfl. met  Rate worst pain at 4/10. Not met  Independent with HEP. progressing    Continue BIW for 5 weeks  Georges Adkins, PT, DPT, OCS  [place] license: [id number]  Physical Therapist      Based upon review of the patient's progress and continued therapy plan, it is my medical opinion that Anthony Ayoub should continue physical therapy treatment at [facility]  [address] [place] [address]  [address] [place] [id number] [phone].    Provider: _____________________________    Yifan Elizabeth MD     Timed:         Manual Therapy:    15     mins  12301;     Therapeutic Exercise:    15     mins  37561;     Neuromuscular Roney:    15    mins  84882;    Therapeutic Activity:          mins  03763;     Gait Training:           mins  11932;     Ultrasound:          mins  04365;    Ionto                                   mins   48984  Self Care                            mins   40272    Un-Timed:  Electrical Stimulation:         mins  43510 ( );  Traction          mins 23389  Low Eval          Mins  58146  Mod Eval          Mins  68590  High Eval                            Mins  55596  Re-Eval                               mins  07457      Timed Treatment:   45   mins   Total Treatment:     45   mins

## 2022-10-13 ENCOUNTER — TREATMENT (OUTPATIENT)
Dept: PHYSICAL THERAPY | Facility: CLINIC | Age: 67
End: 2022-10-13

## 2022-10-13 DIAGNOSIS — M54.42 CHRONIC MIDLINE LOW BACK PAIN WITH BILATERAL SCIATICA: Primary | ICD-10-CM

## 2022-10-13 DIAGNOSIS — G89.29 CHRONIC MIDLINE LOW BACK PAIN WITH BILATERAL SCIATICA: Primary | ICD-10-CM

## 2022-10-13 DIAGNOSIS — M54.41 CHRONIC MIDLINE LOW BACK PAIN WITH BILATERAL SCIATICA: Primary | ICD-10-CM

## 2022-10-13 PROCEDURE — 97112 NEUROMUSCULAR REEDUCATION: CPT | Performed by: PHYSICAL THERAPIST

## 2022-10-13 PROCEDURE — 97140 MANUAL THERAPY 1/> REGIONS: CPT | Performed by: PHYSICAL THERAPIST

## 2022-10-13 PROCEDURE — 97110 THERAPEUTIC EXERCISES: CPT | Performed by: PHYSICAL THERAPIST

## 2022-10-13 NOTE — PROGRESS NOTES
Physical Therapy Daily Progress Note      Patient: Anthony Ayoub   : 1955  Diagnosis/ICD-10 Code:  Chronic midline low back pain with bilateral sciatica [M54.41, M54.42, G89.29]  Referring practitioner: Yfian Elizabeth,*  Date of Initial Visit: Type: THERAPY  Noted: 2022  Today's Date: 10/13/2022  Patient seen for 12 sessions             Subjective No significant changes noted since last visit.  Pt is getting his first epidural this Monday.    Objective   See Exercise, Manual, and Modality Logs for complete treatment.       Assessment/Plan  Good tolerance and effort with exercises and with manual techniques today.    Progress per Plan of Care           Timed:         Manual Therapy:   8     mins  18103;     Therapeutic Exercise:    15     mins  46849;     Neuromuscular Roney:    15    mins  37159;          Timed Treatment:   38   mins   Total Treatment:     38   mins        Yifan Ponce PTA  Physical Therapist Assistant

## 2022-10-17 ENCOUNTER — HOSPITAL ENCOUNTER (OUTPATIENT)
Dept: PAIN MEDICINE | Facility: HOSPITAL | Age: 67
Discharge: HOME | End: 2022-10-17

## 2022-10-17 VITALS
SYSTOLIC BLOOD PRESSURE: 198 MMHG | BODY MASS INDEX: 24.11 KG/M2 | HEIGHT: 66 IN | WEIGHT: 150 LBS | HEART RATE: 57 BPM | RESPIRATION RATE: 16 BRPM | TEMPERATURE: 98 F | OXYGEN SATURATION: 98 % | DIASTOLIC BLOOD PRESSURE: 99 MMHG

## 2022-10-17 DIAGNOSIS — R52 PAIN: ICD-10-CM

## 2022-10-17 DIAGNOSIS — M54.16 LUMBAR RADICULITIS: Primary | ICD-10-CM

## 2022-10-17 PROCEDURE — 0 IOPAMIDOL 41 % SOLUTION: Performed by: ANESTHESIOLOGY

## 2022-10-17 PROCEDURE — 25010000002 METHYLPREDNISOLONE PER 40 MG: Performed by: ANESTHESIOLOGY

## 2022-10-17 PROCEDURE — 77003 FLUOROGUIDE FOR SPINE INJECT: CPT

## 2022-10-17 RX ORDER — METHYLPREDNISOLONE ACETATE 40 MG/ML
40 INJECTION, SUSPENSION INTRA-ARTICULAR; INTRALESIONAL; INTRAMUSCULAR; SOFT TISSUE ONCE
Status: COMPLETED | OUTPATIENT
Start: 2022-10-17 | End: 2022-10-17

## 2022-10-17 RX ORDER — LANOLIN ALCOHOL/MO/W.PET/CERES
325 CREAM (GRAM) TOPICAL
COMMUNITY

## 2022-10-17 RX ADMIN — METHYLPREDNISOLONE ACETATE 40 MG: 40 INJECTION, SUSPENSION INTRA-ARTICULAR; INTRALESIONAL; INTRAMUSCULAR; INTRASYNOVIAL; SOFT TISSUE at 11:46

## 2022-10-17 RX ADMIN — IOPAMIDOL 1 ML: 408 INJECTION, SOLUTION INTRATHECAL at 11:46

## 2022-10-17 NOTE — DISCHARGE INSTRUCTIONS

## 2022-10-17 NOTE — PROCEDURES
"Subjective   CC back pain  Anthony Ayoub is a 66 y.o. male with lumbar radiculitis and stenosis with neurogenic claudication here for LESI..   No anticoagulation    Pain Assessment   Location of Pain: Lower Back, R Hip, L Hip, legs  Description of Pain: Dull/Aching, Throbbing, Stabbing  Previous Pain Rating :8  Current Pain Ratin  Aggravating Factors: Activity  Alleviating Factors: Rest, Medication  Pain onset over 12 weeks  Pain interferes with ADL's    The following portions of the patient's history were reviewed and updated as appropriate: allergies, current medications, past family history, past medical history, past social history, past surgical history and problem list.      Review of Systems  As in HPI  Objective   Physical Exam  Constitutional:       General: He is not in acute distress.  Cardiovascular:      Rate and Rhythm: Normal rate.   Pulmonary:      Effort: Pulmonary effort is normal.   Musculoskeletal:      Lumbar back: Tenderness present. Decreased range of motion.   Neurological:      Mental Status: He is alert and oriented to person, place, and time.       BP (!) 198/99 (BP Location: Left arm, Patient Position: Sitting)   Pulse 57   Temp 98 °F (36.7 °C) (Skin)   Resp 16   Ht 167.6 cm (66\")   Wt 68 kg (150 lb)   SpO2 98%   BMI 24.21 kg/m²     Assessment & Plan    underwent LESI.    RTC 4-6 weeks or as needed for repeat    DATE OF PROCEDURE:  10/17/2022    PREOPERATIVE DIAGNOSIS: lumbar DDD/radiculitis    POSTOPERATIVE DIAGNOSIS: same    PROCEDURE PERFORMED:  lumbar Epidural Steroid Injection    The patient presents with a history of   lumbar degenerative disc disease with  radiculitis. The patient presents today for a  lumbar epidural steroid injection at level  L5/S1.   The patient was seen in the preoperative area.  Patient's consent was obtained and updated.  Vitals were taken.  Patient was then brought to the procedure suite and placed in a prone position. The appropriate " anatomic area was widely prepped with Chloraprep and draped in a sterile fashion. Noninvasive monitoring per routine anesthesia protocol was placed.  Under fluoroscopic guidance using  AP view a 20 gauge styleted tuohy needle was passed through skin anesthetized with 1% Lidocaine without epinephrine.  The needle was advanced using the continuous loss of resistance to saline technique into the L5 epidural space. Needle tip placement in the epidural space was confirmed by loss of resistance and injection of 1 mL of  preservative free contrast.  Following this 8 mL of a solution containing  1 mL of 40 mg Depo-Medrol and 7 mL of preservative-free saline was carefully administered in the epidural space.  A sterile dressing was placed over the puncture site.    The patient tolerated the procedure with no complications . They were then brought to the post procedure area where they recovered nicely.

## 2022-10-18 ENCOUNTER — TELEPHONE (OUTPATIENT)
Dept: PAIN MEDICINE | Facility: HOSPITAL | Age: 67
End: 2022-10-18

## 2022-11-08 RX ORDER — FOLIC ACID 1 MG/1
TABLET ORAL
Qty: 90 TABLET | Refills: 1 | Status: SHIPPED | OUTPATIENT
Start: 2022-11-08

## 2022-11-10 ENCOUNTER — HOSPITAL ENCOUNTER (OUTPATIENT)
Dept: MRI IMAGING | Facility: HOSPITAL | Age: 67
Discharge: HOME | End: 2022-11-10
Admitting: FAMILY MEDICINE

## 2022-11-10 DIAGNOSIS — R20.2 TINGLING OF BOTH FEET: ICD-10-CM

## 2022-11-10 DIAGNOSIS — M54.41 CHRONIC MIDLINE LOW BACK PAIN WITH BILATERAL SCIATICA: ICD-10-CM

## 2022-11-10 DIAGNOSIS — G89.29 CHRONIC MIDLINE LOW BACK PAIN WITH BILATERAL SCIATICA: ICD-10-CM

## 2022-11-10 DIAGNOSIS — M54.42 CHRONIC MIDLINE LOW BACK PAIN WITH BILATERAL SCIATICA: ICD-10-CM

## 2022-11-10 PROCEDURE — 72148 MRI LUMBAR SPINE W/O DYE: CPT

## 2022-11-13 ENCOUNTER — PATIENT MESSAGE (OUTPATIENT)
Dept: FAMILY MEDICINE CLINIC | Facility: CLINIC | Age: 67
End: 2022-11-13

## 2022-12-06 RX ORDER — ERGOCALCIFEROL 1.25 MG/1
CAPSULE ORAL
Qty: 4 CAPSULE | Refills: 5 | Status: SHIPPED | OUTPATIENT
Start: 2022-12-06 | End: 2022-12-30

## 2022-12-07 ENCOUNTER — TELEPHONE (OUTPATIENT)
Dept: ORTHOPEDIC SURGERY | Facility: CLINIC | Age: 67
End: 2022-12-07

## 2022-12-07 NOTE — TELEPHONE ENCOUNTER
Caller: RACHEL   Relationship to Patient: SELF  Phone Number: [phone]   Reason for Call: PATIENTS WIFE CALLING ASKING IF PATIENT CAN GET ANOTHER EPIDURAL

## 2022-12-09 NOTE — TELEPHONE ENCOUNTER
Yes but due to Medicare requirements has to be 3 months apart or I need to see him for follow-up and determine what the next procedure should be.

## 2022-12-12 ENCOUNTER — OFFICE VISIT (OUTPATIENT)
Dept: PAIN MEDICINE | Facility: CLINIC | Age: 67
End: 2022-12-12

## 2022-12-12 VITALS
OXYGEN SATURATION: 98 % | DIASTOLIC BLOOD PRESSURE: 80 MMHG | RESPIRATION RATE: 16 BRPM | HEART RATE: 59 BPM | SYSTOLIC BLOOD PRESSURE: 164 MMHG

## 2022-12-12 DIAGNOSIS — M47.817 LUMBOSACRAL SPONDYLOSIS WITHOUT MYELOPATHY: ICD-10-CM

## 2022-12-12 DIAGNOSIS — M79.2 NEUROPATHIC PAIN: ICD-10-CM

## 2022-12-12 DIAGNOSIS — M48.062 LUMBAR STENOSIS WITH NEUROGENIC CLAUDICATION: ICD-10-CM

## 2022-12-12 DIAGNOSIS — G89.4 CHRONIC PAIN SYNDROME: Primary | ICD-10-CM

## 2022-12-12 PROCEDURE — 99214 OFFICE O/P EST MOD 30 MIN: CPT | Performed by: ANESTHESIOLOGY

## 2022-12-14 NOTE — PROGRESS NOTES
Subjective   CC back pain  Anthony Ayoub is a 67 y.o. male with multiple comorbidity including CKD stage III, history of leukemia post bone marrow transplant 2016, chronic polyarthralgia, chronic back pain here for follow-up.  Reports 80% relief of neurogenic location symptoms with LESI.However continues to experience axial back pain from DDD and spondylosis which is limiting his ADL.  Chronic back pain mostly axial but radiating to both hips and both lower extremity with cramping weakness feeling in the legs constant but worse with standing walking or prolonged activity.  Denies saddle anesthesia, bladder incontinence.  Also complains of bilateral feet neuropathic pain.  EMG/NCS pending.  Participating in physical therapy with marginal relief.  He had tried Tylenol, topical anti-inflammatories without relief.  Back pain is impairing ADL, interfering with sleep.      Pain Assessment   Location of Pain: Lower Back, R Hip, L Hip,   Description of Pain: Dull/Aching, Throbbing, Stabbing  Previous Pain Rating :4  Current Pain Rating: 3  Aggravating Factors: Activity  Alleviating Factors: Rest, Medication  Pain onset over 12 weeks  Interferes with ADL's.   [place] back pain disability scale on chart    PEG Assessment   What number best describes your pain on average in the past week?4  What number best describes how, during the past week, pain has interfered with your enjoyment of life?2  What number best describes how, during the past week, pain has interfered with your general activity?  10    The following portions of the patient's history were reviewed and updated as appropriate: allergies, current medications, past family history, past medical history, past social history, past surgical history and problem list.     has a past medical history of CHF (congestive heart failure) ([facility]), Coronary artery disease, GERD (gastroesophageal reflux disease), Hyperlipidemia, Hypertension, Leukemia (HCC), Low back pain, and  Sleep apnea.   has a past surgical history that includes Bone marrow transplant; Cardiac catheterization; Vasectomy; Sinus surgery; Tonsillectomy; and Cholecystectomy (N/A, 2022).  family history includes Heart attack in his father; Heart disease in his brother and father; Hyperlipidemia in his mother; Hypertension in his brother, mother, and sister.  Social History     Tobacco Use   • Smoking status: Former     Packs/day: 1.00     Years: 30.00     Pack years: 30.00     Types: Cigarettes     Quit date:      Years since quittin.9   • Smokeless tobacco: Never   Substance Use Topics   • Alcohol use: No       Review of Systems   Musculoskeletal: Positive for back pain.   All other systems reviewed and are negative.      Objective   Physical Exam  Vitals reviewed.   Constitutional:       General: He is not in acute distress.  Musculoskeletal:      Lumbar back: Tenderness present. Decreased range of motion. Positive right straight leg raise test and positive left straight leg raise test.      Comments: Lumbar loading positive, pain on extension of low back past 5 degrees.  TTP on the lumbar facets noted.         /80   Pulse 59   Resp 16   SpO2 98%     PHQ 9 on chart  Opioid risk tool low risk    Assessment & Plan   Diagnoses and all orders for this visit:    1. Chronic pain syndrome (Primary)    2. Lumbosacral spondylosis without myelopathy  -     Facet  -     Facet    3. Lumbar stenosis with neurogenic claudication    4. Neuropathic pain      Summary  Anthony Ayoub is a 67 y.o. male with multiple comorbidity including CKD stage III, history of leukemia post bone marrow transplant 2016, chronic polyarthralgia, chronic back pain here for follow-up.  Chronic pain from lumbar DDD spondylosis, and stenosis with neurogenic medication..      Symptoms are consistent with a combination of axial back pain from DDD spondylosis with occasional radicular pain and neurogenic location symptoms.  Tried  conservative management with physical therapy, Tylenol, rest without relief.    Reports 80% relief of neurogenic location symptoms with LESI.  However continues to experience axial back pain from DDD and spondylosis which is limiting his ADL.  We will schedule for bilateral lumbar branch block at L4/5, L5/S1 x2.  If effective will plan RFA.    RTC for procedure

## 2022-12-30 RX ORDER — ERGOCALCIFEROL 1.25 MG/1
CAPSULE ORAL
Qty: 4 CAPSULE | Refills: 5 | Status: SHIPPED | OUTPATIENT
Start: 2022-12-30

## 2023-01-03 ENCOUNTER — HOSPITAL ENCOUNTER (OUTPATIENT)
Dept: PAIN MEDICINE | Facility: HOSPITAL | Age: 68
Discharge: HOME | End: 2023-01-03
Payer: MEDICARE

## 2023-01-03 VITALS
DIASTOLIC BLOOD PRESSURE: 89 MMHG | TEMPERATURE: 98.4 F | WEIGHT: 150 LBS | SYSTOLIC BLOOD PRESSURE: 157 MMHG | HEART RATE: 62 BPM | HEIGHT: 66 IN | BODY MASS INDEX: 24.11 KG/M2 | RESPIRATION RATE: 16 BRPM | OXYGEN SATURATION: 96 %

## 2023-01-03 DIAGNOSIS — M47.817 LUMBOSACRAL SPONDYLOSIS WITHOUT MYELOPATHY: ICD-10-CM

## 2023-01-03 DIAGNOSIS — R52 PAIN: ICD-10-CM

## 2023-01-03 PROCEDURE — 64493 INJ PARAVERT F JNT L/S 1 LEV: CPT | Performed by: STUDENT IN AN ORGANIZED HEALTH CARE EDUCATION/TRAINING PROGRAM

## 2023-01-03 PROCEDURE — 64494 INJ PARAVERT F JNT L/S 2 LEV: CPT | Performed by: STUDENT IN AN ORGANIZED HEALTH CARE EDUCATION/TRAINING PROGRAM

## 2023-01-03 PROCEDURE — 77003 FLUOROGUIDE FOR SPINE INJECT: CPT

## 2023-01-03 PROCEDURE — 25010000002 METHYLPREDNISOLONE PER 80 MG: Performed by: STUDENT IN AN ORGANIZED HEALTH CARE EDUCATION/TRAINING PROGRAM

## 2023-01-03 RX ORDER — METHYLPREDNISOLONE ACETATE 80 MG/ML
80 INJECTION, SUSPENSION INTRA-ARTICULAR; INTRALESIONAL; INTRAMUSCULAR; SOFT TISSUE ONCE
Status: COMPLETED | OUTPATIENT
Start: 2023-01-03 | End: 2023-01-03

## 2023-01-03 RX ORDER — BUPIVACAINE HYDROCHLORIDE 2.5 MG/ML
10 INJECTION, SOLUTION EPIDURAL; INFILTRATION; INTRACAUDAL ONCE
Status: COMPLETED | OUTPATIENT
Start: 2023-01-03 | End: 2023-01-03

## 2023-01-03 RX ORDER — LIDOCAINE HYDROCHLORIDE 10 MG/ML
5 INJECTION, SOLUTION EPIDURAL; INFILTRATION; INTRACAUDAL; PERINEURAL ONCE
Status: COMPLETED | OUTPATIENT
Start: 2023-01-03 | End: 2023-01-03

## 2023-01-03 RX ADMIN — LIDOCAINE HYDROCHLORIDE 5 ML: 10 INJECTION, SOLUTION EPIDURAL; INFILTRATION; INTRACAUDAL; PERINEURAL at 15:02

## 2023-01-03 RX ADMIN — BUPIVACAINE HYDROCHLORIDE 10 ML: 2.5 INJECTION, SOLUTION EPIDURAL; INFILTRATION; INTRACAUDAL; PERINEURAL at 15:11

## 2023-01-03 RX ADMIN — METHYLPREDNISOLONE ACETATE 80 MG: 80 INJECTION, SUSPENSION INTRA-ARTICULAR; INTRALESIONAL; INTRAMUSCULAR; SOFT TISSUE at 15:11

## 2023-01-03 NOTE — DISCHARGE INSTRUCTIONS
Medial Branch Nerve Block    Medial branch nerve block is a procedure to numb the nerves that supply the joints between your spinal bones (facet joints). The facet joints are located on the back of your spine. You may have the procedure on your neck or your upper, middle, or lower spine.  During this procedure, your health care provider will inject a numbing medicine (local anesthetic) around the medial nerves near the facet joint being treated. If more than one facet joint is causing pain, you may have more than one injection. In most cases, an anti-inflammatory medicine (steroid) will also be injected. You may need this procedure if:  You have back pain from wear and tear (osteoarthritis) of your facet joint.  You have an injury to a facet joint.  Your health care provider wants to diagnose a facet joint as the cause of your pain. If the procedure relieves the pain, this indicates that the facet joint was the cause.  The local anesthetic will relieve pain for several days. The steroid may continue to relieve pain for several weeks. If your pain returns when the medicines wear off, this procedure may be repeated.  Tell a health care provider about:  Any allergies you have.  All medicines you are taking, including vitamins, herbs, eye drops, creams, and over-the-counter medicines.  Any problems you or family members have had with anesthetic medicines.  Any blood disorders you have.  Any surgeries you have had.  Any medical conditions you have.  Whether you are pregnant or may be pregnant.  What are the risks?  Generally, this is a safe procedure. However, problems may occur, including:  Infection.  Bleeding.  Allergic reactions to medicines or dyes.  Damage to other structures or organs.  Injection of the anesthetic into a blood vessel, which may decrease blood supply to your spinal cord and cause damage.  Spread of the anesthetic to nearby nerves, which may cause temporary weakness or numbness.  What happens  before the procedure?  Ask your health care provider about:  Changing or stopping your regular medicines. This is especially important if you are taking diabetes medicines or blood thinners.  Taking over-the-counter medicines, vitamins, herbs, and supplements.  Plan to have a responsible adult take you home from the hospital or clinic if required  Ask your health care provider what steps will be taken to help prevent infection. These may include:  Washing skin with a germ-killing soap.  What happens during the procedure?  You will be given one or more of the following:  A medicine to numb the area (local anesthetic). Your health care provider will feel for the facet joint or joints that are causing pain and inject a short-acting local anesthetic into the skin over the joint or joints.  Your health care provider will then pass a needle into the area around the facet joint.  Your health care provider may use a type of X-ray (fluoroscopy) to look at images of your spinal cord. If so, the health care provider will inject a small amount of dye into the facet joint area. The dye will show up on fluoroscopy and help locate the exact area to inject the long-acting anesthetic.  The medicine will then be injected. Along with the long-acting anesthetic, a steroid medicine may also be injected.  The needle will be removed, and a bandage/band-aid will be placed over the injection site.  The procedure may vary among health care providers.  What can I expect after the procedure?  Your blood pressure, heart rate, breathing rate, and blood oxygen level will be monitored until you leave the hospital or clinic.  You should feel less pain in your back.  You may have some soreness around the injection site.  Follow these instructions at home:  Injection site care  Leave your bandage/band-aid on for 24 hours.  Do not take baths, swim, or use a hot tub for 24 hours.  Check your injection site every day for signs of infection. Check  for:  Redness, swelling, or pain.  Fluid or blood.  Warmth.  Pus or a bad smell.  If you need something for comfort at the site, put ice on the injection area:  Put ice in a plastic bag.  Place a towel between your skin and the bag.  Leave the ice on for 20 minutes, 2-3 times a day.       5.  No heat to injection are for 24-48 hours.  General instructions  Continue your normal pain medications after the procedure.  No heavy lifting, pushing or pulling after the procedure for 24 hours then return to your normal activities.  Keep a log of your pain after the procedure. Keep track of how much pain you have and when you have it. This will help your health care provider plan your future treatment.  You should have relief of pain from the anesthetic for up to 2-3 days.  After that you may notice some pain again until the steroid starts to help. Pain relief from the steroid may last for a few weeks.       4.  Keep all follow-up visits as told by your health care provider. This is important.  Contact your health care provider if:  Your pain is not relieved or gets worse at home.  You have a fever or chills.  You have any signs of infection.  You develop any numbness or weakness.  Summary  Medial branch nerve block is a procedure to numb the nerves that supply the joints between your spinal bones (facet joint).  You may have the procedure on your neck or your upper, middle, or lower spine.  This procedure may be done both to diagnose and relieve facet joint pain.  A long-acting local anesthetic is injected close to the nerve that supplies the facet joint. An anti-inflammatory medicine (steroid) may also be injected.  This information is not intended to replace advice given to you by your health care provider. Make sure you discuss any questions you have with your health care provider.  Document Revised: 04/16/2021 Document Reviewed: 08/22/2019  Elsevier Patient Education © 2021 [employer]

## 2023-01-03 NOTE — PROCEDURES
PROCEDURE:  Bilateral L 4, 5 and SA MBB and S 1 LBB    INDICATION: Patient complains of pain in the lower back, bilateral.  Pain increases on extension of the spine, facet tenderness present.       PROCEDURE DETAILS:   After obtaining written informed consent patient was taken to the procedure room. Pre-procedure blood pressure and pulse were stable and recorded in patients clinic chart. The patient was placed in a prone position and the lower back was prepped with chloraprep and draped in the usual sterile fashion.  The skin was infiltrated with 1% lidocaine at the junction of transverse process with the vertebral body at the left L 4 level. A 22-gauge, 3.5-inch needle was inserted into the lumbar medial branch under radiographic guidance. Both AP and lateral views were obtained.   Following placement of the needle and negative aspiration, 1.2 cc mixture of bupivacaine 0.25% with depo-medrol was injected  The identical procedure was performed on left L5 and SA medial branch and S 1 lateral branch at the rim of the S 1 foramen. Identical procedure was repeated on right side at L4, L5, Sa, S1.  A total of 9 cc of 0.25% bupivacaine with 80 mg of Depo-medrol was injected. During the procedure there was no evidence of CSF, paresthesias or heme.    After the procedure the needles were removed. Skin was cleaned and a sterile dressing was applied.    Following the procedure the patient's vital signs were stable. The patient was discharged home in good condition after being given discharge instructions.    COMPLICATIONS None    Scooby Lake DO  Pain Management   [facility]

## 2023-01-03 NOTE — H&P
H and P reviewed from previous visit and no changes to patient's clinical presentation. Will proceed with procedure as planned. Patient denies history of DM and being on blood thinners.    Scooby Lake DO  Pain Management   [facility]

## 2023-01-04 ENCOUNTER — TELEPHONE (OUTPATIENT)
Dept: PAIN MEDICINE | Facility: HOSPITAL | Age: 68
End: 2023-01-04
Payer: MEDICARE

## 2023-01-05 ENCOUNTER — LAB (OUTPATIENT)
Dept: LAB | Facility: HOSPITAL | Age: 68
End: 2023-01-05
Payer: MEDICARE

## 2023-01-05 ENCOUNTER — TRANSCRIBE ORDERS (OUTPATIENT)
Dept: ADMINISTRATIVE | Facility: HOSPITAL | Age: 68
End: 2023-01-05
Payer: MEDICARE

## 2023-01-05 DIAGNOSIS — I10 ESSENTIAL HYPERTENSION, MALIGNANT: ICD-10-CM

## 2023-01-05 DIAGNOSIS — N13.8 ENLARGED PROSTATE WITH URINARY OBSTRUCTION: ICD-10-CM

## 2023-01-05 DIAGNOSIS — N40.1 ENLARGED PROSTATE WITH URINARY OBSTRUCTION: ICD-10-CM

## 2023-01-05 DIAGNOSIS — N18.31 CHRONIC KIDNEY DISEASE (CKD) STAGE G3A/A1, MODERATELY DECREASED GLOMERULAR FILTRATION RATE (GFR) BETWEEN 45-59 ML/MIN/1.73 SQUARE METER AND ALBUMINURIA CREATININE RATIO LESS THAN 30 MG/G (CMS/H*: Primary | ICD-10-CM

## 2023-01-05 DIAGNOSIS — Z85.6 PERSONAL HISTORY OF UNSPECIFIED LEUKEMIA: ICD-10-CM

## 2023-01-05 DIAGNOSIS — N18.31 CHRONIC KIDNEY DISEASE (CKD) STAGE G3A/A1, MODERATELY DECREASED GLOMERULAR FILTRATION RATE (GFR) BETWEEN 45-59 ML/MIN/1.73 SQUARE METER AND ALBUMINURIA CREATININE RATIO LESS THAN 30 MG/G (CMS/H*: ICD-10-CM

## 2023-01-05 DIAGNOSIS — R80.9 PROTEINURIA, UNSPECIFIED TYPE: ICD-10-CM

## 2023-01-05 LAB
ALBUMIN SERPL-MCNC: 4.5 G/DL (ref 3.5–5.2)
ANION GAP SERPL CALCULATED.3IONS-SCNC: 8.9 MMOL/L (ref 5–15)
BUN SERPL-MCNC: 33 MG/DL (ref 8–23)
BUN/CREAT SERPL: 21.6 (ref 7–25)
CALCIUM SPEC-SCNC: 9.5 MG/DL (ref 8.6–10.5)
CHLORIDE SERPL-SCNC: 100 MMOL/L (ref 98–107)
CO2 SERPL-SCNC: 28.1 MMOL/L (ref 22–29)
CREAT SERPL-MCNC: 1.53 MG/DL (ref 0.76–1.27)
CREAT UR-MCNC: 105.2 MG/DL
DEPRECATED RDW RBC AUTO: 44 FL (ref 37–54)
EGFRCR SERPLBLD CKD-EPI 2021: 50.1 ML/MIN/1.73
ERYTHROCYTE [DISTWIDTH] IN BLOOD BY AUTOMATED COUNT: 13.3 % (ref 12.3–15.4)
GLUCOSE SERPL-MCNC: 130 MG/DL (ref 65–99)
HCT VFR BLD AUTO: 37.4 % (ref 37.5–51)
HGB BLD-MCNC: 12.5 G/DL (ref 13–17.7)
MCH RBC QN AUTO: 30.5 PG (ref 26.6–33)
MCHC RBC AUTO-ENTMCNC: 33.4 G/DL (ref 31.5–35.7)
MCV RBC AUTO: 91.2 FL (ref 79–97)
PHOSPHATE SERPL-MCNC: 5.1 MG/DL (ref 2.5–4.5)
PLATELET # BLD AUTO: 231 10*3/MM3 (ref 140–450)
PMV BLD AUTO: 10.2 FL (ref 6–12)
POTASSIUM SERPL-SCNC: 4.9 MMOL/L (ref 3.5–5.2)
PROT ?TM UR-MCNC: 233.4 MG/DL
PROT/CREAT UR: 2218.6 MG/G CREA (ref 0–200)
RBC # BLD AUTO: 4.1 10*6/MM3 (ref 4.14–5.8)
SODIUM SERPL-SCNC: 137 MMOL/L (ref 136–145)
WBC NRBC COR # BLD: 12.82 10*3/MM3 (ref 3.4–10.8)

## 2023-01-05 PROCEDURE — 84156 ASSAY OF PROTEIN URINE: CPT

## 2023-01-05 PROCEDURE — 80069 RENAL FUNCTION PANEL: CPT

## 2023-01-05 PROCEDURE — 36415 COLL VENOUS BLD VENIPUNCTURE: CPT

## 2023-01-05 PROCEDURE — 82570 ASSAY OF URINE CREATININE: CPT

## 2023-01-05 PROCEDURE — 85027 COMPLETE CBC AUTOMATED: CPT

## 2023-01-16 RX ORDER — ROSUVASTATIN CALCIUM 40 MG/1
TABLET, COATED ORAL
Qty: 90 TABLET | Refills: 1 | Status: SHIPPED | OUTPATIENT
Start: 2023-01-16

## 2023-01-17 ENCOUNTER — APPOINTMENT (OUTPATIENT)
Dept: PAIN MEDICINE | Facility: HOSPITAL | Age: 68
End: 2023-01-17
Payer: MEDICARE

## 2023-01-18 ENCOUNTER — HOSPITAL ENCOUNTER (OUTPATIENT)
Dept: PAIN MEDICINE | Facility: HOSPITAL | Age: 68
Discharge: HOME | End: 2023-01-18
Payer: MEDICARE

## 2023-01-18 VITALS
OXYGEN SATURATION: 97 % | DIASTOLIC BLOOD PRESSURE: 75 MMHG | TEMPERATURE: 97.3 F | HEART RATE: 58 BPM | BODY MASS INDEX: 24.11 KG/M2 | SYSTOLIC BLOOD PRESSURE: 146 MMHG | HEIGHT: 66 IN | RESPIRATION RATE: 16 BRPM | WEIGHT: 150 LBS

## 2023-01-18 DIAGNOSIS — R52 PAIN: ICD-10-CM

## 2023-01-18 DIAGNOSIS — M47.817 LUMBOSACRAL SPONDYLOSIS WITHOUT MYELOPATHY: Primary | ICD-10-CM

## 2023-01-18 PROCEDURE — 0 IOPAMIDOL 41 % SOLUTION: Performed by: ANESTHESIOLOGY

## 2023-01-18 PROCEDURE — 64493 INJ PARAVERT F JNT L/S 1 LEV: CPT | Performed by: ANESTHESIOLOGY

## 2023-01-18 PROCEDURE — 77003 FLUOROGUIDE FOR SPINE INJECT: CPT

## 2023-01-18 PROCEDURE — 64494 INJ PARAVERT F JNT L/S 2 LEV: CPT | Performed by: ANESTHESIOLOGY

## 2023-01-18 PROCEDURE — 25010000002 METHYLPREDNISOLONE PER 40 MG: Performed by: ANESTHESIOLOGY

## 2023-01-18 RX ORDER — METHYLPREDNISOLONE ACETATE 40 MG/ML
40 INJECTION, SUSPENSION INTRA-ARTICULAR; INTRALESIONAL; INTRAMUSCULAR; SOFT TISSUE ONCE
Status: COMPLETED | OUTPATIENT
Start: 2023-01-18 | End: 2023-01-18

## 2023-01-18 RX ORDER — LIDOCAINE HYDROCHLORIDE 10 MG/ML
5 INJECTION, SOLUTION EPIDURAL; INFILTRATION; INTRACAUDAL; PERINEURAL ONCE
Status: COMPLETED | OUTPATIENT
Start: 2023-01-18 | End: 2023-01-18

## 2023-01-18 RX ORDER — CLONIDINE HYDROCHLORIDE 0.1 MG/1
TABLET ORAL
COMMUNITY
Start: 2023-01-17

## 2023-01-18 RX ORDER — BUPIVACAINE HYDROCHLORIDE 2.5 MG/ML
10 INJECTION, SOLUTION EPIDURAL; INFILTRATION; INTRACAUDAL ONCE
Status: COMPLETED | OUTPATIENT
Start: 2023-01-18 | End: 2023-01-18

## 2023-01-18 RX ADMIN — BUPIVACAINE HYDROCHLORIDE 10 ML: 2.5 INJECTION, SOLUTION EPIDURAL; INFILTRATION; INTRACAUDAL; PERINEURAL at 14:35

## 2023-01-18 RX ADMIN — IOPAMIDOL 3 ML: 408 INJECTION, SOLUTION INTRATHECAL at 14:34

## 2023-01-18 RX ADMIN — LIDOCAINE HYDROCHLORIDE 5 ML: 10 INJECTION, SOLUTION EPIDURAL; INFILTRATION; INTRACAUDAL; PERINEURAL at 14:32

## 2023-01-18 RX ADMIN — METHYLPREDNISOLONE ACETATE 40 MG: 40 INJECTION, SUSPENSION INTRA-ARTICULAR; INTRALESIONAL; INTRAMUSCULAR; INTRASYNOVIAL; SOFT TISSUE at 14:35

## 2023-01-18 NOTE — PROCEDURES
"Subjective   CC back pain  Anthony Ayoub is a 67 y.o. male with lumbar No anticoagulation    Pain Assessment   Location of Pain: Lower Back, R Hip, L Hip, legs  Description of Pain: Dull/Aching, Throbbing, Stabbing  Previous Pain Rating :8  Current Pain Ratin  Aggravating Factors: Activity  Alleviating Factors: Rest, Medication  Pain onset over 12 weeks  Pain interferes with ADL's    The following portions of the patient's history were reviewed and updated as appropriate: allergies, current medications, past family history, past medical history, past social history, past surgical history and problem list.      Review of Systems  As in HPI  Objective   Physical Exam  Constitutional:       General: He is not in acute distress.  Cardiovascular:      Rate and Rhythm: Normal rate.   Pulmonary:      Effort: Pulmonary effort is normal.   Musculoskeletal:      Lumbar back: Tenderness present. Decreased range of motion.   Neurological:      Mental Status: He is alert and oriented to person, place, and time.       /75 (BP Location: Left arm, Patient Position: Sitting)   Pulse 58   Temp 97.3 °F (36.3 °C) (Skin)   Resp 16   Ht 167.6 cm (66\")   Wt 68 kg (150 lb)   SpO2 97%   BMI 24.21 kg/m²     Assessment & Plan    underwent repeat bilateral lumbar branch block at L4/5, L5/S1  Reported 90% relief 15 minutes after procedure.  Also had 80% relief for 3 to 4 days after first bilateral lumbar medial branch block.    We will schedule for right and left lumbar RFA at L4/5, L5/S1 without sedation.    RTC 4-6 weeks or as needed for repeat    DATE OF PROCEDURE: 2023    PREOPERATIVE DIAGNOSIS: Lumbar spondylosis without myelopathy    POSTOPERATIVE DIAGNOSIS: same    PROCEDURE PERFORMED: [place] Lumbar Medial Branch Block at  L4/L5, L5/S1.     The patient presents with a history of lumbosacral spondylosis bilaterally at level  [ L4/L5 ] [ L5/S1].   The patient understands the risks and benefits of the procedure and " wishes to proceed. The patient was seen in the preoperative area.  Patient's consent was obtained and updated.  Vitals were taken.  Patient was then brought to the procedure suite and placed in a prone position. The appropriate anatomic area was widely prepped with Chloroprep and draped in a sterile fashion.  Noninvasive monitoring per routine anesthesia protocol was placed.  Under fluoroscopic guidance an AP view with caudad cephaled tilt was obtained. A 22 gauge curved tip spinal needle was passed through skin anesthetized with 1% Lidocaine without epinephrine. The needle tip was guided into the superior medial aspect of the transverse process at  [ L4 ] [ L5 ] [ sacral ala ].  Next 0.25 mL of  preservative free contrast were injected.   At this point 0.5 mL of a solution  containing 1 mL of 40 mg Depo-Medrol and 4 mL of 0.25% bupivacaine was injected. A sterile dressing was placed over the puncture site. The patient tolerated the procedure with  no complications. They were then brought to the post procedure area where they recovered nicely.

## 2023-01-19 ENCOUNTER — TELEPHONE (OUTPATIENT)
Dept: PAIN MEDICINE | Facility: HOSPITAL | Age: 68
End: 2023-01-19
Payer: MEDICARE

## 2023-01-28 RX ORDER — EZETIMIBE 10 MG/1
TABLET ORAL
Qty: 95 TABLET | Refills: 0 | Status: SHIPPED | OUTPATIENT
Start: 2023-01-28

## 2023-03-24 ENCOUNTER — OFFICE VISIT (OUTPATIENT)
Dept: FAMILY MEDICINE CLINIC | Facility: CLINIC | Age: 68
End: 2023-03-24
Payer: MEDICARE

## 2023-03-24 ENCOUNTER — HOSPITAL ENCOUNTER (OUTPATIENT)
Dept: GENERAL RADIOLOGY | Facility: HOSPITAL | Age: 68
Discharge: HOME | End: 2023-03-24
Payer: MEDICARE

## 2023-03-24 VITALS
OXYGEN SATURATION: 99 % | SYSTOLIC BLOOD PRESSURE: 186 MMHG | BODY MASS INDEX: 26.68 KG/M2 | HEIGHT: 66 IN | WEIGHT: 166 LBS | HEART RATE: 55 BPM | DIASTOLIC BLOOD PRESSURE: 84 MMHG

## 2023-03-24 DIAGNOSIS — M25.561 ACUTE PAIN OF RIGHT KNEE: Primary | ICD-10-CM

## 2023-03-24 DIAGNOSIS — R20.2 NUMBNESS AND TINGLING OF BOTH FEET: ICD-10-CM

## 2023-03-24 DIAGNOSIS — M25.50 ARTHRALGIA, UNSPECIFIED JOINT: ICD-10-CM

## 2023-03-24 DIAGNOSIS — R20.0 NUMBNESS AND TINGLING OF BOTH FEET: ICD-10-CM

## 2023-03-24 DIAGNOSIS — M25.561 ACUTE PAIN OF RIGHT KNEE: ICD-10-CM

## 2023-03-24 DIAGNOSIS — Z85.6 HISTORY OF LEUKEMIA: ICD-10-CM

## 2023-03-24 DIAGNOSIS — M15.9 OSTEOARTHRITIS OF MULTIPLE JOINTS, UNSPECIFIED OSTEOARTHRITIS TYPE: ICD-10-CM

## 2023-03-24 PROCEDURE — 73562 X-RAY EXAM OF KNEE 3: CPT

## 2023-03-24 NOTE — PROGRESS NOTES
"Chief Complaint  Knee Pain (Right knee/)    Subjective        Anthony Ayoub presents to [facility]  History of Present Illness    Pt is here today for pain in his right knee. It started 2 weeks ago. It has worsened recently. He doesn't recall an injury. Bearing weight on the knee makes it worse. The pain is in the medial lower part of the knee.  He has tried Biofreeze and tylenol arthritis with some relief. He used to get that knee drained a lot in there past for bursitis. However, it is not swollen now and this pain is different. It does pop, click a lot.    He says he has noticed multiple joints being sore such as shoulders, wrists, elbows. The right knee is the most troublesome for him. The others are tolerable.    He also says his feet are numb a lot. He denies calf cramps with walking, but some occasionally at night. He is having a nerve ablation in his lower back to see if that will help the tingling in his feet and low back pain. He says his feet feel cold a lot and a heating pad can help relieve the numbness and tingling.     He had BM transplant in 2016 for leukemia. Follows up on that q 3months.     Objective   Vital Signs:  BP (!) 186/84 (BP Location: Left arm, Patient Position: Sitting, Cuff Size: Adult)   Pulse 55   Ht 167.6 cm (66\")   Wt 75.3 kg (166 lb)   SpO2 99%   BMI 26.79 kg/m²   Estimated body mass index is 26.79 kg/m² as calculated from the following:    Height as of this encounter: 167.6 cm (66\").    Weight as of this encounter: 75.3 kg (166 lb).          Review of Systems   Constitutional: Negative for fatigue and fever.   Respiratory: Negative for cough, shortness of breath and wheezing.    Cardiovascular: Negative for chest pain, palpitations and leg swelling.   Gastrointestinal: Negative for abdominal pain, nausea and vomiting.   Musculoskeletal: Positive for arthralgias and back pain. Negative for gait problem, joint swelling and myalgias.   Skin: " Negative for rash and wound.   Neurological: Positive for numbness (feet). Negative for dizziness, syncope, light-headedness and confusion.   Psychiatric/Behavioral: Negative for agitation, behavioral problems and decreased concentration. The patient is not nervous/anxious.         Physical Exam  Vitals reviewed.   Constitutional:       General: He is not in acute distress.     Appearance: Normal appearance. He is not ill-appearing.   HENT:      Head: Normocephalic and atraumatic.   Cardiovascular:      Rate and Rhythm: Normal rate and regular rhythm.      Pulses: Normal pulses.      Heart sounds: Normal heart sounds.   Pulmonary:      Effort: Pulmonary effort is normal. No respiratory distress.      Breath sounds: Normal breath sounds.   Musculoskeletal:         General: Tenderness (Right knee medial, lower) present. No signs of injury.      Right knee: Crepitus present. Decreased range of motion.      Right lower leg: No edema.      Left lower leg: No edema.   Skin:     General: Skin is warm and dry.      Capillary Refill: Capillary refill takes less than 2 seconds.   Neurological:      General: No focal deficit present.      Mental Status: He is alert and oriented to person, place, and time.   Psychiatric:         Mood and Affect: Mood normal.         Behavior: Behavior normal.         Thought Content: Thought content normal.         Judgment: Judgment normal.        Result Review :                Assessment and Plan   Diagnoses and all orders for this visit:    1. Acute pain of right knee (Primary)  Comments:  -X-ray  -Ortho referral depending on x-ray results.  Orders:  -     XR Knee 3 View Right; Future    2. Arthralgia, unspecified joint    3. Osteoarthritis of multiple joints, unspecified osteoarthritis type  Comments:  -Glucosamine and chondroiton supplement reccomended    4. Numbness and tingling of both feet  Comments:  -Re-evaluate after low back nerve ablation    5. History of leukemia  Comments:  -F/u  with hematology in April             Follow Up   Return in about 6 months (around 9/24/2023), or if symptoms worsen or fail to improve.  Patient was given instructions and counseling regarding his condition or for health maintenance advice. Please see specific information pulled into the AVS if appropriate.

## 2023-03-26 ENCOUNTER — PATIENT MESSAGE (OUTPATIENT)
Dept: FAMILY MEDICINE CLINIC | Facility: CLINIC | Age: 68
End: 2023-03-26

## 2023-03-26 DIAGNOSIS — M25.562 ACUTE PAIN OF BOTH KNEES: Primary | ICD-10-CM

## 2023-03-26 DIAGNOSIS — M25.561 ACUTE PAIN OF BOTH KNEES: Primary | ICD-10-CM

## 2023-03-27 DIAGNOSIS — E88.89 HOFFA'S FAT PAD DISEASE: ICD-10-CM

## 2023-03-27 DIAGNOSIS — M25.561 ACUTE PAIN OF RIGHT KNEE: ICD-10-CM

## 2023-03-27 DIAGNOSIS — M76.899 ENTHESOPATHY, KNEE: Primary | ICD-10-CM

## 2023-03-28 ENCOUNTER — PATIENT MESSAGE (OUTPATIENT)
Dept: FAMILY MEDICINE CLINIC | Facility: CLINIC | Age: 68
End: 2023-03-28

## 2023-03-29 ENCOUNTER — HOSPITAL ENCOUNTER (OUTPATIENT)
Dept: PAIN MEDICINE | Facility: HOSPITAL | Age: 68
Discharge: HOME | End: 2023-03-29
Payer: MEDICARE

## 2023-03-29 VITALS
RESPIRATION RATE: 16 BRPM | HEIGHT: 66 IN | HEART RATE: 58 BPM | SYSTOLIC BLOOD PRESSURE: 150 MMHG | BODY MASS INDEX: 25.71 KG/M2 | WEIGHT: 160 LBS | TEMPERATURE: 97.9 F | DIASTOLIC BLOOD PRESSURE: 83 MMHG | OXYGEN SATURATION: 97 %

## 2023-03-29 DIAGNOSIS — M47.817 LUMBOSACRAL SPONDYLOSIS WITHOUT MYELOPATHY: ICD-10-CM

## 2023-03-29 DIAGNOSIS — R52 PAIN: ICD-10-CM

## 2023-03-29 PROCEDURE — 25010000002 METHYLPREDNISOLONE PER 40 MG: Performed by: ANESTHESIOLOGY

## 2023-03-29 PROCEDURE — 25010000002 ROPIVACAINE PER 1 MG: Performed by: ANESTHESIOLOGY

## 2023-03-29 PROCEDURE — 77003 FLUOROGUIDE FOR SPINE INJECT: CPT

## 2023-03-29 PROCEDURE — 64636 DESTROY L/S FACET JNT ADDL: CPT | Performed by: ANESTHESIOLOGY

## 2023-03-29 PROCEDURE — 64635 DESTROY LUMB/SAC FACET JNT: CPT | Performed by: ANESTHESIOLOGY

## 2023-03-29 RX ORDER — LIDOCAINE HYDROCHLORIDE 10 MG/ML
5 INJECTION, SOLUTION EPIDURAL; INFILTRATION; INTRACAUDAL; PERINEURAL ONCE
Status: COMPLETED | OUTPATIENT
Start: 2023-03-29 | End: 2023-03-29

## 2023-03-29 RX ORDER — MELOXICAM 15 MG/1
TABLET ORAL
COMMUNITY
Start: 2023-03-29

## 2023-03-29 RX ORDER — METHYLPREDNISOLONE ACETATE 40 MG/ML
40 INJECTION, SUSPENSION INTRA-ARTICULAR; INTRALESIONAL; INTRAMUSCULAR; SOFT TISSUE ONCE
Status: COMPLETED | OUTPATIENT
Start: 2023-03-29 | End: 2023-03-29

## 2023-03-29 RX ORDER — ROPIVACAINE HYDROCHLORIDE 2 MG/ML
10 INJECTION, SOLUTION EPIDURAL; INFILTRATION; PERINEURAL ONCE
Status: COMPLETED | OUTPATIENT
Start: 2023-03-29 | End: 2023-03-29

## 2023-03-29 RX ADMIN — METHYLPREDNISOLONE ACETATE 40 MG: 40 INJECTION, SUSPENSION INTRA-ARTICULAR; INTRALESIONAL; INTRAMUSCULAR; INTRASYNOVIAL; SOFT TISSUE at 14:02

## 2023-03-29 RX ADMIN — LIDOCAINE HYDROCHLORIDE 5 ML: 10 INJECTION, SOLUTION EPIDURAL; INFILTRATION; INTRACAUDAL; PERINEURAL at 13:59

## 2023-03-29 RX ADMIN — ROPIVACAINE HYDROCHLORIDE 20 MG: 2 INJECTION EPIDURAL; INFILTRATION; PERINEURAL at 14:02

## 2023-03-29 NOTE — DISCHARGE INSTRUCTIONS
Radiofrequency Lesioning, Care After    Refer to this sheet in the next few weeks. These instructions provide you with information about caring for yourself after your procedure. Your health care provider may also give you more specific instructions. Your treatment has been planned according to current medical practices, but problems sometimes occur. Call your health care provider if you have any problems or questions after your procedure.  What can I expect after the procedure?  After the procedure, it is common to have:  Pain from the burned nerve.  You may feel a burning sensation for up to 1-2 weeks after the procedure  Temporary numbness at the site  Your leg/legs may be weak or feel numb after the procedure until the numbing medication wears off.  When you are up and walking, have assistance to prevent falling.     Follow these instructions at home:  Take over-the-counter and prescription medicines only as told by your health care provider.  Return to your normal activities as told by your health care provider. Ask your health care provider what activities are safe for you.  Pay close attention to how you feel after the procedure. If you start to have pain, write down when it hurts and how it feels. This will help you and your health care provider to know if you need an additional treatment.  Check your needle insertion site every day for signs of infection. Watch for:  Redness, swelling, or pain.  Fluid, blood, or pus.  Keep all follow-up visits as told by your health care provider. This is important.  No driving for 24 hrs-make sure you have full sensation in your legs prior to driving.  Avoid using heat on the injection site for 24 hours. You may use ice intermittently if needed by placing a               towel between your skin and the ice bag and using the ice for 20 minutes 2-3 times a day.  Do not take baths, swim or use a hot tub for 24 hours.  Leave your band-aids on for 24 hours and keep them  dry.    Contact a health care provider if:  Your pain does not get better.  You have redness, swelling, or pain at the needle insertion site.  You have fluid, blood, or pus coming from the needle insertion site.  You have a fever over 101 degrees.  You have new numb.ness in your arm or leg after the procedure    Get help right away if:  You develop sudden, severe pain.  You develop numbness or tingling near the procedure site that does not go away.  This information is not intended to replace advice given to you by your health care provider. Make sure you discuss any questions you have with your health care provider.  Document Released: 08/16/2012 Document Revised: 05/25/2017 Document Reviewed: 01/25/2016  [employer] Patient Education © 2019 [employer]

## 2023-03-29 NOTE — PROCEDURES
"Subjective   CC back pain  Anthony Ayoub is a 67 y.o. male with lumbar spondylosis here for right lumbar RFA    Pain Assessment   Location of Pain: Lower Back, R Hip, L Hip, legs  Description of Pain: Dull/Aching, Throbbing, Stabbing  Previous Pain Rating :8  Current Pain Ratin  Aggravating Factors: Activity  Alleviating Factors: Rest, Medication  Pain onset over 12 weeks  Pain interferes with ADL's    The following portions of the patient's history were reviewed and updated as appropriate: allergies, current medications, past family history, past medical history, past social history, past surgical history and problem list.      Review of Systems  As in HPI  Objective   Physical Exam  Constitutional:       General: He is not in acute distress.  Cardiovascular:      Rate and Rhythm: Normal rate.   Pulmonary:      Effort: Pulmonary effort is normal.   Musculoskeletal:      Lumbar back: Tenderness present. Decreased range of motion.   Neurological:      Mental Status: He is alert and oriented to person, place, and time.       /67 (BP Location: Right arm, Patient Position: Sitting)   Pulse 62   Temp 97.9 °F (36.6 °C) (Oral)   Resp 16   Ht 167.6 cm (66\")   Wt 72.6 kg (160 lb)   SpO2 98%   BMI 25.82 kg/m²     Assessment & Plan    underwent right lumbar RFA at L4/5, L5/S1.    RTC 4-6 weeks or as needed for repeat    DATE OF PROCEDURE:   3/29/2023     PREOPERATIVE DIAGNOSIS:   Lumbar spondylosis without myelopathy    POSTOPERATIVE DIAGNOSIS: same    PROCEDURE PERFORMED:  Right  Lumbar Sacral RFTC at  L4/L5, L5/S1.    The patient presents with a history of lumbosacral spondylosis on the right at level [  L4/L5 ] [ L5/ S1 ].  The patient presents today for lumbosacral RFTC.  The patient understands the risks and benefits of the procedure and wishes to proceed.  The patient was seen in the preoperative area.  Patient's consent was obtained and updated.  Vitals were taken.  Patient was then brought to the " procedure suite and placed in a prone position.  The appropriate anatomic area was widely prepped with Chloraprep and draped in a sterile fashion.  Noninvasive monitoring per routine anesthesia protocol was placed.  Under fluoroscopic guidance an AP view with caudad cephaled tilt was obtained.  A 20 guage RFTC cannula was passed through skin anesthetized with 1% Lidocaine without epinephrine.  The needle tip was guided to the superior medial aspect of the transverse process at [  L4 ][  L5 and  sacral ala ].    Motor stimulation was undertaken at 2.0 mAmps at 2 Hertz. At no point was motor stimulation or sensory stimulation noted in a radicular fashion.  Impedence range 200's. Prior to ablation, each level was anesthetized with 2mL of 1% Lidocaine without epinephrine. The medial branch nerves were then ablated at 80* C for 90 seconds each .  Following ablation, each level was injected with 1 mL of  expiration containing 1 mL of 40 mg Depo-Medrol and 4 mL of 0.25% bupivacaine and the RFTC cannula removed.  A sterile dressing was placed over the puncture site.    The patient tolerated the procedure with no complications. They were then brought to the post procedure area where they recovered nicely.     Discharge  The patient will be discharged home in stable condition.  Patient understands to contact the Center with any post procedure questions or concerns.  Discharge instructions given by nursing staff.

## 2023-03-30 ENCOUNTER — TELEPHONE (OUTPATIENT)
Dept: PAIN MEDICINE | Facility: HOSPITAL | Age: 68
End: 2023-03-30
Payer: MEDICARE

## 2023-04-05 ENCOUNTER — LAB (OUTPATIENT)
Dept: LAB | Facility: HOSPITAL | Age: 68
End: 2023-04-05
Payer: MEDICARE

## 2023-04-05 ENCOUNTER — TRANSCRIBE ORDERS (OUTPATIENT)
Dept: ADMINISTRATIVE | Facility: HOSPITAL | Age: 68
End: 2023-04-05
Payer: MEDICARE

## 2023-04-05 DIAGNOSIS — N40.1 ENLARGED PROSTATE WITH URINARY OBSTRUCTION: ICD-10-CM

## 2023-04-05 DIAGNOSIS — R80.9 PROTEINURIA WITH TYPE 1 DIABETES MELLITUS: ICD-10-CM

## 2023-04-05 DIAGNOSIS — N18.31 CHRONIC KIDNEY DISEASE (CKD) STAGE G3A/A1, MODERATELY DECREASED GLOMERULAR FILTRATION RATE (GFR) BETWEEN 45-59 ML/MIN/1.73 SQUARE METER AND ALBUMINURIA CREATININE RATIO LESS THAN 30 MG/G (CMS/H*: Primary | ICD-10-CM

## 2023-04-05 DIAGNOSIS — I10 ESSENTIAL HYPERTENSION, MALIGNANT: ICD-10-CM

## 2023-04-05 DIAGNOSIS — Z85.6 PERSONAL HISTORY OF UNSPECIFIED LEUKEMIA: ICD-10-CM

## 2023-04-05 DIAGNOSIS — N28.89 URETERAL FISTULA: ICD-10-CM

## 2023-04-05 DIAGNOSIS — E10.29 PROTEINURIA WITH TYPE 1 DIABETES MELLITUS: ICD-10-CM

## 2023-04-05 DIAGNOSIS — E55.9 AVITAMINOSIS D: ICD-10-CM

## 2023-04-05 DIAGNOSIS — N18.31 CHRONIC KIDNEY DISEASE (CKD) STAGE G3A/A1, MODERATELY DECREASED GLOMERULAR FILTRATION RATE (GFR) BETWEEN 45-59 ML/MIN/1.73 SQUARE METER AND ALBUMINURIA CREATININE RATIO LESS THAN 30 MG/G (CMS/H*: ICD-10-CM

## 2023-04-05 DIAGNOSIS — N13.8 ENLARGED PROSTATE WITH URINARY OBSTRUCTION: ICD-10-CM

## 2023-04-05 LAB
25(OH)D3 SERPL-MCNC: 83.6 NG/ML (ref 30–100)
ALBUMIN SERPL-MCNC: 4 G/DL (ref 3.5–5.2)
ANION GAP SERPL CALCULATED.3IONS-SCNC: 9 MMOL/L (ref 5–15)
BUN SERPL-MCNC: 30 MG/DL (ref 8–23)
BUN/CREAT SERPL: 19.1 (ref 7–25)
CALCIUM SPEC-SCNC: 8.7 MG/DL (ref 8.6–10.5)
CHLORIDE SERPL-SCNC: 106 MMOL/L (ref 98–107)
CO2 SERPL-SCNC: 26 MMOL/L (ref 22–29)
CREAT SERPL-MCNC: 1.57 MG/DL (ref 0.76–1.27)
CREAT UR-MCNC: 119.7 MG/DL
DEPRECATED RDW RBC AUTO: 51.6 FL (ref 37–54)
EGFRCR SERPLBLD CKD-EPI 2021: 48.6 ML/MIN/1.73
ERYTHROCYTE [DISTWIDTH] IN BLOOD BY AUTOMATED COUNT: 15.4 % (ref 12.3–15.4)
GLUCOSE SERPL-MCNC: 102 MG/DL (ref 65–99)
HCT VFR BLD AUTO: 36.6 % (ref 37.5–51)
HGB BLD-MCNC: 12.2 G/DL (ref 13–17.7)
MCH RBC QN AUTO: 30.7 PG (ref 26.6–33)
MCHC RBC AUTO-ENTMCNC: 33.3 G/DL (ref 31.5–35.7)
MCV RBC AUTO: 92 FL (ref 79–97)
PHOSPHATE SERPL-MCNC: 4.2 MG/DL (ref 2.5–4.5)
PLATELET # BLD AUTO: 196 10*3/MM3 (ref 140–450)
PMV BLD AUTO: 10.5 FL (ref 6–12)
POTASSIUM SERPL-SCNC: 4.2 MMOL/L (ref 3.5–5.2)
PROT ?TM UR-MCNC: 88.1 MG/DL
PROT/CREAT UR: 736 MG/G CREA (ref 0–200)
PTH-INTACT SERPL-MCNC: 78.2 PG/ML (ref 15–65)
RBC # BLD AUTO: 3.98 10*6/MM3 (ref 4.14–5.8)
SODIUM SERPL-SCNC: 141 MMOL/L (ref 136–145)
WBC NRBC COR # BLD: 7.04 10*3/MM3 (ref 3.4–10.8)

## 2023-04-05 PROCEDURE — 82306 VITAMIN D 25 HYDROXY: CPT

## 2023-04-05 PROCEDURE — 83970 ASSAY OF PARATHORMONE: CPT

## 2023-04-05 PROCEDURE — 85027 COMPLETE CBC AUTOMATED: CPT

## 2023-04-05 PROCEDURE — 80069 RENAL FUNCTION PANEL: CPT

## 2023-04-05 PROCEDURE — 82570 ASSAY OF URINE CREATININE: CPT

## 2023-04-05 PROCEDURE — 84156 ASSAY OF PROTEIN URINE: CPT

## 2023-04-05 PROCEDURE — 36415 COLL VENOUS BLD VENIPUNCTURE: CPT

## 2023-04-12 ENCOUNTER — HOSPITAL ENCOUNTER (OUTPATIENT)
Dept: PAIN MEDICINE | Facility: HOSPITAL | Age: 68
Discharge: HOME | End: 2023-04-12
Payer: MEDICARE

## 2023-04-12 VITALS
DIASTOLIC BLOOD PRESSURE: 67 MMHG | HEIGHT: 66 IN | HEART RATE: 57 BPM | TEMPERATURE: 97.4 F | WEIGHT: 162 LBS | OXYGEN SATURATION: 96 % | RESPIRATION RATE: 16 BRPM | BODY MASS INDEX: 26.03 KG/M2 | SYSTOLIC BLOOD PRESSURE: 138 MMHG

## 2023-04-12 DIAGNOSIS — R52 PAIN: ICD-10-CM

## 2023-04-12 DIAGNOSIS — M47.817 LUMBOSACRAL SPONDYLOSIS WITHOUT MYELOPATHY: ICD-10-CM

## 2023-04-12 PROCEDURE — 77003 FLUOROGUIDE FOR SPINE INJECT: CPT

## 2023-04-12 PROCEDURE — 64635 DESTROY LUMB/SAC FACET JNT: CPT | Performed by: ANESTHESIOLOGY

## 2023-04-12 PROCEDURE — 64636 DESTROY L/S FACET JNT ADDL: CPT | Performed by: ANESTHESIOLOGY

## 2023-04-12 PROCEDURE — 25010000002 METHYLPREDNISOLONE PER 40 MG: Performed by: ANESTHESIOLOGY

## 2023-04-12 RX ORDER — BUPIVACAINE HYDROCHLORIDE 2.5 MG/ML
10 INJECTION, SOLUTION EPIDURAL; INFILTRATION; INTRACAUDAL ONCE
Status: COMPLETED | OUTPATIENT
Start: 2023-04-12 | End: 2023-04-12

## 2023-04-12 RX ORDER — LIDOCAINE HYDROCHLORIDE 10 MG/ML
5 INJECTION, SOLUTION EPIDURAL; INFILTRATION; INTRACAUDAL; PERINEURAL ONCE
Status: COMPLETED | OUTPATIENT
Start: 2023-04-12 | End: 2023-04-12

## 2023-04-12 RX ORDER — METHYLPREDNISOLONE ACETATE 40 MG/ML
40 INJECTION, SUSPENSION INTRA-ARTICULAR; INTRALESIONAL; INTRAMUSCULAR; SOFT TISSUE ONCE
Status: COMPLETED | OUTPATIENT
Start: 2023-04-12 | End: 2023-04-12

## 2023-04-12 RX ADMIN — LIDOCAINE HYDROCHLORIDE 5 ML: 10 INJECTION, SOLUTION EPIDURAL; INFILTRATION; INTRACAUDAL; PERINEURAL at 14:20

## 2023-04-12 RX ADMIN — METHYLPREDNISOLONE ACETATE 40 MG: 40 INJECTION, SUSPENSION INTRA-ARTICULAR; INTRALESIONAL; INTRAMUSCULAR; INTRASYNOVIAL; SOFT TISSUE at 14:25

## 2023-04-12 RX ADMIN — BUPIVACAINE HYDROCHLORIDE 10 ML: 2.5 INJECTION, SOLUTION EPIDURAL; INFILTRATION; INTRACAUDAL; PERINEURAL at 14:25

## 2023-04-12 NOTE — DISCHARGE INSTRUCTIONS
Radiofrequency Lesioning, Care After    Refer to this sheet in the next few weeks. These instructions provide you with information about caring for yourself after your procedure. Your health care provider may also give you more specific instructions. Your treatment has been planned according to current medical practices, but problems sometimes occur. Call your health care provider if you have any problems or questions after your procedure.  What can I expect after the procedure?  After the procedure, it is common to have:  Pain from the burned nerve.  You may feel a burning sensation for up to 1-2 weeks after the procedure  Temporary numbness at the site  Your leg/legs may be weak or feel numb after the procedure until the numbing medication wears off.  When you are up and walking, have assistance to prevent falling.     Follow these instructions at home:  Take over-the-counter and prescription medicines only as told by your health care provider.  Return to your normal activities as told by your health care provider. Ask your health care provider what activities are safe for you.  Pay close attention to how you feel after the procedure. If you start to have pain, write down when it hurts and how it feels. This will help you and your health care provider to know if you need an additional treatment.  Check your needle insertion site every day for signs of infection. Watch for:  Redness, swelling, or pain.  Fluid, blood, or pus.  Keep all follow-up visits as told by your health care provider. This is important.  No driving for 24 hrs-make sure you have full sensation in your legs prior to driving.  Avoid using heat on the injection site for 24 hours. You may use ice intermittently if needed by placing a               towel between your skin and the ice bag and using the ice for 20 minutes 2-3 times a day.  Do not take baths, swim or use a hot tub for 24 hours.  Leave your band-aids on for 24 hours and keep them  dry.    Contact a health care provider if:  Your pain does not get better.  You have redness, swelling, or pain at the needle insertion site.  You have fluid, blood, or pus coming from the needle insertion site.  You have a fever over 101 degrees.  You have new numb.ness in your arm or leg after the procedure    Get help right away if:  You develop sudden, severe pain.  You develop numbness or tingling near the procedure site that does not go away.  This information is not intended to replace advice given to you by your health care provider. Make sure you discuss any questions you have with your health care provider.  Document Released: 08/16/2012 Document Revised: 05/25/2017 Document Reviewed: 01/25/2016  [employer] Interactive Patient Education © 2019 [employer]

## 2023-04-12 NOTE — PROCEDURES
"Subjective   CC back pain  Anthony Ayoub is a 67 y.o. male with lumbar spondylosis here for left lumbar RFA    Pain Assessment   Location of Pain: Lower Back, R Hip, L Hip, legs  Description of Pain: Dull/Aching, Throbbing, Stabbing  Previous Pain Rating :8  Current Pain Ratin  Aggravating Factors: Activity  Alleviating Factors: Rest, Medication  Pain onset over 12 weeks  Pain interferes with ADL's    The following portions of the patient's history were reviewed and updated as appropriate: allergies, current medications, past family history, past medical history, past social history, past surgical history and problem list.      Review of Systems  As in HPI  Objective   Physical Exam  Constitutional:       General: He is not in acute distress.  Cardiovascular:      Rate and Rhythm: Normal rate.   Pulmonary:      Effort: Pulmonary effort is normal.   Musculoskeletal:      Lumbar back: Tenderness present. Decreased range of motion.   Neurological:      Mental Status: He is alert and oriented to person, place, and time.       /67 (BP Location: Left arm, Patient Position: Sitting)   Pulse 57   Temp 97.4 °F (36.3 °C) (Oral)   Resp 16   Ht 167.6 cm (66\")   Wt 73.5 kg (162 lb)   SpO2 96%   BMI 26.15 kg/m²     Assessment & Plan    underwent left lumbar RFA at L4/5, L5/S1.    RTC 4-6 weeks or as needed for repeat    DATE OF PROCEDURE:   2023     PREOPERATIVE DIAGNOSIS:   Lumbar spondylosis without myelopathy    POSTOPERATIVE DIAGNOSIS: same    PROCEDURE PERFORMED: Left lumbar Sacral RFTC at  L4/L5, L5/S1.    The patient presents with a history of lumbosacral spondylosis on the left at level [  L4/L5 ] [ L5/ S1 ].  The patient presents today for lumbosacral RFTC.  The patient understands the risks and benefits of the procedure and wishes to proceed.  The patient was seen in the preoperative area.  Patient's consent was obtained and updated.  Vitals were taken.  Patient was then brought to the " procedure suite and placed in a prone position.  The appropriate anatomic area was widely prepped with Chloraprep and draped in a sterile fashion.  Noninvasive monitoring per routine anesthesia protocol was placed.  Under fluoroscopic guidance an AP view with caudad cephaled tilt was obtained.  A 20 guage RFTC cannula was passed through skin anesthetized with 1% Lidocaine without epinephrine.  The needle tip was guided to the superior medial aspect of the transverse process at [  L4 ][  L5 and  sacral ala ].    Motor stimulation was undertaken at 2.0 mAmps at 2 Hertz. At no point was motor stimulation or sensory stimulation noted in a radicular fashion.  Impedence range 200's. Prior to ablation, each level was anesthetized with 2mL of 1% Lidocaine without epinephrine. The medial branch nerves were then ablated at 80* C for 90 seconds each .  Following ablation, each level was injected with 1 mL of  expiration containing 1 mL of 40 mg Depo-Medrol and 4 mL of 0.25% bupivacaine and the RFTC cannula removed.  A sterile dressing was placed over the puncture site.    The patient tolerated the procedure with no complications. They were then brought to the post procedure area where they recovered nicely.     Discharge  The patient will be discharged home in stable condition.  Patient understands to contact the Center with any post procedure questions or concerns.  Discharge instructions given by nursing staff.

## 2023-04-13 ENCOUNTER — TELEPHONE (OUTPATIENT)
Dept: PAIN MEDICINE | Facility: HOSPITAL | Age: 68
End: 2023-04-13
Payer: MEDICARE

## 2023-04-18 RX ORDER — FOLIC ACID 1 MG/1
TABLET ORAL
Qty: 90 TABLET | Refills: 1 | Status: SHIPPED | OUTPATIENT
Start: 2023-04-18

## 2023-04-30 RX ORDER — EZETIMIBE 10 MG/1
TABLET ORAL
Qty: 95 TABLET | Refills: 1 | Status: SHIPPED | OUTPATIENT
Start: 2023-04-30

## 2023-05-11 ENCOUNTER — TELEPHONE (OUTPATIENT)
Dept: FAMILY MEDICINE CLINIC | Facility: CLINIC | Age: 68
End: 2023-05-11
Payer: MEDICARE

## 2023-05-11 DIAGNOSIS — E88.89 HOFFA'S FAT PAD DISEASE: ICD-10-CM

## 2023-05-11 DIAGNOSIS — M25.561 ACUTE PAIN OF RIGHT KNEE: ICD-10-CM

## 2023-05-11 DIAGNOSIS — M76.899 ENTHESOPATHY, KNEE: Primary | ICD-10-CM

## 2023-05-11 NOTE — TELEPHONE ENCOUNTER
Caller: Anitra Mildred    Relationship: Emergency Contact    Best call back number: [phone]    What is the medical concern/diagnosis: CORTISONE SHOT FOR KNEE     What specialty or service is being requested: ORTHOPEDIC     What is the provider, practice or medical service name: DR. SURYA GREER, [facility]      What is the office location: [address] [place] [address]    What is the office phone number: [phone]    Any additional details: PLEASE CALL TO LET PATIENTS WIFE KNOW ONCE REFERRAL HAS BEEN PLACED.

## 2023-05-25 ENCOUNTER — OFFICE VISIT (OUTPATIENT)
Dept: PAIN MEDICINE | Facility: CLINIC | Age: 68
End: 2023-05-25
Payer: MEDICARE

## 2023-05-25 VITALS
SYSTOLIC BLOOD PRESSURE: 156 MMHG | RESPIRATION RATE: 16 BRPM | DIASTOLIC BLOOD PRESSURE: 81 MMHG | HEART RATE: 62 BPM | OXYGEN SATURATION: 96 %

## 2023-05-25 DIAGNOSIS — M79.2 NEUROPATHIC PAIN: ICD-10-CM

## 2023-05-25 DIAGNOSIS — M25.561 CHRONIC PAIN OF RIGHT KNEE: ICD-10-CM

## 2023-05-25 DIAGNOSIS — M48.062 LUMBAR STENOSIS WITH NEUROGENIC CLAUDICATION: ICD-10-CM

## 2023-05-25 DIAGNOSIS — G89.29 CHRONIC PAIN OF RIGHT KNEE: ICD-10-CM

## 2023-05-25 DIAGNOSIS — M47.817 LUMBOSACRAL SPONDYLOSIS WITHOUT MYELOPATHY: ICD-10-CM

## 2023-05-25 DIAGNOSIS — G89.4 CHRONIC PAIN SYNDROME: Primary | ICD-10-CM

## 2023-05-25 RX ORDER — PANTOPRAZOLE SODIUM 40 MG/1
TABLET, DELAYED RELEASE ORAL EVERY 24 HOURS
COMMUNITY

## 2023-05-25 RX ORDER — CLONIDINE HYDROCHLORIDE 0.1 MG/1
TABLET ORAL
COMMUNITY

## 2023-05-25 RX ORDER — ASPIRIN 81 MG/1
TABLET, CHEWABLE ORAL EVERY 24 HOURS
COMMUNITY

## 2023-05-25 RX ORDER — TERAZOSIN 5 MG/1
CAPSULE ORAL EVERY 24 HOURS
COMMUNITY

## 2023-05-25 RX ORDER — LISINOPRIL 40 MG/1
TABLET ORAL EVERY 24 HOURS
COMMUNITY

## 2023-05-25 NOTE — PROGRESS NOTES
Subjective   CC back pain  Anthony Ayoub is a 67 y.o. male with multiple comorbidity including CKD stage III, history of leukemia post bone marrow transplant 2016, chronic polyarthralgia, chronic back pain here for follow-up.  Lumbar RFA last visit reports 60% relief with functional benefits.  Today complains mainly of worsening right knee pain.  He has seen Ortho at [facility] had cortisone injection with mild relief.  Considering gel injection and has been referred to Dr. Montiel.  Chronic back pain mostly axial but radiating to both hips and both lower extremity with cramping weakness feeling in the legs constant but worse with standing walking or prolonged activity.  Denies saddle anesthesia, bladder incontinence.  Also complains of bilateral feet neuropathic pain.  EMG/NCS pending.  Participating in physical therapy with marginal relief.  He had tried Tylenol, topical anti-inflammatories without relief.  Back pain is impairing ADL, interfering with sleep.      Pain Assessment   Location of Pain: Lower Back, R Hip, L Hip,   Description of Pain: Dull/Aching, Throbbing, Stabbing  Previous Pain Rating :4  Current Pain Rating: 3  Aggravating Factors: Activity  Alleviating Factors: Rest, Medication  Pain onset over 12 weeks  Interferes with ADL's.   [place] back pain disability scale on chart    PEG Assessment   What number best describes your pain on average in the past week?4  What number best describes how, during the past week, pain has interfered with your enjoyment of life?2  What number best describes how, during the past week, pain has interfered with your general activity?  10    The following portions of the patient's history were reviewed and updated as appropriate: allergies, current medications, past family history, past medical history, past social history, past surgical history and problem list.     has a past medical history of CHF (congestive heart failure), Coronary artery disease, GERD  (gastroesophageal reflux disease), Hyperlipidemia, Hypertension, Leukemia, Low back pain, and Sleep apnea.   has a past surgical history that includes Bone marrow transplant; Cardiac catheterization; Vasectomy; Sinus surgery; Tonsillectomy; and Cholecystectomy (N/A, 2022).  family history includes Heart attack in his father; Heart disease in his brother and father; Hyperlipidemia in his mother; Hypertension in his brother, mother, and sister.  Social History     Tobacco Use   • Smoking status: Former     Packs/day: 1.00     Years: 30.00     Pack years: 30.00     Types: Cigarettes     Quit date:      Years since quittin.4   • Smokeless tobacco: Never   Substance Use Topics   • Alcohol use: No       Review of Systems   Musculoskeletal: Positive for arthralgias and back pain.   All other systems reviewed and are negative.      Objective   Physical Exam  Vitals reviewed.   Constitutional:       General: He is not in acute distress.  Musculoskeletal:      Lumbar back: Tenderness present. Decreased range of motion. Positive right straight leg raise test and positive left straight leg raise test.      Comments: Lumbar loading positive, pain on extension of low back past 5 degrees.  TTP on the lumbar facets noted.         /81   Pulse 62   Resp 16   SpO2 96%     PHQ 9 on chart  Opioid risk tool low risk    Assessment & Plan   Diagnoses and all orders for this visit:    1. Chronic pain syndrome (Primary)    2. Lumbar stenosis with neurogenic claudication    3. Neuropathic pain    4. Lumbosacral spondylosis without myelopathy    5. Chronic pain of right knee      Summary  Anthony Ayoub is a 67 y.o. male with multiple comorbidity including CKD stage III, history of leukemia post bone marrow transplant 2016, chronic polyarthralgia, chronic back pain here for follow-up.  Chronic pain from lumbar DDD spondylosis, and stenosis with neurogenic medication.  80% relief of neurogenic location symptoms with  LESI.    Lumbar RFA last visit reports 60% relief with functional benefits.  Today complains mainly of worsening right knee pain.  He has seen Ortho at [facility] had cortisone injection with mild relief.  Considering gel injection and has been referred to Dr. Montiel.    Repeat LESI or lumbar RFA as needed.    RTC 6-month

## 2023-06-06 ENCOUNTER — OFFICE VISIT (OUTPATIENT)
Dept: ORTHOPEDIC SURGERY | Facility: CLINIC | Age: 68
End: 2023-06-06
Payer: MEDICARE

## 2023-06-06 VITALS
RESPIRATION RATE: 16 BRPM | SYSTOLIC BLOOD PRESSURE: 152 MMHG | DIASTOLIC BLOOD PRESSURE: 89 MMHG | HEIGHT: 66 IN | OXYGEN SATURATION: 97 % | HEART RATE: 65 BPM | BODY MASS INDEX: 25.88 KG/M2 | TEMPERATURE: 98 F | WEIGHT: 161 LBS

## 2023-06-06 DIAGNOSIS — M17.11 PRIMARY OSTEOARTHRITIS OF RIGHT KNEE: Primary | ICD-10-CM

## 2023-06-06 PROCEDURE — 99203 OFFICE O/P NEW LOW 30 MIN: CPT | Performed by: FAMILY MEDICINE

## 2023-06-06 NOTE — PROGRESS NOTES
"Primary Care Sports Medicine Office Visit Note     Patient ID: Anthony Ayoub is a 67 y.o. male.    Chief Complaint:  Chief Complaint   Patient presents with    Right Knee - Initial Evaluation     HPI:    The patient presents for right knee pain evaluation.    The patient has arthritis in his right knee. He had x-rays ordered by his primary care physician who sent him to an orthopedist who took his own x-rays. He was told that there are 2 bones that are \"close to one another,\" but it was not bone-on-bone. He was also told that he had a spur on his patella. The patient had a cortisone injection on 03/29/2023. The relief only lasted a couple of days. They saw an orthopedic surgeon, Dr. Zuñiga. They are here for a second opinion. The patient was approved for hyaluronic acid at Dr. Zuñiga's office. He was also given prescriptions for meloxicam and diclofenac; however, the adult female notes that the patient has kidney and heart issues, so he did not take the meloxicam. The patient has pain to touch on his right patella.    The patient is allergic to PENICILLIN and SULFA.    Past Medical History:   Diagnosis Date    CHF (congestive heart failure)     Coronary artery disease     GERD (gastroesophageal reflux disease)     Hyperlipidemia     Hypertension     Leukemia     Low back pain     Sleep apnea        Past Surgical History:   Procedure Laterality Date    BONE MARROW TRANSPLANT      CARDIAC CATHETERIZATION      CHOLECYSTECTOMY N/A 4/23/2022    Procedure: CHOLECYSTECTOMY LAPAROSCOPIC;  Surgeon: Kale Acuna MD;  Location: [facility] MAIN OR;  Service: General;  Laterality: N/A;    SINUS SURGERY      TONSILLECTOMY      VASECTOMY         Family History   Problem Relation Age of Onset    Hypertension Mother     Hyperlipidemia Mother     Heart disease Father     Heart attack Father     Hypertension Sister     Hypertension Brother     Heart disease Brother      Social History     Occupational History    Not on file " "  Tobacco Use    Smoking status: Former     Packs/day: 1.00     Years: 30.00     Pack years: 30.00     Types: Cigarettes     Quit date: 2016     Years since quittin.4    Smokeless tobacco: Never   Vaping Use    Vaping Use: Never used   Substance and Sexual Activity    Alcohol use: No    Drug use: Defer    Sexual activity: Defer      Review of Systems   Constitutional:  Negative for activity change and fever.   Respiratory:  Negative for cough and shortness of breath.    Cardiovascular:  Negative for chest pain.   Gastrointestinal:  Negative for constipation, diarrhea, nausea and vomiting.   Musculoskeletal:  Positive for arthralgias.   Skin:  Negative for color change and rash.   Neurological:  Negative for weakness.   Hematological:  Does not bruise/bleed easily.   Objective:    /89 (BP Location: Left arm, Patient Position: Sitting, Cuff Size: Adult)   Pulse 65   Temp 98 °F (36.7 °C) (Oral)   Resp 16   Ht 167.6 cm (65.98\")   Wt 73 kg (161 lb)   SpO2 97%   BMI 26.00 kg/m²     Physical Examination:  Physical Exam  Vitals and nursing note reviewed.   Constitutional:       General: He is not in acute distress.     Appearance: He is well-developed. He is not diaphoretic.   HENT:      Head: Normocephalic and atraumatic.   Eyes:      Conjunctiva/sclera: Conjunctivae normal.   Pulmonary:      Effort: Pulmonary effort is normal. No respiratory distress.   Musculoskeletal:      Right knee: No effusion.      Instability Tests: Medial Félix test negative and lateral Félix test negative.   Skin:     General: Skin is warm.      Capillary Refill: Capillary refill takes less than 2 seconds.   Neurological:      Mental Status: He is alert.     Right Ankle Exam     Range of Motion   The patient has normal right ankle ROM.       Right Knee Exam     Muscle Strength   The patient has normal right knee strength.    Tenderness   The patient is experiencing tenderness in the lateral joint line, medial joint line and " patella.    Range of Motion   Extension:  normal   Flexion:  normal     Tests   Félix:  Medial - negative Lateral - negative  Varus: negative Valgus: negative  Right knee patellar apprehension test: +patellar grind, +olivia haas.    Other   Erythema: absent  Sensation: normal  Pulse: present (distal to the knee, DP and PT palpable)  Swelling: none  Effusion: no effusion present      Imaging and other tests:    Three view x-ray from previous evaluation of the right knee yields mild to moderate joint space narrowing in the medial compartment with quadriceps insertional enthesophyte to the superior pole of patella, and small enchondroma to the medial aspect of the proximal tibia.    Assessment and Plan:    Primary osteoarthritis of the right knee    After discussion of pathology, I recommended we take a conservative approach as this is early in the disease course.  I would like to see this patient attempt weight loss as every 1 pound lost will help take 4 pounds of pressure off the knees.  I also recommended icing 3 times daily as needed after activity.  We discussed glucosamine/chondroitin supplementation but patient cannot take this due to sulfa allergy.  I would also like to see this patient exercise 20 to 30 minutes a day 3 days a week.  Working on quadriceps strengthening.  We will hold on anti-inflammatories as well with end-stage renal disease.  RTC in 3 to 6 months if no improvement, or sooner if symptoms worsen.  We can always consider corticosteroid injection at that time if needed.     Transcribed from ambient dictation for Duc Montiel II,  by Monalisa Martin.  06/06/23   17:36 EDT    Patient or patient representative verbalized consent to the visit recording.  I have personally performed the services described in this document as transcribed by the above individual, and it is both accurate and complete.    Disclaimer: Please note that areas of this note were completed with computer voice  recognition software.  Quite often unanticipated grammatical, syntax, homophones, and other interpretive errors are inadvertently transcribed by the computer software. Please excuse any errors that have escaped final proofreading.

## 2023-08-18 ENCOUNTER — HOSPITAL ENCOUNTER (OUTPATIENT)
Dept: PAIN MEDICINE | Facility: HOSPITAL | Age: 68
Discharge: HOME | End: 2023-08-18
Payer: MEDICARE

## 2023-08-18 VITALS
HEIGHT: 66 IN | SYSTOLIC BLOOD PRESSURE: 166 MMHG | RESPIRATION RATE: 16 BRPM | WEIGHT: 161 LBS | HEART RATE: 63 BPM | BODY MASS INDEX: 25.88 KG/M2 | OXYGEN SATURATION: 98 % | TEMPERATURE: 97.3 F | DIASTOLIC BLOOD PRESSURE: 90 MMHG

## 2023-08-18 DIAGNOSIS — R52 PAIN: ICD-10-CM

## 2023-08-18 DIAGNOSIS — M46.1 SACROILIITIS: Primary | ICD-10-CM

## 2023-08-18 PROCEDURE — 25010000002 BUPIVACAINE (PF) 0.5 % SOLUTION: Performed by: ANESTHESIOLOGY

## 2023-08-18 PROCEDURE — 77003 FLUOROGUIDE FOR SPINE INJECT: CPT

## 2023-08-18 PROCEDURE — 25010000002 METHYLPREDNISOLONE PER 40 MG: Performed by: ANESTHESIOLOGY

## 2023-08-18 PROCEDURE — 25510000001 IOPAMIDOL 41 % SOLUTION: Performed by: ANESTHESIOLOGY

## 2023-08-18 RX ORDER — BUPIVACAINE HYDROCHLORIDE 5 MG/ML
10 INJECTION, SOLUTION EPIDURAL; INTRACAUDAL ONCE
Status: COMPLETED | OUTPATIENT
Start: 2023-08-18 | End: 2023-08-18

## 2023-08-18 RX ORDER — METHYLPREDNISOLONE ACETATE 40 MG/ML
40 INJECTION, SUSPENSION INTRA-ARTICULAR; INTRALESIONAL; INTRAMUSCULAR; SOFT TISSUE ONCE
Status: COMPLETED | OUTPATIENT
Start: 2023-08-18 | End: 2023-08-18

## 2023-08-18 RX ADMIN — BUPIVACAINE HYDROCHLORIDE 10 ML: 5 INJECTION, SOLUTION EPIDURAL; INTRACAUDAL; PERINEURAL at 11:59

## 2023-08-18 RX ADMIN — IOPAMIDOL 3 ML: 408 INJECTION, SOLUTION INTRATHECAL at 11:58

## 2023-08-18 RX ADMIN — METHYLPREDNISOLONE ACETATE 40 MG: 40 INJECTION, SUSPENSION INTRA-ARTICULAR; INTRALESIONAL; INTRAMUSCULAR; INTRASYNOVIAL; SOFT TISSUE at 11:59

## 2023-08-18 RX ADMIN — METHYLPREDNISOLONE ACETATE 40 MG: 40 INJECTION, SUSPENSION INTRA-ARTICULAR; INTRALESIONAL; INTRAMUSCULAR; INTRASYNOVIAL; SOFT TISSUE at 12:00

## 2023-08-18 NOTE — PROCEDURES
"Subjective   CC back pain  Anthony Ayoub is a 67 y.o. male with ileitis here for bilateral SI injections.    Pain Assessment   Location of Pain: Lower Back, R Hip, L Hip, legs  Description of Pain: Dull/Aching, Throbbing, Stabbing  Previous Pain Rating :8  Current Pain Ratin  Aggravating Factors: Activity  Alleviating Factors: Rest, Medication  Pain onset over 12 weeks  Pain interferes with ADL's    The following portions of the patient's history were reviewed and updated as appropriate: allergies, current medications, past family history, past medical history, past social history, past surgical history and problem list.      Review of Systems  As in HPI  Objective   Physical Exam  Constitutional:       General: He is not in acute distress.  Cardiovascular:      Rate and Rhythm: Normal rate.   Pulmonary:      Effort: Pulmonary effort is normal.   Musculoskeletal:      Lumbar back: Tenderness present. Decreased range of motion.   Neurological:      Mental Status: He is alert and oriented to person, place, and time.     /90 (BP Location: Left arm, Patient Position: Sitting)   Pulse 63   Temp 97.3 øF (36.3 øC) (Skin)   Resp 16   Ht 167.6 cm (66\")   Wt 73 kg (161 lb)   SpO2 98%   BMI 25.99 kg/mý     Assessment & Plan    underwent bilateral SI injection.    RTC 4-6 weeks or as needed for repeat    DATE OF PROCEDURE:  2023    PREOPERATIVE DIAGNOSIS: sacroiliitis    POSTOPERATIVE DIAGNOSIS: same    PROCEDURE PERFORMED: Bilateral SACROILIAC JOINT INJECTION    The patient understands the risks and benefits of the procedure and wishes to proceed. The patient was seen in the preoperative area. Patient's consent was obtained and updated. Vitals were taken. Patient was then brought to the procedure suite and placed in prone position for sacroiliac joint injection. The appropriate anatomic area was widely prepped with Chloraprep and draped in a sterile fashion. Noninvasive monitoring per routine " anesthesia protocol was placed. Under fluoroscopic guidance using an AP view, a 22 gauge curved tip spinal needle was passed through skin anesthetized with 1% Lidocaine without epinephrine. The needle tip was guided to the lower pole of the joint using fluoroscopy. 1 mL of  preservative free contrast was injected into the joint to confirm location. A clear outline was obtained and 5 mL of steroid solution containing 4 mL 0.25% bupivacaine, and 1mL 40mg Depomedrol was injected on each side. The patient tolerated with no pierre-procedural complications.  A sterile dressing was placed over the puncture sites.

## 2023-08-18 NOTE — DISCHARGE INSTRUCTIONS
Sacroiliac Joint Injection, Care After  This sheet gives you information about how to care for yourself after your procedure. Your health care provider may also give you more specific instructions. If you have problems or questions, contact your health care provider.  What can I expect after the procedure?  After the procedure, it is common to have bruising or soreness at the injection site.  Follow these instructions at home:  Managing pain and swelling         Keep a record of your pain (a pain log) to share with your health care provider at your follow-up visit. If a long-acting anti-inflammatory medicine (steroid) was included in your injection, you may not notice an improvement in your pain level for a few days.  Do not use a heating pad, and do not apply heat directly to the area after the procedure for 24-48 hours  If directed, put ice on the affected area. To do this:  Put ice in a plastic bag.  Place a towel between your skin and the bag.  Leave the ice on for 20 minutes, 2-3 times a day.  Remove the ice if your skin turns bright red. This is very important. If you cannot feel pain, heat, or cold, you have a greater risk of damage to the area.  Injection site care  Check your injection site every day for signs of infection. Check for:  Redness, swelling, or pain.  Fluid or blood.  Warmth.  Pus or a bad smell.  Do not take baths, swim, or use a hot tub until your health care provider says that it is safe. You may take showers.  Activity  Rest on the day of your procedure. Avoid doing a lot of activity on that day.  Avoid any activities that take a lot of effort for 24 hours after the injection.  Return to your normal activities the following day or as instructed by your health care provider. Ask your health care provider what activities are safe for you.  General instructions  Take over-the-counter and prescription medicines only as told by your health care provider.  Since dye was used during your  procedure, drink plenty of water to flush the dye out of your body.  No driving for 24 hours after your procedure if directed by your provider  If you are a Diabetic, monitor your blood sugar for the next 48 hours.  If your blood sugar is above 250, contact the physician that helps you monitor your blood sugar.  Keep all follow-up visits. This is important.  Contact a health care provider if:  Your pain does not improve or it gets worse.  You have numbness, tingling, or weakness.  You have a fever or chills.  You have redness, swelling, or pain around your injection site.  You have fluid or blood coming from your injection site.  Your injection site feels warm to the touch.  You have pus or a bad smell coming from your injection site.  Get help right away if:  You have chest pain or shortness of breath.  These symptoms may represent a serious problem that is an emergency. Do not wait to see if the symptoms will go away. Get medical help right away. Call your local emergency services (911 in the [place] Do not drive yourself to the hospital.  Summary  After the procedure, it is common to have bruising or soreness at the injection site.  Keep a record of your pain (a pain log) to share with your health care provider at your follow-up visit.  Return to your normal activities as told by your health care provider. Ask your health care provider what activities are safe for you.  Contact your health care provider if you have pain that is getting worse, weakness, numbness, or any sign of infection at your injection site.  This information is not intended to replace advice given to you by your health care provider. Make sure you discuss any questions you have with your health care provider.  Document Revised: 04/29/2021 Document Reviewed: 04/29/2021  Elsevier Patient Education c 2021 [employer]

## 2023-10-02 ENCOUNTER — OFFICE VISIT (OUTPATIENT)
Dept: FAMILY MEDICINE CLINIC | Facility: CLINIC | Age: 68
End: 2023-10-02
Payer: MEDICARE

## 2023-10-02 ENCOUNTER — LAB (OUTPATIENT)
Dept: LAB | Facility: HOSPITAL | Age: 68
End: 2023-10-02
Payer: MEDICARE

## 2023-10-02 VITALS
SYSTOLIC BLOOD PRESSURE: 116 MMHG | DIASTOLIC BLOOD PRESSURE: 71 MMHG | BODY MASS INDEX: 26.34 KG/M2 | HEART RATE: 65 BPM | WEIGHT: 163.2 LBS | OXYGEN SATURATION: 98 % | TEMPERATURE: 98.3 F

## 2023-10-02 DIAGNOSIS — N18.30 STAGE 3 CHRONIC KIDNEY DISEASE, UNSPECIFIED WHETHER STAGE 3A OR 3B CKD: ICD-10-CM

## 2023-10-02 DIAGNOSIS — F41.8 MIXED ANXIETY DEPRESSIVE DISORDER: ICD-10-CM

## 2023-10-02 DIAGNOSIS — I10 ESSENTIAL HYPERTENSION: Primary | ICD-10-CM

## 2023-10-02 DIAGNOSIS — M15.9 OSTEOARTHRITIS OF MULTIPLE JOINTS, UNSPECIFIED OSTEOARTHRITIS TYPE: ICD-10-CM

## 2023-10-02 LAB
25(OH)D3 SERPL-MCNC: 98.1 NG/ML (ref 30–100)
ALBUMIN SERPL-MCNC: 4.2 G/DL (ref 3.5–5.2)
ALBUMIN/GLOB SERPL: 1.9 G/DL
ALP SERPL-CCNC: 79 U/L (ref 39–117)
ALT SERPL W P-5'-P-CCNC: 18 U/L (ref 1–41)
ANION GAP SERPL CALCULATED.3IONS-SCNC: 9.3 MMOL/L (ref 5–15)
AST SERPL-CCNC: 22 U/L (ref 1–40)
BASOPHILS # BLD AUTO: 0.07 10*3/MM3 (ref 0–0.2)
BASOPHILS NFR BLD AUTO: 1 % (ref 0–1.5)
BILIRUB SERPL-MCNC: 0.5 MG/DL (ref 0–1.2)
BUN SERPL-MCNC: 26 MG/DL (ref 8–23)
BUN/CREAT SERPL: 14 (ref 7–25)
CALCIUM SPEC-SCNC: 9.5 MG/DL (ref 8.6–10.5)
CHLORIDE SERPL-SCNC: 106 MMOL/L (ref 98–107)
CHOLEST SERPL-MCNC: 130 MG/DL (ref 0–200)
CO2 SERPL-SCNC: 25.7 MMOL/L (ref 22–29)
CREAT SERPL-MCNC: 1.86 MG/DL (ref 0.76–1.27)
DEPRECATED RDW RBC AUTO: 47.9 FL (ref 37–54)
EGFRCR SERPLBLD CKD-EPI 2021: 39.7 ML/MIN/1.73
EOSINOPHIL # BLD AUTO: 0.49 10*3/MM3 (ref 0–0.4)
EOSINOPHIL NFR BLD AUTO: 7.2 % (ref 0.3–6.2)
ERYTHROCYTE [DISTWIDTH] IN BLOOD BY AUTOMATED COUNT: 13.9 % (ref 12.3–15.4)
GLOBULIN UR ELPH-MCNC: 2.2 GM/DL
GLUCOSE SERPL-MCNC: 107 MG/DL (ref 65–99)
HCT VFR BLD AUTO: 36.8 % (ref 37.5–51)
HDLC SERPL-MCNC: 31 MG/DL (ref 40–60)
HGB BLD-MCNC: 12.2 G/DL (ref 13–17.7)
IMM GRANULOCYTES # BLD AUTO: 0.02 10*3/MM3 (ref 0–0.05)
IMM GRANULOCYTES NFR BLD AUTO: 0.3 % (ref 0–0.5)
LDLC SERPL CALC-MCNC: 59 MG/DL (ref 0–100)
LDLC/HDLC SERPL: 1.59 {RATIO}
LYMPHOCYTES # BLD AUTO: 2.61 10*3/MM3 (ref 0.7–3.1)
LYMPHOCYTES NFR BLD AUTO: 38.3 % (ref 19.6–45.3)
MAGNESIUM SERPL-MCNC: 2.1 MG/DL (ref 1.6–2.4)
MCH RBC QN AUTO: 31 PG (ref 26.6–33)
MCHC RBC AUTO-ENTMCNC: 33.2 G/DL (ref 31.5–35.7)
MCV RBC AUTO: 93.6 FL (ref 79–97)
MONOCYTES # BLD AUTO: 0.52 10*3/MM3 (ref 0.1–0.9)
MONOCYTES NFR BLD AUTO: 7.6 % (ref 5–12)
NEUTROPHILS NFR BLD AUTO: 3.1 10*3/MM3 (ref 1.7–7)
NEUTROPHILS NFR BLD AUTO: 45.6 % (ref 42.7–76)
NRBC BLD AUTO-RTO: 0 /100 WBC (ref 0–0.2)
PLATELET # BLD AUTO: 171 10*3/MM3 (ref 140–450)
PMV BLD AUTO: 10.7 FL (ref 6–12)
POTASSIUM SERPL-SCNC: 4.7 MMOL/L (ref 3.5–5.2)
PROT SERPL-MCNC: 6.4 G/DL (ref 6–8.5)
RBC # BLD AUTO: 3.93 10*6/MM3 (ref 4.14–5.8)
SODIUM SERPL-SCNC: 141 MMOL/L (ref 136–145)
TRIGL SERPL-MCNC: 248 MG/DL (ref 0–150)
VLDLC SERPL-MCNC: 40 MG/DL (ref 5–40)
WBC NRBC COR # BLD: 6.81 10*3/MM3 (ref 3.4–10.8)

## 2023-10-02 PROCEDURE — 80061 LIPID PANEL: CPT | Performed by: FAMILY MEDICINE

## 2023-10-02 PROCEDURE — 36415 COLL VENOUS BLD VENIPUNCTURE: CPT | Performed by: FAMILY MEDICINE

## 2023-10-02 PROCEDURE — 82306 VITAMIN D 25 HYDROXY: CPT | Performed by: FAMILY MEDICINE

## 2023-10-02 PROCEDURE — 83735 ASSAY OF MAGNESIUM: CPT | Performed by: FAMILY MEDICINE

## 2023-10-02 PROCEDURE — 80053 COMPREHEN METABOLIC PANEL: CPT | Performed by: FAMILY MEDICINE

## 2023-10-02 PROCEDURE — 85025 COMPLETE CBC W/AUTO DIFF WBC: CPT | Performed by: FAMILY MEDICINE

## 2023-10-02 RX ORDER — ESCITALOPRAM OXALATE 20 MG/1
20 TABLET ORAL DAILY
Qty: 30 TABLET | Refills: 3 | Status: SHIPPED | OUTPATIENT
Start: 2023-10-02

## 2023-10-02 NOTE — PROGRESS NOTES
Subjective   Anthony Ayoub is a 67 y.o. male.     History of Present Illness  Anthony Ayoub is in for follow up on his issues with high blood pressure and chronic kidney disease.  He also is bothered by significant arthritis in multiple areas. He seems more depressed lately, avoiding friends and activities.  He has an adult child living at home with their spouse and 2 children and that is putting a lot of stress on him.  There is no history of chest pain or dyspnea. There is no history of issue with bowel or bladder dysfunction. There is no history of dizziness or lightheadedness. There is no history of issue with sleep. There is no history of issue with present medication.       Hypertension  Pertinent negatives include no chest pain, neck pain or shortness of breath.        /71 (BP Location: Left arm, Patient Position: Sitting, Cuff Size: Large Adult)   Pulse 65   Temp 98.3 °F (36.8 °C) (Temporal)   Wt 74 kg (163 lb 3.2 oz)   SpO2 98%   BMI 26.34 kg/m²       Chief Complaint   Patient presents with    Hypertension     6 month f/u            Current Outpatient Medications:     aspirin 81 MG chewable tablet, Chew 1 tablet Daily., Disp: , Rfl:     carvedilol (COREG) 25 MG tablet, Take 1 tablet by mouth 2 (Two) Times a Day., Disp: , Rfl:     cloNIDine (CATAPRES) 0.1 MG tablet, 1/2 in am  And 1/2 inpm, Disp: , Rfl:     escitalopram (LEXAPRO) 10 MG tablet, Take 1 tablet by mouth Daily., Disp: , Rfl:     ezetimibe (ZETIA) 10 MG tablet, TAKE 1 TABLET BY MOUTH EVERY DAY, Disp: 95 tablet, Rfl: 1    ferrous sulfate 325 (65 FE) MG EC tablet, Take 1 tablet by mouth., Disp: , Rfl:     fluticasone (FLONASE) 50 MCG/ACT nasal spray, 1 spray into the nostril(s) as directed by provider 1 (One) Time. prn, Disp: , Rfl:     folic acid (FOLVITE) 1 MG tablet, TAKE 1 TABLET BY MOUTH EVERY DAY, Disp: 90 tablet, Rfl: 1    isosorbide mononitrate (IMDUR) 30 MG 24 hr tablet, Take 1 tablet by mouth Daily., Disp: , Rfl:      lisinopril (PRINIVIL,ZESTRIL) 40 MG tablet, Take 1 tablet by mouth Daily., Disp: , Rfl:     nitroglycerin (NITROSTAT) 0.6 MG SL tablet, , Disp: , Rfl:     pantoprazole (PROTONIX) 40 MG EC tablet, Take 1 tablet by mouth Daily., Disp: , Rfl:     rosuvastatin (CRESTOR) 40 MG tablet, TAKE 1 TABLET BY MOUTH EVERYDAY AT BEDTIME, Disp: 90 tablet, Rfl: 1    terazosin (HYTRIN) 5 MG capsule, Daily., Disp: , Rfl:     vitamin D (ERGOCALCIFEROL) 1.25 MG (14616 UT) capsule capsule, TAKE 1 CAPSULE BY MOUTH 1 TIME PER WEEK., Disp: 12 capsule, Rfl: 1    VITAMIN D PO, 50,000 ut  (1.25mg), Disp: , Rfl:     terazosin (HYTRIN) 5 MG capsule, Take 5 mg by mouth Every Night. Takes at hs, Disp: , Rfl:         The following portions of the patient's history were reviewed and updated as appropriate: allergies, current medications, past family history, past medical history, past social history, past surgical history, and problem list.    Review of Systems   Constitutional:  Positive for fatigue. Negative for activity change and fever.   HENT:  Negative for congestion, sinus pressure, sinus pain, sore throat and trouble swallowing.    Eyes:  Negative for visual disturbance.   Respiratory:  Negative for chest tightness, shortness of breath and wheezing.    Cardiovascular:  Negative for chest pain.   Gastrointestinal:  Negative for abdominal distention, abdominal pain, constipation, diarrhea, nausea and vomiting.   Genitourinary:  Negative for difficulty urinating and dysuria.   Musculoskeletal:  Negative for back pain and neck pain.   Psychiatric/Behavioral:  Positive for dysphoric mood. Negative for agitation, hallucinations and suicidal ideas. The patient is nervous/anxious.      Objective   Physical Exam  Vitals and nursing note reviewed.   HENT:      Right Ear: Tympanic membrane and ear canal normal.      Left Ear: Tympanic membrane and ear canal normal.   Cardiovascular:      Rate and Rhythm: Normal rate and regular rhythm.      Heart  sounds: Normal heart sounds. No murmur heard.  Pulmonary:      Effort: Pulmonary effort is normal.      Breath sounds: No wheezing or rales.   Abdominal:      General: Bowel sounds are normal.      Palpations: Abdomen is soft.      Tenderness: There is no abdominal tenderness. There is no guarding.   Musculoskeletal:      Cervical back: Neck supple.      Right lower leg: No edema.      Left lower leg: No edema.   Lymphadenopathy:      Cervical: No cervical adenopathy.   Neurological:      General: No focal deficit present.      Mental Status: He is alert and oriented to person, place, and time.   Psychiatric:         Mood and Affect: Mood normal.         Assessment & Plan   Problems Addressed this Visit          Musculoskeletal and Injuries    Osteoarthritis     Other Visit Diagnoses       Essential hypertension    -  Primary    Stage 3 chronic kidney disease, unspecified whether stage 3a or 3b CKD              Diagnoses         Codes Comments    Essential hypertension    -  Primary ICD-10-CM: I10  ICD-9-CM: 401.9     Stage 3 chronic kidney disease, unspecified whether stage 3a or 3b CKD     ICD-10-CM: N18.30  ICD-9-CM: 585.3     Osteoarthritis of multiple joints, unspecified osteoarthritis type     ICD-10-CM: M15.9  ICD-9-CM: 715.89             I will raise the escitalopram to 20 to help with depression symptoms  I did advise he work with his adult child to see if alternative living arrangements are possible  I will get some labs to look at chronic issues  I will see back in 3 mos or so for follow up

## 2023-10-08 RX ORDER — FOLIC ACID 1 MG/1
TABLET ORAL
Qty: 90 TABLET | Refills: 1 | Status: SHIPPED | OUTPATIENT
Start: 2023-10-08

## 2023-10-11 ENCOUNTER — LAB (OUTPATIENT)
Dept: LAB | Facility: HOSPITAL | Age: 68
End: 2023-10-11
Payer: MEDICARE

## 2023-10-11 ENCOUNTER — TRANSCRIBE ORDERS (OUTPATIENT)
Dept: ADMINISTRATIVE | Facility: HOSPITAL | Age: 68
End: 2023-10-11
Payer: MEDICARE

## 2023-10-11 DIAGNOSIS — Z85.6 PERSONAL HISTORY OF UNSPECIFIED LEUKEMIA: ICD-10-CM

## 2023-10-11 DIAGNOSIS — R80.9 PROTEINURIA, UNSPECIFIED TYPE: ICD-10-CM

## 2023-10-11 DIAGNOSIS — N13.8 ENLARGED PROSTATE WITH URINARY OBSTRUCTION: ICD-10-CM

## 2023-10-11 DIAGNOSIS — N18.31 CHRONIC KIDNEY DISEASE (CKD) STAGE G3A/A1, MODERATELY DECREASED GLOMERULAR FILTRATION RATE (GFR) BETWEEN 45-59 ML/MIN/1.73 SQUARE METER AND ALBUMINURIA CREATININE RATIO LESS THAN 30 MG/G (CMS/H*: ICD-10-CM

## 2023-10-11 DIAGNOSIS — N18.31 CHRONIC KIDNEY DISEASE (CKD) STAGE G3A/A1, MODERATELY DECREASED GLOMERULAR FILTRATION RATE (GFR) BETWEEN 45-59 ML/MIN/1.73 SQUARE METER AND ALBUMINURIA CREATININE RATIO LESS THAN 30 MG/G (CMS/H*: Primary | ICD-10-CM

## 2023-10-11 DIAGNOSIS — I10 ESSENTIAL HYPERTENSION, MALIGNANT: ICD-10-CM

## 2023-10-11 DIAGNOSIS — N40.1 ENLARGED PROSTATE WITH URINARY OBSTRUCTION: ICD-10-CM

## 2023-10-11 LAB
ALBUMIN SERPL-MCNC: 4 G/DL (ref 3.5–5.2)
ANION GAP SERPL CALCULATED.3IONS-SCNC: 7.2 MMOL/L (ref 5–15)
BUN SERPL-MCNC: 20 MG/DL (ref 8–23)
BUN/CREAT SERPL: 13.8 (ref 7–25)
CALCIUM SPEC-SCNC: 9.2 MG/DL (ref 8.6–10.5)
CHLORIDE SERPL-SCNC: 105 MMOL/L (ref 98–107)
CO2 SERPL-SCNC: 27.8 MMOL/L (ref 22–29)
CREAT SERPL-MCNC: 1.45 MG/DL (ref 0.76–1.27)
CREAT UR-MCNC: 123.5 MG/DL
EGFRCR SERPLBLD CKD-EPI 2021: 53.5 ML/MIN/1.73
GLUCOSE SERPL-MCNC: 109 MG/DL (ref 65–99)
PHOSPHATE SERPL-MCNC: 3.8 MG/DL (ref 2.5–4.5)
POTASSIUM SERPL-SCNC: 4.3 MMOL/L (ref 3.5–5.2)
PROT ?TM UR-MCNC: 134.6 MG/DL
PROT/CREAT UR: 1089.9 MG/G CREA (ref 0–200)
SODIUM SERPL-SCNC: 140 MMOL/L (ref 136–145)

## 2023-10-11 PROCEDURE — 84156 ASSAY OF PROTEIN URINE: CPT

## 2023-10-11 PROCEDURE — 36415 COLL VENOUS BLD VENIPUNCTURE: CPT

## 2023-10-11 PROCEDURE — 80069 RENAL FUNCTION PANEL: CPT

## 2023-10-11 PROCEDURE — 82570 ASSAY OF URINE CREATININE: CPT

## 2023-10-12 ENCOUNTER — OFFICE VISIT (OUTPATIENT)
Dept: FAMILY MEDICINE CLINIC | Facility: CLINIC | Age: 68
End: 2023-10-12
Payer: MEDICARE

## 2023-10-12 VITALS
WEIGHT: 164 LBS | SYSTOLIC BLOOD PRESSURE: 140 MMHG | BODY MASS INDEX: 26.47 KG/M2 | HEART RATE: 67 BPM | DIASTOLIC BLOOD PRESSURE: 81 MMHG | OXYGEN SATURATION: 98 % | TEMPERATURE: 98.8 F

## 2023-10-12 DIAGNOSIS — J06.9 UPPER RESPIRATORY TRACT INFECTION, UNSPECIFIED TYPE: Primary | ICD-10-CM

## 2023-10-12 LAB
EXPIRATION DATE: NORMAL
FLUAV AG UPPER RESP QL IA.RAPID: NOT DETECTED
FLUBV AG UPPER RESP QL IA.RAPID: NOT DETECTED
INTERNAL CONTROL: NORMAL
Lab: NORMAL
SARS-COV-2 AG UPPER RESP QL IA.RAPID: NOT DETECTED

## 2023-10-12 RX ORDER — CEFDINIR 300 MG/1
300 CAPSULE ORAL 2 TIMES DAILY
Qty: 20 CAPSULE | Refills: 0 | Status: SHIPPED | OUTPATIENT
Start: 2023-10-12 | End: 2023-10-22

## 2023-10-12 RX ORDER — ESCITALOPRAM OXALATE 20 MG/1
20 TABLET ORAL DAILY
Qty: 30 TABLET | Refills: 3 | Status: CANCELLED | OUTPATIENT
Start: 2023-10-12

## 2023-10-12 NOTE — PROGRESS NOTES
Subjective     Anthony Ayoub is a 67 y.o. male.     History of Present Illness  Pt is here today with c/o cough and upper respiratory issues.   Symptoms started a few days ago.   He would have an occasional cough and sinus pressure  He feels like it is moving into his chest  He does not smoke- quit in 2015  Denies any fevers  He has had some body aches.  He has been taking claritin and mucinex  Denies SOA or wheezing       The following portions of the patient's history were reviewed and updated as appropriate: allergies, current medications, past family history, past medical history, past social history, past surgical history, and problem list.    Review of Systems   Constitutional:  Positive for chills and fatigue. Negative for fever.   HENT:  Positive for congestion, sinus pressure and sore throat. Negative for ear pain.    Respiratory:  Positive for cough. Negative for chest tightness and shortness of breath.    Cardiovascular:  Negative for chest pain and palpitations.   Gastrointestinal:  Negative for abdominal pain, nausea and vomiting.   Musculoskeletal:  Positive for myalgias.   Neurological:  Positive for headache. Negative for dizziness.       Objective     /81   Pulse 67   Temp 98.8 øF (37.1 øC) (Oral)   Wt 74.4 kg (164 lb)   SpO2 98%   BMI 26.47 kg/mý     Current Outpatient Medications on File Prior to Visit   Medication Sig Dispense Refill    aspirin 81 MG chewable tablet Chew 1 tablet Daily.      carvedilol (COREG) 25 MG tablet Take 1 tablet by mouth 2 (Two) Times a Day.      cloNIDine (CATAPRES) 0.1 MG tablet 1/2 in am  And 1/2 inpm      escitalopram (LEXAPRO) 20 MG tablet Take 1 tablet by mouth Daily. 30 tablet 3    ezetimibe (ZETIA) 10 MG tablet TAKE 1 TABLET BY MOUTH EVERY DAY 95 tablet 1    ferrous sulfate 325 (65 FE) MG EC tablet Take 1 tablet by mouth.      fluticasone (FLONASE) 50 MCG/ACT nasal spray 1 spray into the nostril(s) as directed by provider 1 (One) Time. prn       folic acid (FOLVITE) 1 MG tablet TAKE 1 TABLET BY MOUTH EVERY DAY 90 tablet 1    isosorbide mononitrate (IMDUR) 30 MG 24 hr tablet Take 1 tablet by mouth Daily.      lisinopril (PRINIVIL,ZESTRIL) 40 MG tablet Take 1 tablet by mouth Daily.      nitroglycerin (NITROSTAT) 0.6 MG SL tablet       pantoprazole (PROTONIX) 40 MG EC tablet Take 1 tablet by mouth Daily.      rosuvastatin (CRESTOR) 40 MG tablet TAKE 1 TABLET BY MOUTH EVERYDAY AT BEDTIME 90 tablet 1    terazosin (HYTRIN) 5 MG capsule Daily.      vitamin D (ERGOCALCIFEROL) 1.25 MG (93486 UT) capsule capsule TAKE 1 CAPSULE BY MOUTH 1 TIME PER WEEK. 12 capsule 1    VITAMIN D PO 50,000 ut  (1.25mg)      [DISCONTINUED] terazosin (HYTRIN) 5 MG capsule Take 5 mg by mouth Every Night. Takes at hs       No current facility-administered medications on file prior to visit.        BMI is >= 25 and <30. (Overweight) The following options were offered after discussion;: exercise counseling/recommendations and nutrition counseling/recommendations       Physical Exam  Vitals reviewed.   Constitutional:       General: He is not in acute distress.     Appearance: Normal appearance. He is well-developed. He is not diaphoretic.   HENT:      Head: Normocephalic and atraumatic.      Right Ear: Tympanic membrane and ear canal normal.      Left Ear: Tympanic membrane and ear canal normal.      Nose: Congestion present. No rhinorrhea.      Mouth/Throat:      Pharynx: No oropharyngeal exudate or posterior oropharyngeal erythema.   Eyes:      General:         Right eye: No discharge.         Left eye: No discharge.      Extraocular Movements: Extraocular movements intact.      Conjunctiva/sclera: Conjunctivae normal.   Cardiovascular:      Rate and Rhythm: Normal rate and regular rhythm.   Pulmonary:      Effort: Pulmonary effort is normal. No respiratory distress.      Breath sounds: Normal breath sounds. No wheezing or rales.   Abdominal:      General: Bowel sounds are normal.       Palpations: Abdomen is soft.   Musculoskeletal:         General: Normal range of motion.      Cervical back: Normal range of motion.   Skin:     General: Skin is warm and dry.   Neurological:      General: No focal deficit present.      Mental Status: He is alert and oriented to person, place, and time.   Psychiatric:         Mood and Affect: Mood normal.         Behavior: Behavior normal.         Thought Content: Thought content normal.         Judgment: Judgment normal.         Assessment & Plan     Diagnoses and all orders for this visit:    1. Upper respiratory tract infection, unspecified type (Primary)  Comments:  covid negative  appears to be sinusitis  will treat with omnicef  push water  cont mucinex  Orders:  -     cefdinir (OMNICEF) 300 MG capsule; Take 1 capsule by mouth 2 (Two) Times a Day for 10 days.  Dispense: 20 capsule; Refill: 0  -     POCT SARS-CoV-2 Antigen PETTY

## 2023-10-13 RX ORDER — ESCITALOPRAM OXALATE 10 MG/1
10 TABLET ORAL DAILY
Qty: 30 TABLET | Refills: 1 | Status: SHIPPED | OUTPATIENT
Start: 2023-10-13 | End: 2023-10-14

## 2023-10-14 RX ORDER — ESCITALOPRAM OXALATE 20 MG/1
20 TABLET ORAL DAILY
Qty: 90 TABLET | Refills: 1 | Status: SHIPPED | OUTPATIENT
Start: 2023-10-14 | End: 2023-10-15

## 2023-10-15 RX ORDER — ESCITALOPRAM OXALATE 10 MG/1
10 TABLET ORAL DAILY
Qty: 90 TABLET | Refills: 0 | Status: SHIPPED | OUTPATIENT
Start: 2023-10-15 | End: 2023-10-15

## 2023-10-15 RX ORDER — ESCITALOPRAM OXALATE 20 MG/1
20 TABLET ORAL DAILY
Start: 2023-10-15

## 2023-10-17 RX ORDER — ESCITALOPRAM OXALATE 20 MG/1
20 TABLET ORAL DAILY
Qty: 90 TABLET | Refills: 1
Start: 2023-10-17 | End: 2023-10-19 | Stop reason: SDUPTHER

## 2023-10-17 NOTE — TELEPHONE ENCOUNTER
Incoming Refill Request      Medication requested (name and dose):     escitalopram (LEXAPRO) 20 MG tablet  20 mg, Daily       Pharmacy where request should be sent: [id number] - [place], [place] - [address] - [phone]  - [phone] [facility] [phone]     Additional details provided by patient: PATIENT'S WIFE IS WANTING THIS TO GO TO ANOTHER PHARMACY BECAUSE THE [facility] [address] CONTIN[facility] TO SAY THEY DID NOT GET THE PRESCRIPTION    Best call back number: [phone]    Does the patient have less than a 3 day supply:  [x] Yes  [] No    Randall Sheets Rep  10/17/23, 13:34 EDT

## 2023-10-19 RX ORDER — ESCITALOPRAM OXALATE 20 MG/1
20 TABLET ORAL DAILY
Qty: 90 TABLET | Refills: 1 | Status: SHIPPED | OUTPATIENT
Start: 2023-10-19

## 2023-10-19 NOTE — TELEPHONE ENCOUNTER
She is calling because neither [facility] pharmacy has gotten the script for Lexapro 20 mg. Can we try again - it says no print when I look at it.

## 2023-11-08 RX ORDER — EZETIMIBE 10 MG/1
TABLET ORAL
Qty: 95 TABLET | Refills: 0 | Status: SHIPPED | OUTPATIENT
Start: 2023-11-08

## 2023-11-16 ENCOUNTER — OFFICE VISIT (OUTPATIENT)
Dept: PAIN MEDICINE | Facility: CLINIC | Age: 68
End: 2023-11-16
Payer: MEDICARE

## 2023-11-16 VITALS
RESPIRATION RATE: 16 BRPM | DIASTOLIC BLOOD PRESSURE: 72 MMHG | OXYGEN SATURATION: 97 % | SYSTOLIC BLOOD PRESSURE: 138 MMHG | HEART RATE: 62 BPM

## 2023-11-16 DIAGNOSIS — M46.1 SACROILIITIS: ICD-10-CM

## 2023-11-16 DIAGNOSIS — M25.50 POLYARTHRALGIA: ICD-10-CM

## 2023-11-16 DIAGNOSIS — M79.2 NEUROPATHIC PAIN: ICD-10-CM

## 2023-11-16 DIAGNOSIS — G89.4 CHRONIC PAIN SYNDROME: Primary | ICD-10-CM

## 2023-11-16 DIAGNOSIS — M47.817 LUMBOSACRAL SPONDYLOSIS WITHOUT MYELOPATHY: ICD-10-CM

## 2023-11-16 RX ORDER — DIPHENOXYLATE HYDROCHLORIDE AND ATROPINE SULFATE 2.5; .025 MG/1; MG/1
TABLET ORAL
COMMUNITY

## 2023-11-17 NOTE — PROGRESS NOTES
Subjective   CC back pain  Anthony Ayoub is a 67 y.o. male with multiple comorbidity including CKD stage III, history of leukemia post bone marrow transplant 2016, chronic polyarthralgia, chronic back pain here for follow-up.  Worsening bilateral SI pain.  Had 80% relief with bilateral SI injection lasted 3 months now symptoms have returned and interfering with ADL.  Chronic right knee pain.  He has seen Ortho in [place] and Dr. Montiel in [place].  Had steroid injection with marginal relief.  He would like to try gel injection.  Overall worsening polyarthralgia.  Chronic back pain mostly axial but radiating to both hips and both lower extremity with cramping weakness feeling in the legs constant but worse with standing walking or prolonged activity.  Denies saddle anesthesia, bladder incontinence.  Also complains of bilateral feet neuropathic pain.  EMG/NCS pending.  Participating in physical therapy with marginal relief.  He had tried Tylenol, topical anti-inflammatories without relief.  Back pain is impairing ADL, interfering with sleep.      Pain Assessment   Location of Pain: Lower Back, R Hip, L Hip,   Description of Pain: Dull/Aching, Throbbing, Stabbing  Previous Pain Rating :7  Current Pain Ratin  Aggravating Factors: Activity  Alleviating Factors: Rest, Medication  Pain onset over 12 weeks  Interferes with ADL's.   [place] back pain disability scale on chart    PEG Assessment   What number best describes your pain on average in the past week?4  What number best describes how, during the past week, pain has interfered with your enjoyment of life?3  What number best describes how, during the past week, pain has interfered with your general activity?  9    The following portions of the patient's history were reviewed and updated as appropriate: allergies, current medications, past family history, past medical history, past social history, past surgical history and problem list.     has a past  medical history of CHF (congestive heart failure), Coronary artery disease, GERD (gastroesophageal reflux disease), Hyperlipidemia, Hypertension, Leukemia, Low back pain, and Sleep apnea.   has a past surgical history that includes Bone marrow transplant; Cardiac catheterization; Vasectomy; Sinus surgery; Tonsillectomy; and Cholecystectomy (N/A, 2022).  family history includes Heart attack in his father; Heart disease in his brother and father; Hyperlipidemia in his mother; Hypertension in his brother, mother, and sister.  Social History     Tobacco Use    Smoking status: Former     Packs/day: 1.00     Years: 30.00     Additional pack years: 0.00     Total pack years: 30.00     Types: Cigarettes     Quit date:      Years since quittin.8    Smokeless tobacco: Never   Substance Use Topics    Alcohol use: No       Review of Systems   Musculoskeletal:  Positive for arthralgias and back pain.   All other systems reviewed and are negative.      Objective   Physical Exam  Vitals reviewed.   Constitutional:       General: He is not in acute distress.  Musculoskeletal:      Lumbar back: Tenderness present. Decreased range of motion. Positive right straight leg raise test and positive left straight leg raise test.      Comments: Lumbar loading positive, pain on extension of low back past 5 degrees.  TTP on the lumbar facets noted.         /72   Pulse 62   Resp 16   SpO2 97%     PHQ 9 on chart  Opioid risk tool low risk    Assessment & Plan   Diagnoses and all orders for this visit:    1. Chronic pain syndrome (Primary)    2. Sacroiliitis  -     SI Joint Injection    3. Lumbosacral spondylosis without myelopathy    4. Polyarthralgia    5. Neuropathic pain      Summary  Anthony Ayoub is a 67 y.o. male with multiple comorbidity including CKD stage III, history of leukemia post bone marrow transplant 2016, chronic polyarthralgia, chronic back pain here for follow-up.  Chronic pain from lumbar DDD  spondylosis, and stenosis with neurogenic medication.  Polyarthralgia, right knee pain.  80% relief of neurogenic location symptoms with LESI.  70% relief of lumbar RFA     Worsening bilateral SI pain.  Had 80% relief with bilateral SI injection lasted 3 months now symptoms have returned and interfering with ADL.  We will schedule for repeat bilateral therapeutic SI injection.  Risk and benefits discussed.    RTC for procedure

## 2023-11-27 ENCOUNTER — HOSPITAL ENCOUNTER (OUTPATIENT)
Dept: PAIN MEDICINE | Facility: HOSPITAL | Age: 68
Discharge: HOME | End: 2023-11-27
Payer: MEDICARE

## 2023-11-27 VITALS
HEART RATE: 56 BPM | BODY MASS INDEX: 26.36 KG/M2 | RESPIRATION RATE: 16 BRPM | WEIGHT: 164 LBS | TEMPERATURE: 97.3 F | DIASTOLIC BLOOD PRESSURE: 78 MMHG | HEIGHT: 66 IN | OXYGEN SATURATION: 97 % | SYSTOLIC BLOOD PRESSURE: 165 MMHG

## 2023-11-27 DIAGNOSIS — R52 PAIN: ICD-10-CM

## 2023-11-27 DIAGNOSIS — M46.1 SACROILIITIS: Primary | ICD-10-CM

## 2023-11-27 PROCEDURE — 25510000001 IOPAMIDOL 41 % SOLUTION: Performed by: ANESTHESIOLOGY

## 2023-11-27 PROCEDURE — 27096 INJECT SACROILIAC JOINT: CPT | Performed by: ANESTHESIOLOGY

## 2023-11-27 PROCEDURE — 77003 FLUOROGUIDE FOR SPINE INJECT: CPT

## 2023-11-27 PROCEDURE — 25010000002 METHYLPREDNISOLONE PER 40 MG: Performed by: ANESTHESIOLOGY

## 2023-11-27 RX ORDER — IOPAMIDOL 408 MG/ML
3 INJECTION, SOLUTION INTRATHECAL
Status: COMPLETED | OUTPATIENT
Start: 2023-11-27 | End: 2023-11-27

## 2023-11-27 RX ORDER — METHYLPREDNISOLONE ACETATE 40 MG/ML
40 INJECTION, SUSPENSION INTRA-ARTICULAR; INTRALESIONAL; INTRAMUSCULAR; SOFT TISSUE ONCE
Status: COMPLETED | OUTPATIENT
Start: 2023-11-27 | End: 2023-11-27

## 2023-11-27 RX ADMIN — IOPAMIDOL 3 ML: 408 INJECTION, SOLUTION INTRATHECAL at 11:25

## 2023-11-27 RX ADMIN — METHYLPREDNISOLONE ACETATE 40 MG: 40 INJECTION, SUSPENSION INTRA-ARTICULAR; INTRALESIONAL; INTRAMUSCULAR; INTRASYNOVIAL; SOFT TISSUE at 11:25

## 2023-11-27 NOTE — PROCEDURES
"Subjective   CC back pain  Anthony Ayoub is a 67 y.o. male with ileitis here for repeat bilateral SI injections.    Pain Assessment   Location of Pain: Lower Back, R Hip, L Hip, legs  Description of Pain: Dull/Aching, Throbbing, Stabbing  Previous Pain Rating :8  Current Pain Ratin  Aggravating Factors: Activity  Alleviating Factors: Rest, Medication  Pain onset over 12 weeks  Pain interferes with ADL's    The following portions of the patient's history were reviewed and updated as appropriate: allergies, current medications, past family history, past medical history, past social history, past surgical history and problem list.      Review of Systems  As in HPI  Objective   Physical Exam  Vitals reviewed.   Constitutional:       General: He is not in acute distress.  Pulmonary:      Effort: Pulmonary effort is normal.       /78 (BP Location: Right arm, Patient Position: Sitting)   Pulse 56   Temp 97.3 °F (36.3 °C) (Skin)   Resp 16   Ht 167.6 cm (66\")   Wt 74.4 kg (164 lb)   SpO2 97%   BMI 26.47 kg/m²     Assessment & Plan    underwent repeat bilateral SI injection.    RTC 4-6 weeks or as needed for repeat    DATE OF PROCEDURE:  2023    PREOPERATIVE DIAGNOSIS: sacroiliitis    POSTOPERATIVE DIAGNOSIS: same    PROCEDURE PERFORMED: Bilateral SACROILIAC JOINT INJECTION    The patient understands the risks and benefits of the procedure and wishes to proceed. The patient was seen in the preoperative area. Patient's consent was obtained and updated. Vitals were taken. Patient was then brought to the procedure suite and placed in prone position for sacroiliac joint injection. The appropriate anatomic area was widely prepped with Chloraprep and draped in a sterile fashion. Noninvasive monitoring per routine anesthesia protocol was placed. Under fluoroscopic guidance using an AP view, a 22 gauge curved tip spinal needle was passed through skin anesthetized with 1% Lidocaine without epinephrine. " The needle tip was guided to the lower pole of the joint using fluoroscopy. 1 mL of  preservative free contrast was injected into the joint to confirm location. A clear outline was obtained and 5 mL of steroid solution containing 4 mL 0.25% bupivacaine, and 1mL 40mg Depomedrol was injected on each side. The patient tolerated with no pierre-procedural complications.  A sterile dressing was placed over the puncture sites.

## 2023-11-28 ENCOUNTER — TELEPHONE (OUTPATIENT)
Dept: PAIN MEDICINE | Facility: HOSPITAL | Age: 68
End: 2023-11-28
Payer: MEDICARE

## 2023-11-28 NOTE — TELEPHONE ENCOUNTER
Post procedure phone call completed.  Pt states they are doing good and denies questions or concerns. Pain #2

## 2023-12-31 RX ORDER — ERGOCALCIFEROL 1.25 MG/1
CAPSULE ORAL
Qty: 12 CAPSULE | Refills: 1 | Status: SHIPPED | OUTPATIENT
Start: 2023-12-31

## 2024-01-02 ENCOUNTER — OFFICE VISIT (OUTPATIENT)
Dept: FAMILY MEDICINE CLINIC | Facility: CLINIC | Age: 69
End: 2024-01-02
Payer: MEDICARE

## 2024-01-02 VITALS
OXYGEN SATURATION: 97 % | HEART RATE: 66 BPM | BODY MASS INDEX: 26.79 KG/M2 | TEMPERATURE: 98.4 F | WEIGHT: 166 LBS | SYSTOLIC BLOOD PRESSURE: 137 MMHG | DIASTOLIC BLOOD PRESSURE: 89 MMHG

## 2024-01-02 DIAGNOSIS — J32.9 SINUSITIS, UNSPECIFIED CHRONICITY, UNSPECIFIED LOCATION: Primary | ICD-10-CM

## 2024-01-02 PROCEDURE — 3079F DIAST BP 80-89 MM HG: CPT | Performed by: NURSE PRACTITIONER

## 2024-01-02 PROCEDURE — 1160F RVW MEDS BY RX/DR IN RCRD: CPT | Performed by: NURSE PRACTITIONER

## 2024-01-02 PROCEDURE — 99213 OFFICE O/P EST LOW 20 MIN: CPT | Performed by: NURSE PRACTITIONER

## 2024-01-02 PROCEDURE — 3075F SYST BP GE 130 - 139MM HG: CPT | Performed by: NURSE PRACTITIONER

## 2024-01-02 PROCEDURE — 1159F MED LIST DOCD IN RCRD: CPT | Performed by: NURSE PRACTITIONER

## 2024-01-02 RX ORDER — CEFDINIR 300 MG/1
300 CAPSULE ORAL 2 TIMES DAILY
Qty: 20 CAPSULE | Refills: 0 | Status: SHIPPED | OUTPATIENT
Start: 2024-01-02 | End: 2024-01-12

## 2024-01-02 NOTE — PROGRESS NOTES
Subjective     Anthony Ayoub is a 68 y.o. male.     History of Present Illness  Pt is here today with his wife with c/o congestion and eye drainage  He states it started 4 days ago  Denies any fever has a lot of nasal congestion  Has left ear drainage often  He doesn't have great hearing but that is his norm.  His eyes are matted shut in the morning  He has been using flonase.   Denies cough or chest congestion.   He has a history of leukemia and bone marrow transplant       The following portions of the patient's history were reviewed and updated as appropriate: allergies, current medications, past family history, past medical history, past social history, past surgical history, and problem list.    Review of Systems   Constitutional:  Negative for chills, fatigue and fever.   HENT:  Positive for congestion and ear pain.    Respiratory:  Negative for cough, chest tightness and shortness of breath.    Cardiovascular:  Negative for chest pain and palpitations.       Objective     /89   Pulse 66   Temp 98.4 °F (36.9 °C) (Tympanic)   Wt 75.3 kg (166 lb)   SpO2 97%   BMI 26.79 kg/m²     Current Outpatient Medications on File Prior to Visit   Medication Sig Dispense Refill    aspirin 81 MG chewable tablet Chew 1 tablet Daily.      carvedilol (COREG) 25 MG tablet Take 1 tablet by mouth 2 (Two) Times a Day.      cloNIDine (CATAPRES) 0.1 MG tablet 1/2 in am  And 1/2 inpm      escitalopram (LEXAPRO) 20 MG tablet Take 1 tablet by mouth Daily. 90 tablet 1    ezetimibe (ZETIA) 10 MG tablet TAKE 1 TABLET BY MOUTH EVERY DAY 95 tablet 0    ferrous sulfate 325 (65 FE) MG EC tablet Take 1 tablet by mouth.      fluticasone (FLONASE) 50 MCG/ACT nasal spray 1 spray into the nostril(s) as directed by provider 1 (One) Time. prn      folic acid (FOLVITE) 1 MG tablet TAKE 1 TABLET BY MOUTH EVERY DAY 90 tablet 1    isosorbide mononitrate (IMDUR) 30 MG 24 hr tablet Take 1 tablet by mouth Daily.      lisinopril  (PRINIVIL,ZESTRIL) 40 MG tablet Take 1 tablet by mouth Daily.      multivitamin (THERAGRAN) tablet tablet Take  by mouth.      nitroglycerin (NITROSTAT) 0.6 MG SL tablet       pantoprazole (PROTONIX) 40 MG EC tablet Take 1 tablet by mouth Daily.      rosuvastatin (CRESTOR) 40 MG tablet TAKE 1 TABLET BY MOUTH EVERYDAY AT BEDTIME 90 tablet 1    terazosin (HYTRIN) 5 MG capsule Daily.      vitamin D (ERGOCALCIFEROL) 1.25 MG (34959 UT) capsule capsule TAKE 1 CAPSULE BY MOUTH 1 TIME PER WEEK. 12 capsule 1    VITAMIN D PO 50,000 ut  (1.25mg)       No current facility-administered medications on file prior to visit.                 Physical Exam  Constitutional:       General: He is not in acute distress.     Appearance: Normal appearance. He is not ill-appearing.   HENT:      Head: Normocephalic and atraumatic.      Right Ear: Tympanic membrane is scarred.      Left Ear: Drainage present. Tympanic membrane is perforated.      Nose: Congestion present. No rhinorrhea.   Cardiovascular:      Rate and Rhythm: Normal rate and regular rhythm.      Heart sounds: No murmur heard.  Pulmonary:      Effort: Pulmonary effort is normal. No respiratory distress.      Breath sounds: Normal breath sounds.   Musculoskeletal:         General: Normal range of motion.   Skin:     General: Skin is warm and dry.   Neurological:      General: No focal deficit present.      Mental Status: He is alert and oriented to person, place, and time.   Psychiatric:         Mood and Affect: Mood normal.         Behavior: Behavior normal.         Thought Content: Thought content normal.         Judgment: Judgment normal.           Assessment & Plan     Diagnoses and all orders for this visit:    1. Sinusitis, unspecified chronicity, unspecified location (Primary)  Comments:  appears to be bacterial  will send in omnicef  if eyes continue to drain pt will let me know and we will do eye antibiotics  cont flonase  mucinex  Orders:  -     cefdinir (OMNICEF) 300  MG capsule; Take 1 capsule by mouth 2 (Two) Times a Day for 10 days.  Dispense: 20 capsule; Refill: 0

## 2024-01-03 RX ORDER — POLYMYXIN B SULFATE AND TRIMETHOPRIM 1; 10000 MG/ML; [USP'U]/ML
1 SOLUTION OPHTHALMIC EVERY 6 HOURS
Qty: 10 ML | Refills: 0 | Status: SHIPPED | OUTPATIENT
Start: 2024-01-03 | End: 2024-01-10

## 2024-01-03 RX ORDER — ROSUVASTATIN CALCIUM 40 MG/1
TABLET, COATED ORAL
Qty: 90 TABLET | Refills: 1 | Status: SHIPPED | OUTPATIENT
Start: 2024-01-03

## 2024-01-18 ENCOUNTER — OFFICE VISIT (OUTPATIENT)
Dept: FAMILY MEDICINE CLINIC | Facility: CLINIC | Age: 69
End: 2024-01-18
Payer: MEDICARE

## 2024-01-18 VITALS
HEART RATE: 61 BPM | SYSTOLIC BLOOD PRESSURE: 150 MMHG | HEIGHT: 66 IN | OXYGEN SATURATION: 97 % | DIASTOLIC BLOOD PRESSURE: 88 MMHG | BODY MASS INDEX: 26.68 KG/M2 | WEIGHT: 166 LBS

## 2024-01-18 DIAGNOSIS — M15.9 OSTEOARTHRITIS OF MULTIPLE JOINTS, UNSPECIFIED OSTEOARTHRITIS TYPE: ICD-10-CM

## 2024-01-18 DIAGNOSIS — N18.30 STAGE 3 CHRONIC KIDNEY DISEASE, UNSPECIFIED WHETHER STAGE 3A OR 3B CKD: ICD-10-CM

## 2024-01-18 DIAGNOSIS — Z12.5 ENCOUNTER FOR SCREENING FOR MALIGNANT NEOPLASM OF PROSTATE: ICD-10-CM

## 2024-01-18 DIAGNOSIS — I10 ESSENTIAL HYPERTENSION: Primary | ICD-10-CM

## 2024-01-18 NOTE — PROGRESS NOTES
"Subjective   Anthony Ayoub is a 68 y.o. male.     History of Present Illness  Anthony Ayoub is in for follow up on his issues with high blood pressure and chronic renal disease.  He also has fairly diffuse arthritis.. There is no history of chest pain or dyspnea. There is no history of issue with bowel or bladder dysfunction. There is no history of dizziness or lightheadedness. There is no history of issue with sleep or mood. There is no history of issue with present medication.  He has tolerated the escitalopram well and both he and his spouse feel it has helped him tremendously.  He is still functional with his activities of daily living despite the arthritic issues.  He gets occasional injections in the sacroiliac area from pain management and those are still effective for him.      Hypertension  Pertinent negatives include no chest pain, neck pain or shortness of breath.          /88 (BP Location: Left arm, Patient Position: Sitting, Cuff Size: Adult)   Pulse 61   Ht 167.6 cm (66\")   Wt 75.3 kg (166 lb)   SpO2 97%   BMI 26.79 kg/m²       Chief Complaint   Patient presents with    Hypertension           Current Outpatient Medications:     aspirin 81 MG chewable tablet, Chew 1 tablet Daily., Disp: , Rfl:     carvedilol (COREG) 25 MG tablet, Take 1 tablet by mouth 2 (Two) Times a Day., Disp: , Rfl:     cloNIDine (CATAPRES) 0.1 MG tablet, 1/2 in am  And 1/2 inpm, Disp: , Rfl:     escitalopram (LEXAPRO) 20 MG tablet, Take 1 tablet by mouth Daily., Disp: 90 tablet, Rfl: 1    ezetimibe (ZETIA) 10 MG tablet, TAKE 1 TABLET BY MOUTH EVERY DAY, Disp: 95 tablet, Rfl: 0    ferrous sulfate 325 (65 FE) MG EC tablet, Take 1 tablet by mouth., Disp: , Rfl:     fluticasone (FLONASE) 50 MCG/ACT nasal spray, 1 spray into the nostril(s) as directed by provider 1 (One) Time. prn, Disp: , Rfl:     folic acid (FOLVITE) 1 MG tablet, TAKE 1 TABLET BY MOUTH EVERY DAY, Disp: 90 tablet, Rfl: 1    isosorbide " mononitrate (IMDUR) 30 MG 24 hr tablet, Take 1 tablet by mouth Daily., Disp: , Rfl:     lisinopril (PRINIVIL,ZESTRIL) 40 MG tablet, Take 1 tablet by mouth Daily., Disp: , Rfl:     multivitamin (THERAGRAN) tablet tablet, Take  by mouth., Disp: , Rfl:     nitroglycerin (NITROSTAT) 0.6 MG SL tablet, , Disp: , Rfl:     pantoprazole (PROTONIX) 40 MG EC tablet, Take 1 tablet by mouth Daily., Disp: , Rfl:     rosuvastatin (CRESTOR) 40 MG tablet, TAKE 1 TABLET BY MOUTH EVERYDAY AT BEDTIME, Disp: 90 tablet, Rfl: 1    terazosin (HYTRIN) 5 MG capsule, Daily., Disp: , Rfl:     vitamin D (ERGOCALCIFEROL) 1.25 MG (51066 UT) capsule capsule, TAKE 1 CAPSULE BY MOUTH 1 TIME PER WEEK., Disp: 12 capsule, Rfl: 1    VITAMIN D PO, 50,000 ut  (1.25mg), Disp: , Rfl:             The following portions of the patient's history were reviewed and updated as appropriate: allergies, current medications, past family history, past medical history, past social history, past surgical history, and problem list.    Review of Systems   Constitutional:  Positive for fatigue. Negative for activity change and fever.   HENT:  Negative for congestion, sinus pressure, sinus pain, sore throat and trouble swallowing.    Eyes:  Negative for visual disturbance.   Respiratory:  Negative for chest tightness, shortness of breath and wheezing.    Cardiovascular:  Negative for chest pain.   Gastrointestinal:  Negative for abdominal distention, abdominal pain, constipation, diarrhea, nausea and vomiting.   Genitourinary:  Negative for difficulty urinating and dysuria.   Musculoskeletal:  Negative for back pain and neck pain.   Psychiatric/Behavioral:  Negative for agitation, dysphoric mood, hallucinations and suicidal ideas. The patient is not nervous/anxious.        Objective   Physical Exam  Vitals and nursing note reviewed.   Constitutional:       Appearance: Normal appearance.   HENT:      Right Ear: Tympanic membrane and ear canal normal.      Left Ear: Tympanic  membrane and ear canal normal.   Cardiovascular:      Rate and Rhythm: Normal rate and regular rhythm.      Heart sounds: Normal heart sounds. No murmur heard.  Pulmonary:      Effort: Pulmonary effort is normal.      Breath sounds: No wheezing or rales.   Abdominal:      General: Bowel sounds are normal.      Palpations: Abdomen is soft.      Tenderness: There is no abdominal tenderness. There is no guarding.   Musculoskeletal:      Cervical back: Neck supple.      Right lower leg: No edema.      Left lower leg: No edema.   Lymphadenopathy:      Cervical: No cervical adenopathy.   Neurological:      General: No focal deficit present.      Mental Status: He is alert and oriented to person, place, and time.   Psychiatric:         Mood and Affect: Mood normal.           Assessment & Plan   Problems Addressed this Visit          Genitourinary and Reproductive     Encounter for screening for malignant neoplasm of prostate    Relevant Orders    PSA SCREENING       Musculoskeletal and Injuries    Osteoarthritis     Other Visit Diagnoses       Essential hypertension    -  Primary    Relevant Orders    Comprehensive metabolic panel    Lipid panel    Stage 3 chronic kidney disease, unspecified whether stage 3a or 3b CKD        Relevant Orders    Vitamin D 25 hydroxy          Diagnoses         Codes Comments    Essential hypertension    -  Primary ICD-10-CM: I10  ICD-9-CM: 401.9     Stage 3 chronic kidney disease, unspecified whether stage 3a or 3b CKD     ICD-10-CM: N18.30  ICD-9-CM: 585.3     Osteoarthritis of multiple joints, unspecified osteoarthritis type     ICD-10-CM: M15.9  ICD-9-CM: 715.89     Encounter for screening for malignant neoplasm of prostate     ICD-10-CM: Z12.5  ICD-9-CM: V76.44           I did order some updated labs  I did ask him to stay on the current medical treatment plan  I did ask him to call with any new concerns  I asked him to see me again in 6 months for Medicare well visit  I reminded him to  stay as active as he can tolerate and advised him that I thought he was safe for any travel he might want to take

## 2024-02-12 RX ORDER — EZETIMIBE 10 MG/1
TABLET ORAL
Qty: 95 TABLET | Refills: 0 | Status: SHIPPED | OUTPATIENT
Start: 2024-02-12

## 2024-02-15 ENCOUNTER — LAB (OUTPATIENT)
Dept: FAMILY MEDICINE CLINIC | Facility: CLINIC | Age: 69
End: 2024-02-15
Payer: MEDICARE

## 2024-02-15 LAB
25(OH)D3 SERPL-MCNC: 104 NG/ML (ref 30–100)
ALBUMIN SERPL-MCNC: 4.6 G/DL (ref 3.5–5.2)
ALBUMIN/GLOB SERPL: 2.6 G/DL
ALP SERPL-CCNC: 83 U/L (ref 39–117)
ALT SERPL W P-5'-P-CCNC: 23 U/L (ref 1–41)
ANION GAP SERPL CALCULATED.3IONS-SCNC: 8 MMOL/L (ref 5–15)
AST SERPL-CCNC: 21 U/L (ref 1–40)
BILIRUB SERPL-MCNC: 0.6 MG/DL (ref 0–1.2)
BUN SERPL-MCNC: 31 MG/DL (ref 8–23)
BUN/CREAT SERPL: 15.9 (ref 7–25)
CALCIUM SPEC-SCNC: 9.6 MG/DL (ref 8.6–10.5)
CHLORIDE SERPL-SCNC: 105 MMOL/L (ref 98–107)
CHOLEST SERPL-MCNC: 117 MG/DL (ref 0–200)
CO2 SERPL-SCNC: 27 MMOL/L (ref 22–29)
CREAT SERPL-MCNC: 1.95 MG/DL (ref 0.76–1.27)
EGFRCR SERPLBLD CKD-EPI 2021: 37.2 ML/MIN/1.73
GLOBULIN UR ELPH-MCNC: 1.8 GM/DL
GLUCOSE SERPL-MCNC: 109 MG/DL (ref 65–99)
HDLC SERPL-MCNC: 31 MG/DL (ref 40–60)
LDLC SERPL CALC-MCNC: 52 MG/DL (ref 0–100)
LDLC/HDLC SERPL: 1.43 {RATIO}
POTASSIUM SERPL-SCNC: 5.1 MMOL/L (ref 3.5–5.2)
PROT SERPL-MCNC: 6.4 G/DL (ref 6–8.5)
PSA SERPL-MCNC: 6 NG/ML (ref 0–4)
SODIUM SERPL-SCNC: 140 MMOL/L (ref 136–145)
TRIGL SERPL-MCNC: 209 MG/DL (ref 0–150)
VLDLC SERPL-MCNC: 34 MG/DL (ref 5–40)

## 2024-02-15 PROCEDURE — 82306 VITAMIN D 25 HYDROXY: CPT | Performed by: FAMILY MEDICINE

## 2024-02-15 PROCEDURE — 80053 COMPREHEN METABOLIC PANEL: CPT | Performed by: FAMILY MEDICINE

## 2024-02-15 PROCEDURE — 80061 LIPID PANEL: CPT | Performed by: FAMILY MEDICINE

## 2024-02-15 PROCEDURE — G0103 PSA SCREENING: HCPCS | Performed by: FAMILY MEDICINE

## 2024-02-20 ENCOUNTER — HOSPITAL ENCOUNTER (INPATIENT)
Facility: HOSPITAL | Age: 69
LOS: 5 days | Discharge: HOME | DRG: 233 | End: 2024-02-26
Attending: EMERGENCY MEDICINE | Admitting: EMERGENCY MEDICINE
Payer: MEDICARE

## 2024-02-20 ENCOUNTER — APPOINTMENT (OUTPATIENT)
Dept: GENERAL RADIOLOGY | Facility: HOSPITAL | Age: 69
DRG: 233 | End: 2024-02-20
Payer: MEDICARE

## 2024-02-20 ENCOUNTER — APPOINTMENT (OUTPATIENT)
Dept: CARDIOLOGY | Facility: HOSPITAL | Age: 69
DRG: 233 | End: 2024-02-20
Payer: MEDICARE

## 2024-02-20 DIAGNOSIS — I10 HYPERTENSION, UNSPECIFIED TYPE: ICD-10-CM

## 2024-02-20 DIAGNOSIS — N18.9 CHRONIC KIDNEY DISEASE, UNSPECIFIED CKD STAGE: ICD-10-CM

## 2024-02-20 DIAGNOSIS — Z95.1 S/P CABG X 3: ICD-10-CM

## 2024-02-20 DIAGNOSIS — I21.4 NON-ST ELEVATED MYOCARDIAL INFARCTION (NON-STEMI): ICD-10-CM

## 2024-02-20 DIAGNOSIS — R07.9 CHEST PAIN, UNSPECIFIED TYPE: ICD-10-CM

## 2024-02-20 DIAGNOSIS — I20.0 UNSTABLE ANGINA: Primary | ICD-10-CM

## 2024-02-20 LAB
ANION GAP SERPL CALCULATED.3IONS-SCNC: 11 MMOL/L (ref 5–15)
APTT PPP: 25.3 SECONDS (ref 61–76.5)
BASOPHILS # BLD AUTO: 0.1 10*3/MM3 (ref 0–0.2)
BASOPHILS NFR BLD AUTO: 1.5 % (ref 0–1.5)
BH CV ECHO MEAS - AO MAX PG: 8 MMHG
BH CV ECHO MEAS - AO MEAN PG: 4 MMHG
BH CV ECHO MEAS - AO V2 MAX: 141 CM/SEC
BH CV ECHO MEAS - AO V2 VTI: 32.6 CM
BH CV ECHO MEAS - AVA(I,D): 1.84 CM2
BH CV ECHO MEAS - EDV(CUBED): 103.8 ML
BH CV ECHO MEAS - EDV(MOD-SP2): 78.6 ML
BH CV ECHO MEAS - EDV(MOD-SP4): 105 ML
BH CV ECHO MEAS - EF(MOD-BP): 59.1 %
BH CV ECHO MEAS - EF(MOD-SP2): 60.1 %
BH CV ECHO MEAS - EF(MOD-SP4): 61.4 %
BH CV ECHO MEAS - ESV(CUBED): 35.9 ML
BH CV ECHO MEAS - ESV(MOD-SP2): 31.4 ML
BH CV ECHO MEAS - ESV(MOD-SP4): 40.5 ML
BH CV ECHO MEAS - FS: 29.8 %
BH CV ECHO MEAS - IVS/LVPW: 1.09 CM
BH CV ECHO MEAS - IVSD: 1.2 CM
BH CV ECHO MEAS - LA DIMENSION: 3.7 CM
BH CV ECHO MEAS - LAT PEAK E' VEL: 5.1 CM/SEC
BH CV ECHO MEAS - LV DIASTOLIC VOL/BSA (35-75): 55.9 CM2
BH CV ECHO MEAS - LV MASS(C)D: 199.6 GRAMS
BH CV ECHO MEAS - LV MAX PG: 3.7 MMHG
BH CV ECHO MEAS - LV MEAN PG: 2 MMHG
BH CV ECHO MEAS - LV SYSTOLIC VOL/BSA (12-30): 21.6 CM2
BH CV ECHO MEAS - LV V1 MAX: 95.8 CM/SEC
BH CV ECHO MEAS - LV V1 VTI: 21.2 CM
BH CV ECHO MEAS - LVIDD: 4.7 CM
BH CV ECHO MEAS - LVIDS: 3.3 CM
BH CV ECHO MEAS - LVOT AREA: 2.8 CM2
BH CV ECHO MEAS - LVOT DIAM: 1.9 CM
BH CV ECHO MEAS - LVPWD: 1.1 CM
BH CV ECHO MEAS - MED PEAK E' VEL: 4.6 CM/SEC
BH CV ECHO MEAS - MV A MAX VEL: 91.7 CM/SEC
BH CV ECHO MEAS - MV DEC SLOPE: 265 CM/SEC2
BH CV ECHO MEAS - MV DEC TIME: 0.22 SEC
BH CV ECHO MEAS - MV E MAX VEL: 56.6 CM/SEC
BH CV ECHO MEAS - MV E/A: 0.62
BH CV ECHO MEAS - MV MAX PG: 4.1 MMHG
BH CV ECHO MEAS - MV MEAN PG: 1 MMHG
BH CV ECHO MEAS - MV P1/2T: 75.4 MSEC
BH CV ECHO MEAS - MV V2 VTI: 27.7 CM
BH CV ECHO MEAS - MVA(P1/2T): 2.9 CM2
BH CV ECHO MEAS - MVA(VTI): 2.17 CM2
BH CV ECHO MEAS - PA V2 MAX: 102 CM/SEC
BH CV ECHO MEAS - PULM A REVS DUR: 0.16 SEC
BH CV ECHO MEAS - PULM A REVS VEL: 26.4 CM/SEC
BH CV ECHO MEAS - PULM DIAS VEL: 30.5 CM/SEC
BH CV ECHO MEAS - PULM S/D: 2.17
BH CV ECHO MEAS - PULM SYS VEL: 66.3 CM/SEC
BH CV ECHO MEAS - RV MAX PG: 3.1 MMHG
BH CV ECHO MEAS - RV V1 MAX: 87.8 CM/SEC
BH CV ECHO MEAS - RV V1 VTI: 20.8 CM
BH CV ECHO MEAS - RVDD: 3.3 CM
BH CV ECHO MEAS - SI(MOD-SP2): 25.1 ML/M2
BH CV ECHO MEAS - SI(MOD-SP4): 34.4 ML/M2
BH CV ECHO MEAS - SV(LVOT): 60.1 ML
BH CV ECHO MEAS - SV(MOD-SP2): 47.2 ML
BH CV ECHO MEAS - SV(MOD-SP4): 64.5 ML
BH CV ECHO MEAS - TAPSE (>1.6): 1.61 CM
BH CV ECHO MEASUREMENTS AVERAGE E/E' RATIO: 11.67
BH CV XLRA - RV BASE: 3.3 CM
BH CV XLRA - RV LENGTH: 7.2 CM
BH CV XLRA - RV MID: 2.9 CM
BH CV XLRA - TDI S': 12.3 CM/SEC
BUN SERPL-MCNC: 35 MG/DL (ref 8–23)
BUN/CREAT SERPL: 19.9 (ref 7–25)
CALCIUM SPEC-SCNC: 9.7 MG/DL (ref 8.6–10.5)
CHLORIDE SERPL-SCNC: 101 MMOL/L (ref 98–107)
CO2 SERPL-SCNC: 26 MMOL/L (ref 22–29)
CREAT SERPL-MCNC: 1.76 MG/DL (ref 0.76–1.27)
DEPRECATED RDW RBC AUTO: 47.7 FL (ref 37–54)
EGFRCR SERPLBLD CKD-EPI 2021: 42.1 ML/MIN/1.73
EOSINOPHIL # BLD AUTO: 0.4 10*3/MM3 (ref 0–0.4)
EOSINOPHIL NFR BLD AUTO: 5.4 % (ref 0.3–6.2)
ERYTHROCYTE [DISTWIDTH] IN BLOOD BY AUTOMATED COUNT: 15 % (ref 12.3–15.4)
GEN 5 2HR TROPONIN T REFLEX: 97 NG/L
GLUCOSE SERPL-MCNC: 99 MG/DL (ref 65–99)
HCT VFR BLD AUTO: 40.1 % (ref 37.5–51)
HGB BLD-MCNC: 13.5 G/DL (ref 13–17.7)
HOLD SPECIMEN: NORMAL
INR PPP: 1.02 (ref 0.93–1.1)
LEFT ATRIUM VOLUME INDEX: 28.5 ML/M2
LYMPHOCYTES # BLD AUTO: 2.6 10*3/MM3 (ref 0.7–3.1)
LYMPHOCYTES NFR BLD AUTO: 36.4 % (ref 19.6–45.3)
MAGNESIUM SERPL-MCNC: 2.1 MG/DL (ref 1.6–2.4)
MCH RBC QN AUTO: 31.1 PG (ref 26.6–33)
MCHC RBC AUTO-ENTMCNC: 33.6 G/DL (ref 31.5–35.7)
MCV RBC AUTO: 92.3 FL (ref 79–97)
MONOCYTES # BLD AUTO: 0.6 10*3/MM3 (ref 0.1–0.9)
MONOCYTES NFR BLD AUTO: 7.7 % (ref 5–12)
NEUTROPHILS NFR BLD AUTO: 3.5 10*3/MM3 (ref 1.7–7)
NEUTROPHILS NFR BLD AUTO: 49 % (ref 42.7–76)
NRBC BLD AUTO-RTO: 0 /100 WBC (ref 0–0.2)
PLATELET # BLD AUTO: 169 10*3/MM3 (ref 140–450)
PMV BLD AUTO: 8.8 FL (ref 6–12)
POTASSIUM SERPL-SCNC: 4.6 MMOL/L (ref 3.5–5.2)
PROTHROMBIN TIME: 11.1 SECONDS (ref 9.6–11.7)
QT INTERVAL: 467 MS
QTC INTERVAL: 465 MS
RBC # BLD AUTO: 4.34 10*6/MM3 (ref 4.14–5.8)
SINUS: 3.1 CM
SODIUM SERPL-SCNC: 138 MMOL/L (ref 136–145)
TROPONIN T DELTA: 38 NG/L
TROPONIN T SERPL HS-MCNC: 59 NG/L
WBC NRBC COR # BLD AUTO: 7.2 10*3/MM3 (ref 3.4–10.8)
WHOLE BLOOD HOLD COAG: NORMAL

## 2024-02-20 PROCEDURE — 25810000003 SODIUM CHLORIDE 0.9 % SOLUTION: Performed by: INTERNAL MEDICINE

## 2024-02-20 PROCEDURE — 85730 THROMBOPLASTIN TIME PARTIAL: CPT | Performed by: EMERGENCY MEDICINE

## 2024-02-20 PROCEDURE — G0378 HOSPITAL OBSERVATION PER HR: HCPCS

## 2024-02-20 PROCEDURE — 99285 EMERGENCY DEPT VISIT HI MDM: CPT

## 2024-02-20 PROCEDURE — 85610 PROTHROMBIN TIME: CPT | Performed by: EMERGENCY MEDICINE

## 2024-02-20 PROCEDURE — 84484 ASSAY OF TROPONIN QUANT: CPT | Performed by: EMERGENCY MEDICINE

## 2024-02-20 PROCEDURE — 83735 ASSAY OF MAGNESIUM: CPT | Performed by: INTERNAL MEDICINE

## 2024-02-20 PROCEDURE — 93005 ELECTROCARDIOGRAM TRACING: CPT | Performed by: INTERNAL MEDICINE

## 2024-02-20 PROCEDURE — 80048 BASIC METABOLIC PNL TOTAL CA: CPT | Performed by: EMERGENCY MEDICINE

## 2024-02-20 PROCEDURE — 85025 COMPLETE CBC W/AUTO DIFF WBC: CPT | Performed by: EMERGENCY MEDICINE

## 2024-02-20 PROCEDURE — 93306 TTE W/DOPPLER COMPLETE: CPT

## 2024-02-20 PROCEDURE — 99215 OFFICE O/P EST HI 40 MIN: CPT | Performed by: INTERNAL MEDICINE

## 2024-02-20 PROCEDURE — 93306 TTE W/DOPPLER COMPLETE: CPT | Performed by: INTERNAL MEDICINE

## 2024-02-20 PROCEDURE — 93005 ELECTROCARDIOGRAM TRACING: CPT

## 2024-02-20 PROCEDURE — 36415 COLL VENOUS BLD VENIPUNCTURE: CPT

## 2024-02-20 PROCEDURE — 71045 X-RAY EXAM CHEST 1 VIEW: CPT

## 2024-02-20 RX ORDER — BISACODYL 10 MG
10 SUPPOSITORY, RECTAL RECTAL DAILY PRN
Status: DISCONTINUED | OUTPATIENT
Start: 2024-02-20 | End: 2024-02-22

## 2024-02-20 RX ORDER — SODIUM CHLORIDE 9 MG/ML
100 INJECTION, SOLUTION INTRAVENOUS CONTINUOUS
Status: DISCONTINUED | OUTPATIENT
Start: 2024-02-21 | End: 2024-02-22

## 2024-02-20 RX ORDER — SODIUM CHLORIDE 0.9 % (FLUSH) 0.9 %
10 SYRINGE (ML) INJECTION AS NEEDED
Status: DISCONTINUED | OUTPATIENT
Start: 2024-02-20 | End: 2024-02-22

## 2024-02-20 RX ORDER — SODIUM CHLORIDE 0.9 % (FLUSH) 0.9 %
3-10 SYRINGE (ML) INJECTION AS NEEDED
Status: DISCONTINUED | OUTPATIENT
Start: 2024-02-20 | End: 2024-02-21 | Stop reason: HOSPADM

## 2024-02-20 RX ORDER — AMLODIPINE BESYLATE 5 MG/1
10 TABLET ORAL
Status: DISCONTINUED | OUTPATIENT
Start: 2024-02-20 | End: 2024-02-22

## 2024-02-20 RX ORDER — SODIUM CHLORIDE 0.9 % (FLUSH) 0.9 %
3 SYRINGE (ML) INJECTION EVERY 12 HOURS SCHEDULED
Status: DISCONTINUED | OUTPATIENT
Start: 2024-02-20 | End: 2024-02-21 | Stop reason: HOSPADM

## 2024-02-20 RX ORDER — MORPHINE SULFATE 2 MG/ML
1 INJECTION, SOLUTION INTRAMUSCULAR; INTRAVENOUS EVERY 4 HOURS PRN
Status: DISCONTINUED | OUTPATIENT
Start: 2024-02-20 | End: 2024-02-22

## 2024-02-20 RX ORDER — CLONIDINE HYDROCHLORIDE 0.1 MG/1
0.05 TABLET ORAL EVERY 12 HOURS SCHEDULED
Status: DISCONTINUED | OUTPATIENT
Start: 2024-02-20 | End: 2024-02-22

## 2024-02-20 RX ORDER — BISACODYL 5 MG/1
5 TABLET, DELAYED RELEASE ORAL DAILY PRN
Status: DISCONTINUED | OUTPATIENT
Start: 2024-02-20 | End: 2024-02-22

## 2024-02-20 RX ORDER — NITROGLYCERIN 0.4 MG/1
0.4 TABLET SUBLINGUAL
Status: DISCONTINUED | OUTPATIENT
Start: 2024-02-20 | End: 2024-02-21 | Stop reason: SDUPTHER

## 2024-02-20 RX ORDER — POLYETHYLENE GLYCOL 3350 17 G/17G
17 POWDER, FOR SOLUTION ORAL DAILY PRN
Status: DISCONTINUED | OUTPATIENT
Start: 2024-02-20 | End: 2024-02-22

## 2024-02-20 RX ORDER — ASPIRIN 81 MG/1
81 TABLET, CHEWABLE ORAL DAILY
Status: DISCONTINUED | OUTPATIENT
Start: 2024-02-20 | End: 2024-02-22

## 2024-02-20 RX ORDER — HYDRALAZINE HYDROCHLORIDE 20 MG/ML
10 INJECTION INTRAMUSCULAR; INTRAVENOUS EVERY 6 HOURS PRN
Status: DISCONTINUED | OUTPATIENT
Start: 2024-02-20 | End: 2024-02-22

## 2024-02-20 RX ORDER — TERAZOSIN 5 MG/1
5 CAPSULE ORAL NIGHTLY
Status: DISCONTINUED | OUTPATIENT
Start: 2024-02-20 | End: 2024-02-22

## 2024-02-20 RX ORDER — ONDANSETRON 2 MG/ML
4 INJECTION INTRAMUSCULAR; INTRAVENOUS EVERY 6 HOURS PRN
Status: DISCONTINUED | OUTPATIENT
Start: 2024-02-20 | End: 2024-02-22

## 2024-02-20 RX ORDER — SODIUM CHLORIDE 9 MG/ML
75 INJECTION, SOLUTION INTRAVENOUS CONTINUOUS
Status: DISCONTINUED | OUTPATIENT
Start: 2024-02-20 | End: 2024-02-22

## 2024-02-20 RX ORDER — CHOLECALCIFEROL (VITAMIN D3) 125 MCG
5 CAPSULE ORAL NIGHTLY PRN
Status: DISCONTINUED | OUTPATIENT
Start: 2024-02-20 | End: 2024-02-22

## 2024-02-20 RX ORDER — AMOXICILLIN 250 MG
2 CAPSULE ORAL 2 TIMES DAILY
Status: DISCONTINUED | OUTPATIENT
Start: 2024-02-20 | End: 2024-02-22

## 2024-02-20 RX ORDER — NITROGLYCERIN 0.4 MG/1
0.4 TABLET SUBLINGUAL
Status: DISCONTINUED | OUTPATIENT
Start: 2024-02-20 | End: 2024-02-21

## 2024-02-20 RX ORDER — ALUMINA, MAGNESIA, AND SIMETHICONE 2400; 2400; 240 MG/30ML; MG/30ML; MG/30ML
15 SUSPENSION ORAL EVERY 6 HOURS PRN
Status: DISCONTINUED | OUTPATIENT
Start: 2024-02-20 | End: 2024-02-22

## 2024-02-20 RX ORDER — ROSUVASTATIN CALCIUM 10 MG/1
40 TABLET, COATED ORAL NIGHTLY
Status: DISCONTINUED | OUTPATIENT
Start: 2024-02-20 | End: 2024-02-22

## 2024-02-20 RX ORDER — ACETAMINOPHEN 325 MG/1
650 TABLET ORAL EVERY 4 HOURS PRN
Status: DISCONTINUED | OUTPATIENT
Start: 2024-02-20 | End: 2024-02-22

## 2024-02-20 RX ORDER — ISOSORBIDE MONONITRATE 30 MG/1
30 TABLET, EXTENDED RELEASE ORAL DAILY
Status: DISCONTINUED | OUTPATIENT
Start: 2024-02-21 | End: 2024-02-21

## 2024-02-20 RX ORDER — CARVEDILOL 25 MG/1
25 TABLET ORAL 2 TIMES DAILY
Status: DISCONTINUED | OUTPATIENT
Start: 2024-02-20 | End: 2024-02-22

## 2024-02-20 RX ORDER — NALOXONE HCL 0.4 MG/ML
0.4 VIAL (ML) INJECTION
Status: DISCONTINUED | OUTPATIENT
Start: 2024-02-20 | End: 2024-02-22

## 2024-02-20 RX ORDER — ESCITALOPRAM OXALATE 10 MG/1
20 TABLET ORAL DAILY
Status: DISCONTINUED | OUTPATIENT
Start: 2024-02-21 | End: 2024-02-22

## 2024-02-20 RX ORDER — SODIUM CHLORIDE 9 MG/ML
40 INJECTION, SOLUTION INTRAVENOUS AS NEEDED
Status: DISCONTINUED | OUTPATIENT
Start: 2024-02-20 | End: 2024-02-21 | Stop reason: HOSPADM

## 2024-02-20 RX ADMIN — AMLODIPINE BESYLATE 10 MG: 5 TABLET ORAL at 14:48

## 2024-02-20 RX ADMIN — Medication 3 ML: at 13:29

## 2024-02-20 RX ADMIN — SODIUM CHLORIDE 75 ML/HR: 9 INJECTION, SOLUTION INTRAVENOUS at 13:26

## 2024-02-20 RX ADMIN — ROSUVASTATIN 40 MG: 10 TABLET, FILM COATED ORAL at 21:25

## 2024-02-20 RX ADMIN — TERAZOSIN HYDROCHLORIDE 5 MG: 5 CAPSULE ORAL at 21:25

## 2024-02-20 NOTE — ED PROVIDER NOTES
Subjective   History of Present Illness  Chief complaint: Chest pain    68-year-old male presents with chest pain.  Patient has a history of coronary artery disease.  He states pain has been intermittent over the past 2 or 3 days.  Pain is in the left chest without radiation.  He denies any shortness of breath, diaphoresis, dizziness, palpitations.  He states his blood pressure has also been running high.  He denies any alleviating or exacerbating factors.  He took aspirin prior to arrival to the emergency room tonight.  He is chest pain-free at this time.    History provided by:  Patient      Review of Systems   Constitutional:  Negative for fever.   HENT:  Negative for congestion.    Respiratory:  Negative for cough and shortness of breath.    Cardiovascular:  Positive for chest pain.   Gastrointestinal:  Negative for abdominal pain, diarrhea and vomiting.   Musculoskeletal:  Negative for back pain.   Neurological:  Negative for headaches.   Psychiatric/Behavioral:  Negative for confusion.        Past Medical History:   Diagnosis Date    CHF (congestive heart failure)     Coronary artery disease     GERD (gastroesophageal reflux disease)     Hyperlipidemia     Hypertension     Leukemia     Low back pain     Sleep apnea        Allergies   Allergen Reactions    Doxycycline Hyclate Other (See Comments)     Painful skin    Penicillins Rash    Sulfa Antibiotics Rash       Past Surgical History:   Procedure Laterality Date    BONE MARROW TRANSPLANT      CARDIAC CATHETERIZATION      CHOLECYSTECTOMY N/A 4/23/2022    Procedure: CHOLECYSTECTOMY LAPAROSCOPIC;  Surgeon: Kale Acuna MD;  Location: [facility] MAIN OR;  Service: General;  Laterality: N/A;    SINUS SURGERY      TONSILLECTOMY      VASECTOMY         Family History   Problem Relation Age of Onset    Hypertension Mother     Hyperlipidemia Mother     Heart disease Father     Heart attack Father     Hypertension Sister     Hypertension Brother     Heart disease  "Brother        Social History     Socioeconomic History    Marital status:    Tobacco Use    Smoking status: Former     Packs/day: 1.00     Years: 30.00     Additional pack years: 0.00     Total pack years: 30.00     Types: Cigarettes     Quit date:      Years since quittin.1    Smokeless tobacco: Never   Vaping Use    Vaping Use: Never used   Substance and Sexual Activity    Alcohol use: No    Drug use: Defer    Sexual activity: Defer       BP (!) 198/84   Pulse 63   Temp 97.6 °F (36.4 °C)   Resp 18   Ht 167.6 cm (66\")   Wt 75.5 kg (166 lb 7.2 oz)   SpO2 98%   BMI 26.87 kg/m²       Objective   Physical Exam  Vitals and nursing note reviewed.   Constitutional:       Appearance: Normal appearance.   HENT:      Head: Normocephalic and atraumatic.      Mouth/Throat:      Mouth: Mucous membranes are moist.   Cardiovascular:      Rate and Rhythm: Normal rate and regular rhythm.      Heart sounds: Normal heart sounds.   Pulmonary:      Effort: Pulmonary effort is normal. No respiratory distress.      Breath sounds: Normal breath sounds.   Abdominal:      General: Bowel sounds are normal.      Palpations: Abdomen is soft.      Tenderness: There is no abdominal tenderness.   Musculoskeletal:      Right lower leg: No edema.      Left lower leg: No edema.   Skin:     General: Skin is warm and dry.   Neurological:      Mental Status: He is alert and oriented to person, place, and time.         Procedures           ED Course      My interpretation of EKG shows sinus bradycardia, rate of 59, T wave inversions anterolaterally, no ST elevation          HEART Score: 7                  Results for orders placed or performed during the hospital encounter of 24   Basic Metabolic Panel    Specimen: Blood   Result Value Ref Range    Glucose 99 65 - 99 mg/dL    BUN 35 (H) 8 - 23 mg/dL    Creatinine 1.76 (H) 0.76 - 1.27 mg/dL    Sodium 138 136 - 145 mmol/L    Potassium 4.6 3.5 - 5.2 mmol/L    Chloride 101 98 - " 107 mmol/L    CO2 26.0 22.0 - 29.0 mmol/L    Calcium 9.7 8.6 - 10.5 mg/dL    BUN/Creatinine Ratio 19.9 7.0 - 25.0    Anion Gap 11.0 5.0 - 15.0 mmol/L    eGFR 42.1 (L) >60.0 mL/min/1.73   Protime-INR    Specimen: Blood   Result Value Ref Range    Protime 11.1 9.6 - 11.7 Seconds    INR 1.02 0.93 - 1.10   aPTT    Specimen: Blood   Result Value Ref Range    PTT 25.3 (L) 61.0 - 76.5 seconds   High Sensitivity Troponin T    Specimen: Blood   Result Value Ref Range    HS Troponin T 59 (C) <22 ng/L   CBC Auto Differential    Specimen: Blood   Result Value Ref Range    WBC 7.20 3.40 - 10.80 10*3/mm3    RBC 4.34 4.14 - 5.80 10*6/mm3    Hemoglobin 13.5 13.0 - 17.7 g/dL    Hematocrit 40.1 37.5 - 51.0 %    MCV 92.3 79.0 - 97.0 fL    MCH 31.1 26.6 - 33.0 pg    MCHC 33.6 31.5 - 35.7 g/dL    RDW 15.0 12.3 - 15.4 %    RDW-SD 47.7 37.0 - 54.0 fl    MPV 8.8 6.0 - 12.0 fL    Platelets 169 140 - 450 10*3/mm3    Neutrophil % 49.0 42.7 - 76.0 %    Lymphocyte % 36.4 19.6 - 45.3 %    Monocyte % 7.7 5.0 - 12.0 %    Eosinophil % 5.4 0.3 - 6.2 %    Basophil % 1.5 0.0 - 1.5 %    Neutrophils, Absolute 3.50 1.70 - 7.00 10*3/mm3    Lymphocytes, Absolute 2.60 0.70 - 3.10 10*3/mm3    Monocytes, Absolute 0.60 0.10 - 0.90 10*3/mm3    Eosinophils, Absolute 0.40 0.00 - 0.40 10*3/mm3    Basophils, Absolute 0.10 0.00 - 0.20 10*3/mm3    nRBC 0.0 0.0 - 0.2 /100 WBC   ECG 12 Lead Chest Pain   Result Value Ref Range    QT Interval 467 ms    QTC Interval 465 ms   [facility] - [facility]   Result Value Ref Range    Extra Tube Hold for add-ons.      XR Chest 1 View    Result Date: 2/20/2024  Impression: No acute cardiopulmonary process. Electronically Signed: Sakina Mayo MD  2/20/2024 5:11 AM EST  Workstation ID: [id number]               Medical Decision Making  Amount and/or Complexity of Data Reviewed  Labs: ordered.  Radiology: ordered.    Risk  Prescription drug management.      Patient had the above evaluation.  Results were discussed with the patient.  My  interpretation of chest x-ray shows no infiltrate or effusion.  EKG shows no STEMI.  Troponin is mildly elevated at 59.  Patient does have a history of chronic kidney disease and BUN is 35 and creatinine 1.76.  This could account for some of the troponin elevation.  Patient is chest pain-free at this time.  He already took a full dose aspirin prior to arrival.  White blood cell count is normal.  BMP is otherwise unremarkable.  Patient will be placed in observation for further cardiac monitoring, serial troponins, cardiology consult.  Patient is agreeable with this plan.      Final diagnoses:   Chest pain, unspecified type   Hypertension, unspecified type   Chronic kidney disease, unspecified CKD stage       ED Disposition  ED Disposition       ED Disposition   Decision to Admit    Condition   --    Comment   --               No follow-up provider specified.       Medication List      No changes were made to your prescriptions during this visit.            Pilo Manzo MD  02/20/24 0601

## 2024-02-20 NOTE — CONSULTS
MAJOR NEPHROLOGY CONSULT NOTE    Referring Provider: Michelle Buckley MD   Reason for Consultation: Renal insufficiency  Chief complaint .  Chest pain    History of present illness:    Patient is 68 years old male with history of chronic kidney disease stage III, hypertension, CML, bone marrow transplant in 2016, coronary artery disease, presented to the hospital with intermittent chest pain for last 3 days, mainly at nighttime.  He denies any fever, cough, expectoration, shortness of breath, diaphoresis, palpitation, nausea or vomiting.  Also his blood pressure has been running high.  Patient being evaluated by cardiology.  Creatinine was 1.9 yesterday and 1.7 this morning.  Patient follows up with me as outpatient and his baseline is around 1.4-1.5 Mg/DL    History  Past Medical History:   Diagnosis Date    CHF (congestive heart failure)     Coronary artery disease     GERD (gastroesophageal reflux disease)     Hyperlipidemia     Hypertension     Leukemia     Low back pain     Sleep apnea      Past Surgical History:   Procedure Laterality Date    BONE MARROW TRANSPLANT      CARDIAC CATHETERIZATION      CHOLECYSTECTOMY N/A 2022    Procedure: CHOLECYSTECTOMY LAPAROSCOPIC;  Surgeon: Kale Acuna MD;  Location: [facility];  Service: General;  Laterality: N/A;    SINUS SURGERY      TONSILLECTOMY      VASECTOMY       Social History     Tobacco Use    Smoking status: Former     Packs/day: 1.00     Years: 30.00     Additional pack years: 0.00     Total pack years: 30.00     Types: Cigarettes     Quit date:      Years since quittin.1    Smokeless tobacco: Never   Vaping Use    Vaping Use: Never used   Substance Use Topics    Alcohol use: No    Drug use: Defer     Family History   Problem Relation Age of Onset    Hypertension Mother     Hyperlipidemia Mother     Heart disease Father     Heart attack Father     Hypertension Sister     Hypertension Brother     Heart disease Brother        Review of  "Systems  ROS  As per HPI  Objective     Vital Signs  Temp:  [97.6 °F (36.4 °C)-97.8 °F (36.6 °C)] 97.7 °F (36.5 °C)  Heart Rate:  [55-63] 55  Resp:  [16-18] 16  BP: (127-198)/(83-95) 163/95    No intake/output data recorded.  No intake/output data recorded.    Physical Exam:  Physical Exam    General Appearance: alert, oriented x 3, no acute distress   Skin: warm and dry  HEENT: oral mucosa normal, nonicteric sclera  Neck: supple, no JVD  Lungs: CTA  Heart: RRR, normal S1 and S2  Abdomen: soft, nontender, nondistended  : no palpable bladder  Extremities: no edema, cyanosis or clubbing  Neuro: normal speech and mental status     Results Review:   I reviewed the patient's new clinical results.    Lab Results   Component Value Date    CALCIUM 9.7 02/20/2024    PHOS 3.8 10/11/2023     Results from last 7 days   Lab Units 02/20/24  0650 02/20/24  0459 02/15/24  1148   MAGNESIUM mg/dL 2.1  --   --    SODIUM mmol/L  --  138 140   POTASSIUM mmol/L  --  4.6 5.1   CHLORIDE mmol/L  --  101 105   CO2 mmol/L  --  26.0 27.0   BUN mg/dL  --  35* 31*   CREATININE mg/dL  --  1.76* 1.95*   GLUCOSE mg/dL  --  99 109*   CALCIUM mg/dL  --  9.7 9.6   WBC 10*3/mm3  --  7.20  --    HEMOGLOBIN g/dL  --  13.5  --    PLATELETS 10*3/mm3  --  169  --    ALT (SGPT) U/L  --   --  23   AST (SGOT) U/L  --   --  21     Lab Results   Component Value Date    CKTOTAL 48 (L) 08/05/2019    TROPONINI <0.030 08/05/2019    TROPONINT 97 (C) 02/20/2024     Estimated Creatinine Clearance: 44.5 mL/min (A) (by C-G formula based on SCr of 1.76 mg/dL (H)).No results found for: \"URICACID\"    Brief Urine Lab Results  (Last result in the past 365 days)        Color   Clarity   Blood   Leuk Est   Nitrite   Protein   CREAT   Urine HCG        10/11/23 1147             123.5                 Prior to Admission medications    Medication Sig Start Date End Date Taking? Authorizing Provider   carvedilol (COREG) 25 MG tablet Take 1 tablet by mouth 2 (Two) Times a Day. " 3/3/22  Yes Gutierrez Pavon MD   cloNIDine (CATAPRES) 0.1 MG tablet 1/2 in am  And 1/2 inpm   Yes Gutierrez Pavon MD   escitalopram (LEXAPRO) 20 MG tablet Take 1 tablet by mouth Daily. 10/19/23  Yes Yifan Elizabeth MD   ezetimibe (ZETIA) 10 MG tablet TAKE 1 TABLET BY MOUTH EVERY DAY 2/12/24  Yes Yifan Elizabeth MD   ferrous sulfate 325 (65 FE) MG EC tablet Take 1 tablet by mouth.   Yes Gutierrez Pavon MD   folic acid (FOLVITE) 1 MG tablet TAKE 1 TABLET BY MOUTH EVERY DAY 10/8/23  Yes Yifan Elizabeth MD   isosorbide mononitrate (IMDUR) 30 MG 24 hr tablet Take 1 tablet by mouth Daily. 2/11/22  Yes Gutierrez Pavon MD   lisinopril (PRINIVIL,ZESTRIL) 40 MG tablet Take 1 tablet by mouth Daily. 4/5/22  Yes Gutierrez Pavon MD   pantoprazole (PROTONIX) 40 MG EC tablet Take 1 tablet by mouth Daily.   Yes Gutierrez Pavon MD   rosuvastatin (CRESTOR) 40 MG tablet TAKE 1 TABLET BY MOUTH EVERYDAY AT BEDTIME 1/3/24  Yes Yifan Elizabeth MD   terazosin (HYTRIN) 5 MG capsule Daily.   Yes Gutierrez Pavon MD   vitamin D (ERGOCALCIFEROL) 1.25 MG (16435 UT) capsule capsule TAKE 1 CAPSULE BY MOUTH 1 TIME PER WEEK. 12/31/23  Yes Yifan Elizabeth MD   aspirin 81 MG chewable tablet Chew 1 tablet Daily.    Gutierrez Pavon MD   fluticasone (FLONASE) 50 MCG/ACT nasal spray 1 spray into the nostril(s) as directed by provider 1 (One) Time. prn 1/17/17   Gutierrez Pavon MD   multivitamin (THERAGRAN) tablet tablet Take  by mouth.    Gutierrez Pavon MD   nitroglycerin (NITROSTAT) 0.6 MG SL tablet  5/13/22   Gutierrez Pavon MD   VITAMIN D PO 50,000 ut  (1.25mg)    Gutierrez Pavon MD       nitroglycerin, 1 inch, Topical, Once  senna-docusate sodium, 2 tablet, Oral, BID  sodium chloride, 3 mL, Intravenous, Q12H      [START ON 2/21/2024] sodium chloride, 100 mL/hr        Assessment & Plan       Chronic kidney disease stage III.  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  Patient creatinine slightly higher than previous baseline, probably due to hemodynamic fluctuation with ACE inhibitor's and hypertensive urgency.  Electrolytes, volume status okay  Chest pain.  Patient being evaluated by cardiology and is planning for cardiac catheter tomorrow  Hypertension chronic kidney disease.  Patient blood pressure was very high on presentation but improving  History of coronary disease    Plan:  Starting patient on amlodipine for blood pressure control  Off carvedilol and clonidine for now due to bradycardia  Restart carvedilol once heart rates better  Starting patient on gentle IV hydration  Discussed with patient and family regarding risk of contrast encephalopathy and he sounded understanding    I discussed the patients findings and my recommendations with patient, family, and nursing staff    Laureano Lawson MD  02/20/24  13:09 EST

## 2024-02-20 NOTE — CASE MANAGEMENT/SOCIAL WORK
Discharge Planning Assessment   [place]     Patient Name: Anthony Ayoub  MRN: [id number]  Today's Date: 2/20/2024    Admit Date: 2/20/2024    Plan: DC PLAN:  Return home     Discharge Needs Assessment       Row Name 02/20/24 1235       Living Environment    People in Home spouse    Current Living Arrangements home    Potentially Unsafe Housing Conditions none    In the past 12 months has the electric, gas, oil, or water company threatened to shut off services in your home? No    Primary Care Provided by self    Provides Primary Care For no one    Family Caregiver if Needed spouse    Quality of Family Relationships helpful;involved;supportive    Able to Return to Prior Arrangements yes       Resource/Environmental Concerns    Resource/Environmental Concerns none    Transportation Concerns none       Transportation Needs    In the past 12 months, has lack of transportation kept you from medical appointments or from getting medications? no    In the past 12 months, has lack of transportation kept you from meetings, work, or from getting things needed for daily living? No       Food Insecurity    Within the past 12 months, you worried that your food would run out before you got the money to buy more. Never true    Within the past 12 months, the food you bought just didn't last and you didn't have money to get more. Never true       Transition Planning    Patient/Family Anticipates Transition to home with family    Patient/Family Anticipated Services at Transition none    Transportation Anticipated car, drives self;family or friend will provide       Discharge Needs Assessment    Readmission Within the Last 30 Days no previous admission in last 30 days    Equipment Currently Used at Home none    Anticipated Changes Related to Illness none    Equipment Needed After Discharge none                   Discharge Plan       Row Name 02/20/24 4736       Plan    Plan DC PLAN:  Return home    Patient/Family in Agreement  with Plan yes    Plan Comments Met with patient at bedside, from routine home with wife. Independent with ADL's no DME. PCP is Glenn, Pharmacy is [facility]. Able to afford medications and denies any issues with food or utilities. Denies any transportation issues. Still drives, wife will provide transport at DC. Denies any needs for HHC or SNF. Denies any concerns about return home.                  Continued Care and Services - Admitted Since 2/20/2024    Coordination has not been started for this encounter.       Expected Discharge Date and Time       Expected Discharge Date Expected Discharge Time    Feb 21, 2024            Demographic Summary       Row Name 02/20/24 1208       General Information    Admission Type observation    Arrived From emergency department    Required Notices Provided Observation Status Notice    Referral Source admission list    Reason for Consult discharge planning    Preferred Language English       Contact Information    Permission Granted to Share Info With     Contact Information Obtained for                    Functional Status       Row Name 02/20/24 1208       Functional Status    Usual Activity Tolerance excellent    Current Activity Tolerance excellent       Physical Activity    On average, how many days per week do you engage in moderate to strenuous exercise (like a brisk walk)? 0 days    On average, how many minutes do you engage in exercise at this level? 0 min    Number of minutes of exercise per week 0       Assessment of Health Literacy    How often do you have someone help you read hospital materials? Sometimes    How often do you have problems learning about your medical condition because of difficulty understanding written information? Sometimes    How often do you have a problem understanding what is told to you about your medical condition? Sometimes    How confident are you filling out medical forms by yourself? Somewhat    Health Literacy  Moderate       Functional Status, IADL    Medications independent    Meal Preparation independent    Housekeeping independent    Laundry independent    Shopping independent       Mental Status    General Appearance WDL WDL       Mental Status Summary    Recent Changes in Mental Status/Cognitive Functioning no changes                         Patient Forms       Row Name 02/20/24 1209       Patient Forms    Important Message from Medicare (IMM) Delivered    Delivered to Patient    Method of delivery In person                    Melida Eason RN

## 2024-02-20 NOTE — H&P
[facility] Observation Unit H&P    Patient Name: Anthony Ayoub  : 1955  MRN: [id number]  Primary Care Physician: Yifan Elizabeth MD  Date of admission: 2024     Patient Care Team:  Yifan Elizabeth MD as PCP - General          Subjective   History Present Illness     Chief Complaint:   Chief Complaint   Patient presents with    Hypertension     Hypertension     Hypertension  Associated symptoms include chest pain and shortness of breath. Pertinent negatives include no malaise/fatigue or palpitations.     ED 2024  68-year-old male presents with chest pain.  Patient has a history of coronary artery disease.  He states pain has been intermittent over the past 2 or 3 days.  Pain is in the left chest without radiation.  He denies any shortness of breath, diaphoresis, dizziness, palpitations.  He states his blood pressure has also been running high.  He denies any alleviating or exacerbating factors.  He took aspirin prior to arrival to the emergency room tonight.  He is chest pain-free at this time.    Observation 2024  History obtained mostly from wife as patient was initially sleeping and resting comfortably.  Wife reports chest pain for the last 3 days only at nighttime and associated with hypertension.  Patient was hesitant to come to the ED but pain got worse yesterday and he agreed to proceed to the ED for further workup.  She reports that history of cardiac disease.  He states that patient has had a heart cath with Dr. Hernández as well as at .  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  She has noted the patient is short of breath with exertion or laying down.  Patient woke up and agreed with HPI reported by wife.  He is currently asymptomatic.     Review of Systems   Constitutional: Negative for malaise/fatigue.   Cardiovascular:  Positive for chest pain and dyspnea on exertion. Negative for irregular heartbeat, leg swelling, near-syncope,  palpitations and syncope.   Respiratory:  Positive for shortness of breath.    Gastrointestinal:  Negative for nausea and vomiting.   All other systems reviewed and are negative.          Personal History     Past Medical History:   Past Medical History:   Diagnosis Date    CHF (congestive heart failure)     Coronary artery disease     GERD (gastroesophageal reflux disease)     Hyperlipidemia     Hypertension     Leukemia     Low back pain     Sleep apnea        Surgical History:      Past Surgical History:   Procedure Laterality Date    BONE MARROW TRANSPLANT      CARDIAC CATHETERIZATION      CHOLECYSTECTOMY N/A 4/23/2022    Procedure: CHOLECYSTECTOMY LAPAROSCOPIC;  Surgeon: Kale Acuna MD;  Location: [facility];  Service: General;  Laterality: N/A;    SINUS SURGERY      TONSILLECTOMY      VASECTOMY             Family History: family history includes Heart attack in his father; Heart disease in his brother and father; Hyperlipidemia in his mother; Hypertension in his brother, mother, and sister. Otherwise pertinent FHx was reviewed and unremarkable.     Social History:  reports that he quit smoking about 8 years ago. His smoking use included cigarettes. He has a 30.00 pack-year smoking history. He has never used smokeless tobacco. Drug use questions deferred to the physician. He reports that he does not drink alcohol.      Medications:  Prior to Admission medications    Medication Sig Start Date End Date Taking? Authorizing Provider   carvedilol (COREG) 25 MG tablet Take 1 tablet by mouth 2 (Two) Times a Day. 3/3/22  Yes Provider, MD Gutierrez   cloNIDine (CATAPRES) 0.1 MG tablet 1/2 in am  And 1/2 inpm   Yes Provider, MD Gutierrez   escitalopram (LEXAPRO) 20 MG tablet Take 1 tablet by mouth Daily. 10/19/23  Yes Yifan Elizabeth MD   ezetimibe (ZETIA) 10 MG tablet TAKE 1 TABLET BY MOUTH EVERY DAY 2/12/24  Yes Yifan Elizabeth MD   ferrous sulfate 325 (65 FE) MG EC tablet Take 1 tablet  by mouth.   Yes Gutierrez Pavon MD   folic acid (FOLVITE) 1 MG tablet TAKE 1 TABLET BY MOUTH EVERY DAY 10/8/23  Yes Yifan Elizabeth MD   isosorbide mononitrate (IMDUR) 30 MG 24 hr tablet Take 1 tablet by mouth Daily. 2/11/22  Yes Gutierrez Pavon MD   lisinopril (PRINIVIL,ZESTRIL) 40 MG tablet Take 1 tablet by mouth Daily. 4/5/22  Yes Gutierrez Pavon MD   pantoprazole (PROTONIX) 40 MG EC tablet Take 1 tablet by mouth Daily.   Yes Gutierrez Pavon MD   rosuvastatin (CRESTOR) 40 MG tablet TAKE 1 TABLET BY MOUTH EVERYDAY AT BEDTIME 1/3/24  Yes Yifan Elizabeth MD   terazosin (HYTRIN) 5 MG capsule Daily.   Yes Gutierrez Paovn MD   vitamin D (ERGOCALCIFEROL) 1.25 MG (63847 UT) capsule capsule TAKE 1 CAPSULE BY MOUTH 1 TIME PER WEEK. 12/31/23  Yes Yifan Elizabeth MD   aspirin 81 MG chewable tablet Chew 1 tablet Daily.    Gutierrez Pavon MD   fluticasone (FLONASE) 50 MCG/ACT nasal spray 1 spray into the nostril(s) as directed by provider 1 (One) Time. prn 1/17/17   Gutierrez Pavon MD   multivitamin (THERAGRAN) tablet tablet Take  by mouth.    Gutierrez Pavon MD   nitroglycerin (NITROSTAT) 0.6 MG SL tablet  5/13/22   Gutierrez Pavon MD   VITAMIN D PO 50,000 ut  (1.25mg)    Gutierrez Pavon MD       Allergies:    Allergies   Allergen Reactions    Doxycycline Hyclate Other (See Comments)     Painful skin    Penicillins Rash    Sulfa Antibiotics Rash       Objective   Objective     Vital Signs  Temp:  [97.6 °F (36.4 °C)-97.8 °F (36.6 °C)] 97.7 °F (36.5 °C)  Heart Rate:  [55-63] 55  Resp:  [16-18] 16  BP: (127-198)/(83-95) 163/95  SpO2:  [95 %-98 %] 98 %  on   ;   Device (Oxygen Therapy): room air  Body mass index is 26.31 kg/m².    Physical Exam  Vitals and nursing note reviewed.   Constitutional:       Appearance: Normal appearance.   HENT:      Head: Normocephalic and atraumatic.      Right Ear: External ear normal.      Left Ear: External ear  normal.      Nose: Nose normal.      Mouth/Throat:      Mouth: Mucous membranes are moist.   Eyes:      Extraocular Movements: Extraocular movements intact.   Cardiovascular:      Rate and Rhythm: Normal rate and regular rhythm.      Pulses: Normal pulses.      Heart sounds: Normal heart sounds.   Pulmonary:      Effort: Pulmonary effort is normal.      Breath sounds: Normal breath sounds.   Abdominal:      General: Abdomen is flat. Bowel sounds are normal.      Palpations: Abdomen is soft.   Musculoskeletal:         General: Normal range of motion.      Cervical back: Normal range of motion.   Skin:     General: Skin is warm.   Neurological:      Mental Status: He is alert and oriented to person, place, and time.   Psychiatric:         Behavior: Behavior normal.         Thought Content: Thought content normal.         Judgment: Judgment normal.         Results Review:  I have personally reviewed most recent cardiac tracings, lab results, and radiology images and interpretations and agree with findings, most notably: Troponin, CMP, PT/INR, PTT, CBC, chest x-ray, EKG.    Results from last 7 days   Lab Units 02/20/24  0509 02/20/24  0459   WBC 10*3/mm3  --  7.20   HEMOGLOBIN g/dL  --  13.5   HEMATOCRIT %  --  40.1   PLATELETS 10*3/mm3  --  169   INR  1.02  --      Results from last 7 days   Lab Units 02/20/24  0650 02/20/24  0459 02/15/24  1148   SODIUM mmol/L  --  138 140   POTASSIUM mmol/L  --  4.6 5.1   CHLORIDE mmol/L  --  101 105   CO2 mmol/L  --  26.0 27.0   BUN mg/dL  --  35* 31*   CREATININE mg/dL  --  1.76* 1.95*   GLUCOSE mg/dL  --  99 109*   CALCIUM mg/dL  --  9.7 9.6   ALK PHOS U/L  --   --  83   ALT (SGPT) U/L  --   --  23   AST (SGOT) U/L  --   --  21   HSTROP T ng/L 97* 59*  --      Estimated Creatinine Clearance: 44.5 mL/min (A) (by C-G formula based on SCr of 1.76 mg/dL (H)).  Brief Urine Lab Results  (Last result in the past 365 days)        Color   Clarity   Blood   Leuk Est   Nitrite   Protein    CREAT   Urine HCG        10/11/23 1147             123.5                 Microbiology Results (last 10 days)       ** No results found for the last 240 hours. **            ECG/EMG Results (most recent)       Procedure Component Value Units Date/Time    ECG 12 Lead Chest Pain [[id number]] Collected: 02/20/24 0719     Updated: 02/20/24 0720     QT Interval 498 ms      QTC Interval 464 ms     Narrative:      HEART RATE= 52  bpm  RR Interval= 1152  ms  NY Interval= 172  ms  P Horizontal Axis= 0  deg  P Front Axis= 39  deg  QRSD Interval= 94  ms  QT Interval= 498  ms  QTcB= 464  ms  QRS Axis= -4  deg  T Wave Axis= 146  deg  - ABNORMAL ECG -  Sinus bradycardia  Repol abnrm, prob ischemia, anterolateral lds  Electronically Signed By:   Date and Time of Study: 2024-02-20 07:19:15    SCANNED - TELEMETRY   [[id number]] Resulted: 02/20/24     Updated: 02/20/24 0811    ECG 12 Lead Chest Pain [[id number]] Collected: 02/20/24 0447     Updated: 02/20/24 1014     QT Interval 467 ms      QTC Interval 465 ms     Narrative:      HEART RATE= 59  bpm  RR Interval= 1008  ms  NY Interval= 178  ms  P Horizontal Axis= 0  deg  P Front Axis= 46  deg  QRSD Interval= 94  ms  QT Interval= 467  ms  QTcB= 465  ms  QRS Axis= 7  deg  T Wave Axis= 139  deg  - ABNORMAL ECG -  Sinus bradycardia  Repol abnrm, prob ischemia, anterolateral lds  Electronically Signed By: Pilo Manzo (Brodie) 20-Feb-2024 10:14:02  Date and Time of Study: 2024-02-20 04:47:04    Adult Transthoracic Echo Complete W/ Cont if Necessary Per Protocol [[id number]] Resulted: 02/20/24 1234     Updated: 02/20/24 1235     EF(MOD-bp) 59.1 %      LVIDd 4.7 cm      LVIDs 3.3 cm      IVSd 1.20 cm      LVPWd 1.10 cm      FS 29.8 %      IVS/LVPW 1.09 cm      ESV(cubed) 35.9 ml      LV Sys Vol (BSA corrected) 21.6 cm2      EDV(cubed) 103.8 ml      LV Dailey Vol (BSA corrected) 55.9 cm2      LV mass(C)d 199.6 grams      LVOT area 2.8 cm2      LVOT diam 1.90 cm      EDV(MOD-sp2) 78.6 ml       EDV(MOD-sp4) 105.0 ml      ESV(MOD-sp2) 31.4 ml      ESV(MOD-sp4) 40.5 ml      SV(MOD-sp2) 47.2 ml      SV(MOD-sp4) 64.5 ml      SI(MOD-sp2) 25.1 ml/m2      SI(MOD-sp4) 34.4 ml/m2      EF(MOD-sp2) 60.1 %      EF(MOD-sp4) 61.4 %      MV E max bruce 56.6 cm/sec      MV A max bruce 91.7 cm/sec      MV dec time 0.22 sec      MV E/A 0.62     Pulm A Revs Dur 0.16 sec      LA ESV Index (BP) 28.5 ml/m2      Med Peak E' Bruce 4.6 cm/sec      Lat Peak E' Bruce 5.1 cm/sec      Avg E/e' ratio 11.67     SV(LVOT) 60.1 ml      RVIDd 3.3 cm      RV Base 3.3 cm      RV Mid 2.9 cm      RV Length 7.2 cm      TAPSE (>1.6) 1.61 cm      RV S' 12.3 cm/sec      LA dimension (2D)  3.7 cm      Pulm Sys Bruce 66.3 cm/sec      Pulm Dailey Bruce 30.5 cm/sec      Pulm S/D 2.17     Pulm A Revs Bruce 26.4 cm/sec      LV V1 max 95.8 cm/sec      LV V1 max PG 3.7 mmHg      LV V1 mean PG 2.00 mmHg      LV V1 VTI 21.2 cm      Ao pk bruce 141.0 cm/sec      Ao max PG 8.0 mmHg      Ao mean PG 4.0 mmHg      Ao V2 VTI 32.6 cm      NURY(I,D) 1.84 cm2      MV max PG 4.1 mmHg      MV mean PG 1.00 mmHg      MV V2 VTI 27.7 cm      MV P1/2t 75.4 msec      MVA(P1/2t) 2.9 cm2      MVA(VTI) 2.17 cm2      MV dec slope 265.0 cm/sec2      RV V1 max PG 3.1 mmHg      RV V1 max 87.8 cm/sec      RV V1 VTI 20.8 cm      PA V2 max 102.0 cm/sec      Sinus 3.1 cm     SCANNED - TELEMETRY   [[id number]] Resulted: 02/20/24     Updated: 02/20/24 1241                    XR Chest 1 View    Result Date: 2/20/2024  Impression: No acute cardiopulmonary process. Electronically Signed: Sakina Mayo MD  2/20/2024 5:11 AM EST  Workstation ID: [id number]       Estimated Creatinine Clearance: 44.5 mL/min (A) (by C-G formula based on SCr of 1.76 mg/dL (H)).    Assessment & Plan   Assessment/Plan       Active Hospital Problems    Diagnosis  POA    **Chest pain [R07.9]  Unknown    Unstable angina [I20.0]  Unknown    Non-ST elevated myocardial infarction (non-STEMI) [I21.4]  Unknown      Resolved Hospital Problems    No resolved problems to display.       Chest pain  Lab Results   Component Value Date    TROPONINT 97 (C) 02/20/2024    TROPONINT 59 (C) 02/20/2024   -CBC unremarkable  -PT/INR within normal range and PTT 25.3  -Magnesium 2.1  -Lipid panel from 2/15/2024 showed HDL 31 and triglycerides 209 but otherwise unremarkable  -Chest X-ray: Showed no acute process  -EKG: Sinus bradycardia with heart rate of 52, RI interval 172 and   -Cardiology was consulted and ordered a 2D echo which is pending results  -Plans to proceed with a heart cath when cleared by nephrologist  -N.p.o. at midnight for potential heart cath in the a.m.  -Telemetry  -Morphine ordered as needed  -Continue Imdur      CKD with history of hypertension  -Moderately controlled   BP Readings from Last 1 Encounters:   02/20/24 163/95     Lab Results   Component Value Date    CREATININE 1.76 (H) 02/20/2024    BUN 35 (H) 02/20/2024    BCR 19.9 02/20/2024    EGFR 42.1 (L) 02/20/2024   -Nephrologist consulted  -Per nephrology's notes patient's baseline is 1.5  -Hold lisinopril for now  -Hold clonidine and Coreg due to bradycardia  -Avoid nephrotoxic medication IV dye unless urgently needed  -Monitor BMP and I's and O's while admitted  -Gentle hydration started by nephrologist      GERD  -Hold PPI for now    Hyperlipidemia  -Continue statin        VTE Prophylaxis -   Mechanical Order History:        Ordered        02/20/24 0603  Place Sequential Compression Device  Once            02/20/24 0603  Maintain Sequential Compression Device  Continuous                          Pharmalogical Order History:       None            CODE STATUS:    Code Status and Medical Interventions:   Ordered at: 02/20/24 1210     Level Of Support Discussed With:    Patient     Code Status (Patient has no pulse and is not breathing):    CPR (Attempt to Resuscitate)     Medical Interventions (Patient has pulse or is breathing):    Full Support       This patient has been examined  wearing personal protective equipment.     I discussed the patient's findings and my recommendations with patient and nursing staff.      Signature:Electronically signed by LISA Wilder, 02/20/24, 2:03 PM EST.

## 2024-02-20 NOTE — CONSULTS
Referring Provider: Pilo Manzo MD  Reason for Consultation:  Chest pain    Patient Care Team:  Yifan Elizabeth MD as PCP - General    Chief complaint  Chest pain      Subjective .     History of present illness:  Anthony Ayoub is a 68 y.o. male who presents with history of recurrent episodes of substernal heaviness and tightness over the last several weeks.  Denies having any radiation of the discomfort into the neck or into the arms.  No other associated aggravating or alleviating factors.  Patient was last seen in 2018.  Patient had apparently a cardiac catheter to [facility] in 2019.  Patient is on medical therapy.  Patient's blood pressure has been running high.  No other associated aggravating or elevating factors.  Cardiology consultation was requested..           ROS      Patient not having any shortness of breath, palpitations, dizziness or syncope.  Denies having any headache, abdominal pain, nausea, vomiting, diarrhea, constipation, loss of weight or loss of appetite.  Denies having any excessive bruising, hematuria or blood in the stool.    Review of all systems negative except as indicated      History  Past Medical History:   Diagnosis Date    CHF (congestive heart failure)     Coronary artery disease     GERD (gastroesophageal reflux disease)     Hyperlipidemia     Hypertension     Leukemia     Low back pain     Sleep apnea        Past Surgical History:   Procedure Laterality Date    BONE MARROW TRANSPLANT      CARDIAC CATHETERIZATION      CHOLECYSTECTOMY N/A 4/23/2022    Procedure: CHOLECYSTECTOMY LAPAROSCOPIC;  Surgeon: Kale Acuna MD;  Location: [facility] MAIN OR;  Service: General;  Laterality: N/A;    SINUS SURGERY      TONSILLECTOMY      VASECTOMY         Family History   Problem Relation Age of Onset    Hypertension Mother     Hyperlipidemia Mother     Heart disease Father     Heart attack Father     Hypertension Sister     Hypertension Brother     Heart  "disease Brother        Social History     Tobacco Use    Smoking status: Former     Packs/day: 1.00     Years: 30.00     Additional pack years: 0.00     Total pack years: 30.00     Types: Cigarettes     Quit date:      Years since quittin.1    Smokeless tobacco: Never   Vaping Use    Vaping Use: Never used   Substance Use Topics    Alcohol use: No    Drug use: Defer        (Not in a hospital admission)        Doxycycline hyclate, Penicillins, and Sulfa antibiotics    Scheduled Meds:nitroglycerin, 1 inch, Topical, Once  senna-docusate sodium, 2 tablet, Oral, BID      Continuous Infusions:   PRN Meds:.  acetaminophen    senna-docusate sodium **AND** polyethylene glycol **AND** bisacodyl **AND** bisacodyl    melatonin    Morphine **AND** naloxone    nitroglycerin    nitroglycerin    ondansetron    [COMPLETED] Insert Peripheral IV **AND** sodium chloride    Objective     VITAL SIGNS  Vitals:    24 0433 24 0602 24 0631   BP: (!) 198/84 150/90 127/83   Pulse: 63 56 56   Resp: 18     Temp: 97.6 °F (36.4 °C)     SpO2: 98% 96% 95%   Weight: 75.5 kg (166 lb 7.2 oz)     Height: 167.6 cm (66\")         Flowsheet Rows      Flowsheet Row First Filed Value   Admission Height 167.6 cm (66\") Documented at 2024 0433   Admission Weight 75.5 kg (166 lb 7.2 oz) Documented at 2024 0433            No intake or output data in the 24 hours ending 24 0640     TELEMETRY: Sinus rhythm    Physical Exam:  The patient is alert, oriented and in no distress.  Vital signs as noted above.  Head and neck revealed no carotid bruits or jugular venous distention.  No thyromegaly or lymphadenopathy is present  Lungs clear.  No wheezing.  Breath sounds are normal bilaterally.  Heart normal first and second heart sounds. No murmur.  No precordial rub is present.  No gallop is present.  Abdomen soft and nontender.  No organomegaly is present.  Extremities with good peripheral pulses without any pedal edema.  Skin " warm and dry.  Musculoskeletal system is grossly normal  CNS grossly normal    Reviewed and updated.    Results Review:   I reviewed the patient's new clinical results.  Lab Results (last 24 hours)       Procedure Component Value Units Date/Time    Extra Tubes [[id number]] Collected: 02/20/24 0509    Specimen: Blood, Venous Line Updated: 02/20/24 0615    Narrative:      The following orders were created for panel order Extra Tubes.  Procedure                               Abnormality         Status                     ---------                               -----------         ------                     Light Blue Top[[id number]]                                   Final result                 Please view results for these tests on the individual orders.    Light Blue Top [[id number]] Collected: 02/20/24 0509    Specimen: Blood Updated: 02/20/24 0615     Extra Tube Hold for add-ons.     Comment: Auto resulted       Extra Tubes [[id number]] Collected: 02/20/24 0459    Specimen: Blood, Venous Line Updated: 02/20/24 0600    Narrative:      The following orders were created for panel order Extra Tubes.  Procedure                               Abnormality         Status                     ---------                               -----------         ------                     Gold Top - SST[[id number]]                                   Final result                 Please view results for these tests on the individual orders.    Gold Top - SST [[id number]] Collected: 02/20/24 0459    Specimen: Blood Updated: 02/20/24 0600     Extra Tube Hold for add-ons.     Comment: Auto resulted.       High Sensitivity Troponin T [[id number]]  (Abnormal) Collected: 02/20/24 0459    Specimen: Blood Updated: 02/20/24 0539     HS Troponin T 59 ng/L     Narrative:      High Sensitive Troponin T Reference Range:  <14.0 ng/L- Negative Female for AMI  <22.0 ng/L- Negative Male for AMI  >=14 - Abnormal Female indicating possible myocardial injury.  >=22  - Abnormal Male indicating possible myocardial injury.   Clinicians would have to utilize clinical acumen, EKG, Troponin, and serial changes to determine if it is an Acute Myocardial Infarction or myocardial injury due to an underlying chronic condition.         Protime-INR [[id number]]  (Normal) Collected: 02/20/24 0509    Specimen: Blood Updated: 02/20/24 0538     Protime 11.1 Seconds      INR 1.02    aPTT [[id number]]  (Abnormal) Collected: 02/20/24 0509    Specimen: Blood Updated: 02/20/24 0538     PTT 25.3 seconds     Basic Metabolic Panel [[id number]]  (Abnormal) Collected: 02/20/24 0459    Specimen: Blood Updated: 02/20/24 0525     Glucose 99 mg/dL      BUN 35 mg/dL      Creatinine 1.76 mg/dL      Sodium 138 mmol/L      Potassium 4.6 mmol/L      Comment: Slight hemolysis detected by analyzer. Result may be falsely elevated.        Chloride 101 mmol/L      CO2 26.0 mmol/L      Calcium 9.7 mg/dL      BUN/Creatinine Ratio 19.9     Anion Gap 11.0 mmol/L      eGFR 42.1 mL/min/1.73     Narrative:      GFR Normal >60  Chronic Kidney Disease <60  Kidney Failure <15      CBC & Differential [[id number]]  (Normal) Collected: 02/20/24 0459    Specimen: Blood Updated: 02/20/24 0511    Narrative:      The following orders were created for panel order CBC & Differential.  Procedure                               Abnormality         Status                     ---------                               -----------         ------                     CBC Auto Differential[[id number]]        Normal              Final result                 Please view results for these tests on the individual orders.    CBC Auto Differential [[id number]]  (Normal) Collected: 02/20/24 0459    Specimen: Blood Updated: 02/20/24 0511     WBC 7.20 10*3/mm3      RBC 4.34 10*6/mm3      Hemoglobin 13.5 g/dL      Hematocrit 40.1 %      MCV 92.3 fL      MCH 31.1 pg      MCHC 33.6 g/dL      RDW 15.0 %      RDW-SD 47.7 fl      MPV 8.8 fL      Platelets 169 10*3/mm3       Neutrophil % 49.0 %      Lymphocyte % 36.4 %      Monocyte % 7.7 %      Eosinophil % 5.4 %      Basophil % 1.5 %      Neutrophils, Absolute 3.50 10*3/mm3      Lymphocytes, Absolute 2.60 10*3/mm3      Monocytes, Absolute 0.60 10*3/mm3      Eosinophils, Absolute 0.40 10*3/mm3      Basophils, Absolute 0.10 10*3/mm3      nRBC 0.0 /100 WBC             Imaging Results (Last 24 Hours)       Procedure Component Value Units Date/Time    XR Chest 1 View [[id number]] Collected: 02/20/24 0511     Updated: 02/20/24 0514    Narrative:      XR CHEST 1 VW    Date of Exam: 2/20/2024 4:58 AM EST    Indication: chest pain    Comparison: 5/29/2022.    Findings:  There are no airspace consolidations. No pleural fluid. No pneumothorax. The pulmonary vasculature appears within normal limits. The cardiac and mediastinal silhouette appear unremarkable. No acute osseous abnormality identified.      Impression:      Impression:  No acute cardiopulmonary process.        Electronically Signed: Sakina Mayo MD    2/20/2024 5:11 AM EST    Workstation ID: [id number]        LAB RESULTS (LAST 7 DAYS)    CBC  Results from last 7 days   Lab Units 02/20/24  0459   WBC 10*3/mm3 7.20   RBC 10*6/mm3 4.34   HEMOGLOBIN g/dL 13.5   HEMATOCRIT % 40.1   MCV fL 92.3   PLATELETS 10*3/mm3 169       BMP  Results from last 7 days   Lab Units 02/20/24  0459 02/15/24  1148   SODIUM mmol/L 138 140   POTASSIUM mmol/L 4.6 5.1   CHLORIDE mmol/L 101 105   CO2 mmol/L 26.0 27.0   BUN mg/dL 35* 31*   CREATININE mg/dL 1.76* 1.95*   GLUCOSE mg/dL 99 109*       CMP   Results from last 7 days   Lab Units 02/20/24  0459 02/15/24  1148   SODIUM mmol/L 138 140   POTASSIUM mmol/L 4.6 5.1   CHLORIDE mmol/L 101 105   CO2 mmol/L 26.0 27.0   BUN mg/dL 35* 31*   CREATININE mg/dL 1.76* 1.95*   GLUCOSE mg/dL 99 109*   ALBUMIN g/dL  --  4.6   BILIRUBIN mg/dL  --  0.6   ALK PHOS U/L  --  83   AST (SGOT) U/L  --  21   ALT (SGPT) U/L  --  23         BNP        TROPONIN  Results from last 7  days   Lab Units 02/20/24  0459   HSTROP T ng/L 59*       CoAg  Results from last 7 days   Lab Units 02/20/24  0509   INR  1.02   APTT seconds 25.3*       Creatinine Clearance  Estimated Creatinine Clearance: 44.1 mL/min (A) (by C-G formula based on SCr of 1.76 mg/dL (H)).    ABG        Radiology  XR Chest 1 View    Result Date: 2/20/2024  Impression: No acute cardiopulmonary process. Electronically Signed: Sakina Mayo MD  2/20/2024 5:11 AM EST  Workstation ID: [id number]       EKG            I personally viewed and interpreted the patient's EKG/Telemetry data: Sinus rhythm anterolateral ischemic changes    ECHOCARDIOGRAM:        STRESS TEST        Cardiolite (Tc-99m sestamibi) stress test    HEART CATHETERIZATION  No results found for this or any previous visit.      OTHER:     Assessment & Plan     Principal Problem:    Chest pain    ]]]]]]]]]]]]]]]]]]]   impression  ========  - Unstable angina  Possible non-STEMI.  Troponin level 59.    Patient had cardiac catheterization at [facility] in 2019  70% diffuse and lengthy RCA disease 50% circumflex 95% first marginal branch disease 50% mid LAD and diagonal branch disease.  The information was obtained from a drawing that patient's wife has.    History of subtotal occlusion of 1st marginal branch at cardiac catheterization 05/18/2018      cardiac catheterization 05/18/2018 revealed normal left ventricular function 50% mid LAD, 50% diffuse diagonal branch disease 50% proximal circumflex, 1st marginal branch is subtotally occluded with 99% disease that bifurcated into 2 branches.  RCA is a   nondominant vessel that has diffuse disease.  Distal vessel filling from left circulation.    Echocardiogram 04/18/2018 is normal    Stress Cardiolite test revealed severe inferior posterior and apical ischemia 05/15/2018.  Patient had reproducible chest discomfort shortness of breath and ST abnormalities.     -  hypertension dyslipidemia GERD osteoarthritis renal  dysfunction.  BUN 15 creatinine 1.4     -CML.  Status post bone marrow transplantation  2016     - status post vasectomy and sinus surgery      -former smoker     - strong family history  for coronary artery disease.  Brother had CABG and father had myocardial infarction     -allergic to penicillin sulfa and doxycycline  ============  Plan  ==========  Unstable angina  Non-STEMI.  Troponin 59.  EKG showed anterolateral ST T wave abnormalities.  Chronic coronary artery disease cardiac cath 2018 and 2019.    Chronic renal insufficiency.-Dr. Lawson.  31/1.95-2/15/2024.  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    Patient would benefit from cardiac catheterization and coronary arteriography.    Echocardiogram.-2/20/2024  Structurally and functionally normal cardiac valves except for minimal mitral regurgitation.  Grade 1 diastolic dysfunction is present..  Left ventricular size and contractility is normal with ejection fraction of 60%.    Risks and benefits pros and cons of the procedure including infection bleeding blood clot heart attack stroke allergic reaction to the dye renal dysfunction etc. were discussed with patient and patient's wife.  Further plan will depend on patient's progress.    Renal consultation for renal precautions.    Further plan will depend on patient's progress.    Reviewed and updated-2/20/2024.  ]]]]]]]]]]]]]]]]]     Michelle Hernández MD  02/20/24  06:40 EST

## 2024-02-20 NOTE — H&P (VIEW-ONLY)
Referring Provider: Pilo Manzo MD  Reason for Consultation:  Chest pain    Patient Care Team:  Yifan Elizabeth MD as PCP - General    Chief complaint  Chest pain      Subjective .     History of present illness:  Anthony Ayoub is a 68 y.o. male who presents with history of recurrent episodes of substernal heaviness and tightness over the last several weeks.  Denies having any radiation of the discomfort into the neck or into the arms.  No other associated aggravating or alleviating factors.  Patient was last seen in 2018.  Patient had apparently a cardiac catheter to [facility] in 2019.  Patient is on medical therapy.  Patient's blood pressure has been running high.  No other associated aggravating or elevating factors.  Cardiology consultation was requested..           ROS      Patient not having any shortness of breath, palpitations, dizziness or syncope.  Denies having any headache, abdominal pain, nausea, vomiting, diarrhea, constipation, loss of weight or loss of appetite.  Denies having any excessive bruising, hematuria or blood in the stool.    Review of all systems negative except as indicated      History  Past Medical History:   Diagnosis Date    CHF (congestive heart failure)     Coronary artery disease     GERD (gastroesophageal reflux disease)     Hyperlipidemia     Hypertension     Leukemia     Low back pain     Sleep apnea        Past Surgical History:   Procedure Laterality Date    BONE MARROW TRANSPLANT      CARDIAC CATHETERIZATION      CHOLECYSTECTOMY N/A 4/23/2022    Procedure: CHOLECYSTECTOMY LAPAROSCOPIC;  Surgeon: Kale Acuna MD;  Location: [facility] MAIN OR;  Service: General;  Laterality: N/A;    SINUS SURGERY      TONSILLECTOMY      VASECTOMY         Family History   Problem Relation Age of Onset    Hypertension Mother     Hyperlipidemia Mother     Heart disease Father     Heart attack Father     Hypertension Sister     Hypertension Brother     Heart  "disease Brother        Social History     Tobacco Use    Smoking status: Former     Packs/day: 1.00     Years: 30.00     Additional pack years: 0.00     Total pack years: 30.00     Types: Cigarettes     Quit date:      Years since quittin.1    Smokeless tobacco: Never   Vaping Use    Vaping Use: Never used   Substance Use Topics    Alcohol use: No    Drug use: Defer        (Not in a hospital admission)        Doxycycline hyclate, Penicillins, and Sulfa antibiotics    Scheduled Meds:nitroglycerin, 1 inch, Topical, Once  senna-docusate sodium, 2 tablet, Oral, BID      Continuous Infusions:   PRN Meds:.  acetaminophen    senna-docusate sodium **AND** polyethylene glycol **AND** bisacodyl **AND** bisacodyl    melatonin    Morphine **AND** naloxone    nitroglycerin    nitroglycerin    ondansetron    [COMPLETED] Insert Peripheral IV **AND** sodium chloride    Objective     VITAL SIGNS  Vitals:    24 0433 24 0602 24 0631   BP: (!) 198/84 150/90 127/83   Pulse: 63 56 56   Resp: 18     Temp: 97.6 °F (36.4 °C)     SpO2: 98% 96% 95%   Weight: 75.5 kg (166 lb 7.2 oz)     Height: 167.6 cm (66\")         Flowsheet Rows      Flowsheet Row First Filed Value   Admission Height 167.6 cm (66\") Documented at 2024 0433   Admission Weight 75.5 kg (166 lb 7.2 oz) Documented at 2024 0433            No intake or output data in the 24 hours ending 24 0640     TELEMETRY: Sinus rhythm    Physical Exam:  The patient is alert, oriented and in no distress.  Vital signs as noted above.  Head and neck revealed no carotid bruits or jugular venous distention.  No thyromegaly or lymphadenopathy is present  Lungs clear.  No wheezing.  Breath sounds are normal bilaterally.  Heart normal first and second heart sounds. No murmur.  No precordial rub is present.  No gallop is present.  Abdomen soft and nontender.  No organomegaly is present.  Extremities with good peripheral pulses without any pedal edema.  Skin " warm and dry.  Musculoskeletal system is grossly normal  CNS grossly normal    Reviewed and updated.    Results Review:   I reviewed the patient's new clinical results.  Lab Results (last 24 hours)       Procedure Component Value Units Date/Time    Extra Tubes [[id number]] Collected: 02/20/24 0509    Specimen: Blood, Venous Line Updated: 02/20/24 0615    Narrative:      The following orders were created for panel order Extra Tubes.  Procedure                               Abnormality         Status                     ---------                               -----------         ------                     Light Blue Top[[id number]]                                   Final result                 Please view results for these tests on the individual orders.    Light Blue Top [[id number]] Collected: 02/20/24 0509    Specimen: Blood Updated: 02/20/24 0615     Extra Tube Hold for add-ons.     Comment: Auto resulted       Extra Tubes [[id number]] Collected: 02/20/24 0459    Specimen: Blood, Venous Line Updated: 02/20/24 0600    Narrative:      The following orders were created for panel order Extra Tubes.  Procedure                               Abnormality         Status                     ---------                               -----------         ------                     Gold Top - SST[[id number]]                                   Final result                 Please view results for these tests on the individual orders.    Gold Top - SST [[id number]] Collected: 02/20/24 0459    Specimen: Blood Updated: 02/20/24 0600     Extra Tube Hold for add-ons.     Comment: Auto resulted.       High Sensitivity Troponin T [[id number]]  (Abnormal) Collected: 02/20/24 0459    Specimen: Blood Updated: 02/20/24 0539     HS Troponin T 59 ng/L     Narrative:      High Sensitive Troponin T Reference Range:  <14.0 ng/L- Negative Female for AMI  <22.0 ng/L- Negative Male for AMI  >=14 - Abnormal Female indicating possible myocardial injury.  >=22  - Abnormal Male indicating possible myocardial injury.   Clinicians would have to utilize clinical acumen, EKG, Troponin, and serial changes to determine if it is an Acute Myocardial Infarction or myocardial injury due to an underlying chronic condition.         Protime-INR [[id number]]  (Normal) Collected: 02/20/24 0509    Specimen: Blood Updated: 02/20/24 0538     Protime 11.1 Seconds      INR 1.02    aPTT [[id number]]  (Abnormal) Collected: 02/20/24 0509    Specimen: Blood Updated: 02/20/24 0538     PTT 25.3 seconds     Basic Metabolic Panel [[id number]]  (Abnormal) Collected: 02/20/24 0459    Specimen: Blood Updated: 02/20/24 0525     Glucose 99 mg/dL      BUN 35 mg/dL      Creatinine 1.76 mg/dL      Sodium 138 mmol/L      Potassium 4.6 mmol/L      Comment: Slight hemolysis detected by analyzer. Result may be falsely elevated.        Chloride 101 mmol/L      CO2 26.0 mmol/L      Calcium 9.7 mg/dL      BUN/Creatinine Ratio 19.9     Anion Gap 11.0 mmol/L      eGFR 42.1 mL/min/1.73     Narrative:      GFR Normal >60  Chronic Kidney Disease <60  Kidney Failure <15      CBC & Differential [[id number]]  (Normal) Collected: 02/20/24 0459    Specimen: Blood Updated: 02/20/24 0511    Narrative:      The following orders were created for panel order CBC & Differential.  Procedure                               Abnormality         Status                     ---------                               -----------         ------                     CBC Auto Differential[[id number]]        Normal              Final result                 Please view results for these tests on the individual orders.    CBC Auto Differential [[id number]]  (Normal) Collected: 02/20/24 0459    Specimen: Blood Updated: 02/20/24 0511     WBC 7.20 10*3/mm3      RBC 4.34 10*6/mm3      Hemoglobin 13.5 g/dL      Hematocrit 40.1 %      MCV 92.3 fL      MCH 31.1 pg      MCHC 33.6 g/dL      RDW 15.0 %      RDW-SD 47.7 fl      MPV 8.8 fL      Platelets 169 10*3/mm3       Neutrophil % 49.0 %      Lymphocyte % 36.4 %      Monocyte % 7.7 %      Eosinophil % 5.4 %      Basophil % 1.5 %      Neutrophils, Absolute 3.50 10*3/mm3      Lymphocytes, Absolute 2.60 10*3/mm3      Monocytes, Absolute 0.60 10*3/mm3      Eosinophils, Absolute 0.40 10*3/mm3      Basophils, Absolute 0.10 10*3/mm3      nRBC 0.0 /100 WBC             Imaging Results (Last 24 Hours)       Procedure Component Value Units Date/Time    XR Chest 1 View [[id number]] Collected: 02/20/24 0511     Updated: 02/20/24 0514    Narrative:      XR CHEST 1 VW    Date of Exam: 2/20/2024 4:58 AM EST    Indication: chest pain    Comparison: 5/29/2022.    Findings:  There are no airspace consolidations. No pleural fluid. No pneumothorax. The pulmonary vasculature appears within normal limits. The cardiac and mediastinal silhouette appear unremarkable. No acute osseous abnormality identified.      Impression:      Impression:  No acute cardiopulmonary process.        Electronically Signed: Sakina Mayo MD    2/20/2024 5:11 AM EST    Workstation ID: [id number]        LAB RESULTS (LAST 7 DAYS)    CBC  Results from last 7 days   Lab Units 02/20/24  0459   WBC 10*3/mm3 7.20   RBC 10*6/mm3 4.34   HEMOGLOBIN g/dL 13.5   HEMATOCRIT % 40.1   MCV fL 92.3   PLATELETS 10*3/mm3 169       BMP  Results from last 7 days   Lab Units 02/20/24  0459 02/15/24  1148   SODIUM mmol/L 138 140   POTASSIUM mmol/L 4.6 5.1   CHLORIDE mmol/L 101 105   CO2 mmol/L 26.0 27.0   BUN mg/dL 35* 31*   CREATININE mg/dL 1.76* 1.95*   GLUCOSE mg/dL 99 109*       CMP   Results from last 7 days   Lab Units 02/20/24  0459 02/15/24  1148   SODIUM mmol/L 138 140   POTASSIUM mmol/L 4.6 5.1   CHLORIDE mmol/L 101 105   CO2 mmol/L 26.0 27.0   BUN mg/dL 35* 31*   CREATININE mg/dL 1.76* 1.95*   GLUCOSE mg/dL 99 109*   ALBUMIN g/dL  --  4.6   BILIRUBIN mg/dL  --  0.6   ALK PHOS U/L  --  83   AST (SGOT) U/L  --  21   ALT (SGPT) U/L  --  23         BNP        TROPONIN  Results from last 7  days   Lab Units 02/20/24  0459   HSTROP T ng/L 59*       CoAg  Results from last 7 days   Lab Units 02/20/24  0509   INR  1.02   APTT seconds 25.3*       Creatinine Clearance  Estimated Creatinine Clearance: 44.1 mL/min (A) (by C-G formula based on SCr of 1.76 mg/dL (H)).    ABG        Radiology  XR Chest 1 View    Result Date: 2/20/2024  Impression: No acute cardiopulmonary process. Electronically Signed: Sakina Mayo MD  2/20/2024 5:11 AM EST  Workstation ID: [id number]       EKG            I personally viewed and interpreted the patient's EKG/Telemetry data: Sinus rhythm anterolateral ischemic changes    ECHOCARDIOGRAM:        STRESS TEST        Cardiolite (Tc-99m sestamibi) stress test    HEART CATHETERIZATION  No results found for this or any previous visit.      OTHER:     Assessment & Plan     Principal Problem:    Chest pain    ]]]]]]]]]]]]]]]]]]]   impression  ========  - Unstable angina  Possible non-STEMI.  Troponin level 59.    Patient had cardiac catheterization at [facility] in 2019  70% diffuse and lengthy RCA disease 50% circumflex 95% first marginal branch disease 50% mid LAD and diagonal branch disease.  The information was obtained from a drawing that patient's wife has.    History of subtotal occlusion of 1st marginal branch at cardiac catheterization 05/18/2018      cardiac catheterization 05/18/2018 revealed normal left ventricular function 50% mid LAD, 50% diffuse diagonal branch disease 50% proximal circumflex, 1st marginal branch is subtotally occluded with 99% disease that bifurcated into 2 branches.  RCA is a   nondominant vessel that has diffuse disease.  Distal vessel filling from left circulation.    Echocardiogram 04/18/2018 is normal    Stress Cardiolite test revealed severe inferior posterior and apical ischemia 05/15/2018.  Patient had reproducible chest discomfort shortness of breath and ST abnormalities.     -  hypertension dyslipidemia GERD osteoarthritis renal  dysfunction.  BUN 15 creatinine 1.4     -CML.  Status post bone marrow transplantation  2016     - status post vasectomy and sinus surgery      -former smoker     - strong family history  for coronary artery disease.  Brother had CABG and father had myocardial infarction     -allergic to penicillin sulfa and doxycycline  ============  Plan  ==========  Unstable angina  Non-STEMI.  Troponin 59.  EKG showed anterolateral ST T wave abnormalities.  Chronic coronary artery disease cardiac cath 2018 and 2019.    Chronic renal insufficiency.-Dr. Lawson.  31/1.95-2/15/2024.  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    Patient would benefit from cardiac catheterization and coronary arteriography.    Echocardiogram.-2/20/2024  Structurally and functionally normal cardiac valves except for minimal mitral regurgitation.  Grade 1 diastolic dysfunction is present..  Left ventricular size and contractility is normal with ejection fraction of 60%.    Risks and benefits pros and cons of the procedure including infection bleeding blood clot heart attack stroke allergic reaction to the dye renal dysfunction etc. were discussed with patient and patient's wife.  Further plan will depend on patient's progress.    Renal consultation for renal precautions.    Further plan will depend on patient's progress.    Reviewed and updated-2/20/2024.  ]]]]]]]]]]]]]]]]]     Michelle Hernández MD  02/20/24  06:40 EST

## 2024-02-20 NOTE — PLAN OF CARE
Problem: Adult Inpatient Plan of Care  Goal: Plan of Care Review  Outcome: Ongoing, Progressing  Goal: Patient-Specific Goal (Individualized)  Outcome: Ongoing, Progressing  Goal: Absence of Hospital-Acquired Illness or Injury  Outcome: Ongoing, Progressing  Intervention: Identify and Manage Fall Risk  Recent Flowsheet Documentation  Taken 2/20/2024 1200 by Bright Delgado RN  Safety Promotion/Fall Prevention: safety round/check completed  Taken 2/20/2024 1000 by Bright Delgado RN  Safety Promotion/Fall Prevention: safety round/check completed  Taken 2/20/2024 0800 by Bright Delgado RN  Safety Promotion/Fall Prevention: safety round/check completed  Taken 2/20/2024 0733 by Bright Delgado RN  Safety Promotion/Fall Prevention: safety round/check completed  Intervention: Prevent Skin Injury  Recent Flowsheet Documentation  Taken 2/20/2024 0733 by Bright Delgado RN  Body Position: position changed independently  Intervention: Prevent and Manage VTE (Venous Thromboembolism) Risk  Recent Flowsheet Documentation  Taken 2/20/2024 0733 by Bright Delgado RN  Activity Management: up ad yao  Goal: Optimal Comfort and Wellbeing  Outcome: Ongoing, Progressing  Intervention: Provide Person-Centered Care  Recent Flowsheet Documentation  Taken 2/20/2024 0733 by Bright Delgado RN  Trust Relationship/Rapport: care explained  Goal: Readiness for Transition of Care  Outcome: Ongoing, Progressing  Intervention: Mutually Develop Transition Plan  Recent Flowsheet Documentation  Taken 2/20/2024 0721 by Bright Delgado RN  Transportation Anticipated:   car, drives self   family or friend will provide  Patient/Family Anticipated Services at Transition: none  Patient/Family Anticipates Transition to: home with family  Taken 2/20/2024 0720 by Bright Delgado RN  Equipment Currently Used at Home: none   Goal Outcome Evaluation:   Pt to have possible heart cath in the morning following nephrology consult. NPO at midnight

## 2024-02-20 NOTE — ED NOTES
Nursing report ED to floor  Anthony Ayoub  68 y.o.  male    HPI:   Chief Complaint   Patient presents with    Hypertension       Admitting doctor:   Pilo Manzo MD    Admitting diagnosis:   The primary encounter diagnosis was Chest pain, unspecified type. Diagnoses of Hypertension, unspecified type and Chronic kidney disease, unspecified CKD stage were also pertinent to this visit.    Code status:   Current Code Status       Date Active Code Status Order ID Comments User Context       Prior            Allergies:   Doxycycline hyclate, Penicillins, and Sulfa antibiotics    Isolation:  No active isolations     Fall Risk:  Fall Risk Assessment was completed, and patient is at moderate risk for falls.   Predictive Model Details         7 (Low) Factor Value    Calculated 2/20/2024 06:13 Age 68    Risk of Fall Model Musculoskeletal Assessment WDL     Active Peripheral IV Present     Imaging order in this encounter Present     Drug Use Not Asked     Respiratory Rate 18     Skin Assessment WDL     Financial Class Other     Magnesium not on file     Tobacco Use Quit     Mohan Scale not on file     Creatinine 1.76 mg/dL     Peripheral Vascular Assessment WDL     Cardiac Assessment X     Clinically Relevant Sex Not Female     Chloride 101 mmol/L     Gastrointestinal Assessment WDL     Albumin not on file     Diastolic BP 90     Potassium 4.6 mmol/L     Total Bilirubin not on file     Days after Admission 0.069     Calcium 9.7 mg/dL     ALT not on file         Weight:       02/20/24  0433   Weight: 75.5 kg (166 lb 7.2 oz)       Intake and Output  No intake or output data in the 24 hours ending 02/20/24 0613    Diet:   Dietary Orders (From admission, onward)       Start     Ordered    02/20/24 0603  NPO Diet NPO Type: Strict NPO  Diet Effective Now        Question:  NPO Type  Answer:  Strict NPO    02/20/24 0603                     Most recent vitals:   Vitals:    02/20/24 0433 02/20/24 0602   BP: (!) 198/84 150/90  "  Pulse: 63 56   Resp: 18    Temp: 97.6 °F (36.4 °C)    SpO2: 98% 96%   Weight: 75.5 kg (166 lb 7.2 oz)    Height: 167.6 cm (66\")        Active LDAs/IV Access:   Lines, Drains & Airways       Active LDAs       Name Placement date Placement time Site Days    Peripheral IV 02/20/24 0610 Right Antecubital 02/20/24 0610  Antecubital  less than 1                    Skin Condition:   Skin Assessments (last day)       None             Labs (abnormal labs have a star):   Labs Reviewed   BASIC METABOLIC PANEL - Abnormal; Notable for the following components:       Result Value    BUN 35 (*)     Creatinine 1.76 (*)     eGFR 42.1 (*)     All other components within normal limits    Narrative:     GFR Normal >60  Chronic Kidney Disease <60  Kidney Failure <15     APTT - Abnormal; Notable for the following components:    PTT 25.3 (*)     All other components within normal limits   TROPONIN - Abnormal; Notable for the following components:    HS Troponin T 59 (*)     All other components within normal limits    Narrative:     High Sensitive Troponin T Reference Range:  <14.0 ng/L- Negative Female for AMI  <22.0 ng/L- Negative Male for AMI  >=14 - Abnormal Female indicating possible myocardial injury.  >=22 - Abnormal Male indicating possible myocardial injury.   Clinicians would have to utilize clinical acumen, EKG, Troponin, and serial changes to determine if it is an Acute Myocardial Infarction or myocardial injury due to an underlying chronic condition.        PROTIME-INR - Normal   CBC WITH AUTO DIFFERENTIAL - Normal   HIGH SENSITIVITIY TROPONIN T 2HR   CBC AND DIFFERENTIAL    Narrative:     The following orders were created for panel order CBC & Differential.  Procedure                               Abnormality         Status                     ---------                               -----------         ------                     CBC Auto Differential[[id number]]        Normal              Final result             "     Please view results for these tests on the individual orders.   EXTRA TUBES    Narrative:     The following orders were created for panel order Extra Tubes.  Procedure                               Abnormality         Status                     ---------                               -----------         ------                     Gold Top - SST[[id number]]                                   Final result                 Please view results for these tests on the individual orders.   GOLD TOP - SST   EXTRA TUBES    Narrative:     The following orders were created for panel order Extra Tubes.  Procedure                               Abnormality         Status                     ---------                               -----------         ------                     Light Blue Top[[id number]]                                   In process                   Please view results for these tests on the individual orders.   LIGHT BLUE TOP       LOC: Person, Place, Time, and Situation    Telemetry:  Observation Unit    Cardiac Monitoring Ordered: yes    EKG:   ECG 12 Lead Chest Pain   Preliminary Result   HEART RATE= 59  bpm   RR Interval= 1008  ms   MI Interval= 178  ms   P Horizontal Axis= 0  deg   P Front Axis= 46  deg   QRSD Interval= 94  ms   QT Interval= 467  ms   QTcB= 465  ms   QRS Axis= 7  deg   T Wave Axis= 139  deg   - ABNORMAL ECG -   Sinus bradycardia   Repol abnrm, prob ischemia, anterolateral lds   Electronically Signed By:    Date and Time of Study: 2024-02-20 04:47:04          Medications Given in the ED:   Medications   sodium chloride 0.9 % flush 10 mL (has no administration in time range)   nitroglycerin (NITROSTAT) ointment 1 inch (has no administration in time range)   acetaminophen (TYLENOL) tablet 650 mg (has no administration in time range)   ondansetron (ZOFRAN) injection 4 mg (has no administration in time range)   melatonin tablet 5 mg (has no administration in time range)   nitroglycerin (NITROSTAT)  SL tablet 0.4 mg (has no administration in time range)   nitroglycerin (NITROSTAT) SL tablet 0.4 mg (has no administration in time range)   morphine injection 1 mg (has no administration in time range)     And   naloxone (NARCAN) injection 0.4 mg (has no administration in time range)   sennosides-docusate (PERICOLACE) 8.6-50 MG per tablet 2 tablet (has no administration in time range)     And   polyethylene glycol (MIRALAX) packet 17 g (has no administration in time range)     And   bisacodyl (DULCOLAX) EC tablet 5 mg (has no administration in time range)     And   bisacodyl (DULCOLAX) suppository 10 mg (has no administration in time range)       Imaging results:  XR Chest 1 View    Result Date: 2024  Impression: No acute cardiopulmonary process. Electronically Signed: Sakina Mayo MD  2024 5:11 AM EST  Workstation ID: [id number]     Social issues:   Social History     Socioeconomic History    Marital status:    Tobacco Use    Smoking status: Former     Packs/day: 1.00     Years: 30.00     Additional pack years: 0.00     Total pack years: 30.00     Types: Cigarettes     Quit date: 2016     Years since quittin.1    Smokeless tobacco: Never   Vaping Use    Vaping Use: Never used   Substance and Sexual Activity    Alcohol use: No    Drug use: Defer    Sexual activity: Defer       NIH Stroke Scale:  Interval: (not recorded)  1a. Level of Consciousness: (not recorded)  1b. LOC Questions: (not recorded)  1c. LOC Commands: (not recorded)  2. Best Gaze: (not recorded)  3. Visual: (not recorded)  4. Facial Palsy: (not recorded)  5a. Motor Arm, Left: (not recorded)  5b. Motor Arm, Right: (not recorded)  6a. Motor Leg, Left: (not recorded)  6b. Motor Leg, Right: (not recorded)  7. Limb Ataxia: (not recorded)  8. Sensory: (not recorded)  9. Best Language: (not recorded)  10. Dysarthria: (not recorded)  11. Extinction and Inattention (formerly Neglect): (not recorded)    Total (NIH Stroke Scale): (not  recorded)     Additional notable assessment information:  N/a     Nursing report ED to floor:    Obs MAURICE Jeffery, MAURICE   02/20/24 06:13 EST

## 2024-02-21 ENCOUNTER — APPOINTMENT (OUTPATIENT)
Dept: CARDIOLOGY | Facility: HOSPITAL | Age: 69
DRG: 233 | End: 2024-02-21
Payer: MEDICARE

## 2024-02-21 DIAGNOSIS — R97.20 ELEVATED PSA: Primary | ICD-10-CM

## 2024-02-21 PROBLEM — I25.119 ATHEROSCLEROTIC HEART DISEASE OF NATIVE CORONARY ARTERY WITH ANGINA PECTORIS: Status: ACTIVE | Noted: 2024-02-21

## 2024-02-21 LAB
ABO GROUP BLD: NORMAL
ACT BLD: 163 SECONDS (ref 89–137)
ALBUMIN SERPL-MCNC: 4.4 G/DL (ref 3.5–5.2)
ALBUMIN SERPL-MCNC: 4.4 G/DL (ref 3.5–5.2)
ALBUMIN/GLOB SERPL: 1.9 G/DL
ALBUMIN/GLOB SERPL: 2 G/DL
ALP SERPL-CCNC: 83 U/L (ref 39–117)
ALP SERPL-CCNC: 92 U/L (ref 39–117)
ALT SERPL W P-5'-P-CCNC: 21 U/L (ref 1–41)
ALT SERPL W P-5'-P-CCNC: 21 U/L (ref 1–41)
ANION GAP SERPL CALCULATED.3IONS-SCNC: 10 MMOL/L (ref 5–15)
ANION GAP SERPL CALCULATED.3IONS-SCNC: 9 MMOL/L (ref 5–15)
APTT PPP: 26.6 SECONDS (ref 61–76.5)
APTT PPP: 43.8 SECONDS (ref 24–31)
APTT PPP: 62.5 SECONDS (ref 61–76.5)
APTT PPP: 67.5 SECONDS (ref 61–76.5)
ARTERIAL PATENCY WRIST A: ABNORMAL
AST SERPL-CCNC: 25 U/L (ref 1–40)
AST SERPL-CCNC: 27 U/L (ref 1–40)
ATMOSPHERIC PRESS: ABNORMAL MM[HG]
BACTERIA UR QL AUTO: NORMAL /HPF
BASE EXCESS BLDA CALC-SCNC: -2 MMOL/L (ref 0–3)
BASOPHILS # BLD AUTO: 0.1 10*3/MM3 (ref 0–0.2)
BASOPHILS # BLD AUTO: 0.1 10*3/MM3 (ref 0–0.2)
BASOPHILS NFR BLD AUTO: 0.9 % (ref 0–1.5)
BASOPHILS NFR BLD AUTO: 0.9 % (ref 0–1.5)
BDY SITE: ABNORMAL
BH CV XLRA MEAS - DIST GSV CALF DIST LEFT: 0.22 CM
BH CV XLRA MEAS - DIST GSV CALF DIST RIGHT: 0.16 CM
BH CV XLRA MEAS - DIST GSV THIGH DIST LEFT: 0.27 CM
BH CV XLRA MEAS - DIST GSV THIGH DIST RIGHT: 0.27 CM
BH CV XLRA MEAS - MID GSV CALF LEFT: 0.2 CM
BH CV XLRA MEAS - MID GSV CALF RIGHT: 0.2 CM
BH CV XLRA MEAS - MID GSV THIGH  LEFT: 0.29 CM
BH CV XLRA MEAS - MID GSV THIGH  RIGHT: 0.24 CM
BH CV XLRA MEAS - PROX GSV CALF DIST LEFT: 0.22 CM
BH CV XLRA MEAS - PROX GSV CALF DIST RIGHT: 0.22 CM
BH CV XLRA MEAS - PROX GSV THIGH  LEFT: 0.27 CM
BH CV XLRA MEAS - PROX GSV THIGH  RIGHT: 0.27 CM
BH CV XLRA MEAS LEFT DIST CCA EDV: 20.8 CM/SEC
BH CV XLRA MEAS LEFT DIST CCA PSV: 107 CM/SEC
BH CV XLRA MEAS LEFT DIST ICA EDV: -23 CM/SEC
BH CV XLRA MEAS LEFT DIST ICA PSV: -81.8 CM/SEC
BH CV XLRA MEAS LEFT ICA/CCA RATIO: 1.01
BH CV XLRA MEAS LEFT MID CCA EDV: -26.9 CM/SEC
BH CV XLRA MEAS LEFT MID CCA PSV: -137 CM/SEC
BH CV XLRA MEAS LEFT PROX CCA EDV: 19.3 CM/SEC
BH CV XLRA MEAS LEFT PROX CCA PSV: 114 CM/SEC
BH CV XLRA MEAS LEFT PROX ECA PSV: 117 CM/SEC
BH CV XLRA MEAS LEFT PROX ICA EDV: 22.7 CM/SEC
BH CV XLRA MEAS LEFT PROX ICA PSV: 115 CM/SEC
BH CV XLRA MEAS LEFT PROX SCLA PSV: 151 CM/SEC
BH CV XLRA MEAS LEFT VERTEBRAL A EDV: 15.9 CM/SEC
BH CV XLRA MEAS LEFT VERTEBRAL A PSV: 67 CM/SEC
BH CV XLRA MEAS RIGHT DIST CCA EDV: -24.9 CM/SEC
BH CV XLRA MEAS RIGHT DIST CCA PSV: -94.4 CM/SEC
BH CV XLRA MEAS RIGHT DIST ICA EDV: -31.4 CM/SEC
BH CV XLRA MEAS RIGHT DIST ICA PSV: -90.9 CM/SEC
BH CV XLRA MEAS RIGHT ICA/CCA RATIO: -0.93
BH CV XLRA MEAS RIGHT PROX CCA EDV: -21.1 CM/SEC
BH CV XLRA MEAS RIGHT PROX CCA PSV: -114 CM/SEC
BH CV XLRA MEAS RIGHT PROX ECA PSV: -89.4 CM/SEC
BH CV XLRA MEAS RIGHT PROX ICA EDV: 25.1 CM/SEC
BH CV XLRA MEAS RIGHT PROX ICA PSV: 106 CM/SEC
BH CV XLRA MEAS RIGHT PROX SCLA PSV: 141 CM/SEC
BH CV XLRA MEAS RIGHT VERTEBRAL A EDV: -8.3 CM/SEC
BH CV XLRA MEAS RIGHT VERTEBRAL A PSV: -46.7 CM/SEC
BILIRUB SERPL-MCNC: 0.7 MG/DL (ref 0–1.2)
BILIRUB SERPL-MCNC: 0.9 MG/DL (ref 0–1.2)
BILIRUB UR QL STRIP: NEGATIVE
BLD GP AB SCN SERPL QL: NEGATIVE
BUN SERPL-MCNC: 31 MG/DL (ref 8–23)
BUN SERPL-MCNC: 34 MG/DL (ref 8–23)
BUN/CREAT SERPL: 20.1 (ref 7–25)
BUN/CREAT SERPL: 21.2 (ref 7–25)
CA-I SERPL ISE-MCNC: 1.24 MMOL/L (ref 1.2–1.3)
CALCIUM SPEC-SCNC: 9 MG/DL (ref 8.6–10.5)
CALCIUM SPEC-SCNC: 9.1 MG/DL (ref 8.6–10.5)
CHLORIDE SERPL-SCNC: 104 MMOL/L (ref 98–107)
CHLORIDE SERPL-SCNC: 106 MMOL/L (ref 98–107)
CLARITY UR: CLEAR
CLOSE TME COLL+ADP + EPINEP PNL BLD: 94 % (ref 86–100)
CO2 BLDA-SCNC: 25.5 MMOL/L (ref 22–29)
CO2 SERPL-SCNC: 24 MMOL/L (ref 22–29)
CO2 SERPL-SCNC: 24 MMOL/L (ref 22–29)
COLOR UR: YELLOW
CREAT SERPL-MCNC: 1.46 MG/DL (ref 0.76–1.27)
CREAT SERPL-MCNC: 1.69 MG/DL (ref 0.76–1.27)
DEPRECATED RDW RBC AUTO: 49 FL (ref 37–54)
DEPRECATED RDW RBC AUTO: 51.2 FL (ref 37–54)
EGFRCR SERPLBLD CKD-EPI 2021: 44.2 ML/MIN/1.73
EGFRCR SERPLBLD CKD-EPI 2021: 52.7 ML/MIN/1.73
EOSINOPHIL # BLD AUTO: 0.4 10*3/MM3 (ref 0–0.4)
EOSINOPHIL # BLD AUTO: 0.4 10*3/MM3 (ref 0–0.4)
EOSINOPHIL NFR BLD AUTO: 4.1 % (ref 0.3–6.2)
EOSINOPHIL NFR BLD AUTO: 4.8 % (ref 0.3–6.2)
ERYTHROCYTE [DISTWIDTH] IN BLOOD BY AUTOMATED COUNT: 15.2 % (ref 12.3–15.4)
ERYTHROCYTE [DISTWIDTH] IN BLOOD BY AUTOMATED COUNT: 15.3 % (ref 12.3–15.4)
GLOBULIN UR ELPH-MCNC: 2.2 GM/DL
GLOBULIN UR ELPH-MCNC: 2.3 GM/DL
GLUCOSE SERPL-MCNC: 104 MG/DL (ref 65–99)
GLUCOSE SERPL-MCNC: 130 MG/DL (ref 65–99)
GLUCOSE UR STRIP-MCNC: NEGATIVE MG/DL
HBA1C MFR BLD: 5.9 % (ref 4.8–5.6)
HCO3 BLDA-SCNC: 24.1 MMOL/L (ref 21–28)
HCT VFR BLD AUTO: 38.9 % (ref 37.5–51)
HCT VFR BLD AUTO: 41.9 % (ref 37.5–51)
HEMODILUTION: NO
HGB BLD-MCNC: 13.1 G/DL (ref 13–17.7)
HGB BLD-MCNC: 14.1 G/DL (ref 13–17.7)
HGB UR QL STRIP.AUTO: NEGATIVE
HYALINE CASTS UR QL AUTO: NORMAL /LPF
INR PPP: 1.06 (ref 0.93–1.1)
INR PPP: 1.08 (ref 0.93–1.1)
KETONES UR QL STRIP: NEGATIVE
LEUKOCYTE ESTERASE UR QL STRIP.AUTO: NEGATIVE
LYMPHOCYTES # BLD AUTO: 2.1 10*3/MM3 (ref 0.7–3.1)
LYMPHOCYTES # BLD AUTO: 2.8 10*3/MM3 (ref 0.7–3.1)
LYMPHOCYTES NFR BLD AUTO: 21.7 % (ref 19.6–45.3)
LYMPHOCYTES NFR BLD AUTO: 30.3 % (ref 19.6–45.3)
MAGNESIUM SERPL-MCNC: 1.9 MG/DL (ref 1.6–2.4)
MCH RBC QN AUTO: 30.6 PG (ref 26.6–33)
MCH RBC QN AUTO: 31.3 PG (ref 26.6–33)
MCHC RBC AUTO-ENTMCNC: 33.6 G/DL (ref 31.5–35.7)
MCHC RBC AUTO-ENTMCNC: 33.7 G/DL (ref 31.5–35.7)
MCV RBC AUTO: 91.2 FL (ref 79–97)
MCV RBC AUTO: 93 FL (ref 79–97)
MODALITY: ABNORMAL
MONOCYTES # BLD AUTO: 0.6 10*3/MM3 (ref 0.1–0.9)
MONOCYTES # BLD AUTO: 0.7 10*3/MM3 (ref 0.1–0.9)
MONOCYTES NFR BLD AUTO: 6.3 % (ref 5–12)
MONOCYTES NFR BLD AUTO: 7.7 % (ref 5–12)
MRSA DNA SPEC QL NAA+PROBE: NORMAL
NEUTROPHILS NFR BLD AUTO: 5.1 10*3/MM3 (ref 1.7–7)
NEUTROPHILS NFR BLD AUTO: 56.3 % (ref 42.7–76)
NEUTROPHILS NFR BLD AUTO: 6.5 10*3/MM3 (ref 1.7–7)
NEUTROPHILS NFR BLD AUTO: 67 % (ref 42.7–76)
NITRITE UR QL STRIP: NEGATIVE
NRBC BLD AUTO-RTO: 0 /100 WBC (ref 0–0.2)
NRBC BLD AUTO-RTO: 0 /100 WBC (ref 0–0.2)
PCO2 BLDA: 44.8 MM HG (ref 35–48)
PH BLDA: 7.34 PH UNITS (ref 7.35–7.45)
PH UR STRIP.AUTO: 6 [PH] (ref 5–8)
PHOSPHATE SERPL-MCNC: 3.1 MG/DL (ref 2.5–4.5)
PLATELET # BLD AUTO: 170 10*3/MM3 (ref 140–450)
PLATELET # BLD AUTO: 173 10*3/MM3 (ref 140–450)
PMV BLD AUTO: 8.2 FL (ref 6–12)
PMV BLD AUTO: 8.4 FL (ref 6–12)
PO2 BLDA: 60.8 MM HG (ref 83–108)
POTASSIUM SERPL-SCNC: 4.2 MMOL/L (ref 3.5–5.2)
POTASSIUM SERPL-SCNC: 4.2 MMOL/L (ref 3.5–5.2)
PROT SERPL-MCNC: 6.6 G/DL (ref 6–8.5)
PROT SERPL-MCNC: 6.7 G/DL (ref 6–8.5)
PROT UR QL STRIP: ABNORMAL
PROTHROMBIN TIME: 11.5 SECONDS (ref 9.6–11.7)
PROTHROMBIN TIME: 11.7 SECONDS (ref 9.6–11.7)
RBC # BLD AUTO: 4.19 10*6/MM3 (ref 4.14–5.8)
RBC # BLD AUTO: 4.6 10*6/MM3 (ref 4.14–5.8)
RBC # UR STRIP: NORMAL /HPF
REF LAB TEST METHOD: NORMAL
RH BLD: POSITIVE
SAO2 % BLDCOA: 89.2 % (ref 94–98)
SARS-COV-2 RNA RESP QL NAA+PROBE: NOT DETECTED
SODIUM SERPL-SCNC: 138 MMOL/L (ref 136–145)
SODIUM SERPL-SCNC: 139 MMOL/L (ref 136–145)
SP GR UR STRIP: 1.02 (ref 1–1.03)
SQUAMOUS #/AREA URNS HPF: NORMAL /HPF
T&S EXPIRATION DATE: NORMAL
TROPONIN T SERPL HS-MCNC: 149 NG/L
UROBILINOGEN UR QL STRIP: ABNORMAL
WBC # UR STRIP: NORMAL /HPF
WBC NRBC COR # BLD AUTO: 9.1 10*3/MM3 (ref 3.4–10.8)
WBC NRBC COR # BLD AUTO: 9.8 10*3/MM3 (ref 3.4–10.8)

## 2024-02-21 PROCEDURE — 25010000002 NITROGLYCERIN 5 MG/ML SOLUTION: Performed by: INTERNAL MEDICINE

## 2024-02-21 PROCEDURE — C1894 INTRO/SHEATH, NON-LASER: HCPCS | Performed by: INTERNAL MEDICINE

## 2024-02-21 PROCEDURE — 81001 URINALYSIS AUTO W/SCOPE: CPT | Performed by: NURSE PRACTITIONER

## 2024-02-21 PROCEDURE — 86900 BLOOD TYPING SEROLOGIC ABO: CPT | Performed by: NURSE PRACTITIONER

## 2024-02-21 PROCEDURE — 86920 COMPATIBILITY TEST SPIN: CPT

## 2024-02-21 PROCEDURE — 25010000002 LIDOCAINE 1 % SOLUTION: Performed by: INTERNAL MEDICINE

## 2024-02-21 PROCEDURE — 93005 ELECTROCARDIOGRAM TRACING: CPT | Performed by: INTERNAL MEDICINE

## 2024-02-21 PROCEDURE — 25010000002 HEPARIN (PORCINE) 25000-0.45 UT/250ML-% SOLUTION: Performed by: INTERNAL MEDICINE

## 2024-02-21 PROCEDURE — 83735 ASSAY OF MAGNESIUM: CPT | Performed by: INTERNAL MEDICINE

## 2024-02-21 PROCEDURE — 85730 THROMBOPLASTIN TIME PARTIAL: CPT | Performed by: NURSE PRACTITIONER

## 2024-02-21 PROCEDURE — 25010000002 FENTANYL CITRATE (PF) 100 MCG/2ML SOLUTION: Performed by: INTERNAL MEDICINE

## 2024-02-21 PROCEDURE — 85576 BLOOD PLATELET AGGREGATION: CPT | Performed by: NURSE PRACTITIONER

## 2024-02-21 PROCEDURE — 84484 ASSAY OF TROPONIN QUANT: CPT | Performed by: EMERGENCY MEDICINE

## 2024-02-21 PROCEDURE — 93010 ELECTROCARDIOGRAM REPORT: CPT | Performed by: INTERNAL MEDICINE

## 2024-02-21 PROCEDURE — 25010000002 NICARDIPINE 2.5 MG/ML SOLUTION 10 ML VIAL: Performed by: INTERNAL MEDICINE

## 2024-02-21 PROCEDURE — 93970 EXTREMITY STUDY: CPT

## 2024-02-21 PROCEDURE — 93005 ELECTROCARDIOGRAM TRACING: CPT | Performed by: NURSE PRACTITIONER

## 2024-02-21 PROCEDURE — 25810000003 SODIUM CHLORIDE 0.9 % SOLUTION 250 ML FLEX CONT: Performed by: INTERNAL MEDICINE

## 2024-02-21 PROCEDURE — 85730 THROMBOPLASTIN TIME PARTIAL: CPT

## 2024-02-21 PROCEDURE — 99233 SBSQ HOSP IP/OBS HIGH 50: CPT | Performed by: INTERNAL MEDICINE

## 2024-02-21 PROCEDURE — 85347 COAGULATION TIME ACTIVATED: CPT

## 2024-02-21 PROCEDURE — 85025 COMPLETE CBC W/AUTO DIFF WBC: CPT | Performed by: NURSE PRACTITIONER

## 2024-02-21 PROCEDURE — 25010000002 MIDAZOLAM PER 1 MG: Performed by: INTERNAL MEDICINE

## 2024-02-21 PROCEDURE — 80053 COMPREHEN METABOLIC PANEL: CPT | Performed by: NURSE PRACTITIONER

## 2024-02-21 PROCEDURE — 85610 PROTHROMBIN TIME: CPT | Performed by: INTERNAL MEDICINE

## 2024-02-21 PROCEDURE — 85730 THROMBOPLASTIN TIME PARTIAL: CPT | Performed by: INTERNAL MEDICINE

## 2024-02-21 PROCEDURE — 84100 ASSAY OF PHOSPHORUS: CPT | Performed by: INTERNAL MEDICINE

## 2024-02-21 PROCEDURE — 86901 BLOOD TYPING SEROLOGIC RH(D): CPT | Performed by: NURSE PRACTITIONER

## 2024-02-21 PROCEDURE — 93880 EXTRACRANIAL BILAT STUDY: CPT

## 2024-02-21 PROCEDURE — 25010000002 NITROGLYCERIN 200 MCG/ML SOLUTION: Performed by: NURSE PRACTITIONER

## 2024-02-21 PROCEDURE — 4A023N7 MEASUREMENT OF CARDIAC SAMPLING AND PRESSURE, LEFT HEART, PERCUTANEOUS APPROACH: ICD-10-PCS | Performed by: INTERNAL MEDICINE

## 2024-02-21 PROCEDURE — 99152 MOD SED SAME PHYS/QHP 5/>YRS: CPT | Performed by: INTERNAL MEDICINE

## 2024-02-21 PROCEDURE — 80053 COMPREHEN METABOLIC PANEL: CPT | Performed by: INTERNAL MEDICINE

## 2024-02-21 PROCEDURE — 83036 HEMOGLOBIN GLYCOSYLATED A1C: CPT | Performed by: NURSE PRACTITIONER

## 2024-02-21 PROCEDURE — 82330 ASSAY OF CALCIUM: CPT | Performed by: NURSE PRACTITIONER

## 2024-02-21 PROCEDURE — B2111ZZ FLUOROSCOPY OF MULTIPLE CORONARY ARTERIES USING LOW OSMOLAR CONTRAST: ICD-10-PCS | Performed by: INTERNAL MEDICINE

## 2024-02-21 PROCEDURE — 25510000001 IOPAMIDOL PER 1 ML: Performed by: INTERNAL MEDICINE

## 2024-02-21 PROCEDURE — C1769 GUIDE WIRE: HCPCS | Performed by: INTERNAL MEDICINE

## 2024-02-21 PROCEDURE — 93454 CORONARY ARTERY ANGIO S&I: CPT | Performed by: INTERNAL MEDICINE

## 2024-02-21 PROCEDURE — 87635 SARS-COV-2 COVID-19 AMP PRB: CPT | Performed by: NURSE PRACTITIONER

## 2024-02-21 PROCEDURE — 25810000003 SODIUM CHLORIDE 0.9 % SOLUTION: Performed by: INTERNAL MEDICINE

## 2024-02-21 PROCEDURE — 0 DEXTROSE 5 % SOLUTION: Performed by: INTERNAL MEDICINE

## 2024-02-21 PROCEDURE — 87641 MR-STAPH DNA AMP PROBE: CPT | Performed by: NURSE PRACTITIONER

## 2024-02-21 PROCEDURE — 86850 RBC ANTIBODY SCREEN: CPT | Performed by: NURSE PRACTITIONER

## 2024-02-21 PROCEDURE — 85610 PROTHROMBIN TIME: CPT | Performed by: NURSE PRACTITIONER

## 2024-02-21 PROCEDURE — 25810000003 SODIUM CHLORIDE 0.9 % SOLUTION 250 ML FLEX CONT: Performed by: EMERGENCY MEDICINE

## 2024-02-21 PROCEDURE — 82803 BLOOD GASES ANY COMBINATION: CPT | Performed by: NURSE PRACTITIONER

## 2024-02-21 PROCEDURE — 25010000002 NICARDIPINE 2.5 MG/ML SOLUTION 10 ML VIAL: Performed by: EMERGENCY MEDICINE

## 2024-02-21 RX ORDER — MIDAZOLAM HYDROCHLORIDE 1 MG/ML
INJECTION INTRAMUSCULAR; INTRAVENOUS
Status: DISCONTINUED | OUTPATIENT
Start: 2024-02-21 | End: 2024-02-21 | Stop reason: HOSPADM

## 2024-02-21 RX ORDER — FENTANYL CITRATE 50 UG/ML
INJECTION, SOLUTION INTRAMUSCULAR; INTRAVENOUS
Status: DISCONTINUED | OUTPATIENT
Start: 2024-02-21 | End: 2024-02-21 | Stop reason: HOSPADM

## 2024-02-21 RX ORDER — IBUPROFEN 600 MG/1
1 TABLET ORAL
Status: DISCONTINUED | OUTPATIENT
Start: 2024-02-21 | End: 2024-02-22

## 2024-02-21 RX ORDER — CHLORHEXIDINE GLUCONATE ORAL RINSE 1.2 MG/ML
15 SOLUTION DENTAL EVERY 12 HOURS SCHEDULED
Status: DISCONTINUED | OUTPATIENT
Start: 2024-02-21 | End: 2024-02-22

## 2024-02-21 RX ORDER — NICOTINE POLACRILEX 4 MG
15 LOZENGE BUCCAL
Status: DISCONTINUED | OUTPATIENT
Start: 2024-02-21 | End: 2024-02-22

## 2024-02-21 RX ORDER — LIDOCAINE HYDROCHLORIDE 10 MG/ML
INJECTION, SOLUTION INFILTRATION; PERINEURAL
Status: DISCONTINUED | OUTPATIENT
Start: 2024-02-21 | End: 2024-02-21 | Stop reason: HOSPADM

## 2024-02-21 RX ORDER — ALPRAZOLAM 0.25 MG/1
0.25 TABLET ORAL EVERY 8 HOURS PRN
Status: DISCONTINUED | OUTPATIENT
Start: 2024-02-21 | End: 2024-02-22

## 2024-02-21 RX ORDER — NITROGLYCERIN 20 MG/100ML
10-50 INJECTION INTRAVENOUS
Status: DISCONTINUED | OUTPATIENT
Start: 2024-02-21 | End: 2024-02-22

## 2024-02-21 RX ORDER — CHLORHEXIDINE GLUCONATE ORAL RINSE 1.2 MG/ML
15 SOLUTION DENTAL EVERY 12 HOURS SCHEDULED
Status: DISCONTINUED | OUTPATIENT
Start: 2024-02-21 | End: 2024-02-21

## 2024-02-21 RX ORDER — DEXTROSE MONOHYDRATE 25 G/50ML
10-50 INJECTION, SOLUTION INTRAVENOUS
Status: DISCONTINUED | OUTPATIENT
Start: 2024-02-21 | End: 2024-02-22

## 2024-02-21 RX ORDER — HEPARIN SODIUM 10000 [USP'U]/100ML
12 INJECTION, SOLUTION INTRAVENOUS
Status: DISCONTINUED | OUTPATIENT
Start: 2024-02-21 | End: 2024-02-22

## 2024-02-21 RX ORDER — CHLORHEXIDINE GLUCONATE 500 MG/1
CLOTH TOPICAL EVERY 12 HOURS
Status: DISCONTINUED | OUTPATIENT
Start: 2024-02-21 | End: 2024-02-21

## 2024-02-21 RX ORDER — SODIUM CHLORIDE 9 MG/ML
30 INJECTION, SOLUTION INTRAVENOUS CONTINUOUS PRN
Status: DISCONTINUED | OUTPATIENT
Start: 2024-02-21 | End: 2024-02-22

## 2024-02-21 RX ORDER — SODIUM CHLORIDE 9 MG/ML
INJECTION, SOLUTION INTRAVENOUS
Status: COMPLETED | OUTPATIENT
Start: 2024-02-21 | End: 2024-02-21

## 2024-02-21 RX ORDER — CARVEDILOL 6.25 MG/1
12.5 TABLET ORAL ONCE
Status: COMPLETED | OUTPATIENT
Start: 2024-02-21 | End: 2024-02-21

## 2024-02-21 RX ORDER — SODIUM CHLORIDE 0.9 % (FLUSH) 0.9 %
30 SYRINGE (ML) INJECTION ONCE AS NEEDED
Status: DISCONTINUED | OUTPATIENT
Start: 2024-02-21 | End: 2024-02-22

## 2024-02-21 RX ORDER — CHLORHEXIDINE GLUCONATE 500 MG/1
CLOTH TOPICAL EVERY 12 HOURS
Status: DISCONTINUED | OUTPATIENT
Start: 2024-02-21 | End: 2024-02-22

## 2024-02-21 RX ADMIN — ACETAMINOPHEN 650 MG: 325 TABLET, FILM COATED ORAL at 23:58

## 2024-02-21 RX ADMIN — NITROGLYCERIN 1 INCH: 20 OINTMENT TOPICAL at 00:26

## 2024-02-21 RX ADMIN — CHLORHEXIDINE GLUCONATE, 0.12% ORAL RINSE 15 ML: 1.2 SOLUTION DENTAL at 20:39

## 2024-02-21 RX ADMIN — NITROGLYCERIN 5 MCG/MIN: 5 INJECTION, SOLUTION INTRAVENOUS at 06:25

## 2024-02-21 RX ADMIN — MUPIROCIN 1 APPLICATION: 20 OINTMENT TOPICAL at 11:43

## 2024-02-21 RX ADMIN — NICARDIPINE HYDROCHLORIDE 10 MG/HR: 25 INJECTION, SOLUTION INTRAVENOUS at 11:40

## 2024-02-21 RX ADMIN — HEPARIN SODIUM 12 UNITS/KG/HR: 10000 INJECTION, SOLUTION INTRAVENOUS at 09:08

## 2024-02-21 RX ADMIN — NITROGLYCERIN 10 MCG/MIN: 20 INJECTION INTRAVENOUS at 10:59

## 2024-02-21 RX ADMIN — MUPIROCIN 1 APPLICATION: 20 OINTMENT TOPICAL at 20:40

## 2024-02-21 RX ADMIN — NICARDIPINE HYDROCHLORIDE 5 MG/HR: 25 INJECTION, SOLUTION INTRAVENOUS at 09:09

## 2024-02-21 RX ADMIN — HEPARIN SODIUM 12 UNITS/KG/HR: 10000 INJECTION, SOLUTION INTRAVENOUS at 06:24

## 2024-02-21 RX ADMIN — CHLORHEXIDINE GLUCONATE, 0.12% ORAL RINSE 15 ML: 1.2 SOLUTION DENTAL at 11:43

## 2024-02-21 RX ADMIN — Medication 3 ML: at 00:27

## 2024-02-21 RX ADMIN — ALPRAZOLAM 0.25 MG: 0.25 TABLET ORAL at 23:58

## 2024-02-21 RX ADMIN — ACETAMINOPHEN 650 MG: 325 TABLET, FILM COATED ORAL at 18:44

## 2024-02-21 RX ADMIN — ROSUVASTATIN 40 MG: 10 TABLET, FILM COATED ORAL at 20:39

## 2024-02-21 RX ADMIN — CARVEDILOL 12.5 MG: 6.25 TABLET, FILM COATED ORAL at 10:59

## 2024-02-21 RX ADMIN — CARVEDILOL 25 MG: 25 TABLET, FILM COATED ORAL at 20:39

## 2024-02-21 RX ADMIN — TERAZOSIN HYDROCHLORIDE 5 MG: 5 CAPSULE ORAL at 20:40

## 2024-02-21 RX ADMIN — CHLORHEXIDINE GLUCONATE: 500 CLOTH TOPICAL at 20:41

## 2024-02-21 RX ADMIN — SODIUM CHLORIDE 100 ML/HR: 9 INJECTION, SOLUTION INTRAVENOUS at 00:27

## 2024-02-21 NOTE — PROGRESS NOTES
[facility] Progress Note      Patient Name: Anthony Ayoub  : 1955  MRN: [id number]  Primary Care Physician:  Yifan Elizabeth MD  Date of admission: 2024    Subjective     Interval History:     Patient underwent cardiac cath which showed severe triple-vessel disease and is being evaluated by CV surgery for CABG  Patient otherwise is chest pain-free at this time  Denies any shortness of breath, nausea or vomiting  Blood pressure was high this morning    Review of Systems:   As noted above    Objective     Vitals:   Temp:  [97.4 °F (36.3 °C)-98.3 °F (36.8 °C)] 97.4 °F (36.3 °C)  Heart Rate:  [55-98] 75  Resp:  [14-20] 16  BP: (135-200)/() 148/58  Flow (L/min):  [2] 2    Intake/Output Summary (Last 24 hours) at 2024 1047  Last data filed at 2024 0620  Gross per 24 hour   Intake 1415.83 ml   Output 650 ml   Net 765.83 ml       Physical Exam:    General Appearance: NAD  HEENT: oral mucosa normal, nonicteric sclera  Neck: supple, no JVD  Lungs: CTA  Heart: RRR, normal S1 and S2  Abdomen: soft, nondistended  Extremities: no edema  Neuro: Awake alert and moving all extremities    Scheduled Meds:     amLODIPine, 10 mg, Oral, Q24H  aspirin, 81 mg, Oral, Daily  carvedilol, 12.5 mg, Oral, Once  carvedilol, 25 mg, Oral, BID  [START ON 2024] ceFAZolin, 2,000 mg, Intravenous, On Call to OR  chlorhexidine, 15 mL, Mouth/Throat, Q12H  Chlorhexidine Gluconate Cloth, , Topical, Q12H  [Held by provider] cloNIDine, 0.05 mg, Oral, Q12H  escitalopram, 20 mg, Oral, Daily  mupirocin, , Each Nare, Q12H  rosuvastatin, 40 mg, Oral, Nightly  senna-docusate sodium, 2 tablet, Oral, BID  terazosin, 5 mg, Oral, Nightly      IV Meds:   heparin, 12 Units/kg/hr, Last Rate: 12 Units/kg/hr (24 0908)  [START ON 2024] insulin, 0-100 Units/hr  niCARdipine, 5-15 mg/hr, Last Rate: 5 mg/hr (24)  nitroglycerin, 10-50 mcg/min, Last Rate: 10 mcg/min (24  0725)  nitroglycerin, 10-50 mcg/min  sodium chloride, 100 mL/hr, Last Rate: Stopped (02/21/24 0620)  sodium chloride, 75 mL/hr, Last Rate: Stopped (02/21/24 0028)  sodium chloride, 30 mL/hr        Results Reviewed:   I have personally reviewed the results from the time of this admission to 2/21/2024 10:47 EST     Results from last 7 days   Lab Units 02/21/24  0421 02/20/24  0459 02/15/24  1148   SODIUM mmol/L 138 138 140   POTASSIUM mmol/L 4.2 4.6 5.1   CHLORIDE mmol/L 104 101 105   CO2 mmol/L 24.0 26.0 27.0   BUN mg/dL 34* 35* 31*   CREATININE mg/dL 1.69* 1.76* 1.95*   CALCIUM mg/dL 9.0 9.7 9.6   BILIRUBIN mg/dL 0.7  --  0.6   ALK PHOS U/L 83  --  83   ALT (SGPT) U/L 21  --  23   AST (SGOT) U/L 25  --  21   GLUCOSE mg/dL 104* 99 109*     Estimated Creatinine Clearance: 46.3 mL/min (A) (by C-G formula based on SCr of 1.69 mg/dL (H)).  Results from last 7 days   Lab Units 02/21/24  0421 02/20/24  0650   MAGNESIUM mg/dL 1.9 2.1   PHOSPHORUS mg/dL 3.1  --          Results from last 7 days   Lab Units 02/21/24  0421 02/20/24  0459   WBC 10*3/mm3 9.10 7.20   HEMOGLOBIN g/dL 13.1 13.5   PLATELETS 10*3/mm3 170 169     Results from last 7 days   Lab Units 02/21/24  0421 02/20/24  0509   INR  1.06 1.02       Assessment / Plan     ASSESSMENT:    Chronic kidney disease stage IIIa.  Patient has underlying chronic kidney disease due to hypertensive nephrosclerosis.  Creatinine slightly higher than baseline.  Otherwise electrolytes, volume status okay   Coronary artery disease.  Patient underwent cardiac cath and showed severe triple-vessel disease.  Patient is being evaluated by CV surgery for CABG  Hypertension chronic kidney disease.  Pressure running high this morning    PLAN:  Continue gentle IV hydration  Explained the risk of worsening renal function with surgery the patient and family in detail and he sounded understanding  I agree restarting carvedilol and clonidine for blood pressure control  Repeat labs in [facility]  MD Renny  02/21/24  10:47 EST    [facility]  [phone]

## 2024-02-21 NOTE — PROGRESS NOTES
[facility] Progress note    Patient Name: Anthony Ayoub  : 1955  MRN: [id number]  Primary Care Physician: Yifan Elizabeth MD  Date of admission: 2024     Patient Care Team:  Yifan Elizabeth MD as PCP - General          Subjective   History Present Illness     Chief Complaint:   Chief Complaint   Patient presents with    Hypertension     htn    History of Present Illness    ED 2024  68-year-old male presents with chest pain.  Patient has a history of coronary artery disease.  He states pain has been intermittent over the past 2 or 3 days.  Pain is in the left chest without radiation.  He denies any shortness of breath, diaphoresis, dizziness, palpitations.  He states his blood pressure has also been running high.  He denies any alleviating or exacerbating factors.  He took aspirin prior to arrival to the emergency room tonight.  He is chest pain-free at this time.     Observation 2024  History obtained mostly from wife as patient was initially sleeping and resting comfortably.  Wife reports chest pain for the last 3 days only at nighttime and associated with hypertension.  Patient was hesitant to come to the ED but pain got worse yesterday and he agreed to proceed to the ED for further workup.  She reports that history of cardiac disease.  He states that patient has had a heart cath with Dr. Hernández as well as at .  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  She has noted the patient is short of breath with exertion or laying down.  Patient woke up and agreed with HPI reported by wife.  He is currently asymptomatic.    Observation 24  Pt feeling ok this am. Heparin and nitro gtt. Cardiology following. Catherization performed. CABG recommended. Will txf to hospitalist group for continued management    ROS    Constitutional: Negative for malaise/fatigue.   Cardiovascular:  Positive for chest pain and dyspnea on exertion.  Negative for irregular heartbeat, leg swelling, near-syncope, palpitations and syncope.   Respiratory:  Positive for shortness of breath.    Gastrointestinal:  Negative for nausea and vomiting.   All other systems reviewed and are negative.         Personal History     Past Medical History:   Past Medical History:   Diagnosis Date    CHF (congestive heart failure)     Coronary artery disease     GERD (gastroesophageal reflux disease)     Hyperlipidemia     Hypertension     Leukemia     Low back pain     Sleep apnea        Surgical History:      Past Surgical History:   Procedure Laterality Date    BONE MARROW TRANSPLANT      CARDIAC CATHETERIZATION      CHOLECYSTECTOMY N/A 4/23/2022    Procedure: CHOLECYSTECTOMY LAPAROSCOPIC;  Surgeon: Kale Acuna MD;  Location: [facility] OR;  Service: General;  Laterality: N/A;    SINUS SURGERY      TONSILLECTOMY      VASECTOMY             Family History: family history includes Heart attack in his father; Heart disease in his brother and father; Hyperlipidemia in his mother; Hypertension in his brother, mother, and sister. Otherwise pertinent FHx was reviewed and unremarkable.     Social History:  reports that he quit smoking about 8 years ago. His smoking use included cigarettes. He has a 30.00 pack-year smoking history. He has never used smokeless tobacco. Drug use questions deferred to the physician. He reports that he does not drink alcohol.      Medications:  Prior to Admission medications    Medication Sig Start Date End Date Taking? Authorizing Provider   carvedilol (COREG) 25 MG tablet Take 1 tablet by mouth 2 (Two) Times a Day. 3/3/22  Yes ProviderGutierrez MD   cloNIDine (CATAPRES) 0.1 MG tablet 1/2 in am  And 1/2 inpm   Yes ProviderGutierrez MD   escitalopram (LEXAPRO) 20 MG tablet Take 1 tablet by mouth Daily. 10/19/23  Yes Yifan Elizabeth MD   ezetimibe (ZETIA) 10 MG tablet TAKE 1 TABLET BY MOUTH EVERY DAY 2/12/24  Yes Yifan Elizabeth  MD   ferrous sulfate 325 (65 FE) MG EC tablet Take 1 tablet by mouth.   Yes Gutierrez Pavon MD   folic acid (FOLVITE) 1 MG tablet TAKE 1 TABLET BY MOUTH EVERY DAY 10/8/23  Yes Yifan Elizabeth MD   isosorbide mononitrate (IMDUR) 30 MG 24 hr tablet Take 1 tablet by mouth Daily. 2/11/22  Yes Gutierrez Pavon MD   lisinopril (PRINIVIL,ZESTRIL) 40 MG tablet Take 1 tablet by mouth Daily. 4/5/22  Yes Gutierrez Pavon MD   pantoprazole (PROTONIX) 40 MG EC tablet Take 1 tablet by mouth Daily.   Yes Gutierrez Pavon MD   rosuvastatin (CRESTOR) 40 MG tablet TAKE 1 TABLET BY MOUTH EVERYDAY AT BEDTIME 1/3/24  Yes Yifan Elizabeth MD   terazosin (HYTRIN) 5 MG capsule Daily.   Yes Gutierrez Pavon MD   vitamin D (ERGOCALCIFEROL) 1.25 MG (38756 UT) capsule capsule TAKE 1 CAPSULE BY MOUTH 1 TIME PER WEEK. 12/31/23  Yes Yifan Elizabeth MD   aspirin 81 MG chewable tablet Chew 1 tablet Daily.    Gutierrez Pavon MD   fluticasone (FLONASE) 50 MCG/ACT nasal spray 1 spray into the nostril(s) as directed by provider 1 (One) Time. prn 1/17/17   Gutierrez Pavon MD   multivitamin (THERAGRAN) tablet tablet Take  by mouth.    Gutierrez Pavon MD   nitroglycerin (NITROSTAT) 0.6 MG SL tablet  5/13/22   Gutierrez Pavon MD   VITAMIN D PO 50,000 ut  (1.25mg)    Gutierrez Pavon MD       Allergies:    Allergies   Allergen Reactions    Doxycycline Hyclate Other (See Comments)     Painful skin    Penicillins Rash    Sulfa Antibiotics Rash       Objective   Objective     Vital Signs  Temp:  [97.6 °F (36.4 °C)-98.3 °F (36.8 °C)] 98.3 °F (36.8 °C)  Heart Rate:  [55-77] 73  Resp:  [14-20] 16  BP: (135-200)/() 144/83  SpO2:  [95 %-99 %] 97 %  on  Flow (L/min):  [2] 2;   Device (Oxygen Therapy): nasal cannula  Body mass index is 26.31 kg/m².    Physical Exam  Vitals and nursing note reviewed.   Constitutional:       Appearance: Normal appearance.   HENT:      Head:  Normocephalic and atraumatic.      Right Ear: External ear normal.      Left Ear: External ear normal.      Nose: Nose normal.      Mouth/Throat:      Mouth: Mucous membranes are moist.   Eyes:      Extraocular Movements: Extraocular movements intact.   Cardiovascular:      Rate and Rhythm: Normal rate and regular rhythm.      Pulses: Normal pulses.      Heart sounds: Normal heart sounds.   Pulmonary:      Effort: Pulmonary effort is normal.      Breath sounds: Normal breath sounds.   Abdominal:      General: Abdomen is flat. Bowel sounds are normal.      Palpations: Abdomen is soft.   Musculoskeletal:         General: Normal range of motion.      Cervical back: Normal range of motion.   Skin:     General: Skin is warm.   Neurological:      Mental Status: He is alert and oriented to person, place, and time.   Psychiatric:         Behavior: Behavior normal.         Thought Content: Thought content normal.         Judgment: Judgment normal.     Results Review:  I have personally reviewed most recent cardiac tracings, lab results, and radiology images and interpretations and agree with findings, most notably: trop, cmp, inr, cbc, chest xray, ekg.    Results from last 7 days   Lab Units 02/21/24  0421   WBC 10*3/mm3 9.10   HEMOGLOBIN g/dL 13.1   HEMATOCRIT % 38.9   PLATELETS 10*3/mm3 170   INR  1.06     Results from last 7 days   Lab Units 02/21/24  0421 02/20/24  0650 02/20/24  0459   SODIUM mmol/L 138  --  138   POTASSIUM mmol/L 4.2  --  4.6   CHLORIDE mmol/L 104  --  101   CO2 mmol/L 24.0  --  26.0   BUN mg/dL 34*  --  35*   CREATININE mg/dL 1.69*  --  1.76*   GLUCOSE mg/dL 104*  --  99   CALCIUM mg/dL 9.0  --  9.7   ALK PHOS U/L 83  --   --    ALT (SGPT) U/L 21  --   --    AST (SGOT) U/L 25  --   --    HSTROP T ng/L 149* 97* 59*     Estimated Creatinine Clearance: 46.3 mL/min (A) (by C-G formula based on SCr of 1.69 mg/dL (H)).  Brief Urine Lab Results  (Last result in the past 365 days)        Color   Clarity    Blood   Leuk Est   Nitrite   Protein   CREAT   Urine HCG        10/11/23 1147             123.5                 Microbiology Results (last 10 days)       ** No results found for the last 240 hours. **            ECG/EMG Results (most recent)       Procedure Component Value Units Date/Time    ECG 12 Lead Chest Pain [[id number]] Collected: 02/20/24 0719     Updated: 02/20/24 0720     QT Interval 498 ms      QTC Interval 464 ms     Narrative:      HEART RATE= 52  bpm  RR Interval= 1152  ms  WY Interval= 172  ms  P Horizontal Axis= 0  deg  P Front Axis= 39  deg  QRSD Interval= 94  ms  QT Interval= 498  ms  QTcB= 464  ms  QRS Axis= -4  deg  T Wave Axis= 146  deg  - ABNORMAL ECG -  Sinus bradycardia  Repol abnrm, prob ischemia, anterolateral lds  Electronically Signed By:   Date and Time of Study: 2024-02-20 07:19:15    SCANNED - TELEMETRY   [[id number]] Resulted: 02/20/24     Updated: 02/20/24 0811    ECG 12 Lead Chest Pain [[id number]] Collected: 02/20/24 0447     Updated: 02/20/24 1014     QT Interval 467 ms      QTC Interval 465 ms     Narrative:      HEART RATE= 59  bpm  RR Interval= 1008  ms  WY Interval= 178  ms  P Horizontal Axis= 0  deg  P Front Axis= 46  deg  QRSD Interval= 94  ms  QT Interval= 467  ms  QTcB= 465  ms  QRS Axis= 7  deg  T Wave Axis= 139  deg  - ABNORMAL ECG -  Sinus bradycardia  Repol abnrm, prob ischemia, anterolateral lds  Electronically Signed By: Pilo Manzo (Brodie) 20-Feb-2024 10:14:02  Date and Time of Study: 2024-02-20 04:47:04    SCANNED - TELEMETRY   [[id number]] Resulted: 02/20/24     Updated: 02/20/24 1241    Adult Transthoracic Echo Complete W/ Cont if Necessary Per Protocol [[id number]] Resulted: 02/20/24 1625     Updated: 02/20/24 1630     EF(MOD-bp) 59.1 %      LVIDd 4.7 cm      LVIDs 3.3 cm      IVSd 1.20 cm      LVPWd 1.10 cm      FS 29.8 %      IVS/LVPW 1.09 cm      ESV(cubed) 35.9 ml      LV Sys Vol (BSA corrected) 21.6 cm2      EDV(cubed) 103.8 ml      LV Dailey Vol (BSA corrected)  55.9 cm2      LV mass(C)d 199.6 grams      LVOT area 2.8 cm2      LVOT diam 1.90 cm      EDV(MOD-sp2) 78.6 ml      EDV(MOD-sp4) 105.0 ml      ESV(MOD-sp2) 31.4 ml      ESV(MOD-sp4) 40.5 ml      SV(MOD-sp2) 47.2 ml      SV(MOD-sp4) 64.5 ml      SI(MOD-sp2) 25.1 ml/m2      SI(MOD-sp4) 34.4 ml/m2      EF(MOD-sp2) 60.1 %      EF(MOD-sp4) 61.4 %      MV E max bruce 56.6 cm/sec      MV A max bruce 91.7 cm/sec      MV dec time 0.22 sec      MV E/A 0.62     Pulm A Revs Dur 0.16 sec      LA ESV Index (BP) 28.5 ml/m2      Med Peak E' Bruce 4.6 cm/sec      Lat Peak E' Bruce 5.1 cm/sec      Avg E/e' ratio 11.67     SV(LVOT) 60.1 ml      RVIDd 3.3 cm      RV Base 3.3 cm      RV Mid 2.9 cm      RV Length 7.2 cm      TAPSE (>1.6) 1.61 cm      RV S' 12.3 cm/sec      LA dimension (2D)  3.7 cm      Pulm Sys Bruce 66.3 cm/sec      Pulm Dailey Bruce 30.5 cm/sec      Pulm S/D 2.17     Pulm A Revs Bruce 26.4 cm/sec      LV V1 max 95.8 cm/sec      LV V1 max PG 3.7 mmHg      LV V1 mean PG 2.00 mmHg      LV V1 VTI 21.2 cm      Ao pk bruce 141.0 cm/sec      Ao max PG 8.0 mmHg      Ao mean PG 4.0 mmHg      Ao V2 VTI 32.6 cm      NURY(I,D) 1.84 cm2      MV max PG 4.1 mmHg      MV mean PG 1.00 mmHg      MV V2 VTI 27.7 cm      MV P1/2t 75.4 msec      MVA(P1/2t) 2.9 cm2      MVA(VTI) 2.17 cm2      MV dec slope 265.0 cm/sec2      RV V1 max PG 3.1 mmHg      RV V1 max 87.8 cm/sec      RV V1 VTI 20.8 cm      PA V2 max 102.0 cm/sec      Sinus 3.1 cm     Narrative:        Left ventricular ejection fraction appears to be 56 - 60%.    Indication  Chest pain    Technically satisfactory study.  Mitral valve is structurally normal.  Minimal mitral regurgitation.  Tricuspid valve is structurally normal.  Aortic valve is structurally normal.  Pulmonic valve could not be well visualized.  Left atrium is normal in size.  Right atrium is normal in size.  Left ventricle is normal in size and contractility with ejection fraction   of 60%.  Grade 1 diastolic dysfunction (MV  E/A0.6)  Right ventricle is normal in size and contractility with TAPSE of 1.61..  Atrial septum is intact.  Aorta is normal.  No pericardial effusion or intracardiac thrombus is seen.    Impression  Structurally and functionally normal cardiac valves except for minimal   mitral regurgitation.  Grade 1 diastolic dysfunction is present..  Left ventricular size and contractility is normal with ejection fraction   of 60%.    SCANNED - TELEMETRY   [[id number]] Resulted: 02/20/24     Updated: 02/20/24 1834    SCANNED - TELEMETRY   [[id number]] Resulted: 02/20/24     Updated: 02/20/24 2101    ECG 12 Lead Chest Pain [[id number]] Collected: 02/20/24 2320     Updated: 02/20/24 2320     QT Interval 405 ms      QTC Interval 449 ms     Narrative:      HEART RATE= 74  bpm  RR Interval= 812  ms  KS Interval= 175  ms  P Horizontal Axis= 27  deg  P Front Axis= 73  deg  QRSD Interval= 92  ms  QT Interval= 405  ms  QTcB= 449  ms  QRS Axis= 45  deg  T Wave Axis= 143  deg  - ABNORMAL ECG -  Sinus rhythm  Repol abnrm suggests ischemia, diffuse leads  Electronically Signed By:   Date and Time of Study: 2024-02-20 23:20:10    SCANNED - TELEMETRY   [[id number]] Resulted: 02/20/24     Updated: 02/21/24 0002    SCANNED - TELEMETRY   [[id number]] Resulted: 02/20/24     Updated: 02/21/24 0843                Results for orders placed during the hospital encounter of 02/20/24    Adult Transthoracic Echo Complete W/ Cont if Necessary Per Protocol    Interpretation Summary    Left ventricular ejection fraction appears to be 56 - 60%.    Indication  Chest pain    Technically satisfactory study.  Mitral valve is structurally normal.  Minimal mitral regurgitation.  Tricuspid valve is structurally normal.  Aortic valve is structurally normal.  Pulmonic valve could not be well visualized.  Left atrium is normal in size.  Right atrium is normal in size.  Left ventricle is normal in size and contractility with ejection fraction of 60%.  Grade 1 diastolic  dysfunction (MV E/A0.6)  Right ventricle is normal in size and contractility with TAPSE of 1.61..  Atrial septum is intact.  Aorta is normal.  No pericardial effusion or intracardiac thrombus is seen.    Impression  Structurally and functionally normal cardiac valves except for minimal mitral regurgitation.  Grade 1 diastolic dysfunction is present..  Left ventricular size and contractility is normal with ejection fraction of 60%.      XR Chest 1 View    Result Date: 2/20/2024  Impression: No acute cardiopulmonary process. Electronically Signed: Sakina Mayo MD  2/20/2024 5:11 AM EST  Workstation ID: [id number]       Estimated Creatinine Clearance: 46.3 mL/min (A) (by C-G formula based on SCr of 1.69 mg/dL (H)).    Assessment & Plan   Assessment/Plan       Active Hospital Problems    Diagnosis  POA    **Chest pain [R07.9]  Unknown    Unstable angina [I20.0]  Unknown    Non-ST elevated myocardial infarction (non-STEMI) [I21.4]  Unknown      Resolved Hospital Problems   No resolved problems to display.     Chest pain        Lab Results   Component Value Date     TROPONINT 97 (C) 02/20/2024     TROPONINT 59 (C) 02/20/2024   -CBC unremarkable  -PT/INR within normal range and PTT 25.3  -Magnesium 2.1  -Lipid panel from 2/15/2024 showed HDL 31 and triglycerides 209 but otherwise unremarkable  -Chest X-ray: Showed no acute process  -EKG: Sinus bradycardia with heart rate of 52, NV interval 172 and   -Cardiology was consulted   - echo performed and no valvular disease, grade 1 diastolic dysfunction, ef 60%  - cath performed  - cabg recommended  -Telemetry  -Morphine ordered as needed  -Continue Imdur        CKD with history of hypertension  -Moderately controlled       BP Readings from Last 1 Encounters:   02/20/24 163/95            Lab Results   Component Value Date     CREATININE 1.76 (H) 02/20/2024     BUN 35 (H) 02/20/2024     BCR 19.9 02/20/2024     EGFR 42.1 (L) 02/20/2024   -Nephrologist consulted  -Per  nephrology's notes patient's baseline is 1.5  -Hold lisinopril for now  -Hold clonidine and Coreg due to bradycardia  -Avoid nephrotoxic medication IV dye unless urgently needed  -Monitor BMP and I's and O's while admitted  -Gentle hydration started by nephrologist        GERD  -Hold PPI for now     Hyperlipidemia  -Continue statin      VTE Prophylaxis -   Mechanical Order History:        Ordered        02/20/24 0603  Place Sequential Compression Device  Once            02/20/24 0603  Maintain Sequential Compression Device  Continuous                          Pharmalogical Order History:        Ordered     Dose Route Frequency Stop    02/21/24 0548  heparin [id number] units/250 mL (100 units/mL) in 0.45 % NaCl infusion  9.14 mL/hr         12 Units/kg/hr IV Titrated --    02/21/24 0548  [MAR Hold]  heparin bolus from bag solution 2,300 Units        (MAR Hold since Wed 2/21/2024 at 0757.Hold Reason: Unreviewed Transfer Orders)    30 Units/kg IV Every 6 Hours PRN --    02/21/24 0548  [MAR Hold]  heparin bolus from bag solution 4,600 Units        (MAR Hold since Wed 2/21/2024 at 0757.Hold Reason: Unreviewed Transfer Orders)    60 Units/kg IV Every 6 Hours PRN --                    CODE STATUS:    Code Status and Medical Interventions:   Ordered at: 02/20/24 1210     Level Of Support Discussed With:    Patient     Code Status (Patient has no pulse and is not breathing):    CPR (Attempt to Resuscitate)     Medical Interventions (Patient has pulse or is breathing):    Full Support       This patient has been examined wearing personal protective equipment.     I discussed the patient's findings and my recommendations with patient and nursing staff.      Signature:Electronically signed by Tammy Arias PA-C, 02/21/24, 9:52 AM EST.

## 2024-02-21 NOTE — SIGNIFICANT NOTE
02/21/24 1512   OTHER   Discipline occupational therapist   Rehab Time/Intention   Session Not Performed other (see comments)  (bedrest after heart cath, scheduled CABG 2/22 will follow up post op)   Recommendation   OT - Next Appointment 02/22/24

## 2024-02-21 NOTE — PROGRESS NOTES
Referring Provider: Pilo Manzo MD    Reason for follow-up:  Unstable angina  Non-STEMI     Patient Care Team:  Yifan Elizabeth MD as PCP - General    Subjective .      ROS  Patient had chest discomfort last night off-and-on associated with elevated blood pressure.    Since I have last seen, the patient has been without any shortness of breath, palpitations, dizziness or syncope.  Denies having any headache ,abdominal pain ,nausea, vomiting , diarrhea constipation, loss of weight or loss of appetite.  Denies having any excessive bruising ,hematuria or blood in the stool.    Review of all systems negative except as indicated    History  Past Medical History:   Diagnosis Date    CHF (congestive heart failure)     Coronary artery disease     GERD (gastroesophageal reflux disease)     Hyperlipidemia     Hypertension     Leukemia     Low back pain     Sleep apnea        Past Surgical History:   Procedure Laterality Date    BONE MARROW TRANSPLANT      CARDIAC CATHETERIZATION      CHOLECYSTECTOMY N/A 2022    Procedure: CHOLECYSTECTOMY LAPAROSCOPIC;  Surgeon: Kale Acuna MD;  Location: [facility] MAIN OR;  Service: General;  Laterality: N/A;    SINUS SURGERY      TONSILLECTOMY      VASECTOMY         Family History   Problem Relation Age of Onset    Hypertension Mother     Hyperlipidemia Mother     Heart disease Father     Heart attack Father     Hypertension Sister     Hypertension Brother     Heart disease Brother        Social History     Tobacco Use    Smoking status: Former     Packs/day: 1.00     Years: 30.00     Additional pack years: 0.00     Total pack years: 30.00     Types: Cigarettes     Quit date:      Years since quittin.1    Smokeless tobacco: Never   Vaping Use    Vaping Use: Never used   Substance Use Topics    Alcohol use: No    Drug use: Defer        Medications Prior to Admission   Medication Sig Dispense Refill Last Dose    carvedilol (COREG) 25 MG tablet Take 1 tablet by mouth  2 (Two) Times a Day.   2/19/2024    cloNIDine (CATAPRES) 0.1 MG tablet 1/2 in am  And 1/2 inpm   2/20/2024    escitalopram (LEXAPRO) 20 MG tablet Take 1 tablet by mouth Daily. 90 tablet 1 2/20/2024    ezetimibe (ZETIA) 10 MG tablet TAKE 1 TABLET BY MOUTH EVERY DAY 95 tablet 0 2/20/2024    ferrous sulfate 325 (65 FE) MG EC tablet Take 1 tablet by mouth.   2/20/2024    folic acid (FOLVITE) 1 MG tablet TAKE 1 TABLET BY MOUTH EVERY DAY 90 tablet 1 2/20/2024    isosorbide mononitrate (IMDUR) 30 MG 24 hr tablet Take 1 tablet by mouth Daily.   2/20/2024    lisinopril (PRINIVIL,ZESTRIL) 40 MG tablet Take 1 tablet by mouth Daily.   2/20/2024    pantoprazole (PROTONIX) 40 MG EC tablet Take 1 tablet by mouth Daily.   2/20/2024    rosuvastatin (CRESTOR) 40 MG tablet TAKE 1 TABLET BY MOUTH EVERYDAY AT BEDTIME 90 tablet 1 2/19/2024    terazosin (HYTRIN) 5 MG capsule Daily.   2/19/2024    vitamin D (ERGOCALCIFEROL) 1.25 MG (27964 UT) capsule capsule TAKE 1 CAPSULE BY MOUTH 1 TIME PER WEEK. 12 capsule 1 2/19/2024    aspirin 81 MG chewable tablet Chew 1 tablet Daily.   Unknown    fluticasone (FLONASE) 50 MCG/ACT nasal spray 1 spray into the nostril(s) as directed by provider 1 (One) Time. prn   Unknown    multivitamin (THERAGRAN) tablet tablet Take  by mouth.   Unknown    nitroglycerin (NITROSTAT) 0.6 MG SL tablet    Unknown    VITAMIN D PO 50,000 ut  (1.25mg)   Unknown       Allergies  Doxycycline hyclate, Penicillins, and Sulfa antibiotics    Scheduled Meds:amLODIPine, 10 mg, Oral, Q24H  aspirin, 81 mg, Oral, Daily  [Held by provider] carvedilol, 25 mg, Oral, BID  [Held by provider] cloNIDine, 0.05 mg, Oral, Q12H  escitalopram, 20 mg, Oral, Daily  rosuvastatin, 40 mg, Oral, Nightly  senna-docusate sodium, 2 tablet, Oral, BID  sodium chloride, 3 mL, Intravenous, Q12H  terazosin, 5 mg, Oral, Nightly      Continuous Infusions:heparin, 12 Units/kg/hr, Last Rate: 12 Units/kg/hr (02/21/24 0624)  nitroglycerin, 10-50 mcg/min, Last Rate:  "5 mcg/min (02/21/24 0625)  sodium chloride, 100 mL/hr, Last Rate: 100 mL/hr (02/21/24 0553)  sodium chloride, 75 mL/hr, Last Rate: Stopped (02/21/24 0028)      PRN Meds:.  acetaminophen    aluminum-magnesium hydroxide-simethicone    senna-docusate sodium **AND** polyethylene glycol **AND** bisacodyl **AND** bisacodyl    heparin    heparin    hydrALAZINE    melatonin    Morphine **AND** naloxone    ondansetron    [COMPLETED] Insert Peripheral IV **AND** sodium chloride    sodium chloride    sodium chloride    Objective     VITAL SIGNS  Vitals:    02/20/24 2132 02/21/24 0026 02/21/24 0411 02/21/24 0602   BP: 154/86 161/97 155/98 139/87   BP Location: Right arm  Right arm Right arm   Patient Position: Lying  Lying Lying   Pulse: 77 71     Resp: 17  14 16   Temp: 98 °F (36.7 °C)  98 °F (36.7 °C) 98.3 °F (36.8 °C)   TempSrc: Oral  Oral Oral   SpO2: 95%      Weight:       Height:           Flowsheet Rows      Flowsheet Row First Filed Value   Admission Height 167.6 cm (66\") Documented at 02/20/2024 0433   Admission Weight 75.5 kg (166 lb 7.2 oz) Documented at 02/20/2024 0433              Intake/Output Summary (Last 24 hours) at 2/21/2024 0640  Last data filed at 2/21/2024 0553  Gross per 24 hour   Intake 1370.83 ml   Output 650 ml   Net 720.83 ml        TELEMETRY: Sinus rhythm    Physical Exam:  The patient is alert, oriented and in no distress.  Vital signs as noted above.  Head and neck revealed no carotid bruits or jugular venous distention.  No thyromegaly or lymphadenopathy is present  Lungs clear.  No wheezing.  Breath sounds are normal bilaterally.  Heart normal first and second heart sounds.  No murmur. No precordial rub is present.  No gallop is present.  Abdomen soft and nontender.  No organomegaly is present.  Extremities with good peripheral pulses without any pedal edema.  Skin warm and dry.  Musculoskeletal system is grossly normal  CNS grossly normal    Reviewed and updated.  Results Review:   I reviewed " the patient's new clinical results.  Lab Results (last 24 hours)       Procedure Component Value Units Date/Time    aPTT [[id number]]  (Abnormal) Collected: 02/21/24 0421    Specimen: Blood from Hand, Right Updated: 02/21/24 0601     PTT 26.6 seconds     High Sensitivity Troponin T [[id number]]  (Abnormal) Collected: 02/21/24 0421    Specimen: Blood from Hand, Right Updated: 02/21/24 0502     HS Troponin T 149 ng/L     Narrative:      High Sensitive Troponin T Reference Range:  <14.0 ng/L- Negative Female for AMI  <22.0 ng/L- Negative Male for AMI  >=14 - Abnormal Female indicating possible myocardial injury.  >=22 - Abnormal Male indicating possible myocardial injury.   Clinicians would have to utilize clinical acumen, EKG, Troponin, and serial changes to determine if it is an Acute Myocardial Infarction or myocardial injury due to an underlying chronic condition.         Comprehensive Metabolic Panel [[id number]]  (Abnormal) Collected: 02/21/24 0421    Specimen: Blood from Hand, Right Updated: 02/21/24 0447     Glucose 104 mg/dL      BUN 34 mg/dL      Creatinine 1.69 mg/dL      Sodium 138 mmol/L      Potassium 4.2 mmol/L      Chloride 104 mmol/L      CO2 24.0 mmol/L      Calcium 9.0 mg/dL      Total Protein 6.6 g/dL      Albumin 4.4 g/dL      ALT (SGPT) 21 U/L      AST (SGOT) 25 U/L      Alkaline Phosphatase 83 U/L      Total Bilirubin 0.7 mg/dL      Globulin 2.2 gm/dL      A/G Ratio 2.0 g/dL      BUN/Creatinine Ratio 20.1     Anion Gap 10.0 mmol/L      eGFR 44.2 mL/min/1.73     Narrative:      GFR Normal >60  Chronic Kidney Disease <60  Kidney Failure <15      Magnesium [[id number]]  (Normal) Collected: 02/21/24 0421    Specimen: Blood from Hand, Right Updated: 02/21/24 0447     Magnesium 1.9 mg/dL     Phosphorus [[id number]]  (Normal) Collected: 02/21/24 0421    Specimen: Blood from Hand, Right Updated: 02/21/24 0447     Phosphorus 3.1 mg/dL     Protime-INR [[id number]]  (Normal) Collected: 02/21/24 0421    Specimen:  Blood from Hand, Right Updated: 02/21/24 0440     Protime 11.5 Seconds      INR 1.06    CBC & Differential [[id number]]  (Normal) Collected: 02/21/24 0421    Specimen: Blood from Hand, Right Updated: 02/21/24 0432    Narrative:      The following orders were created for panel order CBC & Differential.  Procedure                               Abnormality         Status                     ---------                               -----------         ------                     CBC Auto Differential[[id number]]        Normal              Final result                 Please view results for these tests on the individual orders.    CBC Auto Differential [[id number]]  (Normal) Collected: 02/21/24 0421    Specimen: Blood from Hand, Right Updated: 02/21/24 0432     WBC 9.10 10*3/mm3      RBC 4.19 10*6/mm3      Hemoglobin 13.1 g/dL      Hematocrit 38.9 %      MCV 93.0 fL      MCH 31.3 pg      MCHC 33.7 g/dL      RDW 15.3 %      RDW-SD 49.0 fl      MPV 8.2 fL      Platelets 170 10*3/mm3      Neutrophil % 56.3 %      Lymphocyte % 30.3 %      Monocyte % 7.7 %      Eosinophil % 4.8 %      Basophil % 0.9 %      Neutrophils, Absolute 5.10 10*3/mm3      Lymphocytes, Absolute 2.80 10*3/mm3      Monocytes, Absolute 0.70 10*3/mm3      Eosinophils, Absolute 0.40 10*3/mm3      Basophils, Absolute 0.10 10*3/mm3      nRBC 0.0 /100 WBC     Magnesium [[id number]]  (Normal) Collected: 02/20/24 0650    Specimen: Blood Updated: 02/20/24 0938     Magnesium 2.1 mg/dL     High Sensitivity Troponin T 2Hr [[id number]]  (Abnormal) Collected: 02/20/24 0650    Specimen: Blood Updated: 02/20/24 0723     HS Troponin T 97 ng/L      Troponin T Delta 38 ng/L     Narrative:      High Sensitive Troponin T Reference Range:  <14.0 ng/L- Negative Female for AMI  <22.0 ng/L- Negative Male for AMI  >=14 - Abnormal Female indicating possible myocardial injury.  >=22 - Abnormal Male indicating possible myocardial injury.   Clinicians would have to utilize clinical acumen,  EKG, Troponin, and serial changes to determine if it is an Acute Myocardial Infarction or myocardial injury due to an underlying chronic condition.                 Imaging Results (Last 24 Hours)       ** No results found for the last 24 hours. **        LAB RESULTS (LAST 7 DAYS)    CBC  Results from last 7 days   Lab Units 02/21/24 0421 02/20/24  0459   WBC 10*3/mm3 9.10 7.20   RBC 10*6/mm3 4.19 4.34   HEMOGLOBIN g/dL 13.1 13.5   HEMATOCRIT % 38.9 40.1   MCV fL 93.0 92.3   PLATELETS 10*3/mm3 170 169       BMP  Results from last 7 days   Lab Units 02/21/24 0421 02/20/24  0650 02/20/24  0459 02/15/24  1148   SODIUM mmol/L 138  --  138 140   POTASSIUM mmol/L 4.2  --  4.6 5.1   CHLORIDE mmol/L 104  --  101 105   CO2 mmol/L 24.0  --  26.0 27.0   BUN mg/dL 34*  --  35* 31*   CREATININE mg/dL 1.69*  --  1.76* 1.95*   GLUCOSE mg/dL 104*  --  99 109*   MAGNESIUM mg/dL 1.9 2.1  --   --    PHOSPHORUS mg/dL 3.1  --   --   --        CMP   Results from last 7 days   Lab Units 02/21/24 0421 02/20/24  0459 02/15/24  1148   SODIUM mmol/L 138 138 140   POTASSIUM mmol/L 4.2 4.6 5.1   CHLORIDE mmol/L 104 101 105   CO2 mmol/L 24.0 26.0 27.0   BUN mg/dL 34* 35* 31*   CREATININE mg/dL 1.69* 1.76* 1.95*   GLUCOSE mg/dL 104* 99 109*   ALBUMIN g/dL 4.4  --  4.6   BILIRUBIN mg/dL 0.7  --  0.6   ALK PHOS U/L 83  --  83   AST (SGOT) U/L 25  --  21   ALT (SGPT) U/L 21  --  23         BNP        TROPONIN  Results from last 7 days   Lab Units 02/21/24 0421   HSTROP T ng/L 149*       CoAg  Results from last 7 days   Lab Units 02/21/24 0421 02/20/24  0509   INR  1.06 1.02   APTT seconds 26.6* 25.3*       Creatinine Clearance  Estimated Creatinine Clearance: 46.3 mL/min (A) (by C-G formula based on SCr of 1.69 mg/dL (H)).    ABG        Radiology  XR Chest 1 View    Result Date: 2/20/2024  Impression: No acute cardiopulmonary process. Electronically Signed: Sakina Mayo MD  2/20/2024 5:11 AM EST  Workstation ID: [id number]          EKG                    I personally viewed and interpreted the patient's EKG/Telemetry data: Sinus rhythm.  Anterolateral ischemic changes    ECHOCARDIOGRAM:    Results for orders placed during the hospital encounter of 02/20/24    Adult Transthoracic Echo Complete W/ Cont if Necessary Per Protocol    Interpretation Summary    Left ventricular ejection fraction appears to be 56 - 60%.    Indication  Chest pain    Technically satisfactory study.  Mitral valve is structurally normal.  Minimal mitral regurgitation.  Tricuspid valve is structurally normal.  Aortic valve is structurally normal.  Pulmonic valve could not be well visualized.  Left atrium is normal in size.  Right atrium is normal in size.  Left ventricle is normal in size and contractility with ejection fraction of 60%.  Grade 1 diastolic dysfunction (MV E/A0.6)  Right ventricle is normal in size and contractility with TAPSE of 1.61..  Atrial septum is intact.  Aorta is normal.  No pericardial effusion or intracardiac thrombus is seen.    Impression  Structurally and functionally normal cardiac valves except for minimal mitral regurgitation.  Grade 1 diastolic dysfunction is present..  Left ventricular size and contractility is normal with ejection fraction of 60%.          STRESS TEST        Cardiolite (Tc-99m sestamibi) stress test    CARDIAC CATHETERIZATION  No results found for this or any previous visit.                OTHER:         Assessment & Plan     Principal Problem:    Chest pain  Active Problems:    Unstable angina    Non-ST elevated myocardial infarction (non-STEMI)    ]]]]]]]]]]]]]]]]]]]   impression  ========  - Unstable angina  Possible non-STEMI.  Troponin level 59.     Patient had cardiac catheterization at [facility][1] in 2019  70% diffuse and lengthy RCA disease 50% circumflex 95% first marginal branch disease 50% mid LAD and diagonal branch disease.  The information was obtained from a drawing that patient's wife has.      History of subtotal occlusion of 1st marginal branch at cardiac catheterization 05/18/2018      cardiac catheterization 05/18/2018 revealed normal left ventricular function 50% mid LAD, 50% diffuse diagonal branch disease 50% proximal circumflex, 1st marginal branch is subtotally occluded with 99% disease that bifurcated into 2 branches.  RCA is a   nondominant vessel that has diffuse disease.  Distal vessel filling from left circulation.     Echocardiogram 04/18/2018 is normal     Stress Cardiolite test revealed severe inferior posterior and apical ischemia 05/15/2018.  Patient had reproducible chest discomfort shortness of breath and ST abnormalities.     -  hypertension dyslipidemia GERD osteoarthritis renal dysfunction.  BUN 15 creatinine 1.4     -CML.  Status post bone marrow transplantation  2016     - status post vasectomy and sinus surgery      -former smoker     - strong family history  for coronary artery disease.  Brother had CABG and father had myocardial infarction     -allergic to penicillin sulfa and doxycycline  ============  Plan  ==========  Unstable angina  Non-STEMI.  Troponin 59.  148-2/21/2024  EKG showed anterolateral ST T wave abnormalities.  Chronic coronary artery disease cardiac cath 2018 and 2019.     Chronic renal insufficiency.-Dr. Lawson.  31/1.95-2/15/2024.  35/1.76-2/20/2024  34/1.69-2/21/2024  Renal precautions and renal consultation appreciated.  Patient definitely at higher risk of progressive renal dysfunction.     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      Echocardiogram.-2/20/2024  Structurally and functionally normal cardiac valves except for minimal mitral regurgitation.  Grade 1 diastolic dysfunction is present..  Left ventricular size and contractility is normal with ejection fraction of 60%.    Patient to have cardiac catheterization and coronary arteriography.     Risks and benefits pros and cons of the procedure including infection bleeding blood clot heart  attack stroke allergic reaction to the dye renal dysfunction etc. were discussed with patient and patient's wife.  Further plan will depend on patient's progress.      Further plan will depend on patient's progress.     Reviewed and updated-2/21/2024    ]]]]]]]]]]]]]]]]]    Cardiac catheterization 2/21/2024  Left ventricular angiogram was not performed due to pre-existing renal dysfunction.  Severe triple-vessel coronary artery disease.    Left main coronary artery has 40% disease  Left anterior descending artery is a large and long vessel that has ostial 99% disease.  Mid LAD has 80% disease.  Diagonal branches diffuse 99% disease proximally.    Circumflex coronary artery is a large and dominant vessel that provided multiple branches.  Circumflex coronary artery has 60 to 70% disease proximal to the first marginal branch.  First marginal branch has ostial 95% disease.  Second marginal branch is a relatively small caliber vessel that has 99% disease in the proximal segment that bifurcated into 2 branches.  Each branch has 90 to 95% disease.  There were 3 other marginal branches.  PDA has 99% disease in the proximal segment.  Second marginal branch has 60 to 70% ostial disease.    Right coronary artery is totally occluded in the proximal segment and distal vessel is filling through collaterals from left circulation.  Small caliber vessel.  (Chronic since 2019).  Please see the report from [facility].    Normal left ventricular function by echocardiogram.      RECOMMENDATIONS:  Patient to have CABG.  Cardiovascular surgery consultation initiated.      Michelle Hernández MD  02/21/24  06:40 EST

## 2024-02-21 NOTE — PLAN OF CARE
Goal Outcome Evaluation:  Plan of Care Reviewed With: patient        Progress: no change  Outcome Evaluation: Chest pain and increased BP. Call placed to ER. New order for Nitro paste. Second episode called to ER Stat troponin. Troponin elevated. Called to report to ER MD, waiting for call back. Will also notify cardiology.

## 2024-02-21 NOTE — CONSULTS
Patient Care Team:  Yifan Elizabeth MD as PCP - General    Chief complaint   Coronary artery disease    Subjective     History of Present Illness  Patient is a 68 y.o. male with known CAD, CKD stage 3, HTN, HLD, leukmeia s/p bone marrow transplant (2016), GERD, and former tobacco abuse. He presented to the ED with intermittent chest pain. It has been occurring the last 3 days. He states the pain is on the left without radiation. He also has some shortness of breath with exertion and notes that's his BP has been higher than normal. He denies palpitations, syncope, edema, fever/chills, or nausea/vomiting. In the ED his troponin was elevated and he was ruled in for NSTEMI. Cardiology was consulted and he underwent cardiac cath for further evaluation. Cath showed severe MV CAD. Echo showed minimal mitral regurg and normal EF. He is referred for evaluation for surgical revascularization. He is currently having substernal chest pain. Heparin drip and NTG drip started.  Cardene started for HTN.  He did not have any of his anti-hypertensives this morning prior to heart cath.    Review of Systems   Respiratory:  Positive for shortness of breath.    Cardiovascular:  Positive for chest pain.   All other systems reviewed and are negative.       Past Medical History:   Diagnosis Date    CHF (congestive heart failure)     Coronary artery disease     GERD (gastroesophageal reflux disease)     Hyperlipidemia     Hypertension     Leukemia     Low back pain     Sleep apnea      Past Surgical History:   Procedure Laterality Date    BONE MARROW TRANSPLANT      CARDIAC CATHETERIZATION      CHOLECYSTECTOMY N/A 4/23/2022    Procedure: CHOLECYSTECTOMY LAPAROSCOPIC;  Surgeon: Kale Acuna MD;  Location: [facility] MAIN OR;  Service: General;  Laterality: N/A;    SINUS SURGERY      TONSILLECTOMY      VASECTOMY       Family History   Problem Relation Age of Onset    Hypertension Mother     Hyperlipidemia Mother     Heart disease  Father     Heart attack Father     Hypertension Sister     Hypertension Brother     Heart disease Brother      Social History     Tobacco Use    Smoking status: Former     Packs/day: 1.00     Years: 30.00     Additional pack years: 0.00     Total pack years: 30.00     Types: Cigarettes     Quit date:      Years since quittin.1    Smokeless tobacco: Never   Vaping Use    Vaping Use: Never used   Substance Use Topics    Alcohol use: No    Drug use: Defer     Medications Prior to Admission   Medication Sig Dispense Refill Last Dose    carvedilol (COREG) 25 MG tablet Take 1 tablet by mouth 2 (Two) Times a Day.   2024    cloNIDine (CATAPRES) 0.1 MG tablet 1/2 in am  And 1/2 inpm   2024    escitalopram (LEXAPRO) 20 MG tablet Take 1 tablet by mouth Daily. 90 tablet 1 2024    ezetimibe (ZETIA) 10 MG tablet TAKE 1 TABLET BY MOUTH EVERY DAY 95 tablet 0 2024    ferrous sulfate 325 (65 FE) MG EC tablet Take 1 tablet by mouth.   2024    folic acid (FOLVITE) 1 MG tablet TAKE 1 TABLET BY MOUTH EVERY DAY 90 tablet 1 2024    isosorbide mononitrate (IMDUR) 30 MG 24 hr tablet Take 1 tablet by mouth Daily.   2024    lisinopril (PRINIVIL,ZESTRIL) 40 MG tablet Take 1 tablet by mouth Daily.   2024    pantoprazole (PROTONIX) 40 MG EC tablet Take 1 tablet by mouth Daily.   2024    rosuvastatin (CRESTOR) 40 MG tablet TAKE 1 TABLET BY MOUTH EVERYDAY AT BEDTIME 90 tablet 1 2024    terazosin (HYTRIN) 5 MG capsule Daily.   2024    vitamin D (ERGOCALCIFEROL) 1.25 MG (63239 UT) capsule capsule TAKE 1 CAPSULE BY MOUTH 1 TIME PER WEEK. 12 capsule 1 2024    aspirin 81 MG chewable tablet Chew 1 tablet Daily.   Unknown    fluticasone (FLONASE) 50 MCG/ACT nasal spray 1 spray into the nostril(s) as directed by provider 1 (One) Time. prn   Unknown    multivitamin (THERAGRAN) tablet tablet Take  by mouth.   Unknown    nitroglycerin (NITROSTAT) 0.6 MG SL tablet    Unknown    VITAMIN D PO  "50,000 ut  (1.25mg)   Unknown     amLODIPine, 10 mg, Oral, Q24H  [MAR Hold] aspirin, 81 mg, Oral, Daily  [Held by provider] carvedilol, 25 mg, Oral, BID  [Held by provider] cloNIDine, 0.05 mg, Oral, Q12H  [MAR Hold] escitalopram, 20 mg, Oral, Daily  [MAR Hold] rosuvastatin, 40 mg, Oral, Nightly  [MAR Hold] senna-docusate sodium, 2 tablet, Oral, BID  terazosin, 5 mg, Oral, Nightly      Allergies:  Doxycycline hyclate, Penicillins, and Sulfa antibiotics    Objective      Vital Signs  Temp:  [97.6 °F (36.4 °C)-98.3 °F (36.8 °C)] 98.3 °F (36.8 °C)  Heart Rate:  [55-77] 73  Resp:  [14-20] 16  BP: (135-200)/() 144/83    Flowsheet Rows      Flowsheet Row First Filed Value   Admission Height 167.6 cm (66\") Documented at 02/20/2024 0433   Admission Weight 75.5 kg (166 lb 7.2 oz) Documented at 02/20/2024 0433          170.2 cm (67\")    Physical Exam    Results Review:   Lab Results (last 24 hours)       Procedure Component Value Units Date/Time    aPTT [[id number]]  (Abnormal) Collected: 02/21/24 0421    Specimen: Blood from Hand, Right Updated: 02/21/24 0601     PTT 26.6 seconds     High Sensitivity Troponin T [[id number]]  (Abnormal) Collected: 02/21/24 0421    Specimen: Blood from Hand, Right Updated: 02/21/24 0502     HS Troponin T 149 ng/L     Narrative:      High Sensitive Troponin T Reference Range:  <14.0 ng/L- Negative Female for AMI  <22.0 ng/L- Negative Male for AMI  >=14 - Abnormal Female indicating possible myocardial injury.  >=22 - Abnormal Male indicating possible myocardial injury.   Clinicians would have to utilize clinical acumen, EKG, Troponin, and serial changes to determine if it is an Acute Myocardial Infarction or myocardial injury due to an underlying chronic condition.         Comprehensive Metabolic Panel [[id number]]  (Abnormal) Collected: 02/21/24 0421    Specimen: Blood from Hand, Right Updated: 02/21/24 0447     Glucose 104 mg/dL      BUN 34 mg/dL      Creatinine 1.69 mg/dL      Sodium 138 " mmol/L      Potassium 4.2 mmol/L      Chloride 104 mmol/L      CO2 24.0 mmol/L      Calcium 9.0 mg/dL      Total Protein 6.6 g/dL      Albumin 4.4 g/dL      ALT (SGPT) 21 U/L      AST (SGOT) 25 U/L      Alkaline Phosphatase 83 U/L      Total Bilirubin 0.7 mg/dL      Globulin 2.2 gm/dL      A/G Ratio 2.0 g/dL      BUN/Creatinine Ratio 20.1     Anion Gap 10.0 mmol/L      eGFR 44.2 mL/min/1.73     Narrative:      GFR Normal >60  Chronic Kidney Disease <60  Kidney Failure <15      Magnesium [[id number]]  (Normal) Collected: 02/21/24 0421    Specimen: Blood from Hand, Right Updated: 02/21/24 0447     Magnesium 1.9 mg/dL     Phosphorus [[id number]]  (Normal) Collected: 02/21/24 0421    Specimen: Blood from Hand, Right Updated: 02/21/24 0447     Phosphorus 3.1 mg/dL     Protime-INR [[id number]]  (Normal) Collected: 02/21/24 0421    Specimen: Blood from Hand, Right Updated: 02/21/24 0440     Protime 11.5 Seconds      INR 1.06    CBC & Differential [[id number]]  (Normal) Collected: 02/21/24 0421    Specimen: Blood from Hand, Right Updated: 02/21/24 0432    Narrative:      The following orders were created for panel order CBC & Differential.  Procedure                               Abnormality         Status                     ---------                               -----------         ------                     CBC Auto Differential[[id number]]        Normal              Final result                 Please view results for these tests on the individual orders.    CBC Auto Differential [[id number]]  (Normal) Collected: 02/21/24 0421    Specimen: Blood from Hand, Right Updated: 02/21/24 0432     WBC 9.10 10*3/mm3      RBC 4.19 10*6/mm3      Hemoglobin 13.1 g/dL      Hematocrit 38.9 %      MCV 93.0 fL      MCH 31.3 pg      MCHC 33.7 g/dL      RDW 15.3 %      RDW-SD 49.0 fl      MPV 8.2 fL      Platelets 170 10*3/mm3      Neutrophil % 56.3 %      Lymphocyte % 30.3 %      Monocyte % 7.7 %      Eosinophil % 4.8 %      Basophil % 0.9 %       Neutrophils, Absolute 5.10 10*3/mm3      Lymphocytes, Absolute 2.80 10*3/mm3      Monocytes, Absolute 0.70 10*3/mm3      Eosinophils, Absolute 0.40 10*3/mm3      Basophils, Absolute 0.10 10*3/mm3      nRBC 0.0 /100 WBC     Magnesium [[id number]]  (Normal) Collected: 02/20/24 0650    Specimen: Blood Updated: 02/20/24 0938     Magnesium 2.1 mg/dL           TTE 2/20/24    Left ventricular ejection fraction appears to be 56 - 60%.     Indication  Chest pain     Technically satisfactory study.  Mitral valve is structurally normal.  Minimal mitral regurgitation.  Tricuspid valve is structurally normal.  Aortic valve is structurally normal.  Pulmonic valve could not be well visualized.  Left atrium is normal in size.  Right atrium is normal in size.  Left ventricle is normal in size and contractility with ejection fraction of 60%.  Grade 1 diastolic dysfunction (MV E/A0.6)  Right ventricle is normal in size and contractility with TAPSE of 1.61..  Atrial septum is intact.  Aorta is normal.  No pericardial effusion or intracardiac thrombus is seen.     Impression  Structurally and functionally normal cardiac valves except for minimal mitral regurgitation.  Grade 1 diastolic dysfunction is present..  Left ventricular size and contractility is normal with ejection fraction of 60%.    Cardiac Cath 2/21/24  SUMMARY:  Left ventricular angiogram was not performed due to pre-existing renal dysfunction.  Severe triple-vessel coronary artery disease.     Left main coronary artery has 40% disease  Left anterior descending artery is a large and long vessel that has ostial 99% disease.  Mid LAD has 80% disease.  Diagonal branches diffuse 99% disease proximally.     Circumflex coronary artery is a large and dominant vessel that provided multiple branches.  Circumflex coronary artery has 60 to 70% disease proximal to the first marginal branch.  First marginal branch has ostial 95% disease.  Second marginal branch is a relatively small  caliber vessel that has 99% disease in the proximal segment that bifurcated into 2 branches.  Each branch has 90 to 95% disease.  There were 3 other marginal branches.  PDA has 99% disease in the proximal segment.  Second marginal branch has 60 to 70% ostial disease.     Right coronary artery is totally occluded in the proximal segment and distal vessel is filling through collaterals from left circulation.  Small caliber vessel.  (Chronic since 2019).  Please see the report from [facility].     Normal left ventricular function by echocardiogram.       Assessment & Plan       Chest pain    Unstable angina    Non-ST elevated myocardial infarction (non-STEMI)      Assessment & Plan    - MV CAD, hx MI/ IABP placement (2019), NSTEMI presentation, EF 55-60% (echo)-- eval for CABG  - HTN--takes Coreg, clonidine, lisinopril, imdur  - HLD--statin, Zetia  - CKD, stage 3--follows with Dr. 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 Cath shows severe MV CAD.  He is currently very hypertensive but did not get any of his meds this morning.  Continue with typical pre-operative evaluation for CABG to include labs and imaging.  Pt d/w Dr. Lawson--he is ok with proceeding with surgery and d/w pt and wife about risk of worsening kidney function/ poss HD.  Full recs to follow.    Thank you for allowing us to participate in the care of this patient.      LISA Azul

## 2024-02-22 ENCOUNTER — ANESTHESIA EVENT (OUTPATIENT)
Dept: PERIOP | Facility: HOSPITAL | Age: 69
End: 2024-02-22
Payer: MEDICARE

## 2024-02-22 ENCOUNTER — OFFICE VISIT (OUTPATIENT)
Dept: PERIOP | Facility: HOSPITAL | Age: 69
DRG: 233 | End: 2024-02-22
Payer: MEDICARE

## 2024-02-22 ENCOUNTER — APPOINTMENT (OUTPATIENT)
Dept: GENERAL RADIOLOGY | Facility: HOSPITAL | Age: 69
DRG: 233 | End: 2024-02-22
Payer: MEDICARE

## 2024-02-22 ENCOUNTER — ANESTHESIA (OUTPATIENT)
Dept: PERIOP | Facility: HOSPITAL | Age: 69
End: 2024-02-22
Payer: MEDICARE

## 2024-02-22 LAB
ACT BLD: 136 SECONDS (ref 89–137)
ACT BLD: 152 SECONDS (ref 89–137)
ACT BLD: 515 SECONDS (ref 89–137)
ACT BLD: 520 SECONDS (ref 89–137)
ACT BLD: 536 SECONDS (ref 89–137)
ALBUMIN SERPL-MCNC: 3.7 G/DL (ref 3.5–5.2)
ALBUMIN SERPL-MCNC: 4.1 G/DL (ref 3.5–5.2)
ANION GAP SERPL CALCULATED.3IONS-SCNC: 11 MMOL/L (ref 5–15)
ANION GAP SERPL CALCULATED.3IONS-SCNC: 12 MMOL/L (ref 5–15)
APTT PPP: 25.8 SECONDS (ref 24–31)
APTT PPP: 82.8 SECONDS (ref 61–76.5)
ARTERIAL PATENCY WRIST A: ABNORMAL
ATMOSPHERIC PRESS: ABNORMAL MM[HG]
BASE DEFICIT: -4 MEQ/LITER
BASE DEFICIT: ABNORMAL
BASE EXCESS BLDA CALC-SCNC: -2.6 MMOL/L (ref 0–3)
BASE EXCESS BLDA CALC-SCNC: -2.8 MMOL/L (ref 0–3)
BASE EXCESS BLDA CALC-SCNC: -2.9 MMOL/L (ref 0–3)
BASE EXCESS BLDA CALC-SCNC: -3.2 MMOL/L (ref 0–3)
BASE EXCESS BLDA CALC-SCNC: -3.6 MMOL/L (ref 0–3)
BASE EXCESS BLDA CALC-SCNC: -3.6 MMOL/L (ref 0–3)
BASE EXCESS BLDA CALC-SCNC: -6.2 MMOL/L (ref 0–3)
BASE EXCESS BLDA CALC-SCNC: 0 MMOL/L (ref 0–3)
BASE EXCESS BLDA CALC-SCNC: <0 MMOL/L (ref 0–3)
BASE EXCESS BLDV CALC-SCNC: ABNORMAL MMOL/L
BASOPHILS # BLD AUTO: 0 10*3/MM3 (ref 0–0.2)
BASOPHILS # BLD AUTO: 0.1 10*3/MM3 (ref 0–0.2)
BASOPHILS NFR BLD AUTO: 0.3 % (ref 0–1.5)
BASOPHILS NFR BLD AUTO: 0.9 % (ref 0–1.5)
BDY SITE: ABNORMAL
BH BB BLOOD EXPIRATION DATE: NORMAL
BH BB BLOOD TYPE BARCODE: 5100
BH BB DISPENSE STATUS: NORMAL
BH BB PRODUCT CODE: NORMAL
BH BB UNIT NUMBER: NORMAL
BUN SERPL-MCNC: 30 MG/DL (ref 8–23)
BUN SERPL-MCNC: 33 MG/DL (ref 8–23)
BUN/CREAT SERPL: 18.1 (ref 7–25)
BUN/CREAT SERPL: 20.8 (ref 7–25)
CA-I BLDA-SCNC: 1.02 MMOL/L (ref 1.12–1.32)
CA-I BLDA-SCNC: 1.05 MMOL/L (ref 1.12–1.32)
CA-I BLDA-SCNC: 1.07 MMOL/L (ref 1.12–1.32)
CA-I BLDA-SCNC: 1.17 MMOL/L (ref 1.15–1.33)
CA-I BLDA-SCNC: 1.18 MMOL/L (ref 1.15–1.33)
CA-I BLDA-SCNC: 1.18 MMOL/L (ref 1.15–1.33)
CA-I BLDA-SCNC: 1.19 MMOL/L (ref 1.15–1.33)
CA-I BLDA-SCNC: 1.22 MMOL/L (ref 1.15–1.33)
CA-I BLDA-SCNC: 1.26 MMOL/L (ref 1.12–1.32)
CA-I BLDA-SCNC: 1.33 MMOL/L (ref 1.12–1.32)
CA-I SERPL ISE-MCNC: 1.24 MMOL/L (ref 1.2–1.3)
CALCIUM SPEC-SCNC: 8.9 MG/DL (ref 8.6–10.5)
CALCIUM SPEC-SCNC: 9 MG/DL (ref 8.6–10.5)
CHLORIDE SERPL-SCNC: 106 MMOL/L (ref 98–107)
CHLORIDE SERPL-SCNC: 106 MMOL/L (ref 98–107)
CO2 BLDA-SCNC: 22 MMOL/L (ref 23–27)
CO2 BLDA-SCNC: 22.5 MMOL/L (ref 22–29)
CO2 BLDA-SCNC: 23.8 MMOL/L (ref 22–29)
CO2 BLDA-SCNC: 24 MMOL/L (ref 23–27)
CO2 BLDA-SCNC: 25 MMOL/L (ref 23–27)
CO2 BLDA-SCNC: 27 MMOL/L (ref 23–27)
CO2 CONTENT VENOUS: ABNORMAL
CO2 SERPL-SCNC: 24 MMOL/L (ref 22–29)
CO2 SERPL-SCNC: 24 MMOL/L (ref 22–29)
CREAT BLDA-MCNC: 1.58 MG/DL (ref 0.6–1.3)
CREAT BLDA-MCNC: 1.63 MG/DL (ref 0.6–1.3)
CREAT SERPL-MCNC: 1.59 MG/DL (ref 0.76–1.27)
CREAT SERPL-MCNC: 1.66 MG/DL (ref 0.76–1.27)
CROSSMATCH INTERPRETATION: NORMAL
DEPRECATED RDW RBC AUTO: 47.7 FL (ref 37–54)
DEPRECATED RDW RBC AUTO: 50.8 FL (ref 37–54)
EGFRCR SERPLBLD CKD-EPI 2021: 45.2 ML/MIN/1.73
EGFRCR SERPLBLD CKD-EPI 2021: 46.2 ML/MIN/1.73
EGFRCR SERPLBLD CKD-EPI 2021: 47.6 ML/MIN/1.73
EGFRCR SERPLBLD CKD-EPI 2021: 47.9 ML/MIN/1.73
EOSINOPHIL # BLD AUTO: 0.3 10*3/MM3 (ref 0–0.4)
EOSINOPHIL # BLD AUTO: 0.4 10*3/MM3 (ref 0–0.4)
EOSINOPHIL NFR BLD AUTO: 2.9 % (ref 0.3–6.2)
EOSINOPHIL NFR BLD AUTO: 3.9 % (ref 0.3–6.2)
ERYTHROCYTE [DISTWIDTH] IN BLOOD BY AUTOMATED COUNT: 14.9 % (ref 12.3–15.4)
ERYTHROCYTE [DISTWIDTH] IN BLOOD BY AUTOMATED COUNT: 15.2 % (ref 12.3–15.4)
GLUCOSE BLDC GLUCOMTR-MCNC: 100 MG/DL (ref 70–105)
GLUCOSE BLDC GLUCOMTR-MCNC: 101 MG/DL (ref 70–105)
GLUCOSE BLDC GLUCOMTR-MCNC: 77 MG/DL (ref 74–100)
GLUCOSE BLDC GLUCOMTR-MCNC: 77 MG/DL (ref 74–100)
GLUCOSE BLDC GLUCOMTR-MCNC: 83 MG/DL (ref 74–100)
GLUCOSE BLDC GLUCOMTR-MCNC: 83 MG/DL (ref 74–100)
GLUCOSE BLDC GLUCOMTR-MCNC: 84 MG/DL (ref 70–105)
GLUCOSE BLDC GLUCOMTR-MCNC: 85 MG/DL (ref 70–105)
GLUCOSE BLDC GLUCOMTR-MCNC: 85 MG/DL (ref 74–100)
GLUCOSE BLDC GLUCOMTR-MCNC: 87 MG/DL (ref 70–105)
GLUCOSE BLDC GLUCOMTR-MCNC: 88 MG/DL (ref 74–100)
GLUCOSE BLDC GLUCOMTR-MCNC: 92 MG/DL (ref 70–105)
GLUCOSE BLDC GLUCOMTR-MCNC: 92 MG/DL (ref 70–105)
GLUCOSE BLDC GLUCOMTR-MCNC: 94 MG/DL (ref 70–105)
GLUCOSE BLDC GLUCOMTR-MCNC: 96 MG/DL (ref 70–105)
GLUCOSE BLDC GLUCOMTR-MCNC: 98 MG/DL (ref 70–105)
GLUCOSE SERPL-MCNC: 94 MG/DL (ref 65–99)
GLUCOSE SERPL-MCNC: 99 MG/DL (ref 65–99)
HCO3 BLDA-SCNC: 20.5 MMOL/L (ref 21–28)
HCO3 BLDA-SCNC: 21.3 MMOL/L (ref 22–26)
HCO3 BLDA-SCNC: 22 MMOL/L (ref 21–28)
HCO3 BLDA-SCNC: 22 MMOL/L (ref 21–28)
HCO3 BLDA-SCNC: 22.2 MMOL/L (ref 21–28)
HCO3 BLDA-SCNC: 22.5 MMOL/L (ref 21–28)
HCO3 BLDA-SCNC: 22.6 MMOL/L (ref 22–26)
HCO3 BLDA-SCNC: 23.6 MMOL/L (ref 21–28)
HCO3 BLDA-SCNC: 23.7 MMOL/L (ref 22–26)
HCO3 BLDA-SCNC: 25.3 MMOL/L (ref 22–26)
HCO3 BLDV-SCNC: 23.1 MMOL/L (ref 23–28)
HCO3 MIXED: 21.3 MMOL/L (ref 21–29)
HCT VFR BLD AUTO: 34.6 % (ref 37.5–51)
HCT VFR BLD AUTO: 37.4 % (ref 37.5–51)
HCT VFR BLDA CALC: 25 % (ref 38–51)
HCT VFR BLDA CALC: 25 % (ref 38–51)
HCT VFR BLDA CALC: 26 % (ref 38–51)
HCT VFR BLDA CALC: 26 % (ref 38–51)
HCT VFR BLDA CALC: 28 % (ref 38–51)
HCT VFR BLDA CALC: 30 % (ref 38–51)
HCT VFR BLDA CALC: 30 % (ref 38–51)
HCT VFR BLDA CALC: 35 % (ref 38–51)
HEMODILUTION: NO
HGB BLD-MCNC: 11.8 G/DL (ref 13–17.7)
HGB BLD-MCNC: 12.6 G/DL (ref 13–17.7)
HGB BLDA-MCNC: 10.2 G/DL (ref 12–17)
HGB BLDA-MCNC: 10.3 G/DL (ref 12–17)
HGB BLDA-MCNC: 11.9 G/DL (ref 12–17)
HGB BLDA-MCNC: 8.5 G/DL (ref 12–17)
HGB BLDA-MCNC: 8.5 G/DL (ref 12–17)
HGB BLDA-MCNC: 8.8 G/DL (ref 12–17)
HGB BLDA-MCNC: 8.8 G/DL (ref 12–17)
HGB BLDA-MCNC: 9.5 G/DL (ref 12–17)
HGB BLDA-MCNC: 9.6 G/DL (ref 12–17)
HGB BLDA-MCNC: 9.7 G/DL (ref 12–17)
INHALED O2 CONCENTRATION: 36 %
INHALED O2 CONCENTRATION: 40 %
INHALED O2 CONCENTRATION: 50 %
INHALED O2 CONCENTRATION: 70 %
INR PPP: 1.11 (ref 0.93–1.1)
LYMPHOCYTES # BLD AUTO: 1.7 10*3/MM3 (ref 0.7–3.1)
LYMPHOCYTES # BLD AUTO: 3.1 10*3/MM3 (ref 0.7–3.1)
LYMPHOCYTES NFR BLD AUTO: 16.1 % (ref 19.6–45.3)
LYMPHOCYTES NFR BLD AUTO: 33.8 % (ref 19.6–45.3)
MCH RBC QN AUTO: 30.8 PG (ref 26.6–33)
MCH RBC QN AUTO: 31.2 PG (ref 26.6–33)
MCHC RBC AUTO-ENTMCNC: 33.8 G/DL (ref 31.5–35.7)
MCHC RBC AUTO-ENTMCNC: 33.9 G/DL (ref 31.5–35.7)
MCV RBC AUTO: 90.7 FL (ref 79–97)
MCV RBC AUTO: 92.3 FL (ref 79–97)
MODALITY: ABNORMAL
MONOCYTES # BLD AUTO: 0.4 10*3/MM3 (ref 0.1–0.9)
MONOCYTES # BLD AUTO: 0.7 10*3/MM3 (ref 0.1–0.9)
MONOCYTES NFR BLD AUTO: 4.3 % (ref 5–12)
MONOCYTES NFR BLD AUTO: 7.6 % (ref 5–12)
NEUTROPHILS NFR BLD AUTO: 4.9 10*3/MM3 (ref 1.7–7)
NEUTROPHILS NFR BLD AUTO: 53.8 % (ref 42.7–76)
NEUTROPHILS NFR BLD AUTO: 7.9 10*3/MM3 (ref 1.7–7)
NEUTROPHILS NFR BLD AUTO: 76.4 % (ref 42.7–76)
NRBC BLD AUTO-RTO: 0 /100 WBC (ref 0–0.2)
NRBC BLD AUTO-RTO: 0 /100 WBC (ref 0–0.2)
O2 SATURATION MIXED: 71.5 %
PCO2 BLDA: 37.4 MM HG (ref 35–48)
PCO2 BLDA: 37.8 MM HG (ref 35–48)
PCO2 BLDA: 39.9 MM HG (ref 35–45)
PCO2 BLDA: 40 MM HG (ref 35–48)
PCO2 BLDA: 40.4 MM HG (ref 35–45)
PCO2 BLDA: 42.2 MM HG (ref 35–45)
PCO2 BLDA: 44.4 MM HG (ref 35–48)
PCO2 BLDA: 44.5 MM HG (ref 35–45)
PCO2 BLDA: 44.7 MM HG (ref 35–48)
PCO2 BLDA: 46.3 MM HG (ref 35–48)
PCO2 BLDV: 46.9 MM HG (ref 41–51)
PCO2 MIXED: 37.1 MMHG (ref 35–51)
PEEP RESPIRATORY: 5 CM[H2O]
PEEP RESPIRATORY: 8 CM[H2O]
PEEP RESPIRATORY: 8 CM[H2O]
PH BLDA: 7.27 PH UNITS (ref 7.35–7.45)
PH BLDA: 7.31 PH UNITS (ref 7.35–7.45)
PH BLDA: 7.31 PH UNITS (ref 7.35–7.45)
PH BLDA: 7.32 PH UNITS (ref 7.35–7.45)
PH BLDA: 7.33 PH UNITS (ref 7.35–7.45)
PH BLDA: 7.35 PH UNITS (ref 7.35–7.45)
PH BLDA: 7.37 PH UNITS (ref 7.35–7.45)
PH BLDA: 7.38 PH UNITS (ref 7.35–7.45)
PH BLDA: 7.38 PH UNITS (ref 7.35–7.45)
PH BLDA: 7.39 PH UNITS (ref 7.35–7.45)
PH BLDV: 7.3 PH UNITS (ref 7.31–7.41)
PH MIXED: 7.37 PH UNITS (ref 7.32–7.45)
PHOSPHATE SERPL-MCNC: 2.3 MG/DL (ref 2.5–4.5)
PHOSPHATE SERPL-MCNC: 2.9 MG/DL (ref 2.5–4.5)
PLATELET # BLD AUTO: 114 10*3/MM3 (ref 140–450)
PLATELET # BLD AUTO: 177 10*3/MM3 (ref 140–450)
PMV BLD AUTO: 8.3 FL (ref 6–12)
PMV BLD AUTO: 8.6 FL (ref 6–12)
PO2 BLDA: 100.7 MM HG (ref 83–108)
PO2 BLDA: 257 MM HG (ref 80–105)
PO2 BLDA: 357 MM HG (ref 80–105)
PO2 BLDA: 377 MM HG (ref 80–105)
PO2 BLDA: 486 MM HG (ref 80–105)
PO2 BLDA: 69.3 MM HG (ref 83–108)
PO2 BLDA: 85.1 MM HG (ref 83–108)
PO2 BLDA: 89.2 MM HG (ref 83–108)
PO2 BLDA: 89.7 MM HG (ref 83–108)
PO2 BLDA: 98.5 MM HG (ref 83–108)
PO2 BLDV: 42 MM HG (ref 35–42)
PO2 MIXED: 38.6 MMHG
POTASSIUM BLDA-SCNC: 3.9 MMOL/L (ref 3.5–4.5)
POTASSIUM BLDA-SCNC: 4.1 MMOL/L (ref 3.5–4.5)
POTASSIUM BLDA-SCNC: 4.2 MMOL/L (ref 3.5–4.5)
POTASSIUM BLDA-SCNC: 4.2 MMOL/L (ref 3.5–4.9)
POTASSIUM BLDA-SCNC: 4.6 MMOL/L (ref 3.5–4.9)
POTASSIUM BLDA-SCNC: 4.6 MMOL/L (ref 3.5–4.9)
POTASSIUM BLDA-SCNC: 4.9 MMOL/L (ref 3.5–4.9)
POTASSIUM BLDA-SCNC: 5.3 MMOL/L (ref 3.5–4.9)
POTASSIUM SERPL-SCNC: 3.9 MMOL/L (ref 3.5–5.2)
POTASSIUM SERPL-SCNC: 4.3 MMOL/L (ref 3.5–5.2)
POTASSIUM SERPL-SCNC: 4.4 MMOL/L (ref 3.5–5.2)
PROTHROMBIN TIME: 12 SECONDS (ref 9.6–11.7)
PSV: 10 CMH2O
PSV: 10 CMH2O
QT INTERVAL: 390 MS
QT INTERVAL: 419 MS
QTC INTERVAL: 451 MS
QTC INTERVAL: 477 MS
RBC # BLD AUTO: 3.82 10*6/MM3 (ref 4.14–5.8)
RBC # BLD AUTO: 4.05 10*6/MM3 (ref 4.14–5.8)
RESPIRATORY RATE: 12
SAO2 % BLDCOA: 100 % (ref 95–98)
SAO2 % BLDCOA: 91.8 % (ref 94–98)
SAO2 % BLDCOA: 94.8 % (ref 94–98)
SAO2 % BLDCOA: 96 % (ref 94–98)
SAO2 % BLDCOA: 96.7 % (ref 94–98)
SAO2 % BLDCOA: 97.3 % (ref 94–98)
SAO2 % BLDCOA: 97.7 % (ref 94–98)
SAO2 % BLDCOV: 25 % (ref 45–75)
SET MECH RESP RATE: 12
SODIUM BLD-SCNC: 137 MMOL/L (ref 138–146)
SODIUM BLD-SCNC: 137 MMOL/L (ref 138–146)
SODIUM BLD-SCNC: 138 MMOL/L (ref 138–146)
SODIUM BLD-SCNC: 138 MMOL/L (ref 138–146)
SODIUM BLD-SCNC: 139 MMOL/L (ref 138–146)
SODIUM BLD-SCNC: 140 MMOL/L (ref 138–146)
SODIUM BLD-SCNC: 141 MMOL/L (ref 138–146)
SODIUM BLD-SCNC: 141 MMOL/L (ref 138–146)
SODIUM BLD-SCNC: 143 MMOL/L (ref 138–146)
SODIUM BLD-SCNC: 145 MMOL/L (ref 138–146)
SODIUM SERPL-SCNC: 141 MMOL/L (ref 136–145)
SODIUM SERPL-SCNC: 142 MMOL/L (ref 136–145)
UNIT  ABO: NORMAL
UNIT  RH: NORMAL
VENTILATOR MODE: ABNORMAL
VENTILATOR MODE: ABNORMAL
VENTILATOR MODE: AC
VT ON VENT VENT: 650 ML
WBC NRBC COR # BLD AUTO: 10.3 10*3/MM3 (ref 3.4–10.8)
WBC NRBC COR # BLD AUTO: 9.1 10*3/MM3 (ref 3.4–10.8)

## 2024-02-22 PROCEDURE — 85014 HEMATOCRIT: CPT

## 2024-02-22 PROCEDURE — 94799 UNLISTED PULMONARY SVC/PX: CPT

## 2024-02-22 PROCEDURE — 25010000002 NICARDIPINE 2.5 MG/ML SOLUTION 10 ML VIAL: Performed by: NURSE PRACTITIONER

## 2024-02-22 PROCEDURE — C1751 CATH, INF, PER/CENT/MIDLINE: HCPCS | Performed by: ANESTHESIOLOGY

## 2024-02-22 PROCEDURE — 82947 ASSAY GLUCOSE BLOOD QUANT: CPT

## 2024-02-22 PROCEDURE — C1751 CATH, INF, PER/CENT/MIDLINE: HCPCS

## 2024-02-22 PROCEDURE — 25810000003 SODIUM CHLORIDE 0.9 % SOLUTION 250 ML FLEX CONT: Performed by: NURSE PRACTITIONER

## 2024-02-22 PROCEDURE — 25010000002 MIDAZOLAM PER 1 MG: Performed by: ANESTHESIOLOGY

## 2024-02-22 PROCEDURE — 93318 ECHO TRANSESOPHAGEAL INTRAOP: CPT | Performed by: ANESTHESIOLOGY

## 2024-02-22 PROCEDURE — 71045 X-RAY EXAM CHEST 1 VIEW: CPT

## 2024-02-22 PROCEDURE — 25010000002 PAPAVERINE PER 60 MG

## 2024-02-22 PROCEDURE — 94761 N-INVAS EAR/PLS OXIMETRY MLT: CPT

## 2024-02-22 PROCEDURE — 25010000002 CEFAZOLIN PER 500 MG: Performed by: NURSE PRACTITIONER

## 2024-02-22 PROCEDURE — 82565 ASSAY OF CREATININE: CPT

## 2024-02-22 PROCEDURE — 33518 CABG ARTERY-VEIN TWO: CPT

## 2024-02-22 PROCEDURE — 25010000002 PROTAMINE SULFATE PER 10 MG: Performed by: ANESTHESIOLOGY

## 2024-02-22 PROCEDURE — 25010000002 ALBUMIN HUMAN 5% PER 50 ML: Performed by: NURSE PRACTITIONER

## 2024-02-22 PROCEDURE — 33518 CABG ARTERY-VEIN TWO: CPT | Performed by: PHYSICIAN ASSISTANT

## 2024-02-22 PROCEDURE — 85730 THROMBOPLASTIN TIME PARTIAL: CPT

## 2024-02-22 PROCEDURE — 84295 ASSAY OF SERUM SODIUM: CPT

## 2024-02-22 PROCEDURE — 80051 ELECTROLYTE PANEL: CPT

## 2024-02-22 PROCEDURE — 93005 ELECTROCARDIOGRAM TRACING: CPT | Performed by: NURSE PRACTITIONER

## 2024-02-22 PROCEDURE — 25010000002 NICARDIPINE 2.5 MG/ML SOLUTION: Performed by: ANESTHESIOLOGY

## 2024-02-22 PROCEDURE — 82948 REAGENT STRIP/BLOOD GLUCOSE: CPT

## 2024-02-22 PROCEDURE — 25010000002 NITROGLYCERIN 200 MCG/ML SOLUTION: Performed by: ANESTHESIOLOGY

## 2024-02-22 PROCEDURE — 94002 VENT MGMT INPAT INIT DAY: CPT

## 2024-02-22 PROCEDURE — 82803 BLOOD GASES ANY COMBINATION: CPT | Performed by: NURSE PRACTITIONER

## 2024-02-22 PROCEDURE — 25810000003 SODIUM CHLORIDE 0.9 % SOLUTION: Performed by: ANESTHESIOLOGY

## 2024-02-22 PROCEDURE — C1729 CATH, DRAINAGE: HCPCS

## 2024-02-22 PROCEDURE — 25010000002 CEFAZOLIN PER 500 MG: Performed by: ANESTHESIOLOGY

## 2024-02-22 PROCEDURE — C1713 ANCHOR/SCREW BN/BN,TIS/BN: HCPCS

## 2024-02-22 PROCEDURE — 25010000002 MAGNESIUM SULFATE PER 500 MG OF MAGNESIUM: Performed by: ANESTHESIOLOGY

## 2024-02-22 PROCEDURE — 33508 ENDOSCOPIC VEIN HARVEST: CPT

## 2024-02-22 PROCEDURE — P9041 ALBUMIN (HUMAN),5%, 50ML: HCPCS | Performed by: NURSE PRACTITIONER

## 2024-02-22 PROCEDURE — 25010000002 MAGNESIUM SULFATE IN D5W 1G/100ML (PREMIX) 1-5 GM/100ML-% SOLUTION: Performed by: NURSE PRACTITIONER

## 2024-02-22 PROCEDURE — 33508 ENDOSCOPIC VEIN HARVEST: CPT | Performed by: PHYSICIAN ASSISTANT

## 2024-02-22 PROCEDURE — 33533 CABG ARTERIAL SINGLE: CPT | Performed by: PHYSICIAN ASSISTANT

## 2024-02-22 PROCEDURE — 85347 COAGULATION TIME ACTIVATED: CPT

## 2024-02-22 PROCEDURE — 25010000002 ACETAMINOPHEN 10 MG/ML SOLUTION: Performed by: ANESTHESIOLOGY

## 2024-02-22 PROCEDURE — 5A1221Z PERFORMANCE OF CARDIAC OUTPUT, CONTINUOUS: ICD-10-PCS

## 2024-02-22 PROCEDURE — 021109W BYPASS CORONARY ARTERY, TWO ARTERIES FROM AORTA WITH AUTOLOGOUS VENOUS TISSUE, OPEN APPROACH: ICD-10-PCS

## 2024-02-22 PROCEDURE — 85730 THROMBOPLASTIN TIME PARTIAL: CPT | Performed by: NURSE PRACTITIONER

## 2024-02-22 PROCEDURE — 85610 PROTHROMBIN TIME: CPT | Performed by: NURSE PRACTITIONER

## 2024-02-22 PROCEDURE — 25010000002 PHENYLEPHRINE 10 MG/ML SOLUTION: Performed by: ANESTHESIOLOGY

## 2024-02-22 PROCEDURE — 85018 HEMOGLOBIN: CPT

## 2024-02-22 PROCEDURE — 25010000002 HEPARIN (PORCINE) PER 1000 UNITS: Performed by: ANESTHESIOLOGY

## 2024-02-22 PROCEDURE — 33533 CABG ARTERIAL SINGLE: CPT

## 2024-02-22 PROCEDURE — 85025 COMPLETE CBC W/AUTO DIFF WBC: CPT | Performed by: NURSE PRACTITIONER

## 2024-02-22 PROCEDURE — 80069 RENAL FUNCTION PANEL: CPT | Performed by: INTERNAL MEDICINE

## 2024-02-22 PROCEDURE — 84132 ASSAY OF SERUM POTASSIUM: CPT

## 2024-02-22 PROCEDURE — 99233 SBSQ HOSP IP/OBS HIGH 50: CPT | Performed by: INTERNAL MEDICINE

## 2024-02-22 PROCEDURE — 02100Z9 BYPASS CORONARY ARTERY, ONE ARTERY FROM LEFT INTERNAL MAMMARY, OPEN APPROACH: ICD-10-PCS

## 2024-02-22 PROCEDURE — 82803 BLOOD GASES ANY COMBINATION: CPT

## 2024-02-22 PROCEDURE — 25010000002 HEPARIN (PORCINE) PER 1000 UNITS

## 2024-02-22 PROCEDURE — 25010000002 FENTANYL CITRATE (PF) 250 MCG/5ML SOLUTION: Performed by: ANESTHESIOLOGY

## 2024-02-22 PROCEDURE — 85025 COMPLETE CBC W/AUTO DIFF WBC: CPT | Performed by: INTERNAL MEDICINE

## 2024-02-22 PROCEDURE — 82330 ASSAY OF CALCIUM: CPT

## 2024-02-22 PROCEDURE — C1887 CATHETER, GUIDING: HCPCS

## 2024-02-22 PROCEDURE — 06BQ4ZZ EXCISION OF LEFT SAPHENOUS VEIN, PERCUTANEOUS ENDOSCOPIC APPROACH: ICD-10-PCS

## 2024-02-22 PROCEDURE — 25010000002 HEPARIN (PORCINE) 1000-0.9 UT/500ML-% SOLUTION: Performed by: ANESTHESIOLOGY

## 2024-02-22 PROCEDURE — A4648 IMPLANTABLE TISSUE MARKER: HCPCS

## 2024-02-22 PROCEDURE — 93010 ELECTROCARDIOGRAM REPORT: CPT | Performed by: INTERNAL MEDICINE

## 2024-02-22 PROCEDURE — 25010000002 MORPHINE PER 10 MG: Performed by: INTERNAL MEDICINE

## 2024-02-22 PROCEDURE — 80069 RENAL FUNCTION PANEL: CPT | Performed by: NURSE PRACTITIONER

## 2024-02-22 PROCEDURE — 25010000002 ACETAMINOPHEN 10 MG/ML SOLUTION: Performed by: NURSE PRACTITIONER

## 2024-02-22 PROCEDURE — 82330 ASSAY OF CALCIUM: CPT | Performed by: NURSE PRACTITIONER

## 2024-02-22 DEVICE — IMPLANTABLE DEVICE: Type: IMPLANTABLE DEVICE | Site: STERNUM | Status: FUNCTIONAL

## 2024-02-22 DEVICE — HEMOST ABS SURGICEL ORIG 2X14IN STRL: Type: IMPLANTABLE DEVICE | Site: HEART | Status: FUNCTIONAL

## 2024-02-22 DEVICE — IMPLANTABLE DEVICE: Type: IMPLANTABLE DEVICE | Site: HEART | Status: FUNCTIONAL

## 2024-02-22 DEVICE — DEV CONTRL TISS STRATAFIXSPIRALMNCRYL PLSPS2 REV3/0 15CM: Type: IMPLANTABLE DEVICE | Site: LEG | Status: FUNCTIONAL

## 2024-02-22 DEVICE — WR STRN MYOWIRE2 SS DBL BE3 14IN SZ6 PK/3: Type: IMPLANTABLE DEVICE | Site: STERNUM | Status: FUNCTIONAL

## 2024-02-22 DEVICE — WAX BONE HEMO NAT 2.5G STRL: Type: IMPLANTABLE DEVICE | Site: STERNUM | Status: FUNCTIONAL

## 2024-02-22 DEVICE — DEV CONTRL TISS STRATAFIX SPIRAL MNCRYL UD 3/0 PLS 30CM: Type: IMPLANTABLE DEVICE | Site: CHEST | Status: FUNCTIONAL

## 2024-02-22 RX ORDER — MAGNESIUM HYDROXIDE 1200 MG/15ML
LIQUID ORAL AS NEEDED
Status: DISCONTINUED | OUTPATIENT
Start: 2024-02-22 | End: 2024-02-22 | Stop reason: HOSPADM

## 2024-02-22 RX ORDER — HEPARIN SODIUM 200 [USP'U]/100ML
INJECTION, SOLUTION INTRAVENOUS
Status: COMPLETED | OUTPATIENT
Start: 2024-02-22 | End: 2024-02-22

## 2024-02-22 RX ORDER — ATORVASTATIN CALCIUM 40 MG/1
40 TABLET, FILM COATED ORAL NIGHTLY
Status: DISCONTINUED | OUTPATIENT
Start: 2024-02-23 | End: 2024-02-23

## 2024-02-22 RX ORDER — NICARDIPINE HYDROCHLORIDE 2.5 MG/ML
INJECTION INTRAVENOUS AS NEEDED
Status: DISCONTINUED | OUTPATIENT
Start: 2024-02-22 | End: 2024-02-22 | Stop reason: SURG

## 2024-02-22 RX ORDER — FENTANYL CITRATE 50 UG/ML
INJECTION, SOLUTION INTRAMUSCULAR; INTRAVENOUS AS NEEDED
Status: DISCONTINUED | OUTPATIENT
Start: 2024-02-22 | End: 2024-02-22 | Stop reason: SURG

## 2024-02-22 RX ORDER — ETOMIDATE 2 MG/ML
INJECTION INTRAVENOUS AS NEEDED
Status: DISCONTINUED | OUTPATIENT
Start: 2024-02-22 | End: 2024-02-22 | Stop reason: SURG

## 2024-02-22 RX ORDER — NICOTINE POLACRILEX 4 MG
15 LOZENGE BUCCAL
Status: DISCONTINUED | OUTPATIENT
Start: 2024-02-22 | End: 2024-02-24

## 2024-02-22 RX ORDER — ASPIRIN 325 MG
325 TABLET ORAL ONCE
Qty: 1 TABLET | Refills: 0 | Status: COMPLETED | OUTPATIENT
Start: 2024-02-22 | End: 2024-02-22

## 2024-02-22 RX ORDER — HEPARIN SODIUM 1000 [USP'U]/ML
INJECTION, SOLUTION INTRAVENOUS; SUBCUTANEOUS AS NEEDED
Status: DISCONTINUED | OUTPATIENT
Start: 2024-02-22 | End: 2024-02-22 | Stop reason: SURG

## 2024-02-22 RX ORDER — HYDROCODONE BITARTRATE AND ACETAMINOPHEN 5; 325 MG/1; MG/1
1 TABLET ORAL EVERY 4 HOURS PRN
Status: DISCONTINUED | OUTPATIENT
Start: 2024-02-22 | End: 2024-02-26 | Stop reason: HOSPADM

## 2024-02-22 RX ORDER — ACETAMINOPHEN 10 MG/ML
1000 INJECTION, SOLUTION INTRAVENOUS EVERY 8 HOURS
Qty: 200 ML | Refills: 0 | Status: COMPLETED | OUTPATIENT
Start: 2024-02-22 | End: 2024-02-23

## 2024-02-22 RX ORDER — EPHEDRINE SULFATE 5 MG/ML
INJECTION INTRAVENOUS AS NEEDED
Status: DISCONTINUED | OUTPATIENT
Start: 2024-02-22 | End: 2024-02-22 | Stop reason: SURG

## 2024-02-22 RX ORDER — ENOXAPARIN SODIUM 100 MG/ML
40 INJECTION SUBCUTANEOUS DAILY
Status: DISCONTINUED | OUTPATIENT
Start: 2024-02-23 | End: 2024-02-26 | Stop reason: HOSPADM

## 2024-02-22 RX ORDER — MAGNESIUM SULFATE HEPTAHYDRATE 500 MG/ML
INJECTION, SOLUTION INTRAMUSCULAR; INTRAVENOUS AS NEEDED
Status: DISCONTINUED | OUTPATIENT
Start: 2024-02-22 | End: 2024-02-22 | Stop reason: SURG

## 2024-02-22 RX ORDER — NITROGLYCERIN 0.4 MG/1
0.4 TABLET SUBLINGUAL
Status: DISCONTINUED | OUTPATIENT
Start: 2024-02-22 | End: 2024-02-26 | Stop reason: HOSPADM

## 2024-02-22 RX ORDER — AMINOCAPROIC ACID 250 MG/ML
INJECTION, SOLUTION INTRAVENOUS AS NEEDED
Status: DISCONTINUED | OUTPATIENT
Start: 2024-02-22 | End: 2024-02-22 | Stop reason: SURG

## 2024-02-22 RX ORDER — NOREPINEPHRINE BITARTRATE 0.03 MG/ML
.02-.06 INJECTION, SOLUTION INTRAVENOUS CONTINUOUS PRN
Status: DISCONTINUED | OUTPATIENT
Start: 2024-02-22 | End: 2024-02-23

## 2024-02-22 RX ORDER — ACETAMINOPHEN 650 MG/1
650 SUPPOSITORY RECTAL EVERY 4 HOURS PRN
Status: DISCONTINUED | OUTPATIENT
Start: 2024-02-23 | End: 2024-02-26 | Stop reason: HOSPADM

## 2024-02-22 RX ORDER — IBUPROFEN 600 MG/1
1 TABLET ORAL
Status: DISCONTINUED | OUTPATIENT
Start: 2024-02-22 | End: 2024-02-24

## 2024-02-22 RX ORDER — VECURONIUM BROMIDE 1 MG/ML
INJECTION, POWDER, LYOPHILIZED, FOR SOLUTION INTRAVENOUS AS NEEDED
Status: DISCONTINUED | OUTPATIENT
Start: 2024-02-22 | End: 2024-02-22 | Stop reason: SURG

## 2024-02-22 RX ORDER — NOREPINEPHRINE BITARTRATE 0.03 MG/ML
INJECTION, SOLUTION INTRAVENOUS CONTINUOUS PRN
Status: DISCONTINUED | OUTPATIENT
Start: 2024-02-22 | End: 2024-02-22 | Stop reason: SURG

## 2024-02-22 RX ORDER — ASPIRIN 81 MG/1
81 TABLET ORAL DAILY
Status: DISCONTINUED | OUTPATIENT
Start: 2024-02-23 | End: 2024-02-26 | Stop reason: HOSPADM

## 2024-02-22 RX ORDER — MAGNESIUM SULFATE 1 G/100ML
1 INJECTION INTRAVENOUS EVERY 8 HOURS
Qty: 300 ML | Refills: 0 | Status: COMPLETED | OUTPATIENT
Start: 2024-02-22 | End: 2024-02-23

## 2024-02-22 RX ORDER — NITROGLYCERIN 20 MG/100ML
INJECTION INTRAVENOUS CONTINUOUS PRN
Status: DISCONTINUED | OUTPATIENT
Start: 2024-02-22 | End: 2024-02-22 | Stop reason: SURG

## 2024-02-22 RX ORDER — PANTOPRAZOLE SODIUM 40 MG/1
40 TABLET, DELAYED RELEASE ORAL
Status: DISCONTINUED | OUTPATIENT
Start: 2024-02-23 | End: 2024-02-26 | Stop reason: HOSPADM

## 2024-02-22 RX ORDER — ACETAMINOPHEN 160 MG/5ML
650 SOLUTION ORAL EVERY 4 HOURS PRN
Status: DISCONTINUED | OUTPATIENT
Start: 2024-02-23 | End: 2024-02-26 | Stop reason: HOSPADM

## 2024-02-22 RX ORDER — SODIUM CHLORIDE 9 MG/ML
30 INJECTION, SOLUTION INTRAVENOUS CONTINUOUS
Status: DISCONTINUED | OUTPATIENT
Start: 2024-02-22 | End: 2024-02-24

## 2024-02-22 RX ORDER — ACETAMINOPHEN 10 MG/ML
INJECTION, SOLUTION INTRAVENOUS AS NEEDED
Status: DISCONTINUED | OUTPATIENT
Start: 2024-02-22 | End: 2024-02-22 | Stop reason: SURG

## 2024-02-22 RX ORDER — PHENYLEPHRINE HYDROCHLORIDE 10 MG/ML
INJECTION INTRAVENOUS AS NEEDED
Status: DISCONTINUED | OUTPATIENT
Start: 2024-02-22 | End: 2024-02-22 | Stop reason: SURG

## 2024-02-22 RX ORDER — SODIUM CHLORIDE 9 MG/ML
INJECTION, SOLUTION INTRAVENOUS CONTINUOUS PRN
Status: DISCONTINUED | OUTPATIENT
Start: 2024-02-22 | End: 2024-02-22 | Stop reason: SURG

## 2024-02-22 RX ORDER — POLYETHYLENE GLYCOL 3350 17 G/17G
17 POWDER, FOR SOLUTION ORAL 2 TIMES DAILY
Status: DISCONTINUED | OUTPATIENT
Start: 2024-02-22 | End: 2024-02-26 | Stop reason: HOSPADM

## 2024-02-22 RX ORDER — CHLORHEXIDINE GLUCONATE ORAL RINSE 1.2 MG/ML
15 SOLUTION DENTAL EVERY 12 HOURS
Status: DISCONTINUED | OUTPATIENT
Start: 2024-02-22 | End: 2024-02-26 | Stop reason: HOSPADM

## 2024-02-22 RX ORDER — NALOXONE HCL 0.4 MG/ML
0.4 VIAL (ML) INJECTION
Status: DISCONTINUED | OUTPATIENT
Start: 2024-02-22 | End: 2024-02-26 | Stop reason: HOSPADM

## 2024-02-22 RX ORDER — KETAMINE HCL IN NACL, ISO-OSM 100MG/10ML
SYRINGE (ML) INJECTION AS NEEDED
Status: DISCONTINUED | OUTPATIENT
Start: 2024-02-22 | End: 2024-02-22 | Stop reason: SURG

## 2024-02-22 RX ORDER — AMOXICILLIN 250 MG
2 CAPSULE ORAL 2 TIMES DAILY
Status: DISCONTINUED | OUTPATIENT
Start: 2024-02-22 | End: 2024-02-26 | Stop reason: HOSPADM

## 2024-02-22 RX ORDER — POTASSIUM CHLORIDE 1.5 G/1.58G
20 POWDER, FOR SOLUTION ORAL ONCE
Status: COMPLETED | OUTPATIENT
Start: 2024-02-22 | End: 2024-02-22

## 2024-02-22 RX ORDER — DEXMEDETOMIDINE HYDROCHLORIDE 4 UG/ML
.2-1.5 INJECTION, SOLUTION INTRAVENOUS
Status: DISCONTINUED | OUTPATIENT
Start: 2024-02-22 | End: 2024-02-23

## 2024-02-22 RX ORDER — ALBUMIN, HUMAN INJ 5% 5 %
12.5 SOLUTION INTRAVENOUS AS NEEDED
Status: COMPLETED | OUTPATIENT
Start: 2024-02-22 | End: 2024-02-22

## 2024-02-22 RX ORDER — MORPHINE SULFATE 2 MG/ML
2 INJECTION, SOLUTION INTRAMUSCULAR; INTRAVENOUS
Status: DISPENSED | OUTPATIENT
Start: 2024-02-22 | End: 2024-02-25

## 2024-02-22 RX ORDER — ONDANSETRON 2 MG/ML
4 INJECTION INTRAMUSCULAR; INTRAVENOUS EVERY 6 HOURS PRN
Status: DISCONTINUED | OUTPATIENT
Start: 2024-02-22 | End: 2024-02-26 | Stop reason: HOSPADM

## 2024-02-22 RX ORDER — NITROGLYCERIN 20 MG/100ML
5-50 INJECTION INTRAVENOUS CONTINUOUS PRN
Status: DISCONTINUED | OUTPATIENT
Start: 2024-02-22 | End: 2024-02-23

## 2024-02-22 RX ORDER — PANTOPRAZOLE SODIUM 40 MG/10ML
40 INJECTION, POWDER, LYOPHILIZED, FOR SOLUTION INTRAVENOUS
Qty: 10 ML | Refills: 0 | Status: COMPLETED | OUTPATIENT
Start: 2024-02-22 | End: 2024-02-22

## 2024-02-22 RX ORDER — ACETAMINOPHEN 325 MG/1
650 TABLET ORAL EVERY 4 HOURS PRN
Status: DISCONTINUED | OUTPATIENT
Start: 2024-02-23 | End: 2024-02-26 | Stop reason: HOSPADM

## 2024-02-22 RX ORDER — MEPERIDINE HYDROCHLORIDE 25 MG/ML
25 INJECTION INTRAMUSCULAR; INTRAVENOUS; SUBCUTANEOUS ONCE AS NEEDED
Status: DISCONTINUED | OUTPATIENT
Start: 2024-02-22 | End: 2024-02-23

## 2024-02-22 RX ORDER — CEFAZOLIN SODIUM 1 G/3ML
INJECTION, POWDER, FOR SOLUTION INTRAMUSCULAR; INTRAVENOUS AS NEEDED
Status: DISCONTINUED | OUTPATIENT
Start: 2024-02-22 | End: 2024-02-22 | Stop reason: SURG

## 2024-02-22 RX ORDER — DEXTROSE MONOHYDRATE 25 G/50ML
10-50 INJECTION, SOLUTION INTRAVENOUS
Status: DISCONTINUED | OUTPATIENT
Start: 2024-02-22 | End: 2024-02-24

## 2024-02-22 RX ORDER — MIDAZOLAM HYDROCHLORIDE 1 MG/ML
INJECTION INTRAMUSCULAR; INTRAVENOUS AS NEEDED
Status: DISCONTINUED | OUTPATIENT
Start: 2024-02-22 | End: 2024-02-22 | Stop reason: SURG

## 2024-02-22 RX ADMIN — MORPHINE SULFATE 1 MG: 2 INJECTION, SOLUTION INTRAMUSCULAR; INTRAVENOUS at 07:18

## 2024-02-22 RX ADMIN — NICARDIPINE HYDROCHLORIDE 0.5 MG: 25 INJECTION, SOLUTION INTRAVENOUS at 12:28

## 2024-02-22 RX ADMIN — Medication 0.1 MCG/KG/MIN: at 09:21

## 2024-02-22 RX ADMIN — ACETAMINOPHEN 1000 MG: 10 INJECTION, SOLUTION INTRAVENOUS at 11:35

## 2024-02-22 RX ADMIN — ASPIRIN 325 MG ORAL TABLET 325 MG: 325 PILL ORAL at 15:15

## 2024-02-22 RX ADMIN — ALBUMIN (HUMAN) 12.5 G: 12.5 INJECTION, SOLUTION INTRAVENOUS at 14:18

## 2024-02-22 RX ADMIN — AMINOCAPROIC ACID 10 G: 250 INJECTION, SOLUTION INTRAVENOUS at 09:37

## 2024-02-22 RX ADMIN — SODIUM CHLORIDE 2 G: 900 INJECTION INTRAVENOUS at 16:03

## 2024-02-22 RX ADMIN — PROTAMINE SULFATE 250 MG: 10 INJECTION, SOLUTION INTRAVENOUS at 11:22

## 2024-02-22 RX ADMIN — AMINOCAPROIC ACID 10 G: 250 INJECTION, SOLUTION INTRAVENOUS at 11:35

## 2024-02-22 RX ADMIN — MAGNESIUM SULFATE HEPTAHYDRATE 2 G: 500 INJECTION, SOLUTION INTRAMUSCULAR; INTRAVENOUS at 11:17

## 2024-02-22 RX ADMIN — CEFAZOLIN 2 G: 1 INJECTION, POWDER, FOR SOLUTION INTRAMUSCULAR; INTRAVENOUS at 11:35

## 2024-02-22 RX ADMIN — SODIUM CHLORIDE: 9 INJECTION, SOLUTION INTRAVENOUS at 11:35

## 2024-02-22 RX ADMIN — PHENYLEPHRINE HYDROCHLORIDE 200 MCG: 10 INJECTION INTRAVENOUS at 09:40

## 2024-02-22 RX ADMIN — FENTANYL CITRATE 250 MCG: 50 INJECTION, SOLUTION INTRAMUSCULAR; INTRAVENOUS at 11:45

## 2024-02-22 RX ADMIN — ALBUMIN (HUMAN) 12.5 G: 12.5 INJECTION, SOLUTION INTRAVENOUS at 15:30

## 2024-02-22 RX ADMIN — MAGNESIUM SULFATE IN DEXTROSE 1 G: 10 INJECTION, SOLUTION INTRAVENOUS at 18:15

## 2024-02-22 RX ADMIN — NICARDIPINE HYDROCHLORIDE 1 MG: 25 INJECTION, SOLUTION INTRAVENOUS at 11:25

## 2024-02-22 RX ADMIN — DEXMEDETOMIDINE HYDROCHLORIDE 0.5 MCG/KG/HR: 100 INJECTION, SOLUTION INTRAVENOUS at 09:16

## 2024-02-22 RX ADMIN — POTASSIUM CHLORIDE 20 MEQ: 1.5 POWDER, FOR SOLUTION ORAL at 18:15

## 2024-02-22 RX ADMIN — VECURONIUM BROMIDE 4 MG: 10 INJECTION, POWDER, LYOPHILIZED, FOR SOLUTION INTRAVENOUS at 10:24

## 2024-02-22 RX ADMIN — CEFAZOLIN 2000 MG: 2 INJECTION, POWDER, FOR SOLUTION INTRAMUSCULAR; INTRAVENOUS at 09:08

## 2024-02-22 RX ADMIN — HEPARIN SODIUM 23000 UNITS: 1000 INJECTION INTRAVENOUS; SUBCUTANEOUS at 10:18

## 2024-02-22 RX ADMIN — FENTANYL CITRATE 250 MCG: 50 INJECTION, SOLUTION INTRAMUSCULAR; INTRAVENOUS at 11:53

## 2024-02-22 RX ADMIN — NITROGLYCERIN 20 MCG/MIN: 20 INJECTION INTRAVENOUS at 09:10

## 2024-02-22 RX ADMIN — INSULIN HUMAN 2 UNITS/HR: 1 INJECTION, SOLUTION INTRAVENOUS at 10:55

## 2024-02-22 RX ADMIN — ALBUMIN (HUMAN) 12.5 G: 12.5 INJECTION, SOLUTION INTRAVENOUS at 13:09

## 2024-02-22 RX ADMIN — SODIUM BICARBONATE 100 MEQ: 84 INJECTION INTRAVENOUS at 21:58

## 2024-02-22 RX ADMIN — Medication 30 MG: at 09:16

## 2024-02-22 RX ADMIN — ALBUMIN (HUMAN) 12.5 G: 12.5 INJECTION, SOLUTION INTRAVENOUS at 13:47

## 2024-02-22 RX ADMIN — PHENYLEPHRINE HYDROCHLORIDE 200 MCG: 10 INJECTION INTRAVENOUS at 09:22

## 2024-02-22 RX ADMIN — NICARDIPINE HYDROCHLORIDE 1 MG: 25 INJECTION, SOLUTION INTRAVENOUS at 10:21

## 2024-02-22 RX ADMIN — MIDAZOLAM 2 MG: 1 INJECTION INTRAMUSCULAR; INTRAVENOUS at 11:17

## 2024-02-22 RX ADMIN — VECURONIUM BROMIDE 12 MG: 10 INJECTION, POWDER, LYOPHILIZED, FOR SOLUTION INTRAVENOUS at 08:45

## 2024-02-22 RX ADMIN — HEPARIN SODIUM 1000 UNITS: 200 INJECTION, SOLUTION INTRAVENOUS at 08:44

## 2024-02-22 RX ADMIN — HYDROCODONE BITARTRATE AND ACETAMINOPHEN 1 TABLET: 5; 325 TABLET ORAL at 16:14

## 2024-02-22 RX ADMIN — FENTANYL CITRATE 250 MCG: 50 INJECTION, SOLUTION INTRAMUSCULAR; INTRAVENOUS at 10:43

## 2024-02-22 RX ADMIN — DEXTROSE MONOHYDRATE 10 ML: 25 INJECTION, SOLUTION INTRAVENOUS at 15:15

## 2024-02-22 RX ADMIN — LIDOCAINE HYDROCHLORIDE 60 MG: 20 INJECTION, SOLUTION EPIDURAL; INFILTRATION; INTRACAUDAL; PERINEURAL at 08:45

## 2024-02-22 RX ADMIN — LIDOCAINE HYDROCHLORIDE 100 MG: 20 INJECTION, SOLUTION EPIDURAL; INFILTRATION; INTRACAUDAL; PERINEURAL at 11:20

## 2024-02-22 RX ADMIN — NICARDIPINE HYDROCHLORIDE 1 MG: 25 INJECTION, SOLUTION INTRAVENOUS at 09:17

## 2024-02-22 RX ADMIN — EPHEDRINE SULFATE 10 MG: 5 INJECTION INTRAVENOUS at 09:42

## 2024-02-22 RX ADMIN — VECURONIUM BROMIDE 4 MG: 10 INJECTION, POWDER, LYOPHILIZED, FOR SOLUTION INTRAVENOUS at 11:00

## 2024-02-22 RX ADMIN — NICARDIPINE HYDROCHLORIDE 0.5 MG: 25 INJECTION, SOLUTION INTRAVENOUS at 12:04

## 2024-02-22 RX ADMIN — FENTANYL CITRATE 150 MCG: 50 INJECTION, SOLUTION INTRAMUSCULAR; INTRAVENOUS at 08:45

## 2024-02-22 RX ADMIN — AMLODIPINE BESYLATE 10 MG: 5 TABLET ORAL at 07:19

## 2024-02-22 RX ADMIN — FENTANYL CITRATE 100 MCG: 50 INJECTION, SOLUTION INTRAMUSCULAR; INTRAVENOUS at 08:39

## 2024-02-22 RX ADMIN — FENTANYL CITRATE 250 MCG: 50 INJECTION, SOLUTION INTRAMUSCULAR; INTRAVENOUS at 11:17

## 2024-02-22 RX ADMIN — EPHEDRINE SULFATE 10 MG: 5 INJECTION INTRAVENOUS at 09:24

## 2024-02-22 RX ADMIN — MIDAZOLAM 3 MG: 1 INJECTION INTRAMUSCULAR; INTRAVENOUS at 08:39

## 2024-02-22 RX ADMIN — MIDAZOLAM 2 MG: 1 INJECTION INTRAMUSCULAR; INTRAVENOUS at 09:41

## 2024-02-22 RX ADMIN — NICARDIPINE HYDROCHLORIDE 2.5 MG/HR: 25 INJECTION, SOLUTION INTRAVENOUS at 13:00

## 2024-02-22 RX ADMIN — ACETAMINOPHEN 1000 MG: 1000 INJECTION INTRAVENOUS at 18:32

## 2024-02-22 RX ADMIN — Medication 20 MG: at 08:39

## 2024-02-22 RX ADMIN — Medication 12.5 MG: at 06:18

## 2024-02-22 RX ADMIN — SODIUM CHLORIDE: 9 INJECTION, SOLUTION INTRAVENOUS at 08:37

## 2024-02-22 RX ADMIN — ETOMIDATE 12 MG: 20 INJECTION, SOLUTION INTRAVENOUS at 08:45

## 2024-02-22 RX ADMIN — PANTOPRAZOLE SODIUM 40 MG: 40 INJECTION, POWDER, FOR SOLUTION INTRAVENOUS at 18:22

## 2024-02-22 RX ADMIN — VECURONIUM BROMIDE 4 MG: 10 INJECTION, POWDER, LYOPHILIZED, FOR SOLUTION INTRAVENOUS at 11:37

## 2024-02-22 RX ADMIN — FENTANYL CITRATE 250 MCG: 50 INJECTION, SOLUTION INTRAMUSCULAR; INTRAVENOUS at 09:16

## 2024-02-22 NOTE — SIGNIFICANT NOTE
02/22/24 0710   OTHER   Discipline occupational therapist   Rehab Time/Intention   Session Not Performed other (see comments)  (scheduled for CABG this AM, OTwill follow up post op)   Recommendation   OT - Next Appointment 02/23/24        [place]    Difficult intravenous access     HAS NEEDED PICC TEAM IN THE PAST    Difficulty walking     WHEELCHAIR BOUND-PIVOTS WITH ASSIST O F 2    DJD (degenerative joint disease) of knee     Elbow, forearm, and wrist, abrasion or friction burn, without mention of infection 8/13/2013    Abrasions lft forearm     Encephalopathy acute 4/27/2016    Encounter regarding vascular access for dialysis for ESRD ([address] 2/2/2017    ESRD (end stage renal disease) on dialysis ([address] 1/17/2017    Forgetfulness     HAS SOME SHORT TERM MEMORY LOSS, QUYEN-WIFE IS LEGAL GUARDIAN    H/O transesophageal echocardiography ([facility]) for monitoring     Hemodialysis patient ([address] 11/11/2016    tues-thurs-sat [place]---[facility]IUS.  TUNNELED CATHETER RT UPPER CHEST    Hyperlipidemia 1990    on Meds    Hypertension 1990    on Meds    Intercritical gout     on Meds    Joint pain, knee 01/13/2017    baldo knee synvisc-1 injections    Long-term insulin use ([address] 1/17/2017    Major depressive disorder with single episode, in partial remission ([address] 1/3/2017    Mobility impaired     Morbid obesity ([address]     Muscle weakness     GRNERALIZED    Obesity     Obstructive sleep apnea     HEATHER on CPAP     On CPAP-TO BRING DOS    Paroxysmal atrial fibrillation ([address] 9/6/2017    Pneumonia 12/2/2019    Respiratory failure ([address] 03/2016    with open heart surgery, trached x 5 months    S/P cardiac cath     Seborrhea     Severe aortic stenosis 3/9/2016    Severe mitral valve stenosis 3/9/2016    Severe sepsis ([address] 4/3/2016    Skin rash     ABD FOLDS    Stasis dermatitis     Thyroid disease     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 lft thumb     Traumatic hemorrhagic shock ([address] 3/13/2019    Venous stasis dermatitis     Wears glasses     Wheelchair bound     pivots with 2 assists       PAST SURGICAL HISTORY:   Past Surgical History:   Procedure Laterality Date    AORTIC VALVE REPLACEMENT  03/18/2016    bioprosthetic    ARM SURGERY Left 1990    CARDIAC CATHETERIZATION      CENTRAL VENOUS CATHETER Right 02/21/2018    DIALYSIS CATHETER Dr Tim Pena    COLONOSCOPY  11/19/09    COLONOSCOPY N/A 10/12/2018    COLONOSCOPY WITH BIOPSY performed by Romulo Lal DO at [address] Right 3/10/2019    WASHOUT RIGHT LOWER EXTREMITY WITH WOUND VAC EXCHANGE performed by Betty Morrow MD at [address] Right 3/8/2019    RIGHT LOWER EXTREMITY EXPLORATION, HEMATOMA EVACUATION, WOUND VAC PLACEMENT performed by Betty Morrow MD at [address] (x10-12 surgeries)    several surgeries on left arm    IR NONTUNNELED VASCULAR CATHETER  8/5/2020    IR NONTUNNELED VASCULAR CATHETER 8/5/2020 Marly Escobar MD [facility]    KNEE ARTHROSCOPY Left 09/17/99    MITRAL VALVE REPLACEMENT  03/18/2016    bioprosthetic     NASAL SEPTUM SURGERY      OTHER SURGICAL HISTORY  3/18/16    Aortic root Enlargement    OTHER SURGICAL HISTORY  3/18/16    ASD Closure    OTHER SURGICAL HISTORY Right 01/09/2017    AV fistula creation, wrist    OTHER SURGICAL HISTORY Right 08/29/2017    ligation of collateral branch of av fistula right wrist    OTHER SURGICAL HISTORY  04/05/2018    FISTULAGRAM WITH DR. Mariza Dueñas    [place] EGD TRANSORAL BIOPSY SINGLE/MULTIPLE N/A 10/17/2017    EGD BIOPSY performed by Woodrow Rangel MD at [address] ANGIOACCESS AV FISTULA Right 8/29/2017     RIGHT  LOWER ARM AV FISTULA  LIGATION OF AQUIRED BRANCHES X2 performed by Ulysses Medina DO at [address]  3/18/16    median    VASCULAR SURGERY  12/06/2018    Right arm fistulagram,  PTA cephalic vein stenosis  /  DR Arnoldo Ayon       SOCIAL HISTORY:     Tobacco:   Social History     Tobacco Use   Smoking Status Former Smoker    Packs/day: 2.00    Years: 25.00    Pack years: 50.00    Types: Cigarettes    Start date: 3/17/1955   [facility] Quit date: 1980    Years since quittin.3   Smokeless Tobacco Never Used     Alcohol:   Social History     Substance and Sexual Activity   Alcohol Use No    Alcohol/week: 0.0 standard drinks    Comment: 1-2 drinks per year     Drugs:   Social History     Substance and Sexual Activity   Drug Use No       FAMILY HISTORY: family history includes Alzheimer's Disease in his father; Diabetes in his maternal grandmother and mother; High Blood Pressure in his sister; Daryel Fear in his mother. REVIEW OF SYSTEMS:     Please see history of chief complaint above; otherwise no new problems with respect to General, HEENT, Cardiovascular, Respiratory, Gastrointestinal, Genitourinary, Endocrinologic, Musculoskeletal, or Neuropsychiatric complaints. PHYSICAL EXAMINATION:    Vitals: Temp: 98.1 deg F. Pulse: 76. Resp: 16. BP: 132/79. General: A 70 y.o.  male. Alert and oriented to person and place but not time. He does not appear to be in any acute distress. Skin: Skin color, texture, turgor normal. No rashes or lesions. HEENT/Neck: Essentially unremarkable  Chest: There was a chest catheter in place  Lungs: Normal - CTA without rales, rhonchi, or wheezing. Heart: regular rate and rhythm, S1, S2 normal, no murmur, click, rub or gallop No S3 or S4. Abdomen: Obese, soft, slightly distended, non-tender, normal active bowel sounds, no masses palpated and no hepatosplenomegaly. There was a moderate-sized epigastric hernia present  Extremities: No clubbing, cyanosis, edema  Neurologic: cranial nerves II-XII are grossly intact    ASSESSMENT:     Diagnosis Orders   1. Controlled type 2 diabetes mellitus with chronic kidney disease on chronic dialysis, with long-term current use of insulin ([address]     2. Controlled type 2 diabetes mellitus with diabetic polyneuropathy, with long-term current use of insulin (HCC)     3. Long-term insulin use ([address]     4. Essential hypertension     5. Mixed hyperlipidemia     6.  ESRD (end stage renal disease) on dialysis ([facility]     7. Dialysis patient ([address]     8. Secondary hyperparathyroidism of renal origin ([address]     9. Anemia in chronic kidney disease, on chronic dialysis ([address]     10. Paroxysmal atrial fibrillation (HCC)     11. Severe aortic stenosis     12. Severe mitral valve stenosis     13. Vascular dementia without behavioral disturbance ([facility]     14. Obstructive sleep apnea     15. Major depressive disorder with single episode, in partial remission ([facility]     16. Traumatic amputation of thumb, left, sequela (HCC)     17. Class 1 obesity with serious comorbidity and body mass index (BMI) of 32.0 to 32.9 in adult, unspecified obesity type           PLAN:    1. Continue current treatment  2. Nursing home record reviewed and updates summarized and entered into electronic record  3. Nursing home blood sugar logs and diabetic medication administration reviewed. No changes. 4. See nursing home orders and MAR.         Electronically signed by Óscar Dominguez DO on 4/29/2021 at 1:58 AM  Internal Medicine

## 2024-02-22 NOTE — CASE MANAGEMENT/SOCIAL WORK
Continued Stay Note  YARI Laird     Patient Name: Anthony Ayoub  MRN: [id number]  Today's Date: 2/22/2024    Admit Date: 2/20/2024    Plan: DC Plan: Pending Clinical Course and PT/OT evaluations. From home with spouse. CABG 2/22/2024   Discharge Plan       Row Name 02/22/24 1407       Plan    Plan DC Plan: Pending Clinical Course and PT/OT evaluations. From home with spouse. CABG 2/22/2024    Provided Post Acute Provider List? N/A    Provided Post Acute Provider Quality & Resource List? N/A    Plan Comments CM spoke with patient’s nurse and [facility] NP Annetta Orozco to obtain clinical updates. Patient in OR at time of rounds. IMM obtained from patient spouse. CM will continue to follow for any further needs and adjust discharge plan accordingly. DC Barriers: POD 0 OHS.               Expected Discharge Date and Time       Expected Discharge Date Expected Discharge Time    Feb 27, 2024             Phone communication or documentation only- no physical contact with patient or family.    Anali Ha RN    Office Phone: [phone]  Office Cell:     [phone]

## 2024-02-22 NOTE — PROGRESS NOTES
Referring Provider: Soham Garrett, *    Reason for follow-up:  Unstable angina  Non-STEMI  Severe and critical coronary artery disease.     Patient Care Team:  Yifan Elizabeth MD as PCP - General    Subjective .      ROS  No chest discomfort at this time.    Since I have last seen, the patient has been without any shortness of breath, palpitations, dizziness or syncope.  Denies having any headache ,abdominal pain ,nausea, vomiting , diarrhea constipation, loss of weight or loss of appetite.  Denies having any excessive bruising ,hematuria or blood in the stool.    Review of all systems negative except as indicated    History  Past Medical History:   Diagnosis Date    CHF (congestive heart failure)     Coronary artery disease     GERD (gastroesophageal reflux disease)     Hyperlipidemia     Hypertension     Leukemia     Low back pain     Sleep apnea        Past Surgical History:   Procedure Laterality Date    BONE MARROW TRANSPLANT      CARDIAC CATHETERIZATION      CHOLECYSTECTOMY N/A 2022    Procedure: CHOLECYSTECTOMY LAPAROSCOPIC;  Surgeon: Kale Acuna MD;  Location: [facility] OR;  Service: General;  Laterality: N/A;    SINUS SURGERY      TONSILLECTOMY      VASECTOMY         Family History   Problem Relation Age of Onset    Hypertension Mother     Hyperlipidemia Mother     Heart disease Father     Heart attack Father     Hypertension Sister     Hypertension Brother     Heart disease Brother        Social History     Tobacco Use    Smoking status: Former     Packs/day: 1.00     Years: 30.00     Additional pack years: 0.00     Total pack years: 30.00     Types: Cigarettes     Quit date:      Years since quittin.1    Smokeless tobacco: Never   Vaping Use    Vaping Use: Never used   Substance Use Topics    Alcohol use: No    Drug use: Defer        Medications Prior to Admission   Medication Sig Dispense Refill Last Dose    carvedilol (COREG) 25 MG tablet Take 1 tablet by mouth 2  (Two) Times a Day.   2/19/2024    cloNIDine (CATAPRES) 0.1 MG tablet 1/2 in am  And 1/2 inpm   2/20/2024    escitalopram (LEXAPRO) 20 MG tablet Take 1 tablet by mouth Daily. 90 tablet 1 2/20/2024    ezetimibe (ZETIA) 10 MG tablet TAKE 1 TABLET BY MOUTH EVERY DAY 95 tablet 0 2/20/2024    ferrous sulfate 325 (65 FE) MG EC tablet Take 1 tablet by mouth.   2/20/2024    folic acid (FOLVITE) 1 MG tablet TAKE 1 TABLET BY MOUTH EVERY DAY 90 tablet 1 2/20/2024    isosorbide mononitrate (IMDUR) 30 MG 24 hr tablet Take 1 tablet by mouth Daily.   2/20/2024    lisinopril (PRINIVIL,ZESTRIL) 40 MG tablet Take 1 tablet by mouth Daily.   2/20/2024    pantoprazole (PROTONIX) 40 MG EC tablet Take 1 tablet by mouth Daily.   2/20/2024    rosuvastatin (CRESTOR) 40 MG tablet TAKE 1 TABLET BY MOUTH EVERYDAY AT BEDTIME 90 tablet 1 2/19/2024    terazosin (HYTRIN) 5 MG capsule Daily.   2/19/2024    vitamin D (ERGOCALCIFEROL) 1.25 MG (15434 UT) capsule capsule TAKE 1 CAPSULE BY MOUTH 1 TIME PER WEEK. 12 capsule 1 2/19/2024    aspirin 81 MG chewable tablet Chew 1 tablet Daily.   Unknown    fluticasone (FLONASE) 50 MCG/ACT nasal spray 1 spray into the nostril(s) as directed by provider 1 (One) Time. prn   Unknown    multivitamin (THERAGRAN) tablet tablet Take  by mouth.   Unknown    nitroglycerin (NITROSTAT) 0.6 MG SL tablet    Unknown    VITAMIN D PO 50,000 ut  (1.25mg)   Unknown       Allergies  Doxycycline hyclate, Penicillins, and Sulfa antibiotics    Scheduled Meds:amLODIPine, 10 mg, Oral, Q24H  aspirin, 81 mg, Oral, Daily  carvedilol, 25 mg, Oral, BID  ceFAZolin, 2,000 mg, Intravenous, On Call to OR  chlorhexidine, 15 mL, Mouth/Throat, Q12H  Chlorhexidine Gluconate Cloth, , Topical, Q12H  [Held by provider] cloNIDine, 0.05 mg, Oral, Q12H  escitalopram, 20 mg, Oral, Daily  metoprolol tartrate, 12.5 mg, Oral, On Call to OR  mupirocin, , Each Nare, Q12H  rosuvastatin, 40 mg, Oral, Nightly  senna-docusate sodium, 2 tablet, Oral,  "BID  terazosin, 5 mg, Oral, Nightly      Continuous Infusions:heparin, 12 Units/kg/hr, Last Rate: 12 Units/kg/hr (02/21/24 0908)  insulin, 0-100 Units/hr  niCARdipine, 5-15 mg/hr, Last Rate: 5 mg/hr (02/21/24 1142)  nitroglycerin, 10-50 mcg/min, Last Rate: Stopped (02/21/24 1101)  nitroglycerin, 10-50 mcg/min, Last Rate: 15 mcg/min (02/21/24 1830)  sodium chloride, 100 mL/hr, Last Rate: Stopped (02/21/24 0620)  sodium chloride, 75 mL/hr, Last Rate: Stopped (02/21/24 0028)  sodium chloride, 30 mL/hr      PRN Meds:.  acetaminophen    ALPRAZolam    aluminum-magnesium hydroxide-simethicone    senna-docusate sodium **AND** polyethylene glycol **AND** bisacodyl **AND** bisacodyl    dextrose    dextrose    glucagon (human recombinant)    heparin    heparin    hydrALAZINE    melatonin    Morphine **AND** naloxone    ondansetron    [COMPLETED] Insert Peripheral IV **AND** sodium chloride    sodium chloride    sodium chloride    Objective     VITAL SIGNS  Vitals:    02/22/24 0030 02/22/24 0100 02/22/24 0130 02/22/24 0200   BP:  128/77  130/72   BP Location:       Patient Position:       Pulse: 68 78 68 64   Resp:       Temp:       TempSrc:       SpO2: 93% 90% 91% 95%   Weight:       Height:           Flowsheet Rows      Flowsheet Row First Filed Value   Admission Height 167.6 cm (66\") Documented at 02/20/2024 0433   Admission Weight 75.5 kg (166 lb 7.2 oz) Documented at 02/20/2024 0433              Intake/Output Summary (Last 24 hours) at 2/22/2024 0602  Last data filed at 2/22/2024 0200  Gross per 24 hour   Intake 910 ml   Output 1150 ml   Net -240 ml        TELEMETRY: Sinus rhythm    Physical Exam:  The patient is alert, oriented and in no distress.  Vital signs as noted above.  Head and neck revealed no carotid bruits or jugular venous distention.  No thyromegaly or lymphadenopathy is present  Lungs clear.  No wheezing.  Breath sounds are normal bilaterally.  Heart normal first and second heart sounds.  No murmur. No " precordial rub is present.  No gallop is present.  Abdomen soft and nontender.  No organomegaly is present.  Extremities with good peripheral pulses without any pedal edema.  Cardiac cath site looks normal.  Sheath in place.  No hematoma.  Skin warm and dry.  Musculoskeletal system is grossly normal  CNS grossly normal    Reviewed and updated.    Results Review:   I reviewed the patient's new clinical results.  Lab Results (last 24 hours)       Procedure Component Value Units Date/Time    Renal Function Panel [[id number]]  (Abnormal) Collected: 02/22/24 0457    Specimen: Blood Updated: 02/22/24 0545     Glucose 99 mg/dL      BUN 33 mg/dL      Creatinine 1.59 mg/dL      Sodium 141 mmol/L      Potassium 4.3 mmol/L      Chloride 106 mmol/L      CO2 24.0 mmol/L      Calcium 8.9 mg/dL      Albumin 4.1 g/dL      Phosphorus 2.9 mg/dL      Anion Gap 11.0 mmol/L      BUN/Creatinine Ratio 20.8     eGFR 47.6 mL/min/1.73     Narrative:      GFR Normal >60  Chronic Kidney Disease <60  Kidney Failure <15      aPTT [[id number]]  (Abnormal) Collected: 02/22/24 0457    Specimen: Blood from Cannula Updated: 02/22/24 0530     PTT 82.8 seconds     CBC & Differential [[id number]]  (Abnormal) Collected: 02/22/24 0457    Specimen: Blood Updated: 02/22/24 0520    Narrative:      The following orders were created for panel order CBC & Differential.  Procedure                               Abnormality         Status                     ---------                               -----------         ------                     CBC Auto Differential[[id number]]        Abnormal            Final result                 Please view results for these tests on the individual orders.    CBC Auto Differential [[id number]]  (Abnormal) Collected: 02/22/24 0457    Specimen: Blood Updated: 02/22/24 0520     WBC 9.10 10*3/mm3      RBC 4.05 10*6/mm3      Hemoglobin 12.6 g/dL      Hematocrit 37.4 %      MCV 92.3 fL      MCH 31.2 pg      MCHC 33.8 g/dL      RDW 14.9 %       RDW-SD 47.7 fl      MPV 8.6 fL      Platelets 177 10*3/mm3      Neutrophil % 53.8 %      Lymphocyte % 33.8 %      Monocyte % 7.6 %      Eosinophil % 3.9 %      Basophil % 0.9 %      Neutrophils, Absolute 4.90 10*3/mm3      Lymphocytes, Absolute 3.10 10*3/mm3      Monocytes, Absolute 0.70 10*3/mm3      Eosinophils, Absolute 0.40 10*3/mm3      Basophils, Absolute 0.10 10*3/mm3      nRBC 0.0 /100 WBC     POC Glucose Once [[id number]]  (Normal) Collected: 02/22/24 0458    Specimen: Blood Updated: 02/22/24 0509     Glucose 101 mg/dL      Comment: Serial Number: [id number]:  [id number]       aPTT [[id number]]  (Normal) Collected: 02/21/24 2305    Specimen: Blood from Cannula Updated: 02/21/24 2331     PTT 67.5 seconds     Urinalysis With Culture If Indicated - Urine, Clean Catch [[id number]]  (Abnormal) Collected: 02/21/24 1833    Specimen: Urine, Clean Catch Updated: 02/21/24 1856     Color, UA Yellow     Appearance, UA Clear     pH, UA 6.0     Specific Gravity, UA 1.025     Glucose, UA Negative     Ketones, UA Negative     Bilirubin, UA Negative     Blood, UA Negative     Protein,  mg/dL (2+)     Leuk Esterase, UA Negative     Nitrite, UA Negative     Urobilinogen, UA 1.0 E.U./dL    Narrative:      In absence of clinical symptoms, the presence of pyuria, bacteria, and/or nitrites on the urinalysis result does not correlate with infection.    Urinalysis, Microscopic Only - Urine, Clean Catch [[id number]] Collected: 02/21/24 1833    Specimen: Urine, Clean Catch Updated: 02/21/24 1856     RBC, UA 0-2 /HPF      WBC, UA 0-2 /HPF      Comment: Urine culture not indicated.        Bacteria, UA None Seen /HPF      Squamous Epithelial Cells, UA 0-2 /HPF      Hyaline Casts, UA None Seen /LPF      Methodology Automated Microscopy    aPTT [[id number]]  (Normal) Collected: 02/21/24 1703    Specimen: Blood Updated: 02/21/24 1726     PTT 62.5 seconds     POC Activated Clotting Time [[id number]]  (Abnormal) Collected: 02/21/24  0821    Specimen: Blood Updated: 02/21/24 1324     Activated Clotting Time  163 Seconds      Comment: Serial Number: [id number]:  [id number]       MRSA Screen, PCR (Inpatient) - Swab, Nares [[id number]]  (Normal) Collected: 02/21/24 1200    Specimen: Swab from Nares Updated: 02/21/24 1315     MRSA PCR No MRSA Detected    Narrative:      The negative predictive value of this diagnostic test is high and should only be used to consider de-escalating anti-MRSA therapy. A positive result may indicate colonization with MRSA and must be correlated clinically.    Hemoglobin A1c [[id number]]  (Abnormal) Collected: 02/21/24 1150    Specimen: Blood Updated: 02/21/24 1311     Hemoglobin A1C 5.90 %     Platelet Function ADP [[id number]]  (Normal) Collected: 02/21/24 1246    Specimen: Blood Updated: 02/21/24 1257     ADP Aggregation, % Platelet 94 %     COVID PRE-OP / PRE-PROCEDURE SCREENING ORDER (NO ISOLATION) - Swab, Nasopharynx [[id number]]  (Normal) Collected: 02/21/24 1200    Specimen: Swab from Nasopharynx Updated: 02/21/24 1224    Narrative:      The following orders were created for panel order COVID PRE-OP / PRE-PROCEDURE SCREENING ORDER (NO ISOLATION) - Swab, Nasopharynx.  Procedure                               Abnormality         Status                     ---------                               -----------         ------                     COVID-19,CEPHEID/MELIZA,CO...[[id number]]  Normal              Final result                 Please view results for these tests on the individual orders.    COVID-19,CEPHEID/MELIZA,COR/AIDEN/PAD/[facility],NP SWAB IN TRANSPORT MEDIA 1 HR TAT, RT-PCR - Swab, Nasopharynx [[id number]]  (Normal) Collected: 02/21/24 1200    Specimen: Swab from Nasopharynx Updated: 02/21/24 1224     COVID19 Not Detected    Narrative:      Fact sheet for providers: https://www.fda.gov/media/521611/download     Fact sheet for patients: https://www.fda.gov/media/978133/download  Fact sheet for providers:  https://www.fda.gov/media/566159/download    Fact sheet for patients: https://www.fda.gov/media/996307/download    Test performed by PCR.    Comprehensive Metabolic Panel [[id number]]  (Abnormal) Collected: 02/21/24 1150    Specimen: Blood Updated: 02/21/24 1217     Glucose 130 mg/dL      BUN 31 mg/dL      Creatinine 1.46 mg/dL      Sodium 139 mmol/L      Potassium 4.2 mmol/L      Chloride 106 mmol/L      CO2 24.0 mmol/L      Calcium 9.1 mg/dL      Total Protein 6.7 g/dL      Albumin 4.4 g/dL      ALT (SGPT) 21 U/L      AST (SGOT) 27 U/L      Alkaline Phosphatase 92 U/L      Total Bilirubin 0.9 mg/dL      Globulin 2.3 gm/dL      A/G Ratio 1.9 g/dL      BUN/Creatinine Ratio 21.2     Anion Gap 9.0 mmol/L      eGFR 52.7 mL/min/1.73     Narrative:      GFR Normal >60  Chronic Kidney Disease <60  Kidney Failure <15      aPTT [[id number]]  (Abnormal) Collected: 02/21/24 1150    Specimen: Blood Updated: 02/21/24 1215     PTT 43.8 seconds     Protime-INR [[id number]]  (Normal) Collected: 02/21/24 1150    Specimen: Blood Updated: 02/21/24 1215     Protime 11.7 Seconds      INR 1.08    CBC & Differential [[id number]]  (Normal) Collected: 02/21/24 1150    Specimen: Blood Updated: 02/21/24 1200    Narrative:      The following orders were created for panel order CBC & Differential.  Procedure                               Abnormality         Status                     ---------                               -----------         ------                     CBC Auto Differential[[id number]]        Normal              Final result                 Please view results for these tests on the individual orders.    CBC Auto Differential [[id number]]  (Normal) Collected: 02/21/24 1150    Specimen: Blood Updated: 02/21/24 1200     WBC 9.80 10*3/mm3      RBC 4.60 10*6/mm3      Hemoglobin 14.1 g/dL      Hematocrit 41.9 %      MCV 91.2 fL      MCH 30.6 pg      MCHC 33.6 g/dL      RDW 15.2 %      RDW-SD 51.2 fl      MPV 8.4 fL      Platelets 173  10*3/mm3      Neutrophil % 67.0 %      Lymphocyte % 21.7 %      Monocyte % 6.3 %      Eosinophil % 4.1 %      Basophil % 0.9 %      Neutrophils, Absolute 6.50 10*3/mm3      Lymphocytes, Absolute 2.10 10*3/mm3      Monocytes, Absolute 0.60 10*3/mm3      Eosinophils, Absolute 0.40 10*3/mm3      Basophils, Absolute 0.10 10*3/mm3      nRBC 0.0 /100 WBC     Blood Gas, Arterial - [[id number]]  (Abnormal) Collected: 02/21/24 1123    Specimen: Arterial Blood Updated: 02/21/24 1127     Site Arterial Line     Lance's Test N/A     pH, Arterial 7.338 pH units      pCO2, Arterial 44.8 mm Hg      pO2, Arterial 60.8 mm Hg      HCO3, Arterial 24.1 mmol/L      Base Excess, Arterial -2.0 mmol/L      Comment: Serial Number: [id number]:  [id number]        O2 Saturation, Arterial 89.2 %      CO2 Content 25.5 mmol/L      Barometric Pressure for Blood Gas --     Comment: N/A        Modality Room Air     Hemodilution No    Calcium, Ionized [[id number]]  (Normal) Collected: 02/21/24 1019    Specimen: Blood Updated: 02/21/24 1039     Ionized Calcium 1.24 mmol/L             Imaging Results (Last 24 Hours)       ** No results found for the last 24 hours. **        LAB RESULTS (LAST 7 DAYS)    CBC  Results from last 7 days   Lab Units 02/22/24  0457 02/21/24  1150 02/21/24  0421 02/20/24  0459   WBC 10*3/mm3 9.10 9.80 9.10 7.20   RBC 10*6/mm3 4.05* 4.60 4.19 4.34   HEMOGLOBIN g/dL 12.6* 14.1 13.1 13.5   HEMATOCRIT % 37.4* 41.9 38.9 40.1   MCV fL 92.3 91.2 93.0 92.3   PLATELETS 10*3/mm3 177 173 170 169       BMP  Results from last 7 days   Lab Units 02/22/24  0457 02/21/24  1150 02/21/24  0421 02/20/24  0650 02/20/24  0459 02/15/24  1148   SODIUM mmol/L 141 139 138  --  138 140   POTASSIUM mmol/L 4.3 4.2 4.2  --  4.6 5.1   CHLORIDE mmol/L 106 106 104  --  101 105   CO2 mmol/L 24.0 24.0 24.0  --  26.0 27.0   BUN mg/dL 33* 31* 34*  --  35* 31*   CREATININE mg/dL 1.59* 1.46* 1.69*  --  1.76* 1.95*   GLUCOSE mg/dL 99 130* 104*  --  99 109*   MAGNESIUM  mg/dL  --   --  1.9 2.1  --   --    PHOSPHORUS mg/dL 2.9  --  3.1  --   --   --        CMP   Results from last 7 days   Lab Units 02/22/24  0457 02/21/24  1150 02/21/24  0421 02/20/24  0459 02/15/24  1148   SODIUM mmol/L 141 139 138 138 140   POTASSIUM mmol/L 4.3 4.2 4.2 4.6 5.1   CHLORIDE mmol/L 106 106 104 101 105   CO2 mmol/L 24.0 24.0 24.0 26.0 27.0   BUN mg/dL 33* 31* 34* 35* 31*   CREATININE mg/dL 1.59* 1.46* 1.69* 1.76* 1.95*   GLUCOSE mg/dL 99 130* 104* 99 109*   ALBUMIN g/dL 4.1 4.4 4.4  --  4.6   BILIRUBIN mg/dL  --  0.9 0.7  --  0.6   ALK PHOS U/L  --  92 83  --  83   AST (SGOT) U/L  --  27 25  --  21   ALT (SGPT) U/L  --  21 21  --  23         BNP        TROPONIN  Results from last 7 days   Lab Units 02/21/24  0421   HSTROP T ng/L 149*       CoAg  Results from last 7 days   Lab Units 02/22/24  0457 02/21/24  2305 02/21/24  1703 02/21/24  1150 02/21/24  0421 02/20/24  0509   INR   --   --   --  1.08 1.06 1.02   APTT seconds 82.8* 67.5 62.5 43.8* 26.6* 25.3*       Creatinine Clearance  Estimated Creatinine Clearance: 47.7 mL/min (A) (by C-G formula based on SCr of 1.59 mg/dL (H)).    ABG  Results from last 7 days   Lab Units 02/21/24  1123   PH, ARTERIAL pH units 7.338*   PCO2, ARTERIAL mm Hg 44.8   PO2 ART mm Hg 60.8*   O2 SATURATION ART % 89.2*   BASE EXCESS ART mmol/L -2.0*       Radiology  No radiology results for the last day        EKG                    I personally viewed and interpreted the patient's EKG/Telemetry data: Sinus rhythm.  Anterolateral ischemic changes    ECHOCARDIOGRAM:    Results for orders placed during the hospital encounter of 02/20/24    Adult Transthoracic Echo Complete W/ Cont if Necessary Per Protocol    Interpretation Summary    Left ventricular ejection fraction appears to be 56 - 60%.    Indication  Chest pain    Technically satisfactory study.  Mitral valve is structurally normal.  Minimal mitral regurgitation.  Tricuspid valve is structurally normal.  Aortic valve is  structurally normal.  Pulmonic valve could not be well visualized.  Left atrium is normal in size.  Right atrium is normal in size.  Left ventricle is normal in size and contractility with ejection fraction of 60%.  Grade 1 diastolic dysfunction (MV E/A0.6)  Right ventricle is normal in size and contractility with TAPSE of 1.61..  Atrial septum is intact.  Aorta is normal.  No pericardial effusion or intracardiac thrombus is seen.    Impression  Structurally and functionally normal cardiac valves except for minimal mitral regurgitation.  Grade 1 diastolic dysfunction is present..  Left ventricular size and contractility is normal with ejection fraction of 60%.          STRESS TEST        Cardiolite (Tc-99m sestamibi) stress test    CARDIAC CATHETERIZATION  No results found for this or any previous visit.                OTHER:         Assessment & Plan     Principal Problem:    Chest pain  Active Problems:    Unstable angina    Non-ST elevated myocardial infarction (non-STEMI)    Atherosclerotic heart disease of native coronary artery with angina pectoris    ]]]]]]]]]]]]]]]]]]]   impression  ========  - Unstable angina  Possible non-STEMI.  Troponin level 59.    - Severe and critical coronary artery disease.    Cardiac catheterization 2/21/2024  Left ventricular angiogram was not performed due to pre-existing renal dysfunction.  Severe triple-vessel coronary artery disease.    Left main coronary artery has 40% disease  Left anterior descending artery is a large and long vessel that has ostial 99% disease.  Mid LAD has 80% disease.  Diagonal branches diffuse 99% disease proximally.    Circumflex coronary artery is a large and dominant vessel that provided multiple branches.  Circumflex coronary artery has 60 to 70% disease proximal to the first marginal branch.  First marginal branch has ostial 95% disease.  Second marginal branch is a relatively small caliber vessel that has 99% disease in the proximal segment that  bifurcated into 2 branches.  Each branch has 90 to 95% disease.  There were 3 other marginal branches.  PDA has 99% disease in the proximal segment.  Second marginal branch has 60 to 70% ostial disease.    Right coronary artery is totally occluded in the proximal segment and distal vessel is filling through collaterals from left circulation.  Small caliber vessel.  (Chronic since 2019).  Please see the report from [facility].    Normal left ventricular function by echocardiogram.     Patient had cardiac catheterization at [facility] in 2019  70% diffuse and lengthy RCA disease 50% circumflex 95% first marginal branch disease 50% mid LAD and diagonal branch disease.  The information was obtained from a drawing that patient's wife has.     History of subtotal occlusion of 1st marginal branch at cardiac catheterization 05/18/2018      cardiac catheterization 05/18/2018 revealed normal left ventricular function 50% mid LAD, 50% diffuse diagonal branch disease 50% proximal circumflex, 1st marginal branch is subtotally occluded with 99% disease that bifurcated into 2 branches.  RCA is a   nondominant vessel that has diffuse disease.  Distal vessel filling from left circulation.     Echocardiogram 04/18/2018 is normal     Stress Cardiolite test revealed severe inferior posterior and apical ischemia 05/15/2018.  Patient had reproducible chest discomfort shortness of breath and ST abnormalities.     -  hypertension dyslipidemia GERD osteoarthritis renal dysfunction.  BUN 15 creatinine 1.4     -CML.  Status post bone marrow transplantation  2016     - status post vasectomy and sinus surgery      -former smoker     - strong family history  for coronary artery disease.  Brother had CABG and father had myocardial infarction     -allergic to penicillin sulfa and doxycycline  ============  Plan  ==========  Unstable angina  Non-STEMI.  Troponin 59.  148-2/21/2024  EKG showed anterolateral ST T wave  abnormalities.  Chronic coronary artery disease cardiac cath 2018 and 2019.    Cardiac cath 2/21/2024 reviewed severe and critical coronary artery disease-as above.  Patient to have CABG today.  Have discussed with patient and patient's family regarding the risks and benefits pros and cons of the procedure.     Chronic renal insufficiency.-Dr. Lawson.  31/1.95-2/15/2024.  35/1.76-2/20/2024  34/1.69-2/21/2024  33/1.59-2/22/2024  Renal precautions and renal consultation appreciated.  Patient definitely at higher risk of progressive renal dysfunction.     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      Echocardiogram.-2/20/2024  Structurally and functionally normal cardiac valves except for minimal mitral regurgitation.  Grade 1 diastolic dysfunction is present..  Left ventricular size and contractility is normal with ejection fraction of 60%.    Further plan will depend on patient's progress.     Reviewed and updated-2/22/2024  ]]]]]]]]]]]]]]]]]              Michelle Hernández MD  02/22/24  06:02 EST

## 2024-02-22 NOTE — ANESTHESIA PROCEDURE NOTES
Intra-Op Anesthesia DELANEY    Procedure Performed: Intra-Op Anesthesia DELANEY       Start Time:        End Time:      Preanesthesia Checklist:  Patient identified, IV assessed, risks and benefits discussed, monitors and equipment assessed, procedure being performed at surgeon's request and anesthesia consent obtained.    General Procedure Information  DELANEY Placed for monitoring purposes only -- This is not a diagnostic DELANEY  Diagnostic Indications for Echo:  assessment of surgical repair and hemodynamic monitoring  Physician Requesting Echo: Soham Garrett MD  Location performed:  OR  Intubated  Bite block placed  Heart visualized  Probe Insertion:  Easy  Probe Type:  Multiplane  Modalities:  Color flow mapping and continuous wave Doppler    Echocardiographic and Doppler Measurements    Ventricles    Right Ventricle:  Cavity size normal.  Thrombus not present.  Global function mildly impaired.    Left Ventricle:  Cavity size normal.  Thrombus not present.  Global Function moderately impaired.          Valves    Aortic Valve:  Annulus normal.  Stenosis not present.  Area: 2.9 cm².  Regurgitation absent.  Leaflets normal.  Leaflet motions normal.      Mitral Valve:  Annulus normal.  Stenosis not present.  Regurgitation trace.  Leaflets normal.  Leaflet motions normal.      Tricuspid Valve:  Annulus normal.  Stenosis not present.  Regurgitation absent.  Leaflets normal.  Leaflet motions normal.    Pulmonic Valve:  Annulus normal.  Stenosis not present.  Regurgitation absent.        Aorta    Ascending Aorta:  Size normal.    Aortic Arch:  Size normal.    Descending Aorta:  Size normal.        Atria    Right Atrium:  Size normal.  Spontaneous echo contrast not present.  Thrombus not present.  Tumor not present.  Device not present.      Left Atrium:  Size normal.  Spontaneous echo contrast not present.  Thrombus not present.  Tumor not present.  Device not present.    Left atrial appendage  normal.      Septa        Ventricular Septum:  Intra-ventricular septum morphology normal.          Other Findings  Pericardium:  normal  Pleural Effusion:  none  Pulmonary Venous Flow:  normal    Anesthesia Information  Performed Personally  Anesthesiologist:  Rufino Shipman MD      Echocardiogram Comments:       Post bypass EF 60%. Trace TR. No MR. No AI. No air seen.

## 2024-02-22 NOTE — PROGRESS NOTES
[facility] Progress Note      Patient Name: Anthony Ayoub  : 1955  MRN: [id number]  Primary Care Physician:  Yifan Elizabeth MD  Date of admission: 2024    Subjective     Interval History:     Patient was seen and examined this morning.  He was having intermittent chest pain, on nitro drip  No acute respiratory distress overnight    Review of Systems:   As noted above    Objective     Vitals:   Temp:  [97.4 °F (36.3 °C)-98.3 °F (36.8 °C)] 97.9 °F (36.6 °C)  Heart Rate:  [62-98] 71  Resp:  [14-18] 18  BP: (111-200)/() 161/88  Flow (L/min):  [2] 2    Intake/Output Summary (Last 24 hours) at 2024 0857  Last data filed at 2024 0619  Gross per 24 hour   Intake 983 ml   Output 1500 ml   Net -517 ml       Physical Exam:    General Appearance: NAD  HEENT: oral mucosa normal, nonicteric sclera  Neck: supple, no JVD  Lungs: CTA  Heart: RRR, normal S1 and S2  Abdomen: soft, nondistended  Extremities: no edema  Neuro: Awake alert and moving all extremities    Scheduled Meds:     amLODIPine, 10 mg, Oral, Q24H  aspirin, 81 mg, Oral, Daily  carvedilol, 25 mg, Oral, BID  ceFAZolin, 2,000 mg, Intravenous, On Call to OR  chlorhexidine, 15 mL, Mouth/Throat, Q12H  Chlorhexidine Gluconate Cloth, , Topical, Q12H  [Held by provider] cloNIDine, 0.05 mg, Oral, Q12H  escitalopram, 20 mg, Oral, Daily  mupirocin, , Each Nare, Q12H  rosuvastatin, 40 mg, Oral, Nightly  senna-docusate sodium, 2 tablet, Oral, BID  terazosin, 5 mg, Oral, Nightly      IV Meds:   heparin, 12 Units/kg/hr, Last Rate: Stopped (24 0615)  insulin, 0-100 Units/hr, Last Rate: Stopped (24 0619)  niCARdipine, 5-15 mg/hr, Last Rate: 5 mg/hr (24 1142)  nitroglycerin, 10-50 mcg/min, Last Rate: Stopped (24 1101)  nitroglycerin, 10-50 mcg/min, Last Rate: 15 mcg/min (24 1830)  sodium chloride, 100 mL/hr, Last Rate: Stopped (24 0620)  sodium chloride, 75 mL/hr, Last  Rate: Stopped (02/21/24 0028)  sodium chloride, 30 mL/hr        Results Reviewed:   I have personally reviewed the results from the time of this admission to 2/22/2024 08:57 EST     Results from last 7 days   Lab Units 02/22/24  0457 02/21/24  1150 02/21/24  0421 02/20/24  0459 02/15/24  1148   SODIUM mmol/L 141 139 138   < > 140   POTASSIUM mmol/L 4.3 4.2 4.2   < > 5.1   CHLORIDE mmol/L 106 106 104   < > 105   CO2 mmol/L 24.0 24.0 24.0   < > 27.0   BUN mg/dL 33* 31* 34*   < > 31*   CREATININE mg/dL 1.59* 1.46* 1.69*   < > 1.95*   CALCIUM mg/dL 8.9 9.1 9.0   < > 9.6   BILIRUBIN mg/dL  --  0.9 0.7  --  0.6   ALK PHOS U/L  --  92 83  --  83   ALT (SGPT) U/L  --  21 21  --  23   AST (SGOT) U/L  --  27 25  --  21   GLUCOSE mg/dL 99 130* 104*   < > 109*    < > = values in this interval not displayed.     Estimated Creatinine Clearance: 47.7 mL/min (A) (by C-G formula based on SCr of 1.59 mg/dL (H)).  Results from last 7 days   Lab Units 02/22/24 0457 02/21/24  0421 02/20/24  0650   MAGNESIUM mg/dL  --  1.9 2.1   PHOSPHORUS mg/dL 2.9 3.1  --          Results from last 7 days   Lab Units 02/22/24  0457 02/21/24  1150 02/21/24  0421 02/20/24  0459   WBC 10*3/mm3 9.10 9.80 9.10 7.20   HEMOGLOBIN g/dL 12.6* 14.1 13.1 13.5   PLATELETS 10*3/mm3 177 173 170 169     Results from last 7 days   Lab Units 02/21/24  1150 02/21/24  0421 02/20/24  0509   INR  1.08 1.06 1.02       Assessment / Plan     ASSESSMENT:    Chronic kidney disease stage IIIa.  Patient has underlying chronic kidney disease due to hypertensive nephrosclerosis. Renal function stable.  Left leg, volume status okay   Coronary artery disease.  Patient underwent cardiac cath and showed severe triple-vessel disease.    Hypertension chronic kidney disease.  blood pressure better controlled    PLAN:  Patient going for CABG today  Explained to patient and family regarding risk of acute kidney injury and may require dialysis.  This sounded understanding  I will follow  patient's renal function and electrolytes    Laureano Lawson MD  02/22/24  08:57 EST    [facility]  [phone]

## 2024-02-22 NOTE — ANESTHESIA PROCEDURE NOTES
Central Line      Patient reassessed immediately prior to procedure    Patient location during procedure: OR  Indications: central pressure monitoring, vascular access and MD/Surgeon request  Staff  Anesthesiologist: Rufino Shipman MD  Preanesthetic Checklist  Completed: patient identified, IV checked, site marked, risks and benefits discussed, surgical consent, monitors and equipment checked, pre-op evaluation and timeout performed  Central Line Prep  Sterile Tech:gloves, cap, gown, mask and sterile barriers  Prep: chloraprep  Patient monitoring: blood pressure monitoring, continuous pulse oximetry and EKG  Central Line Procedure  Laterality:left  Location:internal jugular  Catheter Type:double lumen  Catheter Size:9 Fr  Guidance:ultrasound guided  PROCEDURE NOTE/ULTRASOUND INTERPRETATION.  Using ultrasound guidance the potential vascular sites for insertion of the catheter were visualized to determine the patency of the vessel to be used for vascular access.  After selecting the appropriate site for insertion, the needle was visualized under ultrasound being inserted into the internal jugular vein, followed by ultrasound confirmation of wire and catheter placement. There were no abnormalities seen on ultrasound; an image was taken; and the patient tolerated the procedure with no complications. Images: still images not obtained  Assessment  Post procedure:biopatch applied, line sutured and occlusive dressing applied  Assessement:blood return through all ports, free fluid flow, Idris Test and chest x-ray ordered  Complications:no  Patient Tolerance:patient tolerated the procedure well with no apparent complications

## 2024-02-22 NOTE — ANESTHESIA PROCEDURE NOTES
Central Line      Patient reassessed immediately prior to procedure    Patient location during procedure: OR  Indications: central pressure monitoring, vascular access and MD/Surgeon request  Staff  Anesthesiologist: Rufino Shipman MD  Preanesthetic Checklist  Completed: patient identified, IV checked, site marked, risks and benefits discussed, surgical consent, monitors and equipment checked, pre-op evaluation and timeout performed  Central Line Prep  Sterile Tech:gloves, cap, gown, mask and sterile barriers  Prep: chloraprep  Patient monitoring: blood pressure monitoring, continuous pulse oximetry and EKG  Central Line Procedure  Laterality:left  Location:internal jugular  Catheter Type:[place]  Assessment  Post procedure:biopatch applied, line sutured and occlusive dressing applied  Assessement:blood return through all ports, free fluid flow, chest x-ray ordered and Idris Test  Complications:no  Patient Tolerance:patient tolerated the procedure well with no apparent complications  Additional Notes  Placed through double lumen

## 2024-02-22 NOTE — ANESTHESIA PREPROCEDURE EVALUATION
Anesthesia Evaluation     Patient summary reviewed and Nursing notes reviewed   no history of anesthetic complications:                Airway   Mallampati: II  TM distance: >3 FB  Neck ROM: full  No difficulty expected  Dental      Pulmonary - normal exam   (+) a smoker Former,shortness of breath, sleep apnea  Cardiovascular - normal exam    ECG reviewed    (+) hypertension, past MI  1-7 days, CAD, angina, hyperlipidemia      Neuro/Psych  (+) psychiatric history Anxiety and Depression  GI/Hepatic/Renal/Endo    (+) GERD, renal disease- CRI, thyroid problem hypothyroidism    Musculoskeletal     Abdominal  - normal exam   Substance History - negative use     OB/GYN negative ob/gyn ROS         Other   arthritis,   history of cancer remission    ROS/Med Hx Other: SUMMARY:  Left ventricular angiogram was not performed due to pre-existing renal  dysfunction.  Severe triple-vessel coronary artery disease.     Left main coronary artery has 40% disease  Left anterior descending artery is a large and long vessel that has ostial  99% disease.  Mid LAD has 80% disease.  Diagonal branches diffuse 99% disease proximally.     Circumflex coronary artery is a large and dominant vessel that provided  multiple branches.  Circumflex coronary artery has 60 to 70% disease proximal to the first  marginal branch.  First marginal branch has ostial 95% disease.  Second marginal branch is a relatively small caliber vessel that has 99%  disease in the proximal segment that bifurcated into 2 branches.  Each  branch has 90 to 95% disease.  There were 3 other marginal branches.  PDA has 99% disease in the proximal segment.  Second marginal branch has  60 to 70% ostial disease.     Right coronary artery is totally occluded in the proximal segment and  distal vessel is filling through collaterals from left circulation.  Small  caliber vessel.  (Chronic since 2019).  Please see the report from [facility].     Normal left ventricular  function by echocardiogram.                      Anesthesia Plan    ASA 4     general, Kathrine, CVL and PAC     (Discussed a line, central line, intra op DELANEY, and post op vent. All questions answered.)  intravenous induction   Postoperative Plan: Expected vent after surgery  Anesthetic plan, risks, benefits, and alternatives have been provided, discussed and informed consent has been obtained with: patient.        CODE STATUS:    Level Of Support Discussed With: Patient  Code Status (Patient has no pulse and is not breathing): CPR (Attempt to Resuscitate)  Medical Interventions (Patient has pulse or is breathing): Full Support

## 2024-02-22 NOTE — ANESTHESIA PROCEDURE NOTES
Arterial Line      Patient reassessed immediately prior to procedure    Patient location during procedure: OR   Line placed for hemodynamic monitoring, ABGs/Labs/ISTAT and MD/Surgeon request.  Performed By   Anesthesiologist: Rufino Shipman MD   Preanesthetic Checklist  Completed: patient identified, IV checked, site marked, risks and benefits discussed, surgical consent, monitors and equipment checked, pre-op evaluation and timeout performed  Arterial Line Prep    Sterile Tech: cap, gloves and sterile barriers  Prep: ChloraPrep  Patient monitoring: EKG, continuous pulse oximetry and blood pressure monitoring  Arterial Line Procedure   Laterality:left  Location:  radial artery  Catheter size: 20 G   Guidance: palpation technique  Number of attempts: 1  Successful placement: yes  Heparin (Porcine) in NaCl 1000-0.9 UT/500ML-% for arterial line pressure bag - Intra-arterial   1,000 Units - 2/22/2024 8:44:00 AM   Post Assessment   Dressing Type: wrist guard applied, secured with tape and occlusive dressing applied.   Complications no  Circ/Move/Sens Assessment: normal and unchanged.   Patient Tolerance: patient tolerated the procedure well with no apparent complications

## 2024-02-22 NOTE — ANESTHESIA PROCEDURE NOTES
Airway  Urgency: elective    Date/Time: 2/22/2024 8:48 AM  Airway not difficult    General Information and Staff    Patient location during procedure: OR  Anesthesiologist: Rufino Shipman MD    Indications and Patient Condition  Indications for airway management: airway protection    Preoxygenated: yes  MILS maintained throughout  Mask difficulty assessment: 1 - vent by mask    Final Airway Details  Final airway type: endotracheal airway      Successful airway: Microcuff Subglottic Suctioning ETT and ETT  Cuffed: yes   Successful intubation technique: direct laryngoscopy  Endotracheal tube insertion site: oral  Blade: Debbie  Blade size: 4  ETT size (mm): 7.5  Cormack-Lehane Classification: grade IIa - partial view of glottis  Placement verified by: chest auscultation and capnometry   Measured from: lips  ETT/EBT  to lips (cm): 22  Number of attempts at approach: 1  Assessment: lips, teeth, and gum same as pre-op and atraumatic intubation    Additional Comments  Minimal occlusion pressure in cuff

## 2024-02-23 ENCOUNTER — APPOINTMENT (OUTPATIENT)
Dept: GENERAL RADIOLOGY | Facility: HOSPITAL | Age: 69
DRG: 233 | End: 2024-02-23
Payer: MEDICARE

## 2024-02-23 LAB
ALBUMIN SERPL-MCNC: 4.3 G/DL (ref 3.5–5.2)
ANION GAP SERPL CALCULATED.3IONS-SCNC: 15 MMOL/L (ref 5–15)
ARTERIAL PATENCY WRIST A: ABNORMAL
BASE DEFICIT: -6.9 MEQ/LITER
BASE EXCESS BLDA CALC-SCNC: -6.7 MMOL/L (ref 0–3)
BASOPHILS # BLD AUTO: 0 10*3/MM3 (ref 0–0.2)
BASOPHILS NFR BLD AUTO: 0.4 % (ref 0–1.5)
BDY SITE: ABNORMAL
BUN SERPL-MCNC: 25 MG/DL (ref 8–23)
BUN/CREAT SERPL: 16.4 (ref 7–25)
CA-I SERPL ISE-MCNC: 1.19 MMOL/L (ref 1.2–1.3)
CALCIUM SPEC-SCNC: 8.4 MG/DL (ref 8.6–10.5)
CHLORIDE SERPL-SCNC: 111 MMOL/L (ref 98–107)
CO2 BLDA-SCNC: 22 MMOL/L (ref 22–29)
CO2 SERPL-SCNC: 22 MMOL/L (ref 22–29)
CREAT SERPL-MCNC: 1.52 MG/DL (ref 0.76–1.27)
DEPRECATED RDW RBC AUTO: 51.6 FL (ref 37–54)
EGFRCR SERPLBLD CKD-EPI 2021: 50.2 ML/MIN/1.73
EOSINOPHIL # BLD AUTO: 0.1 10*3/MM3 (ref 0–0.4)
EOSINOPHIL NFR BLD AUTO: 0.7 % (ref 0.3–6.2)
ERYTHROCYTE [DISTWIDTH] IN BLOOD BY AUTOMATED COUNT: 15.2 % (ref 12.3–15.4)
GLUCOSE BLDC GLUCOMTR-MCNC: 122 MG/DL (ref 70–105)
GLUCOSE BLDC GLUCOMTR-MCNC: 135 MG/DL (ref 70–105)
GLUCOSE BLDC GLUCOMTR-MCNC: 160 MG/DL (ref 70–105)
GLUCOSE BLDC GLUCOMTR-MCNC: 170 MG/DL (ref 70–105)
GLUCOSE BLDC GLUCOMTR-MCNC: 195 MG/DL (ref 70–105)
GLUCOSE BLDC GLUCOMTR-MCNC: 196 MG/DL (ref 70–105)
GLUCOSE BLDC GLUCOMTR-MCNC: 96 MG/DL (ref 74–100)
GLUCOSE SERPL-MCNC: 114 MG/DL (ref 65–99)
HCO3 MIXED: 20.5 MMOL/L (ref 21–29)
HCT VFR BLD AUTO: 31.8 % (ref 37.5–51)
HEMODILUTION: NO
HGB BLD-MCNC: 10.4 G/DL (ref 13–17.7)
INHALED O2 CONCENTRATION: 40 %
INR PPP: 1.1 (ref 0.93–1.1)
LYMPHOCYTES # BLD AUTO: 1.7 10*3/MM3 (ref 0.7–3.1)
LYMPHOCYTES NFR BLD AUTO: 17.6 % (ref 19.6–45.3)
MAGNESIUM SERPL-MCNC: 2.3 MG/DL (ref 1.6–2.4)
MCH RBC QN AUTO: 30.4 PG (ref 26.6–33)
MCHC RBC AUTO-ENTMCNC: 32.7 G/DL (ref 31.5–35.7)
MCV RBC AUTO: 92.8 FL (ref 79–97)
MODALITY: ABNORMAL
MONOCYTES # BLD AUTO: 0.6 10*3/MM3 (ref 0.1–0.9)
MONOCYTES NFR BLD AUTO: 5.8 % (ref 5–12)
NEUTROPHILS NFR BLD AUTO: 7.2 10*3/MM3 (ref 1.7–7)
NEUTROPHILS NFR BLD AUTO: 75.5 % (ref 42.7–76)
NRBC BLD AUTO-RTO: 0 /100 WBC (ref 0–0.2)
O2 SATURATION MIXED: 63.1 %
PCO2 MIXED: 48.2 MMHG (ref 35–51)
PH MIXED: 7.24 PH UNITS (ref 7.32–7.45)
PHOSPHATE SERPL-MCNC: 4.8 MG/DL (ref 2.5–4.5)
PLATELET # BLD AUTO: 107 10*3/MM3 (ref 140–450)
PMV BLD AUTO: 8.2 FL (ref 6–12)
PO2 MIXED: 38.7 MMHG
POTASSIUM SERPL-SCNC: 4.4 MMOL/L (ref 3.5–5.2)
PROTHROMBIN TIME: 11.9 SECONDS (ref 9.6–11.7)
QT INTERVAL: 407 MS
QTC INTERVAL: 449 MS
RBC # BLD AUTO: 3.43 10*6/MM3 (ref 4.14–5.8)
SODIUM SERPL-SCNC: 148 MMOL/L (ref 136–145)
WBC NRBC COR # BLD AUTO: 9.6 10*3/MM3 (ref 3.4–10.8)

## 2024-02-23 PROCEDURE — 25010000002 MAGNESIUM SULFATE IN D5W 1G/100ML (PREMIX) 1-5 GM/100ML-% SOLUTION: Performed by: NURSE PRACTITIONER

## 2024-02-23 PROCEDURE — 25810000003 SODIUM CHLORIDE 0.9 % SOLUTION: Performed by: NURSE PRACTITIONER

## 2024-02-23 PROCEDURE — 85025 COMPLETE CBC W/AUTO DIFF WBC: CPT | Performed by: NURSE PRACTITIONER

## 2024-02-23 PROCEDURE — 82330 ASSAY OF CALCIUM: CPT | Performed by: NURSE PRACTITIONER

## 2024-02-23 PROCEDURE — 93005 ELECTROCARDIOGRAM TRACING: CPT | Performed by: NURSE PRACTITIONER

## 2024-02-23 PROCEDURE — 83735 ASSAY OF MAGNESIUM: CPT | Performed by: NURSE PRACTITIONER

## 2024-02-23 PROCEDURE — 25010000002 ONDANSETRON PER 1 MG: Performed by: NURSE PRACTITIONER

## 2024-02-23 PROCEDURE — 71045 X-RAY EXAM CHEST 1 VIEW: CPT

## 2024-02-23 PROCEDURE — 97162 PT EVAL MOD COMPLEX 30 MIN: CPT | Performed by: PHYSICAL THERAPIST

## 2024-02-23 PROCEDURE — 85610 PROTHROMBIN TIME: CPT | Performed by: NURSE PRACTITIONER

## 2024-02-23 PROCEDURE — 80069 RENAL FUNCTION PANEL: CPT | Performed by: NURSE PRACTITIONER

## 2024-02-23 PROCEDURE — 25810000003 SODIUM CHLORIDE 0.9 % SOLUTION 250 ML FLEX CONT: Performed by: NURSE PRACTITIONER

## 2024-02-23 PROCEDURE — 99232 SBSQ HOSP IP/OBS MODERATE 35: CPT | Performed by: INTERNAL MEDICINE

## 2024-02-23 PROCEDURE — 82803 BLOOD GASES ANY COMBINATION: CPT

## 2024-02-23 PROCEDURE — 97166 OT EVAL MOD COMPLEX 45 MIN: CPT

## 2024-02-23 PROCEDURE — 25010000002 MORPHINE PER 10 MG: Performed by: NURSE PRACTITIONER

## 2024-02-23 PROCEDURE — 25010000002 ENOXAPARIN PER 10 MG: Performed by: NURSE PRACTITIONER

## 2024-02-23 PROCEDURE — 25010000002 ACETAMINOPHEN 10 MG/ML SOLUTION: Performed by: NURSE PRACTITIONER

## 2024-02-23 PROCEDURE — 25010000002 NICARDIPINE 2.5 MG/ML SOLUTION 10 ML VIAL: Performed by: NURSE PRACTITIONER

## 2024-02-23 PROCEDURE — 25010000002 CEFAZOLIN PER 500 MG: Performed by: NURSE PRACTITIONER

## 2024-02-23 PROCEDURE — 82948 REAGENT STRIP/BLOOD GLUCOSE: CPT

## 2024-02-23 PROCEDURE — 93010 ELECTROCARDIOGRAM REPORT: CPT | Performed by: INTERNAL MEDICINE

## 2024-02-23 RX ORDER — ROSUVASTATIN CALCIUM 10 MG/1
40 TABLET, COATED ORAL NIGHTLY
Status: DISCONTINUED | OUTPATIENT
Start: 2024-02-23 | End: 2024-02-26 | Stop reason: HOSPADM

## 2024-02-23 RX ORDER — FOLIC ACID 1 MG/1
1 TABLET ORAL DAILY
Status: DISCONTINUED | OUTPATIENT
Start: 2024-02-23 | End: 2024-02-26 | Stop reason: HOSPADM

## 2024-02-23 RX ORDER — CARVEDILOL 6.25 MG/1
12.5 TABLET ORAL ONCE
Status: COMPLETED | OUTPATIENT
Start: 2024-02-23 | End: 2024-02-23

## 2024-02-23 RX ORDER — CARVEDILOL 25 MG/1
25 TABLET ORAL 2 TIMES DAILY WITH MEALS
Status: DISCONTINUED | OUTPATIENT
Start: 2024-02-23 | End: 2024-02-26 | Stop reason: HOSPADM

## 2024-02-23 RX ORDER — CARVEDILOL 6.25 MG/1
6.25 TABLET ORAL 2 TIMES DAILY WITH MEALS
Status: DISCONTINUED | OUTPATIENT
Start: 2024-02-23 | End: 2024-02-23

## 2024-02-23 RX ORDER — AMLODIPINE BESYLATE 5 MG/1
10 TABLET ORAL
Status: DISCONTINUED | OUTPATIENT
Start: 2024-02-23 | End: 2024-02-26

## 2024-02-23 RX ADMIN — MORPHINE SULFATE 2 MG: 2 INJECTION, SOLUTION INTRAMUSCULAR; INTRAVENOUS at 02:11

## 2024-02-23 RX ADMIN — SODIUM CHLORIDE 2 G: 900 INJECTION INTRAVENOUS at 08:49

## 2024-02-23 RX ADMIN — SODIUM CHLORIDE 30 ML/HR: 9 INJECTION, SOLUTION INTRAVENOUS at 21:51

## 2024-02-23 RX ADMIN — MORPHINE SULFATE 2 MG: 2 INJECTION, SOLUTION INTRAMUSCULAR; INTRAVENOUS at 06:03

## 2024-02-23 RX ADMIN — POLYETHYLENE GLYCOL 3350 17 G: 17 POWDER, FOR SOLUTION ORAL at 08:49

## 2024-02-23 RX ADMIN — MAGNESIUM SULFATE IN DEXTROSE 1 G: 10 INJECTION, SOLUTION INTRAVENOUS at 10:30

## 2024-02-23 RX ADMIN — CHLORHEXIDINE GLUCONATE, 0.12% ORAL RINSE 15 ML: 1.2 SOLUTION DENTAL at 08:50

## 2024-02-23 RX ADMIN — NICARDIPINE HYDROCHLORIDE 10 MG/HR: 25 INJECTION, SOLUTION INTRAVENOUS at 13:33

## 2024-02-23 RX ADMIN — ACETAMINOPHEN 1000 MG: 1000 INJECTION INTRAVENOUS at 03:13

## 2024-02-23 RX ADMIN — HYDROCODONE BITARTRATE AND ACETAMINOPHEN 1 TABLET: 5; 325 TABLET ORAL at 06:03

## 2024-02-23 RX ADMIN — CARVEDILOL 6.25 MG: 6.25 TABLET, FILM COATED ORAL at 09:33

## 2024-02-23 RX ADMIN — PANTOPRAZOLE SODIUM 40 MG: 40 TABLET, DELAYED RELEASE ORAL at 06:03

## 2024-02-23 RX ADMIN — SODIUM CHLORIDE 2 G: 900 INJECTION INTRAVENOUS at 16:48

## 2024-02-23 RX ADMIN — CARVEDILOL 12.5 MG: 6.25 TABLET, FILM COATED ORAL at 14:01

## 2024-02-23 RX ADMIN — HYDROCODONE BITARTRATE AND ACETAMINOPHEN 1 TABLET: 5; 325 TABLET ORAL at 02:11

## 2024-02-23 RX ADMIN — AMLODIPINE BESYLATE 10 MG: 5 TABLET ORAL at 12:47

## 2024-02-23 RX ADMIN — NICARDIPINE HYDROCHLORIDE 5 MG/HR: 25 INJECTION, SOLUTION INTRAVENOUS at 08:50

## 2024-02-23 RX ADMIN — ROSUVASTATIN 40 MG: 10 TABLET, FILM COATED ORAL at 22:08

## 2024-02-23 RX ADMIN — ACETAMINOPHEN 650 MG: 325 TABLET, FILM COATED ORAL at 20:04

## 2024-02-23 RX ADMIN — DOCUSATE SODIUM 50MG AND SENNOSIDES 8.6MG 2 TABLET: 8.6; 5 TABLET, FILM COATED ORAL at 22:08

## 2024-02-23 RX ADMIN — ENOXAPARIN SODIUM 40 MG: 100 INJECTION SUBCUTANEOUS at 16:47

## 2024-02-23 RX ADMIN — SODIUM CHLORIDE 2 G: 900 INJECTION INTRAVENOUS at 01:12

## 2024-02-23 RX ADMIN — FOLIC ACID 1 MG: 1 TABLET ORAL at 09:33

## 2024-02-23 RX ADMIN — ONDANSETRON 4 MG: 2 INJECTION INTRAMUSCULAR; INTRAVENOUS at 06:24

## 2024-02-23 RX ADMIN — HYDROCODONE BITARTRATE AND ACETAMINOPHEN 1 TABLET: 5; 325 TABLET ORAL at 22:10

## 2024-02-23 RX ADMIN — CHLORHEXIDINE GLUCONATE, 0.12% ORAL RINSE 15 ML: 1.2 SOLUTION DENTAL at 22:08

## 2024-02-23 RX ADMIN — MAGNESIUM SULFATE IN DEXTROSE 1 G: 10 INJECTION, SOLUTION INTRAVENOUS at 03:13

## 2024-02-23 RX ADMIN — DOCUSATE SODIUM 50MG AND SENNOSIDES 8.6MG 2 TABLET: 8.6; 5 TABLET, FILM COATED ORAL at 08:49

## 2024-02-23 RX ADMIN — NICARDIPINE HYDROCHLORIDE 7.5 MG/HR: 25 INJECTION, SOLUTION INTRAVENOUS at 17:09

## 2024-02-23 RX ADMIN — MORPHINE SULFATE 2 MG: 2 INJECTION, SOLUTION INTRAMUSCULAR; INTRAVENOUS at 20:04

## 2024-02-23 RX ADMIN — MUPIROCIN 1 APPLICATION: 20 OINTMENT TOPICAL at 22:08

## 2024-02-23 RX ADMIN — POLYETHYLENE GLYCOL 3350 17 G: 17 POWDER, FOR SOLUTION ORAL at 22:08

## 2024-02-23 RX ADMIN — MORPHINE SULFATE 2 MG: 2 INJECTION, SOLUTION INTRAMUSCULAR; INTRAVENOUS at 17:17

## 2024-02-23 RX ADMIN — NICARDIPINE HYDROCHLORIDE 7.5 MG/HR: 25 INJECTION, SOLUTION INTRAVENOUS at 04:09

## 2024-02-23 RX ADMIN — MUPIROCIN 1 APPLICATION: 20 OINTMENT TOPICAL at 08:49

## 2024-02-23 RX ADMIN — MORPHINE SULFATE 2 MG: 2 INJECTION, SOLUTION INTRAMUSCULAR; INTRAVENOUS at 04:05

## 2024-02-23 RX ADMIN — ASPIRIN 81 MG: 81 TABLET, COATED ORAL at 08:50

## 2024-02-23 RX ADMIN — CARVEDILOL 25 MG: 25 TABLET, FILM COATED ORAL at 17:10

## 2024-02-23 NOTE — PLAN OF CARE
Goal Outcome Evaluation:  Plan of Care Reviewed With: patient           Outcome Evaluation: Patient is a 67 y/o M who was admitted to [facility] on 2/20/24 with c/o left sided chest pain, without radiation, that had been intermittent over the past 2 or 3 days.  PMHx includes chronic kidney disease stage III, hypertension, CML, bone marrow transplant in 2016, coronary artery disease.  Pt had cardiac cath, and was then suggested to have CABG.  CABG was performed on 2/22/24.  Pt is currently POD#1 CABGx3.  At baseline, pt was independent with all mobility.  He is retired, has 2 GILLIAN.  During today's evaluation pt was educated on sternal precautions.  He was sitting up in his chair upon entering his room, and returned to to his recliner.  Pt was min A for sit to stand, required some assist with balance upon standing as he had a slight posterior lean.  Required cues to keep his eyes open during standing.  During gait pt required CGA to min A.  At this time, PT recommends pt d/c home with family assist.  PT will continue to follow pt in acute setting due to weakness, balance, endurance/activity tolerance, and overall mobility.      Anticipated Discharge Disposition (PT): home with assist

## 2024-02-23 NOTE — THERAPY EVALUATION
Patient Name: Anthony Ayoub  : 1955    MRN: [id number]                              Today's Date: 2024       Admit Date: 2024    Visit Dx:     ICD-10-CM ICD-9-CM   1. Unstable angina  I20.0 411.1   2. Chest pain, unspecified type  R07.9 786.50   3. Hypertension, unspecified type  I10 401.9   4. Chronic kidney disease, unspecified CKD stage  N18.9 585.9   5. Non-ST elevated myocardial infarction (non-STEMI)  I21.4 410.70     Patient Active Problem List   Diagnosis    Abnormal cardiovascular stress test    Benign essential hypertension    Chronic myelomonocytic leukemia    Arteriosclerosis of coronary artery    Dyspnea on exertion    Encounter for general adult medical examination without abnormal findings    Encounter for screening for malignant neoplasm of prostate    Enlarged lymph nodes    Erectile dysfunction    Hyperlipidemia    Hypothyroidism    Leukemia    Mixed anxiety depressive disorder    Myeloid leukemia in remission    Osteoarthritis    Tobacco use    BETY treated with BiPAP    H/O non-ST elevation myocardial infarction (NSTEMI)    History of phlebitis    Sicca    Pseudophakia    Chronic diastolic heart failure    Chronic GVHD    History of leukemia    Hypertension    Keratitis    Keratoconjunctivitis sicca    Lower urinary tract symptoms due to benign prostatic hyperplasia    Meibomian gland dysfunction (MGD) of both eyes    Meibomian gland dysfunction (MGD)    Proteinuria    Punctate keratitis    Punctate keratitis of both eyes    Pure hypercholesterolemia    Renal mass    S/P allogeneic bone marrow transplant    Stage 3a chronic kidney disease    Obstructive sleep apnea syndrome    Gallstones    Right upper quadrant abdominal pain    Acute cholecystitis    Chest pain    Unstable angina    Non-ST elevated myocardial infarction (non-STEMI)    Atherosclerotic heart disease of native coronary artery with angina pectoris     Past Medical History:   Diagnosis Date    CHF  (congestive heart failure)     Coronary artery disease     GERD (gastroesophageal reflux disease)     Hyperlipidemia     Hypertension     Leukemia     Low back pain     Sleep apnea      Past Surgical History:   Procedure Laterality Date    BONE MARROW TRANSPLANT      CARDIAC CATHETERIZATION      CARDIAC CATHETERIZATION N/A 2/21/2024    Procedure: Coronary angiography;  Surgeon: Michelle Hernández MD;  Location: [facility] CATH INVASIVE LOCATION;  Service: Cardiovascular;  Laterality: N/A;    CARDIAC CATHETERIZATION N/A 2/21/2024    Procedure: Left Heart Cath;  Surgeon: Michelle Hernández MD;  Location: [facility] CATH INVASIVE LOCATION;  Service: Cardiovascular;  Laterality: N/A;    CHOLECYSTECTOMY N/A 4/23/2022    Procedure: CHOLECYSTECTOMY LAPAROSCOPIC;  Surgeon: Kale Acuna MD;  Location: [facility] MAIN OR;  Service: General;  Laterality: N/A;    SINUS SURGERY      TONSILLECTOMY      VASECTOMY        General Information       Row Name 02/23/24 1329          Physical Therapy Time and Intention    Document Type evaluation  -EJ     Mode of Treatment physical therapy  -EJ       Row Name 02/23/24 1329          General Information    Patient Profile Reviewed yes  -EJ     Prior Level of Function independent:  -EJ     Existing Precautions/Restrictions sternal;fall;oxygen therapy device and L/min  -EJ     Barriers to Rehab medically complex  -EJ       Row Name 02/23/24 1329          Living Environment    People in Home spouse;child(stacie), adult;grandchild(stacie)  Pt reports he lives with his wife, son/DIL and 2 grandchildren.  -EJ     Name(s) of People in Home Mildred (spouse)  -EJ       Row Name 02/23/24 1329          Home Main Entrance    Number of Stairs, Main Entrance two  -EJ       Row Name 02/23/24 1329          Stairs Within Home, Primary    Number of Stairs, Within Home, Primary other (see comments)  Reports pt and wife stay on main level, and his son and his family live on 2nd floor.  -       Row Name 02/23/24 1322           Cognition    Orientation Status (Cognition) oriented x 4  -       Row Name 02/23/24 1329          Safety Issues, Functional Mobility    Impairments Affecting Function (Mobility) endurance/activity tolerance;pain;balance;strength  -               User Key  (r) = Recorded By, (t) = Taken By, (c) = Cosigned By      Initials Name Provider Type     Norma Melo, PT Physical Therapist                   Mobility       Row Name 02/23/24 1334          Bed Mobility    Bed Mobility bed mobility (all) activities  -     All Activities, [place] (Bed Mobility) not tested  -     Comment, (Bed Mobility) Pt already up in chair and returned to chair.  -       Row Name 02/23/24 1334          Sit-Stand Transfer    Sit-Stand [place] (Transfers) minimum assist (75% patient effort)  -     Assistive Device (Sit-Stand Transfers) walker, front-wheeled  -       Row Name 02/23/24 1334          Gait/Stairs (Locomotion)    [place] Level (Gait) contact guard;minimum assist (75% patient effort)  -     Assistive Device (Gait) walker, front-wheeled  -     Distance in Feet (Gait) 40 ft x2  -               User Key  (r) = Recorded By, (t) = Taken By, (c) = Cosigned By      Initials Name Provider Type     Norma Melo, PT Physical Therapist                   Obj/Interventions       Row Name 02/23/24 1337          Range of Motion Comprehensive    General Range of Motion bilateral lower extremity ROM WFL  -[facility] Name 02/23/24 1337          Strength Comprehensive (MMT)    Comment, General Manual Muscle Testing (MMT) Assessment BLE strenght functionally 4-/5.  -       Row Name 02/23/24 1337          Motor Skills    Motor Skills functional endurance  -     Functional Endurance Fair  -       Row Name 02/23/24 1337          Balance    Balance Assessment sitting static balance;sitting dynamic balance;sit to stand dynamic balance;standing static balance;standing dynamic balance  -     Static Sitting  Balance independent  -EJ     Dynamic Sitting Balance independent  -EJ     Position, Sitting Balance supported;sitting in chair  -EJ     Sit to Stand Dynamic Balance minimal assist  -EJ     Static Standing Balance contact guard  -EJ     Dynamic Standing Balance contact guard;minimal assist  -EJ     Position/Device Used, Standing Balance supported;walker, front-wheeled  -EJ     Balance Interventions sitting;standing;sit to stand;supported;dynamic;static;minimal challenge  -EJ       Row Name 02/23/24 1337          Sensory Assessment (Somatosensory)    Sensory Assessment (Somatosensory) sensation intact  -EJ               User Key  (r) = Recorded By, (t) = Taken By, (c) = Cosigned By      Initials Name Provider Type    EJ Norma Melo, PT Physical Therapist                   Goals/Plan       Row Name 02/23/24 1434          Bed Mobility Goal 1 (PT)    Activity/Assistive Device (Bed Mobility Goal 1, PT) bed mobility activities, all  -EJ     [place] Level/Cues Needed (Bed Mobility Goal 1, PT) standby assist  -EJ     Time Frame (Bed Mobility Goal 1, PT) long term goal (LTG);2 weeks  -[facility] Name 02/23/24 1434          Transfer Goal 1 (PT)    Activity/Assistive Device (Transfer Goal 1, PT) transfers, all  -EJ     [place] Level/Cues Needed (Transfer Goal 1, PT) standby assist  -EJ     Time Frame (Transfer Goal 1, PT) long term goal (LTG);2 weeks  -[facility] Name 02/23/24 1434          Gait Training Goal 1 (PT)    Activity/Assistive Device (Gait Training Goal 1, PT) gait (walking locomotion)  -EJ     [place] Level (Gait Training Goal 1, PT) standby assist  -EJ     Distance (Gait Training Goal 1, PT) 400  -EJ     Time Frame (Gait Training Goal 1, PT) long term goal (LTG);2 weeks  -       Row Name 02/23/24 1434          Therapy Assessment/Plan (PT)    Planned Therapy Interventions (PT) balance training;bed mobility training;gait training;home exercise program;patient/family  education;strengthening;stair training;transfer training  -               User Key  (r) = Recorded By, (t) = Taken By, (c) = Cosigned By      Initials Name Provider Type    Norma Osorio, PT Physical Therapist                   Clinical Impression       Row Name 02/23/24 6364          Pain    Pretreatment Pain Rating 5/10  -EJ     Posttreatment Pain Rating 5/10  -EJ     Pain Intervention(s) Therapeutic presence;Repositioned;Emotional support  -       Row Name 02/23/24 4206          Plan of Care Review    Plan of Care Reviewed With patient  -     Outcome Evaluation Patient is a 67 y/o M who was admitted to [facility] on 2/20/24 with c/o left sided chest pain, without radiation, that had been intermittent over the past 2 or 3 days.  PMHx includes chronic kidney disease stage III, hypertension, CML, bone marrow transplant in 2016, coronary artery disease.  Pt had cardiac cath, and was then suggested to have CABG.  CABG was performed on 2/22/24.  Pt is currently POD#1 CABGx3.  At baseline, pt was independent with all mobility.  He is retired, has 2 GILLIAN.  During today's evaluation pt was educated on sternal precautions.  He was sitting up in his chair upon entering his room, and returned to to his recliner.  Pt was min A for sit to stand, required some assist with balance upon standing as he had a slight posterior lean.  Required cues to keep his eyes open during standing.  During gait pt required CGA to min A.  At this time, PT recommends pt d/c home with family assist.  PT will continue to follow pt in acute setting due to weakness, balance, endurance/activity tolerance, and overall mobility.  -       Row Name 02/23/24 0148          Therapy Assessment/Plan (PT)    Criteria for Skilled Interventions Met (PT) yes;skilled treatment is necessary  -     Therapy Frequency (PT) 5 times/wk  -     Predicted Duration of Therapy Intervention (PT) until discharge  -       Row Name 02/23/24 4355          Positioning and  Restraints    Pre-Treatment Position sitting in chair/recliner  -EJ     Post Treatment Position chair  -EJ     In Chair reclined;call light within reach;exit alarm on;encouraged to call for assist;notified nsg  -               User Key  (r) = Recorded By, (t) = Taken By, (c) = Cosigned By      Initials Name Provider Type    Norma Osorio, PT Physical Therapist                   Outcome Measures       Row Name 02/23/24 1435          How much help from another person do you currently need...    Turning from your back to your side while in flat bed without using bedrails? 3  -EJ     Moving from lying on back to sitting on the side of a flat bed without bedrails? 3  -EJ     Moving to and from a bed to a chair (including a wheelchair)? 3  -EJ     Standing up from a chair using your arms (e.g., wheelchair, bedside chair)? 3  -EJ     Climbing 3-5 steps with a railing? 3  -EJ     To walk in hospital room? 3  -EJ     AM-PAC 6 Clicks Score (PT) 18  -EJ     Highest Level of Mobility Goal 6 --> Walk 10 steps or more  -       Row Name 02/23/24 1435          Functional Assessment    Outcome Measure Options AM-PAC 6 Clicks Basic Mobility (PT)  -               User Key  (r) = Recorded By, (t) = Taken By, (c) = Cosigned By      Initials Name Provider Type    Norma Osorio, PT Physical Therapist                                 Physical Therapy Education       Title: PT OT SLP Therapies (In Progress)       Topic: Physical Therapy (In Progress)       Point: Mobility training (Done)       Learning Progress Summary             Patient Acceptance, E, VU by BLANCHE at 2/23/2024 1435                         Point: Home exercise program (Not Started)       Learner Progress:  Not documented in this visit.              Point: Body mechanics (Done)       Learning Progress Summary             Patient Acceptance, E, VU by BLANCHE at 2/23/2024 1435                         Point: Precautions (Done)       Learning Progress Summary              Patient Acceptance, E, VU by  at 2/23/2024 1435                                         User Key       Initials Effective Dates Name Provider Type Discipline     07/11/23 -  Norma Melo, PT Physical Therapist PT                  PT Recommendation and Plan  Planned Therapy Interventions (PT): balance training, bed mobility training, gait training, home exercise program, patient/family education, strengthening, stair training, transfer training  Plan of Care Reviewed With: patient  Outcome Evaluation: Patient is a 69 y/o M who was admitted to [facility] on 2/20/24 with c/o left sided chest pain, without radiation, that had been intermittent over the past 2 or 3 days.  PMHx includes chronic kidney disease stage III, hypertension, CML, bone marrow transplant in 2016, coronary artery disease.  Pt had cardiac cath, and was then suggested to have CABG.  CABG was performed on 2/22/24.  Pt is currently POD#1 CABGx3.  At baseline, pt was independent with all mobility.  He is retired, has 2 GILLIAN.  During today's evaluation pt was educated on sternal precautions.  He was sitting up in his chair upon entering his room, and returned to to his recliner.  Pt was min A for sit to stand, required some assist with balance upon standing as he had a slight posterior lean.  Required cues to keep his eyes open during standing.  During gait pt required CGA to min A.  At this time, PT recommends pt d/c home with family assist.  PT will continue to follow pt in acute setting due to weakness, balance, endurance/activity tolerance, and overall mobility.     Time Calculation:         PT Charges       Row Name 02/23/24 1436 02/23/24 1001          Time Calculation    Start Time 1143  -EJ --     Stop Time 1218  -EJ --     Time Calculation (min) 35 min  -EJ --     PT Received On 02/23/24  -EJ --     PT - Next Appointment 02/25/24  -EJ 02/24/24  -EJ     PT Goal Re-Cert Due Date 03/08/24  -EJ --        Time Calculation- PT    Total Timed Code  Minutes- PT 0 minute(s)  -BLANCHE --               User Key  (r) = Recorded By, (t) = Taken By, (c) = Cosigned By      Initials Name Provider Type    Norma Osorio, PT Physical Therapist                  Therapy Charges for Today       Code Description Service Date Service Provider Modifiers Qty    66090518319 HC PT EVAL MOD COMPLEXITY 4 2/23/2024 Norma Melo, PT GP 1            PT G-Codes  Outcome Measure Options: AM-PAC 6 Clicks Basic Mobility (PT)  AM-PAC 6 Clicks Score (PT): 18  PT Discharge Summary  Anticipated Discharge Disposition (PT): home with assist    Norma Melo, PT  2/23/2024

## 2024-02-23 NOTE — CASE MANAGEMENT/SOCIAL WORK
Continued Stay Note   [place]     Patient Name: Anthony Ayoub  MRN: [id number]  Today's Date: 2/23/2024    Admit Date: 2/20/2024    Plan: DC Plan: Pending Clinical Course and PT/OT evaluations. From home with spouse. CABG 2/22/2024   Discharge Plan       Row Name 02/23/24 1148       Plan    Plan DC Plan: Pending Clinical Course and PT/OT evaluations. From home with spouse. CABG 2/22/2024    Provided Post Acute Provider List? N/A    Provided Post Acute Provider Quality & Resource List? N/A    Plan Comments CM spoke with patient’s nurse and [facility] NP Annetta Orozco to obtain clinical updates. PT/OT recommendations pending at this time. CM will continue to follow for any further needs and adjust discharge plan accordingly. DC Barriers: POD 1 OHS, O2@6L nc,Cardiac monitoring, Central Line, Arterial Line, Pacer Wires, Chest Tubes x2, IV abx, and Pending PT/OT evals.               Expected Discharge Date and Time       Expected Discharge Date Expected Discharge Time    Feb 27, 2024           Phone communication or documentation only- no physical contact with patient or family.      Anali Ha RN     Office Phone: [phone]  Office Cell:     [phone]

## 2024-02-23 NOTE — SIGNIFICANT NOTE
02/23/24 1001   OTHER   Discipline physical therapist   Rehab Time/Intention   Session Not Performed unable to evaluate, medical status change  (Pt still intubated at this time.  PT to f/u once pt off vent.)   Therapy Assessment/Plan (PT)   Criteria for Skilled Interventions Met (PT) yes;meets criteria   Recommendation   PT - Next Appointment 02/24/24

## 2024-02-23 NOTE — PLAN OF CARE
Goal Outcome Evaluation:  Plan of Care Reviewed With: patient        Progress: no change  Outcome Evaluation: Pt is a 69 y/o M admitted to [facility] 2/21/24 with c/o chest pain. POD#1 CABGx3 by Dr. Garrett. PMHx significant for HTN, chronic myeloid leukemia, leukemia, mixed anxiety depressive disorder, and CKDIII. At baseline pt resides with spouse, son, daughter in law and grandchildren, multi-level home with 2 GILLIAN. Pt typically (I) with ADLs, no AD for mobility, retired, active . This date pt A&Ox4 on 4L O2 and sitting up in chair upon arrival. Pt educated on BUE sternal precautions, verbalized understanding. Pt comes to standing with min A with RW, completes functional mobility with CGA-Gissel RW, does require to titrate to 6L O2 with activity. Anticipate pt to progress well, likely safe to dc home with family assist. OT will follow while admitted.      Anticipated Discharge Disposition (OT): home with assist

## 2024-02-23 NOTE — PROGRESS NOTES
S/P POD# 1 CABG x3 with LIMA--[place]  EF 55-60% (echo)    Subjective:  mild PONV    Extubated ~ 2245  Drips:  Cardene 5  CI 3.6, CVP 9,   Wt is up 1.5 kgs from preop  CXR:  atel  MVO2 sat:  63  UF - 1L      Intake/Output Summary (Last 24 hours) at 2/22/2024 2100  Last data filed at 2/22/2024 1845  Gross per 24 hour   Intake 2848 ml   Output 4525 ml   Net -1677 ml     Temp:  [96.8 °F (36 °C)-99 °F (37.2 °C)] 99 °F (37.2 °C)  Heart Rate:  [62-99] 79  Resp:  [12-18] 12  BP: (112-162)/(71-94) 126/79  FiO2 (%):  [40 %-70 %] 40 %  CT 90/8    Results from last 7 days   Lab Units 02/22/24  1453 02/22/24  1415 02/22/24  1319 02/22/24  1257 02/22/24  0923 02/22/24  0457 02/21/24  1150 02/21/24  0421   WBC 10*3/mm3  --   --   --  10.30  --  9.10 9.80 9.10   HEMOGLOBIN g/dL  --   --   --  11.8*  --  12.6* 14.1 13.1   HEMOGLOBIN, POC g/dL 9.7* 10.2*   < >  --    < >  --   --   --    HEMATOCRIT %  --   --   --  34.6*  --  37.4* 41.9 38.9   HEMATOCRIT POC % 28* 30*   < >  --    < >  --   --   --    PLATELETS 10*3/mm3  --   --   --  114*  --  177 173 170   INR   --   --   --  1.11*  --   --  1.08 1.06    < > = values in this interval not displayed.     Results from last 7 days   Lab Units 02/22/24  1600 02/22/24  1257 02/21/24  1150 02/21/24  0421   CREATININE mg/dL  --  1.66*   < > 1.69*   POTASSIUM mmol/L 3.9 4.4  4.4   < > 4.2   SODIUM mmol/L  --  142   < > 138   MAGNESIUM mg/dL  --   --   --  1.9   PHOSPHORUS mg/dL  --  2.3*   < > 3.1    < > = values in this interval not displayed.     ica 1.19    Physical Exam:  Neuro intact, nad, up in recliner, conversant  Tele:  SR 80s, + rub  Diminished bases, 96% 6L  dsg c/d/i, no drainage  Benign abd, no BM, + emesis x1  No edema    Assessment/Plan:  Principal Problem:    Chest pain  Active Problems:    Unstable angina    Non-ST elevated myocardial infarction (non-STEMI)    Atherosclerotic heart disease of native coronary artery with angina pectoris    - MV CAD, hx MI/  IABP placement (2019), NSTEMI presentation, EF 55-60% (echo)--s/p CABG x3 with LIMA to LAD, SVG to diagonal, SVG to OM (Grery)  - HTN--takes Coreg, clonidine, lisinopril, imdur  - HLD--statin, Zetia  - CKD, stage 3--follows with Dr. 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  Doing well.  SCOTT xavier.  SCOTT gilbert  when Cardene off.  On asa/statin, add Coreg.  Creatinine 1.52 this morning.  UO 3160/24 hr yest.  Diuretics per renal.  Chest tube output dark.  Keep chest tubes for now.  Resume maintenance home meds.    Addendum:  Nsg increased Cardene to 15 mg/hr with BP still running in 140s.  He has received amlodipine 10 and Coreg 6.25.  Increase Coreg to home dose of 25 mg bid.  CT output 110/8.  Keep today per Dr. Garrett.    Routine care--as above  De-escal  D/w pt/family, nsg, Dr. Garrett   Anticipate home at discharge    Annetta Mark, APRN  2024  21:00 EST

## 2024-02-23 NOTE — THERAPY EVALUATION
Patient Name: Anthony Ayoub  : 1955    MRN: [id number]                              Today's Date: 2024       Admit Date: 2024    Visit Dx:     ICD-10-CM ICD-9-CM   1. Unstable angina  I20.0 411.1   2. Chest pain, unspecified type  R07.9 786.50   3. Hypertension, unspecified type  I10 401.9   4. Chronic kidney disease, unspecified CKD stage  N18.9 585.9   5. Non-ST elevated myocardial infarction (non-STEMI)  I21.4 410.70     Patient Active Problem List   Diagnosis    Abnormal cardiovascular stress test    Benign essential hypertension    Chronic myelomonocytic leukemia    Arteriosclerosis of coronary artery    Dyspnea on exertion    Encounter for general adult medical examination without abnormal findings    Encounter for screening for malignant neoplasm of prostate    Enlarged lymph nodes    Erectile dysfunction    Hyperlipidemia    Hypothyroidism    Leukemia    Mixed anxiety depressive disorder    Myeloid leukemia in remission    Osteoarthritis    Tobacco use    BETY treated with BiPAP    H/O non-ST elevation myocardial infarction (NSTEMI)    History of phlebitis    Sicca    Pseudophakia    Chronic diastolic heart failure    Chronic GVHD    History of leukemia    Hypertension    Keratitis    Keratoconjunctivitis sicca    Lower urinary tract symptoms due to benign prostatic hyperplasia    Meibomian gland dysfunction (MGD) of both eyes    Meibomian gland dysfunction (MGD)    Proteinuria    Punctate keratitis    Punctate keratitis of both eyes    Pure hypercholesterolemia    Renal mass    S/P allogeneic bone marrow transplant    Stage 3a chronic kidney disease    Obstructive sleep apnea syndrome    Gallstones    Right upper quadrant abdominal pain    Acute cholecystitis    Chest pain    Unstable angina    Non-ST elevated myocardial infarction (non-STEMI)    Atherosclerotic heart disease of native coronary artery with angina pectoris     Past Medical History:   Diagnosis Date    CHF  (congestive heart failure)     Coronary artery disease     GERD (gastroesophageal reflux disease)     Hyperlipidemia     Hypertension     Leukemia     Low back pain     Sleep apnea      Past Surgical History:   Procedure Laterality Date    BONE MARROW TRANSPLANT      CARDIAC CATHETERIZATION      CARDIAC CATHETERIZATION N/A 2/21/2024    Procedure: Coronary angiography;  Surgeon: Michelle Hernández MD;  Location: [facility] CATH INVASIVE LOCATION;  Service: Cardiovascular;  Laterality: N/A;    CARDIAC CATHETERIZATION N/A 2/21/2024    Procedure: Left Heart Cath;  Surgeon: Michelle Hernández MD;  Location: [facility] CATH INVASIVE LOCATION;  Service: Cardiovascular;  Laterality: N/A;    CHOLECYSTECTOMY N/A 4/23/2022    Procedure: CHOLECYSTECTOMY LAPAROSCOPIC;  Surgeon: Kale Acuna MD;  Location: [facility] MAIN OR;  Service: General;  Laterality: N/A;    SINUS SURGERY      TONSILLECTOMY      VASECTOMY        General Information       Row Name 02/23/24 1437          OT Time and Intention    Document Type evaluation  -MS     Mode of Treatment occupational therapy  -MS       Row Name 02/23/24 1437          General Information    Patient Profile Reviewed yes  -MS     Prior Level of Function independent:;ADL's;driving  -MS     Existing Precautions/Restrictions fall;sternal;oxygen therapy device and L/min  no home O2, 4L O2  -MS     Barriers to Rehab medically complex  -MS       Row Name 02/23/24 1435          Occupational Profile    Reason for Services/Referral (Occupational Profile) Pt is a 69 y/o M admitted to [facility] 2/21/24 with c/o chest pain. POD#1 CABGx3 by Dr. Garrett. PMHx significant for HTN, chronic myeloid leukemia, leukemia, mixed anxiety depressive disorder, and CKDIII. At baseline pt resides with spouse, son, daughter in law and grandchildren, multi-level home with 2 GILLIAN. Pt typically (I) with ADLs, no AD for mobility, retired, active .  -MS     Environmental Supports and Barriers (Occupational Profile)  supportive family  -MS       Row Name 02/23/24 1437          Living Environment    People in Home child(stacie), adult;grandchild(stacie);spouse  -MS       Row Name 02/23/24 1437          Home Main Entrance    Number of Stairs, Main Entrance two  -MS       Row Name 02/23/24 1437          Stairs Within Home, Primary    Stairs, Within Home, Primary resides on main level  -MS       Row Name 02/23/24 1437          Cognition    Orientation Status (Cognition) oriented x 4  -MS       Row Name 02/23/24 1437          Safety Issues, Functional Mobility    Impairments Affecting Function (Mobility) endurance/activity tolerance;pain;shortness of breath  -MS               User Key  (r) = Recorded By, (t) = Taken By, (c) = Cosigned By      Initials Name Provider Type    Radha Cerna OT Occupational Therapist                     Mobility/ADL's       Row Name 02/23/24 1438          Bed Mobility    Bed Mobility bed mobility (all) activities  -MS     All Activities, [place] (Bed Mobility) unable to assess  -MS     Comment, (Bed Mobility) pt sitting up in chair upon arrival  -MS       Row Name 02/23/24 1438          Transfers    Transfers sit-stand transfer  -MS       Row Name 02/23/24 1438          Sit-Stand Transfer    Sit-Stand [place] (Transfers) minimum assist (75% patient effort)  -MS     Assistive Device (Sit-Stand Transfers) walker, front-wheeled  -MS               User Key  (r) = Recorded By, (t) = Taken By, (c) = Cosigned By      Initials Name Provider Type    Radha Cerna OT Occupational Therapist                   Obj/Interventions       Row Name 02/23/24 1439          Sensory Assessment (Somatosensory)    Sensory Assessment (Somatosensory) sensation intact  -MS       Row Name 02/23/24 1439          Vision Assessment/Intervention    Visual Impairment/Limitations WNL  -MS       Row Name 02/23/24 1439          Range of Motion Comprehensive    Comment, General Range of Motion BUE WFL within sternal precautions   -MS       Row Name 02/23/24 1439          Strength Comprehensive (MMT)    Comment, General Manual Muscle Testing (MMT) Assessment BUE not assessed d/t sternal precautions  -MS       Row Name 02/23/24 1439          Balance    Balance Assessment sitting static balance;sitting dynamic balance;standing static balance;standing dynamic balance  -MS     Static Sitting Balance modified independence  -MS     Dynamic Sitting Balance modified independence  -MS     Position, Sitting Balance supported;sitting in chair  -MS     Static Standing Balance contact guard  -MS     Dynamic Standing Balance contact guard;minimal assist  -MS     Position/Device Used, Standing Balance supported;walker, front-wheeled  -MS               User Key  (r) = Recorded By, (t) = Taken By, (c) = Cosigned By      Initials Name Provider Type    Radha Cerna, OT Occupational Therapist                   Goals/Plan       Row Name 02/23/24 1446          Bed Mobility Goal 1 (OT)    Activity/Assistive Device (Bed Mobility Goal 1, OT) bed mobility activities, all  -MS     [place] Level/Cues Needed (Bed Mobility Goal 1, OT) modified independence  -MS     Time Frame (Bed Mobility Goal 1, OT) 2 weeks  -MS     Progress/Outcomes (Bed Mobility Goal 1, OT) new goal  -MS       Row Name 02/23/24 1446          Transfer Goal 1 (OT)    Activity/Assistive Device (Transfer Goal 1, OT) transfers, all  -MS     [place] Level/Cues Needed (Transfer Goal 1, OT) modified independence  -MS     Time Frame (Transfer Goal 1, OT) long term goal (LTG);2 weeks  -MS     Progress/Outcome (Transfer Goal 1, OT) new goal  -MS       Row Name 02/23/24 1446          Dressing Goal 1 (OT)    Activity/Device (Dressing Goal 1, OT) lower body dressing  -MS     [place]/Cues Needed (Dressing Goal 1, OT) minimum assist (75% or more patient effort)  -MS     Time Frame (Dressing Goal 1, OT) long term goal (LTG);2 weeks  -MS     Progress/Outcome (Dressing Goal 1, OT) new goal  -MS        Row Name 02/23/24 1446          Toileting Goal 1 (OT)    Activity/Device (Toileting Goal 1, OT) toileting skills, all  -MS     [place] Level/Cues Needed (Toileting Goal 1, OT) minimum assist (75% or more patient effort)  -MS     Time Frame (Toileting Goal 1, OT) long term goal (LTG);2 weeks  -MS     Progress/Outcome (Toileting Goal 1, OT) new goal  -MS       Row Name 02/23/24 1446          Therapy Assessment/Plan (OT)    Planned Therapy Interventions (OT) activity tolerance training;adaptive equipment training;BADL retraining;functional balance retraining;IADL retraining;occupation/activity based interventions;passive ROM/stretching;patient/caregiver education/training;transfer/mobility retraining;strengthening exercise;ROM/therapeutic exercise  -MS               User Key  (r) = Recorded By, (t) = Taken By, (c) = Cosigned By      Initials Name Provider Type    MS Radha Garcia, OT Occupational Therapist                   Clinical Impression       Row Name 02/23/24 1442          Pain Assessment    Pretreatment Pain Rating 5/10  -MS     Posttreatment Pain Rating 5/10  -MS     Pain Location incisional  -MS     Pain Location - chest  -MS     Pain Intervention(s) Repositioned;Emotional support  -MS       Row Name 02/23/24 1442          Plan of Care Review    Plan of Care Reviewed With patient  -MS     Progress no change  -MS     Outcome Evaluation Pt is a 67 y/o M admitted to [facility] 2/21/24 with c/o chest pain. POD#1 CABGx3 by Dr. Garrett. PMHx significant for HTN, chronic myeloid leukemia, leukemia, mixed anxiety depressive disorder, and CKDIII. At baseline pt resides with spouse, son, daughter in law and grandchildren, multi-level home with 2 GILLIAN. Pt typically (I) with ADLs, no AD for mobility, retired, active . This date pt A&Ox4 on 4L O2 and sitting up in chair upon arrival. Pt educated on BUE sternal precautions, verbalized understanding. Pt comes to standing with min A with RW, completes functional  mobility with CGA-Gissel RW, does require to titrate to 6L O2 with activity. Anticipate pt to progress well, likely safe to dc home with family assist. OT will follow while admitted.  -MS       Row Name 02/23/24 1442          Therapy Assessment/Plan (OT)    Rehab Potential (OT) good, to achieve stated therapy goals  -MS     Criteria for Skilled Therapeutic Interventions Met (OT) yes;meets criteria;skilled treatment is necessary  -MS     Therapy Frequency (OT) 5 times/wk  -MS     Predicted Duration of Therapy Intervention (OT) until d/c  -MS       Row Name 02/23/24 1442          Therapy Plan Review/Discharge Plan (OT)    Anticipated Discharge Disposition (OT) home with assist  -MS       Row Name 02/23/24 1442          Vital Signs    Pre Systolic BP Rehab 128  -MS     Pre Treatment Diastolic BP 65  -MS     Intra Systolic BP Rehab 125  -MS     Intra Treatment Diastolic BP 60  -MS     Post Systolic BP Rehab 123  -MS     Post Treatment Diastolic BP 62  -MS     Pretreatment Heart Rate (beats/min) 85  -MS     Posttreatment Heart Rate (beats/min) 88  -MS     Pre SpO2 (%) 91  -MS     O2 Delivery Pre Treatment nasal cannula  4L  -MS     Intra SpO2 (%) 86  -MS     O2 Delivery Intra Treatment nasal cannula  6L  -MS     Post SpO2 (%) 91  -MS     O2 Delivery Post Treatment nasal cannula  4L  -MS     Pre Patient Position Sitting  -MS     Intra Patient Position Standing  -MS     Post Patient Position Sitting  -MS       Row Name 02/23/24 1442          Positioning and Restraints    Pre-Treatment Position sitting in chair/recliner  -MS     Post Treatment Position chair  -MS     In Chair notified nsg;sitting;call light within reach;encouraged to call for assist;exit alarm on;legs elevated  -MS               User Key  (r) = Recorded By, (t) = Taken By, (c) = Cosigned By      Initials Name Provider Type    MS Radha Garcia, OT Occupational Therapist                   Outcome Measures       Row Name 02/23/24 1447          How much help  from another is currently needed...    Putting on and taking off regular lower body clothing? 2  -MS     Bathing (including washing, rinsing, and drying) 2  -MS     Toileting (which includes using toilet bed pan or urinal) 2  -MS     Putting on and taking off regular upper body clothing 2  -MS     Taking care of personal grooming (such as brushing teeth) 3  -MS     Eating meals 4  -MS     AM-PAC 6 Clicks Score (OT) 15  -MS       Row Name 02/23/24 1435          How much help from another person do you currently need...    Turning from your back to your side while in flat bed without using bedrails? 3  -EJ     Moving from lying on back to sitting on the side of a flat bed without bedrails? 3  -EJ     Moving to and from a bed to a chair (including a wheelchair)? 3  -EJ     Standing up from a chair using your arms (e.g., wheelchair, bedside chair)? 3  -EJ     Climbing 3-5 steps with a railing? 3  -EJ     To walk in hospital room? 3  -EJ     AM-PAC 6 Clicks Score (PT) 18  -EJ     Highest Level of Mobility Goal 6 --> Walk 10 steps or more  -EJ       Row Name 02/23/24 1447 02/23/24 1435       Functional Assessment    Outcome Measure Options AM-PAC 6 Clicks Daily Activity (OT)  -MS AM-PAC 6 Clicks Basic Mobility (PT)  -EJ              User Key  (r) = Recorded By, (t) = Taken By, (c) = Cosigned By      Initials Name Provider Type    EJ Norma Melo, PT Physical Therapist    Radha Cerna, OT Occupational Therapist                    Occupational Therapy Education       Title: PT OT SLP Therapies (In Progress)       Topic: Occupational Therapy (Done)       Point: ADL training (Done)       Description:   Instruct learner(s) on proper safety adaptation and remediation techniques during self care or transfers.   Instruct in proper use of assistive devices.                  Learning Progress Summary             Patient Acceptance, E,TB, VU by MS at 2/23/2024 1447                         Point: Precautions (Done)        Description:   Instruct learner(s) on prescribed precautions during self-care and functional transfers.                  Learning Progress Summary             Patient Acceptance, E,TB, VU by MS at 2/23/2024 1447                         Point: Body mechanics (Done)       Description:   Instruct learner(s) on proper positioning and spine alignment during self-care, functional mobility activities and/or exercises.                  Learning Progress Summary             Patient Acceptance, E,TB, VU by MS at 2/23/2024 1447                                         User Key       Initials Effective Dates Name Provider Type Discipline    MS 07/13/22 -  Radha Garcia, MARIANO Occupational Therapist OT                  OT Recommendation and Plan  Planned Therapy Interventions (OT): activity tolerance training, adaptive equipment training, BADL retraining, functional balance retraining, IADL retraining, occupation/activity based interventions, passive ROM/stretching, patient/caregiver education/training, transfer/mobility retraining, strengthening exercise, ROM/therapeutic exercise  Therapy Frequency (OT): 5 times/wk  Plan of Care Review  Plan of Care Reviewed With: patient  Progress: no change  Outcome Evaluation: Pt is a 69 y/o M admitted to [facility] 2/21/24 with c/o chest pain. POD#1 CABGx3 by Dr. Garrett. PMHx significant for HTN, chronic myeloid leukemia, leukemia, mixed anxiety depressive disorder, and CKDIII. At baseline pt resides with spouse, son, daughter in law and grandchildren, multi-level home with 2 GILLIAN. Pt typically (I) with ADLs, no AD for mobility, retired, active . This date pt A&Ox4 on 4L O2 and sitting up in chair upon arrival. Pt educated on BUE sternal precautions, verbalized understanding. Pt comes to standing with min A with RW, completes functional mobility with CGA-Gissel RW, does require to titrate to 6L O2 with activity. Anticipate pt to progress well, likely safe to dc home with family assist. OT  will follow while admitted.     Time Calculation:                   Radha Garcia, MARIANO  2/23/2024

## 2024-02-23 NOTE — OP NOTE
Operative Note    Date of Dictation: 02/22/24    Date of Procedure: 02/22/24    Preoperative diagnosis: Multivessel CAD    Postoperative diagnosis: Same    Procedure:   1. CABG x 3 (LIMA to LAD, SVG to Diagonal, SVG to OM)  2. EVH of the left legs    Surgeon: Soham Garrett MD     Assistants: LINDA Anderson was responsible for performing the following activities: Cardiac Surgery First assist, Endoscopic Vein [place] for CABG, surgical wound closure and their skilled assistance was necessary for the success of this case.     Anesthesia: General endotracheal anesthesia and DELANEY    Findings:  The saphenous vein was harvested endoscopically form the left  leg. The vein had a diameter of 4 mm and was of good quality. The coronaries had a diameter of 2 mm and were of good quality.      Estimated Blood Loss:  300 mL of Cell Saver returned.    STS Data: The STS Risk score discussed with the patient and family.  Counseling was done regarding abuse of tobacco, alcohol and drugs as needed. They understand and wish to proceed. The antibiotics and b blockers were given in the STS required window.          Description of the procedure:     The patient was placed supine on the operative table. General anesthesia was given and lines placed. The patient was prepped and draped using the usual sterile technique. A median sternotomy was performed with a scalpel and the layers carried down to the sternum using the electrocautery. The sternum was split in the midline using a vertical oscillating saw. Hemostasis was achieved. The LIMA was harvested skeletonized and prepared with papaverine. It was of good quality. 300 units/kg of IV heparin was given to an ACT over 400. A Favaloro retractor was placed and the mediastinum exposed, the pericardium was opened and the edges tacked to the wound. Cannulation sutures were placed in the ascending aorta and right atrium. Small cannulas were placed and aorto right atrial cardiopulmonary  bypass was started. Cardioplegia cannulas were placed. Cardiopulmonary bypass was then established for 52 minutes drifting to 34°C at appropriate flow rates.  The aorta was crossclamped for 49 minutes and we gave 1000 cc of antegrade cold blood cardioplegia then 200L of retrograde cold blood cardioplegia and then repeated doses every 10 to 15 minutes to good effect. 2veins were anastomosed to the ascending aorta with 6-0 Prolene and marked with washers.  The first vein was sewn to the first diagonal with 7-0 Prolene.  The next vein was sewn to obtuse marginal of the circunflex artery with 7-0 Prolene. The LIMA was sewn to the distal LAD with 7-0 Prolene. A warm dose of cardioplegia was given and then the aortic clamp removed as well as the LIMA bulldog clamp. All anastomoses were checked and had good flow and morphology. The left pleural space was suctioned and the lungs ventilated. The heart was paced till regular atrial rhythm resumed. I allowed the heart to eject and once hemodynamics were acceptable, then the CPB was discontinued and the venous and cardioplegia cannulas removed. The matching dose of protamine was given and the aortic cannula removed as well. AV temporary wires and pleural and mediastinal chest tubes were placed and the wound sprayed with platelet rich plasma. The sternum was closed with single and double wires and soft tissue in layers of reabsorbable material. The wounds were covered with sterile dressings.       Specimen removed:  none    CPB time:  52 minutes.    Aortic clamp time:  49 minutes    Complications:  none           Disposition: Cardiovascular recovery room           Condition: Critical but stable.

## 2024-02-23 NOTE — PROGRESS NOTES
[facility] Progress Note      Patient Name: Anthony Ayoub  : 1955  MRN: [id number]  Primary Care Physician:  Yifan Elizabeth MD  Date of admission: 2024    Subjective     Interval History:     Patient was seen and examined this morning.  S/p CABG yesterday  Patient was on Cardene drip this morning  Patient was extubated.  Mild chest discomfort otherwise remained stable    Review of Systems:   As noted above    Objective     Vitals:   Temp:  [96.8 °F (36 °C)-99.7 °F (37.6 °C)] 98.1 °F (36.7 °C)  Heart Rate:  [71-99] 79  Resp:  [12] 12  BP: (126-139)/(54-69) 133/66  Flow (L/min):  [5-6] 6  FiO2 (%):  [40 %-70 %] 40 %    Intake/Output Summary (Last 24 hours) at 2024 1123  Last data filed at 2024 1000  Gross per 24 hour   Intake 4944 ml   Output 5215 ml   Net -271 ml       Physical Exam:    General Appearance: NAD  HEENT: oral mucosa normal, nonicteric sclera  Neck: supple, no JVD  Lungs: Few rhonchi  Heart: RRR, normal S1 and S2  Abdomen: soft, nondistended  Extremities: no edema  Neuro: Awake alert and moving all extremities    Scheduled Meds:     aspirin, 81 mg, Oral, Daily  carvedilol, 6.25 mg, Oral, BID With Meals  ceFAZolin, 2 g, Intravenous, Q8H  chlorhexidine, 15 mL, Mouth/Throat, Q12H  enoxaparin, 40 mg, Subcutaneous, Daily  folic acid, 1 mg, Oral, Daily  mupirocin, , Each Nare, BID  pantoprazole, 40 mg, Oral, Q AM  polyethylene glycol, 17 g, Oral, BID  rosuvastatin, 40 mg, Oral, Nightly  senna-docusate sodium, 2 tablet, Oral, BID      IV Meds:   insulin, 0-100 Units/hr, Last Rate: Stopped (24 1303)  niCARdipine, 5-15 mg/hr, Last Rate: 7.5 mg/hr (24 0945)  sodium chloride, 30 mL/hr, Last Rate: 30 mL/hr (24 1304)        Results Reviewed:   I have personally reviewed the results from the time of this admission to 2024 11:23 EST     Results from last 7 days   Lab Units 24  0347 24  2234 24  5065  02/22/24 2126 02/22/24  1600 02/22/24  1257 02/22/24  0457 02/21/24  1150 02/21/24  0421   SODIUM mmol/L 148*  --   --   --   --  142 141 139 138   POTASSIUM mmol/L 4.4  --   --  4.4 3.9 4.4  4.4 4.3 4.2 4.2   CHLORIDE mmol/L 111*  --   --   --   --  106 106 106 104   CO2 mmol/L 22.0  --   --   --   --  24.0 24.0 24.0 24.0   BUN mg/dL 25*  --   --   --   --  30* 33* 31* 34*   CREATININE mg/dL 1.52* 1.63* 1.58*  --   --  1.66* 1.59* 1.46* 1.69*   CALCIUM mg/dL 8.4*  --   --   --   --  9.0 8.9 9.1 9.0   BILIRUBIN mg/dL  --   --   --   --   --   --   --  0.9 0.7   ALK PHOS U/L  --   --   --   --   --   --   --  92 83   ALT (SGPT) U/L  --   --   --   --   --   --   --  21 21   AST (SGOT) U/L  --   --   --   --   --   --   --  27 25   GLUCOSE mg/dL 114*  --   --   --   --  94 99 130* 104*     Estimated Creatinine Clearance: 52 mL/min (A) (by C-G formula based on SCr of 1.52 mg/dL (H)).  Results from last 7 days   Lab Units 02/23/24 0347 02/22/24  1257 02/22/24  0457 02/21/24  0421 02/21/24  0421 02/20/24  0650   MAGNESIUM mg/dL 2.3  --   --   --  1.9 2.1   PHOSPHORUS mg/dL 4.8* 2.3* 2.9   < > 3.1  --     < > = values in this interval not displayed.         Results from last 7 days   Lab Units 02/23/24 0347 02/22/24 2232 02/22/24 2129 02/22/24  1453 02/22/24  1415 02/22/24  1319 02/22/24  1257 02/22/24  0923 02/22/24  0457 02/21/24  1150 02/21/24  0421   WBC 10*3/mm3 9.60  --   --   --   --   --  10.30  --  9.10 9.80 9.10   HEMOGLOBIN g/dL 10.4*  --   --   --   --   --  11.8*  --  12.6* 14.1 13.1   HEMOGLOBIN, POC g/dL  --  9.5* 9.6* 9.7* 10.2*   < >  --    < >  --   --   --    PLATELETS 10*3/mm3 107*  --   --   --   --   --  114*  --  177 173 170    < > = values in this interval not displayed.     Results from last 7 days   Lab Units 02/23/24  0347 02/22/24  1257 02/21/24  1150 02/21/24  0421 02/20/24  0509   INR  1.10 1.11* 1.08 1.06 1.02       Assessment / Plan     ASSESSMENT:    Chronic kidney disease stage  IIIa.  Secondary to hypertensive nephrosclerosis.  Renal function stable.  Electrolytes, volume status okay  Coronary artery disease.  S/p CABG x 3.  Patient stable hemodynamically  Hypertension chronic kidney disease.  Blood pressure was running high this morning.  He was on Cardene drip    PLAN:  Start on carvedilol.  Add amlodipine and lisinopril as needed  Repeat labs in a.m.    Laureano Lawson MD  02/23/24  11:23 [facility]    [facility]  [phone]

## 2024-02-23 NOTE — PROGRESS NOTES
Referring Provider: Soham Garrett, *    Reason for follow-up:  Unstable angina  Non-STEMI  Severe and critical coronary artery disease.  Status post CABG 2024  Patient Care Team:  Yifan Elizabeth MD as PCP - General    Subjective .      ROS  No specific symptoms were offered.      History  Past Medical History:   Diagnosis Date    CHF (congestive heart failure)     Coronary artery disease     GERD (gastroesophageal reflux disease)     Hyperlipidemia     Hypertension     Leukemia     Low back pain     Sleep apnea        Past Surgical History:   Procedure Laterality Date    BONE MARROW TRANSPLANT      CARDIAC CATHETERIZATION      CARDIAC CATHETERIZATION N/A 2024    Procedure: Coronary angiography;  Surgeon: Michelle Hernández MD;  Location: [facility] CATH INVASIVE LOCATION;  Service: Cardiovascular;  Laterality: N/A;    CARDIAC CATHETERIZATION N/A 2024    Procedure: Left Heart Cath;  Surgeon: Michelle Hernández MD;  Location: [facility] CATH INVASIVE LOCATION;  Service: Cardiovascular;  Laterality: N/A;    CHOLECYSTECTOMY N/A 2022    Procedure: CHOLECYSTECTOMY LAPAROSCOPIC;  Surgeon: Kale Acuna MD;  Location: [facility] MAIN OR;  Service: General;  Laterality: N/A;    SINUS SURGERY      TONSILLECTOMY      VASECTOMY         Family History   Problem Relation Age of Onset    Hypertension Mother     Hyperlipidemia Mother     Heart disease Father     Heart attack Father     Hypertension Sister     Hypertension Brother     Heart disease Brother        Social History     Tobacco Use    Smoking status: Former     Packs/day: 1.00     Years: 30.00     Additional pack years: 0.00     Total pack years: 30.00     Types: Cigarettes     Quit date:      Years since quittin.1    Smokeless tobacco: Never   Vaping Use    Vaping Use: Never used   Substance Use Topics    Alcohol use: No    Drug use: Defer        Medications Prior to Admission   Medication Sig Dispense Refill Last Dose    carvedilol  (COREG) 25 MG tablet Take 1 tablet by mouth 2 (Two) Times a Day.   2/19/2024    cloNIDine (CATAPRES) 0.1 MG tablet 1/2 in am  And 1/2 inpm   2/20/2024    escitalopram (LEXAPRO) 20 MG tablet Take 1 tablet by mouth Daily. 90 tablet 1 2/20/2024    ezetimibe (ZETIA) 10 MG tablet TAKE 1 TABLET BY MOUTH EVERY DAY 95 tablet 0 2/20/2024    ferrous sulfate 325 (65 FE) MG EC tablet Take 1 tablet by mouth.   2/20/2024    folic acid (FOLVITE) 1 MG tablet TAKE 1 TABLET BY MOUTH EVERY DAY 90 tablet 1 2/20/2024    isosorbide mononitrate (IMDUR) 30 MG 24 hr tablet Take 1 tablet by mouth Daily.   2/20/2024    lisinopril (PRINIVIL,ZESTRIL) 40 MG tablet Take 1 tablet by mouth Daily.   2/20/2024    pantoprazole (PROTONIX) 40 MG EC tablet Take 1 tablet by mouth Daily.   2/20/2024    rosuvastatin (CRESTOR) 40 MG tablet TAKE 1 TABLET BY MOUTH EVERYDAY AT BEDTIME 90 tablet 1 2/19/2024    terazosin (HYTRIN) 5 MG capsule Daily.   2/19/2024    vitamin D (ERGOCALCIFEROL) 1.25 MG (59297 UT) capsule capsule TAKE 1 CAPSULE BY MOUTH 1 TIME PER WEEK. 12 capsule 1 2/19/2024    aspirin 81 MG chewable tablet Chew 1 tablet Daily.   Unknown    fluticasone (FLONASE) 50 MCG/ACT nasal spray 1 spray into the nostril(s) as directed by provider 1 (One) Time. prn   Unknown    multivitamin (THERAGRAN) tablet tablet Take  by mouth.   Unknown    nitroglycerin (NITROSTAT) 0.6 MG SL tablet    Unknown    VITAMIN D PO 50,000 ut  (1.25mg)   Unknown       Allergies  Doxycycline hyclate, Penicillins, and Sulfa antibiotics    Scheduled Meds:aspirin, 81 mg, Oral, Daily  atorvastatin, 40 mg, Oral, Nightly  ceFAZolin, 2 g, Intravenous, Q8H  chlorhexidine, 15 mL, Mouth/Throat, Q12H  enoxaparin, 40 mg, Subcutaneous, Daily  magnesium sulfate, 1 g, Intravenous, Q8H  mupirocin, , Each Nare, BID  pantoprazole, 40 mg, Oral, Q AM  polyethylene glycol, 17 g, Oral, BID  senna-docusate sodium, 2 tablet, Oral, BID      Continuous Infusions:dexmedetomidine, 0.2-1.5 mcg/kg/hr, Last Rate:  "Stopped (02/22/24 1500)  insulin, 0-100 Units/hr, Last Rate: Stopped (02/22/24 1303)  niCARdipine, 5-15 mg/hr, Last Rate: 7.5 mg/hr (02/23/24 0409)  nitroglycerin, 5-50 mcg/min, Last Rate: Stopped (02/22/24 1311)  norepinephrine, 0.02-0.06 mcg/kg/min  sodium chloride, 30 mL/hr, Last Rate: 30 mL/hr (02/22/24 1304)      PRN Meds:.  acetaminophen **OR** acetaminophen **OR** acetaminophen    Calcium Replacement - Follow Nurse / BPA Driven Protocol    dextrose    dextrose    glucagon (human recombinant)    HYDROcodone-acetaminophen    Magnesium Cardiology Dose Replacement - Follow Nurse / BPA Driven Protocol    meperidine    Morphine **AND** naloxone    niCARdipine    nitroglycerin    nitroglycerin    norepinephrine    ondansetron    Phosphorus Replacement - Follow Nurse / BPA Driven Protocol    Potassium Replacement - Follow Nurse / BPA Driven Protocol    vasopressin    Objective     VITAL SIGNS  Vitals:    02/23/24 0300 02/23/24 0400 02/23/24 0500 02/23/24 0600   BP:       BP Location:       Patient Position:       Pulse: 89 81 77 79   Resp:       Temp: 98.8 °F (37.1 °C) 98.1 °F (36.7 °C) 97.9 °F (36.6 °C) 97.7 °F (36.5 °C)   TempSrc:       SpO2: 98% 95% 94% 96%   Weight:    76.9 kg (169 lb 8.5 oz)   Height:           Flowsheet Rows      Flowsheet Row First Filed Value   Admission Height 167.6 cm (66\") Documented at 02/20/2024 0433   Admission Weight 75.5 kg (166 lb 7.2 oz) Documented at 02/20/2024 0433              Intake/Output Summary (Last 24 hours) at 2/23/2024 0654  Last data filed at 2/23/2024 0400  Gross per 24 hour   Intake 3390 ml   Output 4700 ml   Net -1310 ml        TELEMETRY: Sinus rhythm    Physical Exam:  The patient is alert, oriented and in no distress.  Vital signs as noted above.  Head and neck revealed no carotid bruits or jugular venous distention.  No thyromegaly or lymphadenopathy is present  Lungs clear.  No wheezing.  Breath sounds are normal bilaterally.  Heart normal first and second heart " sounds.  No murmur. No precordial rub is present.  No gallop is present.  Abdomen soft and nontender.  No organomegaly is present.  Extremities with good peripheral pulses without any pedal edema.  Cardiac cath site looks normal.  Sheath in place.  No hematoma.  Skin warm and dry.  Visible incisions are looking well.  Musculoskeletal system is grossly normal  CNS grossly normal    Reviewed and updated.    Results Review:   I reviewed the patient's new clinical results.  Lab Results (last 24 hours)       Procedure Component Value Units Date/Time    Calcium, Ionized [[id number]]  (Abnormal) Collected: 02/23/24 0429    Specimen: Blood Updated: 02/23/24 0446     Ionized Calcium 1.19 mmol/L     Renal Function Panel [[id number]]  (Abnormal) Collected: 02/23/24 0347    Specimen: Blood Updated: 02/23/24 0421     Glucose 114 mg/dL      BUN 25 mg/dL      Creatinine 1.52 mg/dL      Sodium 148 mmol/L      Potassium 4.4 mmol/L      Chloride 111 mmol/L      CO2 22.0 mmol/L      Calcium 8.4 mg/dL      Albumin 4.3 g/dL      Phosphorus 4.8 mg/dL      Anion Gap 15.0 mmol/L      BUN/Creatinine Ratio 16.4     eGFR 50.2 mL/min/1.73     Narrative:      GFR Normal >60  Chronic Kidney Disease <60  Kidney Failure <15      POC Glucose Once [[id number]]  (Abnormal) Collected: 02/23/24 0416    Specimen: Blood Updated: 02/23/24 0418     Glucose 122 mg/dL      Comment: Serial Number: [id number]:  [id number]       Magnesium [[id number]]  (Normal) Collected: 02/23/24 0347    Specimen: Blood Updated: 02/23/24 0408     Magnesium 2.3 mg/dL     Blood Gas, Mixed [[id number]]  (Abnormal) Collected: 02/23/24 0338    Specimen: Blood Updated: 02/23/24 0405     pH, mixed 7.24 pH units      PCO2 MIXED 48.2 mmHg      PO2 MIXED 38.7 mmHg      HCO3 MIXED 20.5 mmol/L      CO2 Content 22.0 mmol/L      Base Excess, Arterial -6.7 mmol/L      Comment: Serial Number: [id number]:  [id number]        O2 SATURATION MIXED 63.1 %      Hemodilution No     [facility]      Lance's Test N/A     Modality Cannula     FIO2 40 %      Base Deficit -6.9 mEq/liter     Protime-INR [[id number]]  (Abnormal) Collected: 02/23/24 0347    Specimen: Blood Updated: 02/23/24 0404     Protime 11.9 Seconds      INR 1.10    CBC & Differential [[id number]]  (Abnormal) Collected: 02/23/24 0347    Specimen: Blood Updated: 02/23/24 0350    Narrative:      The following orders were created for panel order CBC & Differential.  Procedure                               Abnormality         Status                     ---------                               -----------         ------                     CBC Auto Differential[[id number]]        Abnormal            Final result                 Please view results for these tests on the individual orders.    CBC Auto Differential [[id number]]  (Abnormal) Collected: 02/23/24 0347    Specimen: Blood Updated: 02/23/24 0350     WBC 9.60 10*3/mm3      RBC 3.43 10*6/mm3      Hemoglobin 10.4 g/dL      Hematocrit 31.8 %      MCV 92.8 fL      MCH 30.4 pg      MCHC 32.7 g/dL      RDW 15.2 %      RDW-SD 51.6 fl      MPV 8.2 fL      Platelets 107 10*3/mm3      Neutrophil % 75.5 %      Lymphocyte % 17.6 %      Monocyte % 5.8 %      Eosinophil % 0.7 %      Basophil % 0.4 %      Neutrophils, Absolute 7.20 10*3/mm3      Lymphocytes, Absolute 1.70 10*3/mm3      Monocytes, Absolute 0.60 10*3/mm3      Eosinophils, Absolute 0.10 10*3/mm3      Basophils, Absolute 0.00 10*3/mm3      nRBC 0.0 /100 WBC     POC Glucose Once [[id number]]  (Normal) Collected: 02/23/24 0338    Specimen: Blood Updated: 02/23/24 0342     Glucose 96 mg/dL      Comment: Serial Number: [id number]:  [id number]       Blood Gas, Arterial - [[id number]]  (Abnormal) Collected: 02/22/24 2342    Specimen: Arterial Blood Updated: 02/22/24 2346     Site Arterial Line     Lance's Test N/A     pH, Arterial 7.312 pH units      pCO2, Arterial 44.4 mm Hg      pO2, Arterial 69.3 mm Hg      HCO3, Arterial 22.5 mmol/L      Base Excess,  Arterial -3.6 mmol/L      Comment: Serial Number: [id number]:  [id number]        O2 Saturation, Arterial 91.8 %      CO2 Content 23.8 mmol/L      Barometric Pressure for Blood Gas --     Comment: N/A        Modality Cannula     FIO2 36 %      Hemodilution No    POC Glucose Once [[id number]]  (Normal) Collected: 02/22/24 2342    Specimen: Arterial Blood Updated: 02/22/24 2346     Glucose 88 mg/dL      Comment: Serial Number: [id number]:  [id number]       POC Creatinine [[id number]]  (Abnormal) Collected: 02/22/24 2232    Specimen: Arterial Blood Updated: 02/22/24 2236     Creatinine 1.63 mg/dL      Comment: Serial Number: [id number]:  [id number]        eGFR 46.2 mL/min/1.73     Blood Gas, Arterial - [[id number]]  (Abnormal) Collected: 02/22/24 2232    Specimen: Arterial Blood Updated: 02/22/24 2236     Site Arterial Line     Lance's Test N/A     pH, Arterial 7.315 pH units      pCO2, Arterial 46.3 mm Hg      pO2, Arterial 89.7 mm Hg      HCO3, Arterial 23.6 mmol/L      Base Excess, Arterial -2.6 mmol/L      Comment: Serial Number: [id number]:  [id number]        O2 Saturation, Arterial 96.0 %      Barometric Pressure for Blood Gas --     Comment: N/A        Modality Adult Vent     FIO2 40 %      Ventilator Mode CPAP/PS     PEEP 5     PSV 10 cmH2O      Hemodilution No    POCT Electrolytes +HGB +HCT [[id number]]  (Abnormal) Collected: 02/22/24 2232    Specimen: Arterial Blood Updated: 02/22/24 2236     Sodium 145 mmol/L      POC Potassium 4.1 mmol/L      Ionized Calcium 1.18 mmol/L      Comment: Serial Number: [id number]:  [id number]        Glucose 85 mg/dL      Hematocrit 28 %      Hemoglobin 9.5 g/dL     POC Glucose Once [[id number]]  (Normal) Collected: 02/22/24 2232    Specimen: Arterial Blood Updated: 02/22/24 2236     Glucose 85 mg/dL      Comment: Serial Number: [id number]:  [id number]       Potassium [[id number]]  (Normal) Collected: 02/22/24 2126    Specimen: Blood Updated: 02/22/24 2146     Potassium 4.4 mmol/L     POC  Creatinine [[id number]]  (Abnormal) Collected: 02/22/24 2129    Specimen: Arterial Blood Updated: 02/22/24 2133     Creatinine 1.58 mg/dL      Comment: Serial Number: [id number]:  [id number]        eGFR 47.9 mL/min/1.73     Blood Gas, Arterial - [[id number]]  (Abnormal) Collected: 02/22/24 2129    Specimen: Arterial Blood Updated: 02/22/24 2133     Site Arterial Line     Lance's Test N/A     pH, Arterial 7.269 pH units      pCO2, Arterial 44.7 mm Hg      pO2, Arterial 85.1 mm Hg      HCO3, Arterial 20.5 mmol/L      Base Excess, Arterial -6.2 mmol/L      Comment: Serial Number: [id number]:  [id number]        O2 Saturation, Arterial 94.8 %      Barometric Pressure for Blood Gas --     Comment: N/A        Modality Adult Vent     FIO2 40 %      Ventilator Mode CPAP/PS     PEEP 5     PSV 10 cmH2O      Hemodilution No    POCT Electrolytes +HGB +HCT [[id number]]  (Abnormal) Collected: 02/22/24 2129    Specimen: Arterial Blood Updated: 02/22/24 2133     Sodium 143 mmol/L      POC Potassium 4.1 mmol/L      Ionized Calcium 1.22 mmol/L      Comment: Serial Number: [id number]:  [id number]        Glucose 85 mg/dL      Hematocrit 28 %      Hemoglobin 9.6 g/dL     POC Glucose Once [[id number]]  (Normal) Collected: 02/22/24 2129    Specimen: Arterial Blood Updated: 02/22/24 2133     Glucose 85 mg/dL      Comment: Serial Number: [id number]:  [id number]       POC Glucose Once [[id number]]  (Normal) Collected: 02/22/24 2102    Specimen: Blood Updated: 02/22/24 2104     Glucose 84 mg/dL      Comment: Serial Number: [id number]:  [id number]       POC Chem 8, arterial (ISTAT) [[id number]]  (Abnormal) Collected: 02/22/24 1135    Specimen: Arterial Blood Updated: 02/22/24 1940     Ionized Calcium 1.33 mmol/L      pH, Arterial 7.390 pH units      pCO2, Arterial 42.2 mm Hg      pO2, Arterial 257.0 mm Hg      HCO3, Arterial 25.3 mmol/L      Base Excess, Arterial 0.0 mmol/L      Base Deficit --     O2 Saturation, Arterial 100.0 %      Glucose 92  mg/dL      Comment: Serial Number: [id number]:  [id number]        Sodium 138 mmol/L      POC Potassium 4.9 mmol/L      Hematocrit 26 %      Hemoglobin 8.8 g/dL      CO2 Content 27 mmol/L     POC Chem 8, arterial (ISTAT) [[id number]]  (Abnormal) Collected: 02/22/24 1110    Specimen: Arterial Blood Updated: 02/22/24 1940     Ionized Calcium 1.05 mmol/L      pH, Arterial 7.380 pH units      pCO2, Arterial 40.4 mm Hg      pO2, Arterial 357.0 mm Hg      HCO3, Arterial 23.7 mmol/L      Base Excess, Arterial <0.0 mmol/L      Base Deficit --     O2 Saturation, Arterial 100.0 %      Glucose 98 mg/dL      Comment: Serial Number: [id number]:  [id number]        Sodium 137 mmol/L      POC Potassium 5.3 mmol/L      Hematocrit 26 %      Hemoglobin 8.8 g/dL      CO2 Content 25 mmol/L     POC Chem Panel [[id number]]  (Abnormal) Collected: 02/22/24 1049    Specimen: Venous Blood Updated: 02/22/24 1939     Glucose 92 mg/dL      Comment: Serial Number: [id number]:  [id number]        Sodium 138 mmol/L      POC Potassium 4.6 mmol/L      Ionized Calcium 1.07 mmol/L      Hematocrit 25 %      Hemoglobin 8.5 g/dL      pH, Venous 7.300 pH Units      pCO2, Venous 46.9 mm Hg      CO2 CONTENT  --     HCO3, Venous 23.1 mmol/L      Base Excess, Venous --     Base Deficit --     O2 Saturation, Venous 25.0 %      pO2, Venous 42.0 mm Hg     POC Chem 8, arterial (ISTAT) [[id number]]  (Abnormal) Collected: 02/22/24 1041    Specimen: Arterial Blood Updated: 02/22/24 1939     Ionized Calcium 1.02 mmol/L      pH, Arterial 7.310 pH units      pCO2, Arterial 44.5 mm Hg      pO2, Arterial 377.0 mm Hg      HCO3, Arterial 22.6 mmol/L      Base Excess, Arterial <0.0 mmol/L      Base Deficit --     O2 Saturation, Arterial 100.0 %      Glucose 94 mg/dL      Comment: Serial Number: [id number]:  [id number]        Sodium 137 mmol/L      POC Potassium 4.6 mmol/L      Hematocrit 25 %      Hemoglobin 8.5 g/dL      CO2 Content 24 mmol/L     POC Chem 8, arterial (ISTAT)  [[id number]]  (Abnormal) Collected: 02/22/24 0923    Specimen: Arterial Blood Updated: 02/22/24 1939     Ionized Calcium 1.26 mmol/L      pH, Arterial 7.330 pH units      pCO2, Arterial 39.9 mm Hg      pO2, Arterial 486.0 mm Hg      HCO3, Arterial 21.3 mmol/L      Base Excess, Arterial <0.0 mmol/L      Base Deficit --     O2 Saturation, Arterial 100.0 %      Glucose 100 mg/dL      Comment: Serial Number: [id number]:  [id number]        Sodium 139 mmol/L      POC Potassium 4.2 mmol/L      Hematocrit 35 %      Hemoglobin 11.9 g/dL      CO2 Content 22 mmol/L     POC Glucose Once [[id number]]  (Normal) Collected: 02/22/24 1826    Specimen: Blood Updated: 02/22/24 1828     Glucose 85 mg/dL      Comment: Serial Number: [id number]:  [id number]       Potassium [[id number]]  (Normal) Collected: 02/22/24 1600    Specimen: Blood Updated: 02/22/24 1630     Potassium 3.9 mmol/L     POC Glucose Once [[id number]]  (Normal) Collected: 02/22/24 1602    Specimen: Blood Updated: 02/22/24 1603     Glucose 87 mg/dL      Comment: Serial Number: [id number]:  [id number]       Potassium [[id number]]  (Normal) Collected: 02/22/24 1257    Specimen: Blood Updated: 02/22/24 1542     Potassium 4.4 mmol/L      Comment: Slight hemolysis detected by analyzer. Result may be falsely elevated.       Blood Gas, Mixed [[id number]]  (Abnormal) Collected: 02/22/24 1458    Specimen: Blood Updated: 02/22/24 1506     pH, mixed 7.37 pH units      PCO2 MIXED 37.1 mmHg      PO2 MIXED 38.6 mmHg      HCO3 MIXED 21.3 mmol/L      CO2 Content 22.5 mmol/L      Base Excess, Arterial -3.6 mmol/L      Comment: Serial Number: [id number]:  [id number]        O2 SATURATION MIXED 71.5 %      Hemodilution No     Site Doe Lucas's Test N/A     Modality Adult Vent     FIO2 40 %      Set Mech Resp Rate 12     PEEP 5     Base Deficit -4.0 mEq/liter      Ventilator Mode AC    Blood Gas, Arterial - [[id number]]  (Abnormal) Collected: 02/22/24 1453    Specimen: Arterial  Blood Updated: 02/22/24 1456     Site Arterial Line     Lance's Test N/A     pH, Arterial 7.378 pH units      pCO2, Arterial 37.4 mm Hg      pO2, Arterial 89.2 mm Hg      HCO3, Arterial 22.0 mmol/L      Base Excess, Arterial -2.8 mmol/L      Comment: Serial Number: [id number]:  [id number]        O2 Saturation, Arterial 96.7 %      Barometric Pressure for Blood Gas --     Comment: N/A        Modality Adult Vent     FIO2 40 %      Ventilator Mode AC     Set Tidal Volume 650     PEEP 5     Hemodilution No     Respiratory Rate 12    POCT Electrolytes +HGB +HCT [[id number]]  (Abnormal) Collected: 02/22/24 1453    Specimen: Arterial Blood Updated: 02/22/24 1456     Sodium 141 mmol/L      POC Potassium 3.9 mmol/L      Ionized Calcium 1.17 mmol/L      Comment: Serial Number: [id number]:  [id number]        Glucose 77 mg/dL      Hematocrit 28 %      Hemoglobin 9.7 g/dL     POC Glucose Once [[id number]]  (Normal) Collected: 02/22/24 1453    Specimen: Arterial Blood Updated: 02/22/24 1456     Glucose 77 mg/dL      Comment: Serial Number: [id number]:  [id number]       Blood Gas, Arterial - [[id number]]  (Abnormal) Collected: 02/22/24 1415    Specimen: Arterial Blood Updated: 02/22/24 1418     Site Arterial Line     Lance's Test N/A     pH, Arterial 7.374 pH units      pCO2, Arterial 37.8 mm Hg      pO2, Arterial 100.7 mm Hg      HCO3, Arterial 22.0 mmol/L      Base Excess, Arterial -2.9 mmol/L      Comment: Serial Number: [id number]:  [id number]        O2 Saturation, Arterial 97.7 %      Barometric Pressure for Blood Gas --     Comment: N/A        Modality Adult Vent     FIO2 50 %      Ventilator Mode AC     Set Tidal Volume 650     PEEP 8     Hemodilution No     Respiratory Rate 12    POCT Electrolytes +HGB +HCT [[id number]]  (Abnormal) Collected: 02/22/24 1415    Specimen: Arterial Blood Updated: 02/22/24 1418     Sodium 141 mmol/L      POC Potassium 4.1 mmol/L      Ionized Calcium 1.18 mmol/L      Comment: Serial Number:  [id number]:  [id number]        Glucose 83 mg/dL      Hematocrit 30 %      Hemoglobin 10.2 g/dL     POC Glucose Once [[id number]]  (Normal) Collected: 02/22/24 1415    Specimen: Arterial Blood Updated: 02/22/24 1418     Glucose 83 mg/dL      Comment: Serial Number: [id number]:  [id number]       Renal Function Panel [[id number]]  (Abnormal) Collected: 02/22/24 1257    Specimen: Blood Updated: 02/22/24 1350     Glucose 94 mg/dL      BUN 30 mg/dL      Creatinine 1.66 mg/dL      Sodium 142 mmol/L      Potassium 4.4 mmol/L      Comment: Slight hemolysis detected by analyzer. Result may be falsely elevated.        Chloride 106 mmol/L      CO2 24.0 mmol/L      Calcium 9.0 mg/dL      Albumin 3.7 g/dL      Phosphorus 2.3 mg/dL      Anion Gap 12.0 mmol/L      BUN/Creatinine Ratio 18.1     eGFR 45.2 mL/min/1.73     Narrative:      GFR Normal >60  Chronic Kidney Disease <60  Kidney Failure <15      Calcium, Ionized [[id number]]  (Normal) Collected: 02/22/24 1257    Specimen: Blood Updated: 02/22/24 1347     Ionized Calcium 1.24 mmol/L     Blood Gas, Arterial - [[id number]]  (Abnormal) Collected: 02/22/24 1319    Specimen: Arterial Blood Updated: 02/22/24 1323     Site Arterial Line     Lance's Test N/A     pH, Arterial 7.352 pH units      pCO2, Arterial 40.0 mm Hg      pO2, Arterial 98.5 mm Hg      HCO3, Arterial 22.2 mmol/L      Base Excess, Arterial -3.2 mmol/L      Comment: Serial Number: [id number]:  [id number]        O2 Saturation, Arterial 97.3 %      Barometric Pressure for Blood Gas --     Comment: N/A        Modality Adult Vent     FIO2 70 %      Ventilator Mode AC     Set Tidal Volume 650     PEEP 8     Hemodilution No     Respiratory Rate 12    POCT Electrolytes +HGB +HCT [[id number]]  (Abnormal) Collected: 02/22/24 1319    Specimen: Arterial Blood Updated: 02/22/24 1323     Sodium 140 mmol/L      POC Potassium 4.2 mmol/L      Ionized Calcium 1.19 mmol/L      Comment: Serial Number: [id number]:  [id number]        Glucose 85  mg/dL      Hematocrit 30 %      Hemoglobin 10.3 g/dL     POC Glucose Once [[id number]]  (Normal) Collected: 02/22/24 1319    Specimen: Arterial Blood Updated: 02/22/24 1323     Glucose 85 mg/dL      Comment: Serial Number: [id number]:  [id number]       Protime-INR [[id number]]  (Abnormal) Collected: 02/22/24 1257    Specimen: Blood Updated: 02/22/24 1320     Protime 12.0 Seconds      INR 1.11    aPTT [[id number]]  (Normal) Collected: 02/22/24 1257    Specimen: Blood Updated: 02/22/24 1320     PTT 25.8 seconds     CBC & Differential [[id number]]  (Abnormal) Collected: 02/22/24 1257    Specimen: Blood Updated: 02/22/24 1307    Narrative:      The following orders were created for panel order CBC & Differential.  Procedure                               Abnormality         Status                     ---------                               -----------         ------                     CBC Auto Differential[[id number]]        Abnormal            Final result                 Please view results for these tests on the individual orders.    CBC Auto Differential [[id number]]  (Abnormal) Collected: 02/22/24 1257    Specimen: Blood Updated: 02/22/24 1307     WBC 10.30 10*3/mm3      RBC 3.82 10*6/mm3      Hemoglobin 11.8 g/dL      Hematocrit 34.6 %      MCV 90.7 fL      MCH 30.8 pg      MCHC 33.9 g/dL      RDW 15.2 %      RDW-SD 50.8 fl      MPV 8.3 fL      Platelets 114 10*3/mm3      Neutrophil % 76.4 %      Lymphocyte % 16.1 %      Monocyte % 4.3 %      Eosinophil % 2.9 %      Basophil % 0.3 %      Neutrophils, Absolute 7.90 10*3/mm3      Lymphocytes, Absolute 1.70 10*3/mm3      Monocytes, Absolute 0.40 10*3/mm3      Eosinophils, Absolute 0.30 10*3/mm3      Basophils, Absolute 0.00 10*3/mm3      nRBC 0.0 /100 WBC     POC Activated Clotting Time [[id number]]  (Abnormal) Collected: 02/22/24 1109    Specimen: Arterial Blood Updated: 02/22/24 1144     Activated Clotting Time  515 Seconds      Comment: Serial Number: [id number]:   [id number]       POC Activated Clotting Time [[id number]]  (Abnormal) Collected: 02/22/24 1040    Specimen: Arterial Blood Updated: 02/22/24 1143     Activated Clotting Time  520 Seconds      Comment: Serial Number: [id number]:  [id number]       POC Activated Clotting Time [[id number]]  (Abnormal) Collected: 02/22/24 1025    Specimen: Blood Updated: 02/22/24 1143     Activated Clotting Time  536 Seconds      Comment: Serial Number: [id number]:  [id number]       POC Activated Clotting Time [[id number]]  (Abnormal) Collected: 02/22/24 0923    Specimen: Arterial Blood Updated: 02/22/24 1143     Activated Clotting Time  152 Seconds      Comment: Serial Number: [id number]:  [id number]       POC Activated Clotting Time [[id number]]  (Normal) Collected: 02/22/24 1135    Specimen: Arterial Blood Updated: 02/22/24 1143     Activated Clotting Time  136 Seconds      Comment: Serial Number: [id number]:  [id number]       POC Glucose Once [[id number]]  (Normal) Collected: 02/22/24 0740    Specimen: Blood Updated: 02/22/24 0743     Glucose 96 mg/dL      Comment: Serial Number: [id number]:  [id number]               Imaging Results (Last 24 Hours)       Procedure Component Value Units Date/Time    XR Chest 1 View [[id number]] Resulted: 02/23/24 0538     Updated: 02/23/24 0539    XR Chest 1 View [[id number]] Collected: 02/22/24 1406     Updated: 02/22/24 1410    Narrative:      XR CHEST 1 VW    Date of Exam: 2/22/2024 1:31 PM EST    Indication: Post-Op Check Line & Tube Placement    Comparison: 2/20/2024    Findings:  Sternotomy wires overlie the midline. There is an ET tube present with the tip 4.7 cm from the rich. There is a left IJ introducer sheath and Anna-Fatmata catheter noted. The tip is not well distinguished due to overlying leads, but appears to be in the   proximal right main pulmonary artery. Esophagogastric tube courses through midline below the diaphragm, tip not seen. Left basilar chest tube is noted. No pneumothorax is  seen. Linear opacities in the left suprahilar and basilar region suggestive   atelectasis. No other focal pulmonary opacities. No pneumothorax identified. There is some widening of the upper mediastinum which is likely postsurgical. Heart size otherwise appears unchanged.      Impression:      Impression:    1. Postsurgical changes from median sternotomy. Tubes and lines as above.  2. Left suprahilar and basilar atelectasis.      Electronically Signed: Manfred Eric MD    2/22/2024 2:08 PM EST    Workstation ID: [id number]        LAB RESULTS (LAST 7 DAYS)    CBC  Results from last 7 days   Lab Units 02/23/24  0347 02/22/24  2232 02/22/24  2129 02/22/24  1453 02/22/24  1415 02/22/24  1319 02/22/24  1257 02/22/24  0923 02/22/24  0457 02/21/24  1150 02/21/24  0421 02/20/24  0459   WBC 10*3/mm3 9.60  --   --   --   --   --  10.30  --  9.10 9.80 9.10 7.20   RBC 10*6/mm3 3.43*  --   --   --   --   --  3.82*  --  4.05* 4.60 4.19 4.34   HEMOGLOBIN g/dL 10.4*  --   --   --   --   --  11.8*  --  12.6* 14.1 13.1 13.5   HEMOGLOBIN, POC g/dL  --  9.5* 9.6* 9.7* 10.2* 10.3*  --    < >  --   --   --   --    HEMATOCRIT % 31.8*  --   --   --   --   --  34.6*  --  37.4* 41.9 38.9 40.1   HEMATOCRIT POC %  --  28* 28* 28* 30* 30*  --    < >  --   --   --   --    MCV fL 92.8  --   --   --   --   --  90.7  --  92.3 91.2 93.0 92.3   PLATELETS 10*3/mm3 107*  --   --   --   --   --  114*  --  177 173 170 169    < > = values in this interval not displayed.       BMP  Results from last 7 days   Lab Units 02/23/24  0347 02/22/24 2232 02/22/24 2129 02/22/24 2126 02/22/24  1600 02/22/24  1257 02/22/24  0457 02/21/24  1150 02/21/24  0421 02/20/24  0650 02/20/24  0459   SODIUM mmol/L 148*  --   --   --   --  142 141 139 138  --  138   POTASSIUM mmol/L 4.4  --   --  4.4 3.9 4.4  4.4 4.3 4.2 4.2  --  4.6   CHLORIDE mmol/L 111*  --   --   --   --  106 106 106 104  --  101   CO2 mmol/L 22.0  --   --   --   --  24.0 24.0 24.0 24.0  --  26.0   BUN  mg/dL 25*  --   --   --   --  30* 33* 31* 34*  --  35*   CREATININE mg/dL 1.52* 1.63* 1.58*  --   --  1.66* 1.59* 1.46* 1.69*  --  1.76*   GLUCOSE mg/dL 114*  --   --   --   --  94 99 130* 104*  --  99   MAGNESIUM mg/dL 2.3  --   --   --   --   --   --   --  1.9 2.1  --    PHOSPHORUS mg/dL 4.8*  --   --   --   --  2.3* 2.9  --  3.1  --   --        CMP   Results from last 7 days   Lab Units 02/23/24  0347 02/22/24  2232 02/22/24 2129 02/22/24  2126 02/22/24  1600 02/22/24  1257 02/22/24  0457 02/21/24  1150 02/21/24  0421 02/20/24  0459   SODIUM mmol/L 148*  --   --   --   --  142 141 139 138 138   POTASSIUM mmol/L 4.4  --   --  4.4 3.9 4.4  4.4 4.3 4.2 4.2 4.6   CHLORIDE mmol/L 111*  --   --   --   --  106 106 106 104 101   CO2 mmol/L 22.0  --   --   --   --  24.0 24.0 24.0 24.0 26.0   BUN mg/dL 25*  --   --   --   --  30* 33* 31* 34* 35*   CREATININE mg/dL 1.52* 1.63* 1.58*  --   --  1.66* 1.59* 1.46* 1.69* 1.76*   GLUCOSE mg/dL 114*  --   --   --   --  94 99 130* 104* 99   ALBUMIN g/dL 4.3  --   --   --   --  3.7 4.1 4.4 4.4  --    BILIRUBIN mg/dL  --   --   --   --   --   --   --  0.9 0.7  --    ALK PHOS U/L  --   --   --   --   --   --   --  92 83  --    AST (SGOT) U/L  --   --   --   --   --   --   --  27 25  --    ALT (SGPT) U/L  --   --   --   --   --   --   --  21 21  --          BNP        TROPONIN  Results from last 7 days   Lab Units 02/21/24  0421   HSTROP T ng/L 149*       CoAg  Results from last 7 days   Lab Units 02/23/24  0347 02/22/24  1257 02/22/24  0457 02/21/24  2305 02/21/24  1703 02/21/24  1150 02/21/24  0421 02/20/24  0509   INR  1.10 1.11*  --   --   --  1.08 1.06 1.02   APTT seconds  --  25.8 82.8* 67.5 62.5 43.8* 26.6* 25.3*       Creatinine Clearance  Estimated Creatinine Clearance: 52 mL/min (A) (by C-G formula based on SCr of 1.52 mg/dL (H)).    ABG  Results from last 7 days   Lab Units 02/23/24  0338 02/22/24  2342 02/22/24  2232 02/22/24  2129 02/22/24  1458 02/22/24  1453  02/22/24  1415 02/22/24  1319 02/22/24  1135   PH, ARTERIAL pH units  --  7.312* 7.315* 7.269*  --  7.378 7.374 7.352 7.390   PCO2, ARTERIAL mm Hg  --  44.4 46.3 44.7  --  37.4 37.8 40.0 42.2   PO2 ART mm Hg  --  69.3* 89.7 85.1  --  89.2 100.7 98.5 257.0*   O2 SATURATION ART %  --  91.8* 96.0 94.8  --  96.7 97.7 97.3 100.0*   BASE EXCESS ART mmol/L -6.7* -3.6* -2.6* -6.2* -3.6* -2.8* -2.9* -3.2* 0.0       Radiology  XR Chest 1 View    Result Date: 2/22/2024  Impression: 1. Postsurgical changes from median sternotomy. Tubes and lines as above. 2. Left suprahilar and basilar atelectasis. Electronically Signed: Manfred Eric MD  2/22/2024 2:08 PM EST  Workstation ID: [id number]         EKG                    I personally viewed and interpreted the patient's EKG/Telemetry data: Sinus rhythm.  Anterolateral ischemic changes    ECHOCARDIOGRAM:    Results for orders placed during the hospital encounter of 02/20/24    Adult Transthoracic Echo Complete W/ Cont if Necessary Per Protocol    Interpretation Summary    Left ventricular ejection fraction appears to be 56 - 60%.    Indication  Chest pain    Technically satisfactory study.  Mitral valve is structurally normal.  Minimal mitral regurgitation.  Tricuspid valve is structurally normal.  Aortic valve is structurally normal.  Pulmonic valve could not be well visualized.  Left atrium is normal in size.  Right atrium is normal in size.  Left ventricle is normal in size and contractility with ejection fraction of 60%.  Grade 1 diastolic dysfunction (MV E/A0.6)  Right ventricle is normal in size and contractility with TAPSE of 1.61..  Atrial septum is intact.  Aorta is normal.  No pericardial effusion or intracardiac thrombus is seen.    Impression  Structurally and functionally normal cardiac valves except for minimal mitral regurgitation.  Grade 1 diastolic dysfunction is present..  Left ventricular size and contractility is normal with ejection fraction of  60%.          STRESS TEST        Cardiolite (Tc-99m sestamibi) stress test    CARDIAC CATHETERIZATION  No results found for this or any previous visit.                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 Garrett  CABG x 3 (LIMA to LAD SVG to diagonal and SVG to marginal branch)    - Preoperative unstable angina  Possible non-STEMI.  Troponin level 59.    - Severe and critical coronary artery disease.    Cardiac catheterization 2/21/2024  Left ventricular angiogram was not performed due to pre-existing renal dysfunction.  Severe triple-vessel coronary artery disease.  Left main coronary artery has 40% disease  Left anterior descending artery is a large and long vessel that has ostial 99% disease.  Mid LAD has 80% disease.  Diagonal branches diffuse 99% disease proximally.  Circumflex coronary artery is a large and dominant vessel that provided multiple branches.  Circumflex coronary artery has 60 to 70% disease proximal to the first marginal branch.  First marginal branch has ostial 95% disease.  Second marginal branch is a relatively small caliber vessel that has 99% disease in the proximal segment that bifurcated into 2 branches.  Each branch has 90 to 95% disease.  There were 3 other marginal branches.  PDA has 99% disease in the proximal segment.  Second marginal branch has 60 to 70% ostial disease.  Right coronary artery is totally occluded in the proximal segment and distal vessel is filling through collaterals from left circulation.  Small caliber vessel.  (Chronic since 2019).  Please see the report from [facility].    Echocardiogram.-2/20/2024  Structurally and functionally normal cardiac valves except for minimal mitral  regurgitation.  Grade 1 diastolic dysfunction is present..  Left ventricular size and contractility is normal with ejection fraction of 60%.     Patient had cardiac catheterization at [facility] in 2019  70% diffuse and lengthy RCA disease 50% circumflex 95% first marginal branch disease 50% mid LAD and diagonal branch disease.  The information was obtained from a drawing that patient's wife has.     History of subtotal occlusion of 1st marginal branch at cardiac catheterization 05/18/2018      cardiac catheterization 05/18/2018 revealed normal left ventricular function 50% mid LAD, 50% diffuse diagonal branch disease 50% proximal circumflex, 1st marginal branch is subtotally occluded with 99% disease that bifurcated into 2 branches.  RCA is a   nondominant vessel that has diffuse disease.  Distal vessel filling from left circulation.     Echocardiogram 04/18/2018 is normal     Stress Cardiolite test revealed severe inferior posterior and apical ischemia 05/15/2018.  Patient had reproducible chest discomfort shortness of breath and ST abnormalities.     -  hypertension dyslipidemia GERD osteoarthritis renal dysfunction.  BUN 15 creatinine 1.4     -CML.  Status post bone marrow transplantation  2016     - status post vasectomy and sinus surgery      -former smoker     - strong family history  for coronary artery disease.  Brother had CABG and father had myocardial infarction     -allergic to penicillin sulfa and doxycycline  ============  Plan  ==========  Status post CABG 2/22/2024-Dr. Garrett  CABG x 3 (LIMA to LAD SVG to diagonal and SVG to marginal branch)    Preoperative unstable angina/non-STEMI  Severe and critical coronary artery disease enthesis cardiac cath 2/21/2024    Chronic renal insufficiency.-Dr. Lawson.  31/1.95-2/15/2024.  35/1.76-2/20/2024  34/1.69-2/21/2024  33/1.59-2/22/2024  25/1.52-2/23/2024  Postoperative renal function will be observed closely.    Respiratory  status-extubated.  Chest tubes in place.    Rhythm-sinus.    Dyslipidemia-on Crestor.    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      Echocardiogram.-2/20/2024  Structurally and functionally normal cardiac valves except for minimal mitral regurgitation.  Grade 1 diastolic dysfunction is present..  Left ventricular size and contractility is normal with ejection fraction of 60%.    Medications were reviewed and updated.  Patient is on aspirin Coreg 6.25 mg twice daily subcu Lovenox (DVT prophylaxis) folic acid pantoprazole Crestor.    Further plan will depend on patient's progress.     Reviewed and updated-2/23/2024.  ]]]]]]]]]]]]]]]]]              Michelle Hernández MD  02/23/24  06:54 EST

## 2024-02-24 ENCOUNTER — APPOINTMENT (OUTPATIENT)
Dept: GENERAL RADIOLOGY | Facility: HOSPITAL | Age: 69
DRG: 233 | End: 2024-02-24
Payer: MEDICARE

## 2024-02-24 LAB
ALBUMIN SERPL-MCNC: 3.7 G/DL (ref 3.5–5.2)
ANION GAP SERPL CALCULATED.3IONS-SCNC: 7 MMOL/L (ref 5–15)
BUN SERPL-MCNC: 25 MG/DL (ref 8–23)
BUN/CREAT SERPL: 16.8 (ref 7–25)
CALCIUM SPEC-SCNC: 8.7 MG/DL (ref 8.6–10.5)
CHLORIDE SERPL-SCNC: 112 MMOL/L (ref 98–107)
CO2 SERPL-SCNC: 27 MMOL/L (ref 22–29)
CREAT SERPL-MCNC: 1.49 MG/DL (ref 0.76–1.27)
DEPRECATED RDW RBC AUTO: 52.1 FL (ref 37–54)
EGFRCR SERPLBLD CKD-EPI 2021: 51.4 ML/MIN/1.73
ERYTHROCYTE [DISTWIDTH] IN BLOOD BY AUTOMATED COUNT: 15.3 % (ref 12.3–15.4)
GLUCOSE BLDC GLUCOMTR-MCNC: 107 MG/DL (ref 70–105)
GLUCOSE BLDC GLUCOMTR-MCNC: 117 MG/DL (ref 70–105)
GLUCOSE BLDC GLUCOMTR-MCNC: 117 MG/DL (ref 70–105)
GLUCOSE BLDC GLUCOMTR-MCNC: 121 MG/DL (ref 70–105)
GLUCOSE BLDC GLUCOMTR-MCNC: 123 MG/DL (ref 70–105)
GLUCOSE BLDC GLUCOMTR-MCNC: 125 MG/DL (ref 70–105)
GLUCOSE BLDC GLUCOMTR-MCNC: 131 MG/DL (ref 70–105)
GLUCOSE BLDC GLUCOMTR-MCNC: 135 MG/DL (ref 70–105)
GLUCOSE BLDC GLUCOMTR-MCNC: 152 MG/DL (ref 70–105)
GLUCOSE BLDC GLUCOMTR-MCNC: 156 MG/DL (ref 70–105)
GLUCOSE SERPL-MCNC: 118 MG/DL (ref 65–99)
HCT VFR BLD AUTO: 29.9 % (ref 37.5–51)
HGB BLD-MCNC: 10 G/DL (ref 13–17.7)
MCH RBC QN AUTO: 30.8 PG (ref 26.6–33)
MCHC RBC AUTO-ENTMCNC: 33.5 G/DL (ref 31.5–35.7)
MCV RBC AUTO: 92 FL (ref 79–97)
PHOSPHATE SERPL-MCNC: 2.5 MG/DL (ref 2.5–4.5)
PLATELET # BLD AUTO: 104 10*3/MM3 (ref 140–450)
PMV BLD AUTO: 8.2 FL (ref 6–12)
POTASSIUM SERPL-SCNC: 4.2 MMOL/L (ref 3.5–5.2)
RBC # BLD AUTO: 3.25 10*6/MM3 (ref 4.14–5.8)
SODIUM SERPL-SCNC: 146 MMOL/L (ref 136–145)
WBC NRBC COR # BLD AUTO: 10.2 10*3/MM3 (ref 3.4–10.8)

## 2024-02-24 PROCEDURE — 25010000002 MORPHINE PER 10 MG: Performed by: NURSE PRACTITIONER

## 2024-02-24 PROCEDURE — 85027 COMPLETE CBC AUTOMATED: CPT | Performed by: NURSE PRACTITIONER

## 2024-02-24 PROCEDURE — 80069 RENAL FUNCTION PANEL: CPT | Performed by: INTERNAL MEDICINE

## 2024-02-24 PROCEDURE — 82948 REAGENT STRIP/BLOOD GLUCOSE: CPT

## 2024-02-24 PROCEDURE — 25010000002 ENOXAPARIN PER 10 MG: Performed by: NURSE PRACTITIONER

## 2024-02-24 PROCEDURE — 71045 X-RAY EXAM CHEST 1 VIEW: CPT

## 2024-02-24 PROCEDURE — 82948 REAGENT STRIP/BLOOD GLUCOSE: CPT | Performed by: NURSE PRACTITIONER

## 2024-02-24 PROCEDURE — 63710000001 INSULIN LISPRO (HUMAN) PER 5 UNITS: Performed by: NURSE PRACTITIONER

## 2024-02-24 PROCEDURE — 93010 ELECTROCARDIOGRAM REPORT: CPT | Performed by: INTERNAL MEDICINE

## 2024-02-24 PROCEDURE — 25010000002 CEFAZOLIN PER 500 MG: Performed by: NURSE PRACTITIONER

## 2024-02-24 PROCEDURE — 99232 SBSQ HOSP IP/OBS MODERATE 35: CPT | Performed by: INTERNAL MEDICINE

## 2024-02-24 PROCEDURE — 93005 ELECTROCARDIOGRAM TRACING: CPT | Performed by: NURSE PRACTITIONER

## 2024-02-24 RX ORDER — LISINOPRIL 20 MG/1
40 TABLET ORAL
Status: DISCONTINUED | OUTPATIENT
Start: 2024-02-24 | End: 2024-02-26 | Stop reason: HOSPADM

## 2024-02-24 RX ORDER — NICOTINE POLACRILEX 4 MG
15 LOZENGE BUCCAL
Status: DISCONTINUED | OUTPATIENT
Start: 2024-02-24 | End: 2024-02-26 | Stop reason: HOSPADM

## 2024-02-24 RX ORDER — IBUPROFEN 600 MG/1
1 TABLET ORAL
Status: DISCONTINUED | OUTPATIENT
Start: 2024-02-24 | End: 2024-02-26 | Stop reason: HOSPADM

## 2024-02-24 RX ORDER — INSULIN LISPRO 100 [IU]/ML
2-7 INJECTION, SOLUTION INTRAVENOUS; SUBCUTANEOUS
Status: DISCONTINUED | OUTPATIENT
Start: 2024-02-24 | End: 2024-02-26 | Stop reason: HOSPADM

## 2024-02-24 RX ORDER — DEXTROSE MONOHYDRATE 25 G/50ML
25 INJECTION, SOLUTION INTRAVENOUS
Status: DISCONTINUED | OUTPATIENT
Start: 2024-02-24 | End: 2024-02-26 | Stop reason: HOSPADM

## 2024-02-24 RX ADMIN — ROSUVASTATIN 40 MG: 10 TABLET, FILM COATED ORAL at 22:22

## 2024-02-24 RX ADMIN — DOCUSATE SODIUM 50MG AND SENNOSIDES 8.6MG 2 TABLET: 8.6; 5 TABLET, FILM COATED ORAL at 08:35

## 2024-02-24 RX ADMIN — MUPIROCIN 1 APPLICATION: 20 OINTMENT TOPICAL at 22:22

## 2024-02-24 RX ADMIN — FOLIC ACID 1 MG: 1 TABLET ORAL at 08:36

## 2024-02-24 RX ADMIN — INSULIN LISPRO 2 UNITS: 100 INJECTION, SOLUTION INTRAVENOUS; SUBCUTANEOUS at 17:07

## 2024-02-24 RX ADMIN — LISINOPRIL 40 MG: 20 TABLET ORAL at 12:44

## 2024-02-24 RX ADMIN — CHLORHEXIDINE GLUCONATE, 0.12% ORAL RINSE 15 ML: 1.2 SOLUTION DENTAL at 22:22

## 2024-02-24 RX ADMIN — MORPHINE SULFATE 2 MG: 2 INJECTION, SOLUTION INTRAMUSCULAR; INTRAVENOUS at 06:07

## 2024-02-24 RX ADMIN — INSULIN HUMAN 0.4 UNITS/HR: 1 INJECTION, SOLUTION INTRAVENOUS at 03:00

## 2024-02-24 RX ADMIN — ENOXAPARIN SODIUM 40 MG: 100 INJECTION SUBCUTANEOUS at 17:07

## 2024-02-24 RX ADMIN — CARVEDILOL 25 MG: 25 TABLET, FILM COATED ORAL at 08:35

## 2024-02-24 RX ADMIN — INSULIN HUMAN 0.3 UNITS/HR: 1 INJECTION, SOLUTION INTRAVENOUS at 06:30

## 2024-02-24 RX ADMIN — MUPIROCIN 1 APPLICATION: 20 OINTMENT TOPICAL at 08:36

## 2024-02-24 RX ADMIN — HYDROCODONE BITARTRATE AND ACETAMINOPHEN 1 TABLET: 5; 325 TABLET ORAL at 20:26

## 2024-02-24 RX ADMIN — DOCUSATE SODIUM 50MG AND SENNOSIDES 8.6MG 2 TABLET: 8.6; 5 TABLET, FILM COATED ORAL at 22:22

## 2024-02-24 RX ADMIN — POLYETHYLENE GLYCOL 3350 17 G: 17 POWDER, FOR SOLUTION ORAL at 22:22

## 2024-02-24 RX ADMIN — CHLORHEXIDINE GLUCONATE, 0.12% ORAL RINSE 15 ML: 1.2 SOLUTION DENTAL at 08:37

## 2024-02-24 RX ADMIN — ASPIRIN 81 MG: 81 TABLET, COATED ORAL at 08:37

## 2024-02-24 RX ADMIN — POLYETHYLENE GLYCOL 3350 17 G: 17 POWDER, FOR SOLUTION ORAL at 08:36

## 2024-02-24 RX ADMIN — CARVEDILOL 25 MG: 25 TABLET, FILM COATED ORAL at 17:06

## 2024-02-24 RX ADMIN — SODIUM CHLORIDE 2 G: 900 INJECTION INTRAVENOUS at 01:57

## 2024-02-24 RX ADMIN — PANTOPRAZOLE SODIUM 40 MG: 40 TABLET, DELAYED RELEASE ORAL at 06:07

## 2024-02-24 RX ADMIN — AMLODIPINE BESYLATE 10 MG: 5 TABLET ORAL at 08:35

## 2024-02-24 NOTE — CASE MANAGEMENT/SOCIAL WORK
Continued Stay Note  YARI Laird     Patient Name: Anthony Ayoub  MRN: [id number]  Today's Date: 2/24/2024    Admit Date: 2/20/2024    Plan: Plan for home with spouse. CABG 2/22/224.   Discharge Plan       Row Name 02/24/24 0749       Plan    Plan Plan for home with spouse. CABG 2/22/224.    Plan Comments PT recommending home with assistance.

## 2024-02-24 NOTE — PROGRESS NOTES
[facility] Progress Note      Patient Name: Anthony Ayoub  : 1955  MRN: [id number]  Primary Care Physician:  Yifan Elizabeth MD  Date of admission: 2024    Subjective     Interval History:      Pod#2 cabg  No dyspnea or pain, had chest and mediastinal tubes removed, feels better    Review of Systems:   As noted above    Objective     Vitals:   Temp:  [98.5 °F (36.9 °C)-100.8 °F (38.2 °C)] 99.7 °F (37.6 °C)  Heart Rate:  [76-94] 88  Resp:  [15-24] 21  BP: (116-161)/(50-88) 136/76  Flow (L/min):  [2-4] 2    Intake/Output Summary (Last 24 hours) at 2024 1345  Last data filed at 2024 1120  Gross per 24 hour   Intake 1871.9 ml   Output 2030 ml   Net -158.1 ml       Physical Exam:    General Appearance: NAD  HEENT: oral mucosa normal, nonicteric sclera  Neck: supple, no JVD  Lungs: Few rhonchi  Heart: RRR, normal S1 and S2  Abdomen: soft, nondistended  Extremities: no edema  Neuro: Awake alert and moving all extremities    Scheduled Meds:     amLODIPine, 10 mg, Oral, Q24H  aspirin, 81 mg, Oral, Daily  carvedilol, 25 mg, Oral, BID With Meals  chlorhexidine, 15 mL, Mouth/Throat, Q12H  enoxaparin, 40 mg, Subcutaneous, Daily  folic acid, 1 mg, Oral, Daily  insulin lispro, 2-7 Units, Subcutaneous, 4x Daily AC & at Bedtime  lisinopril, 40 mg, Oral, Q24H  mupirocin, , Each Nare, BID  pantoprazole, 40 mg, Oral, Q AM  polyethylene glycol, 17 g, Oral, BID  rosuvastatin, 40 mg, Oral, Nightly  senna-docusate sodium, 2 tablet, Oral, BID      IV Meds:          Results Reviewed:   I have personally reviewed the results from the time of this admission to 2024 13:45 EST     Results from last 7 days   Lab Units 24  0430 24  0347 02/246 24  1600 24  1257 24  0457 24  1150 24  0421   SODIUM mmol/L 146* 148*  --   --   --   --  142   < > 139 138   POTASSIUM mmol/L 4.2 4.4  --   --  4.4    < > 4.4  4.4   < > 4.2 4.2   CHLORIDE mmol/L 112* 111*  --   --   --   --  106   < > 106 104   CO2 mmol/L 27.0 22.0  --   --   --   --  24.0   < > 24.0 24.0   BUN mg/dL 25* 25*  --   --   --   --  30*   < > 31* 34*   CREATININE mg/dL 1.49* 1.52* 1.63*   < >  --   --  1.66*   < > 1.46* 1.69*   CALCIUM mg/dL 8.7 8.4*  --   --   --   --  9.0   < > 9.1 9.0   BILIRUBIN mg/dL  --   --   --   --   --   --   --   --  0.9 0.7   ALK PHOS U/L  --   --   --   --   --   --   --   --  92 83   ALT (SGPT) U/L  --   --   --   --   --   --   --   --  21 21   AST (SGOT) U/L  --   --   --   --   --   --   --   --  27 25   GLUCOSE mg/dL 118* 114*  --   --   --   --  94   < > 130* 104*    < > = values in this interval not displayed.     Estimated Creatinine Clearance: 53.1 mL/min (A) (by C-G formula based on SCr of 1.49 mg/dL (H)).  Results from last 7 days   Lab Units 02/24/24  0430 02/23/24 0347 02/22/24  1257 02/22/24  0457 02/21/24  0421 02/20/24  0650   MAGNESIUM mg/dL  --  2.3  --   --  1.9 2.1   PHOSPHORUS mg/dL 2.5 4.8* 2.3*   < > 3.1  --     < > = values in this interval not displayed.         Results from last 7 days   Lab Units 02/24/24  0430 02/23/24 0347 02/22/24  2232 02/22/24  2129 02/22/24  1453 02/22/24  1319 02/22/24  1257 02/22/24  0923 02/22/24  0457 02/21/24  1150   WBC 10*3/mm3 10.20 9.60  --   --   --   --  10.30  --  9.10 9.80   HEMOGLOBIN g/dL 10.0* 10.4*  --   --   --   --  11.8*  --  12.6* 14.1   HEMOGLOBIN, POC g/dL  --   --  9.5* 9.6* 9.7*   < >  --    < >  --   --    PLATELETS 10*3/mm3 104* 107*  --   --   --   --  114*  --  177 173    < > = values in this interval not displayed.     Results from last 7 days   Lab Units 02/23/24  0347 02/22/24  1257 02/21/24  1150 02/21/24  0421 02/20/24  0509   INR  1.10 1.11* 1.08 1.06 1.02       Assessment / Plan     ASSESSMENT:    Chronic kidney disease stage IIIa.  Secondary to hypertensive nephrosclerosis.  Renal function stable.  Electrolytes, volume status  okay  Coronary artery disease.  S/p CABG x 3.  Patient stable hemodynamically  Hypertension chronic kidney disease.  Blood pressure was running high this morning.  He was on Cardene drip    PLAN:  Start on carvedilol.  Add amlodipine and lisinopril as needed  Repeat labs in a.m.    Kj Mead MD  02/24/24  13:45 [facility]    [facility]  [phone]

## 2024-02-24 NOTE — PROGRESS NOTES
LOS: 3 days   Admitting Physician- Sohma Garrett, *    Reason For Followup:    Unstable angina  Non-ST elevation myocardial infarction  CAD  CABG  Hypertension    Subjective     Patient is sitting up in the chair.  Patient complain of sternal pain    Objective     Hemodynamics are stable    Review of Systems:   Review of Systems   Constitutional: Negative for chills and fever.   HENT:  Negative for ear discharge and nosebleeds.    Eyes:  Negative for discharge and redness.   Cardiovascular:  Negative for chest pain, orthopnea, palpitations, paroxysmal nocturnal dyspnea and syncope.   Respiratory:  Negative for cough, shortness of breath and wheezing.    Endocrine: Negative for heat intolerance.   Skin:  Negative for rash.   Musculoskeletal:  Negative for arthritis and myalgias.   Gastrointestinal:  Negative for abdominal pain, melena, nausea and vomiting.   Genitourinary:  Negative for dysuria and hematuria.   Neurological:  Negative for dizziness, light-headedness, numbness and tremors.   Psychiatric/Behavioral:  Negative for depression. The patient is not nervous/anxious.          Vital Signs  Vitals:    02/24/24 0835 02/24/24 1000 02/24/24 1200 02/24/24 1244   BP: 154/84 161/88 145/79 136/76   BP Location:       Patient Position:   Lying    Pulse: 92 91 89 88   Resp:  23 21    Temp:   99.7 °F (37.6 °C)    TempSrc:   Oral    SpO2:  96% 94%    Weight:       Height:         Wt Readings from Last 1 Encounters:   02/24/24 77 kg (169 lb 12.1 oz)       Intake/Output Summary (Last 24 hours) at 2/24/2024 1351  Last data filed at 2/24/2024 1120  Gross per 24 hour   Intake 1871.9 ml   Output 2030 ml   Net -158.1 ml     Physical Exam:  Constitutional:       Appearance: Well-developed.   Eyes:      General: No scleral icterus.        Right eye: No discharge.   HENT:      Head: Normocephalic and atraumatic.   Neck:      Thyroid: No thyromegaly.      Lymphadenopathy: No cervical adenopathy.   Pulmonary:      Effort:  Pulmonary effort is normal. No respiratory distress.      Breath sounds: Normal breath sounds. No wheezing. No rales.   Cardiovascular:      Normal rate. Regular rhythm.      No gallop.    Edema:     Peripheral edema absent.   Abdominal:      Tenderness: There is no abdominal tenderness.   Skin:     Findings: No erythema or rash.   Neurological:      Mental Status: Alert and oriented to person, place, and time.         Results Review:   Lab Results (last 24 hours)       Procedure Component Value Units Date/Time    POC Glucose 4x Daily Before Meals & at Bedtime [[id number]]  (Abnormal) Collected: 02/24/24 1242    Specimen: Blood Updated: 02/24/24 1243     Glucose 123 mg/dL      Comment: Serial Number: [id number]:  [id number]       POC Glucose Once [[id number]]  (Abnormal) Collected: 02/24/24 0945    Specimen: Blood Updated: 02/24/24 0946     Glucose 156 mg/dL      Comment: Serial Number: [id number]:  [id number]       POC Glucose Once [[id number]]  (Abnormal) Collected: 02/24/24 0841    Specimen: Blood Updated: 02/24/24 0842     Glucose 135 mg/dL      Comment: Serial Number: [id number]:  [id number]       POC Glucose Once [[id number]]  (Abnormal) Collected: 02/24/24 0739    Specimen: Blood Updated: 02/24/24 0741     Glucose 117 mg/dL      Comment: Serial Number: [id number]:  [id number]       POC Glucose Once [[id number]]  (Abnormal) Collected: 02/24/24 0633    Specimen: Blood Updated: 02/24/24 0634     Glucose 117 mg/dL      Comment: Serial Number: [id number]:  [id number]       Renal Function Panel [[id number]]  (Abnormal) Collected: 02/24/24 0430    Specimen: Blood Updated: 02/24/24 0536     Glucose 118 mg/dL      BUN 25 mg/dL      Creatinine 1.49 mg/dL      Sodium 146 mmol/L      Potassium 4.2 mmol/L      Chloride 112 mmol/L      CO2 27.0 mmol/L      Calcium 8.7 mg/dL      Albumin 3.7 g/dL      Phosphorus 2.5 mg/dL      Anion Gap 7.0 mmol/L      BUN/Creatinine Ratio 16.8     eGFR 51.4  mL/min/1.73     Narrative:      GFR Normal >60  Chronic Kidney Disease <60  Kidney Failure <15      CBC (No Diff) [[id number]]  (Abnormal) Collected: 02/24/24 0430    Specimen: Blood Updated: 02/24/24 0443     WBC 10.20 10*3/mm3      RBC 3.25 10*6/mm3      Hemoglobin 10.0 g/dL      Hematocrit 29.9 %      MCV 92.0 fL      MCH 30.8 pg      MCHC 33.5 g/dL      RDW 15.3 %      RDW-SD 52.1 fl      MPV 8.2 fL      Platelets 104 10*3/mm3     POC Glucose Once [[id number]]  (Abnormal) Collected: 02/24/24 0429    Specimen: Blood Updated: 02/24/24 0433     Glucose 121 mg/dL      Comment: Serial Number: [id number]:  [id number]       POC Glucose Once [[id number]]  (Abnormal) Collected: 02/24/24 0258    Specimen: Blood Updated: 02/24/24 0259     Glucose 107 mg/dL      Comment: Serial Number: [id number]:  [id number]       POC Glucose Once [[id number]]  (Abnormal) Collected: 02/24/24 0133    Specimen: Blood Updated: 02/24/24 0134     Glucose 125 mg/dL      Comment: Serial Number: [id number]:  [id number]       POC Glucose Once [[id number]]  (Abnormal) Collected: 02/24/24 0020    Specimen: Blood Updated: 02/24/24 0023     Glucose 131 mg/dL      Comment: Serial Number: [id number]:  [id number]       POC Glucose Once [[id number]]  (Abnormal) Collected: 02/23/24 2305    Specimen: Blood Updated: 02/23/24 2311     Glucose 135 mg/dL      Comment: Serial Number: [id number]:  [id number]       POC Glucose Once [[id number]]  (Abnormal) Collected: 02/23/24 2156    Specimen: Blood Updated: 02/23/24 2159     Glucose 160 mg/dL      Comment: Serial Number: [id number]:  [id number]       POC Glucose Once [[id number]]  (Abnormal) Collected: 02/23/24 2032    Specimen: Blood Updated: 02/23/24 2033     Glucose 196 mg/dL      Comment: Serial Number: [id number]:  [id number]             Imaging Results (Last 72 Hours)       Procedure Component Value Units Date/Time    XR Chest 1 View [[id number]] Resulted: 02/24/24 1333      Updated: 02/24/24 1334    XR Chest 1 View [[id number]] Collected: 02/24/24 0826     Updated: 02/24/24 0831    Narrative:      XR CHEST 1 VW    Date of Exam: 2/24/2024 3:25 AM EST    Indication: Post-Op Heart Surgery    Comparison: Chest radiograph 2/23/2024.    Findings:  Removal of pulmonary artery catheter with left IJ sheath remaining. Unchanged position of left chest tube. Sternotomy. Cardiomediastinal silhouette is unchanged. Trace bilateral pleural effusions with bibasilar atelectasis, left greater than right,   similar to prior exam. No enlarging pleural effusion or new focal consolidation. No discrete pneumothorax. Osseous structures are unchanged.      Impression:      Impression:  Similar left greater than right trace pleural effusions and bibasilar atelectasis. Left chest tube in place without discrete pneumothorax. Removal of pulmonary catheter with left IJ sheath remaining.      Electronically Signed: Peña Downs MD    2/24/2024 8:29 AM EST    Workstation ID: [id number]    XR Chest 1 View [[id number]] Collected: 02/23/24 0734     Updated: 02/23/24 0740    Narrative:      XR CHEST 1 VW    Date of Exam: 2/23/2024 5:10 AM EST    Indication: Post-Op Heart Surgery    Comparison: 2/22/2024    Findings:    The heart size is stable with widening appearance of the upper mediastinum similar to the prior exam. Median sternotomy wires are again seen. There is a left IJ introducer sheath and [place]-Fatmata catheter with the tip in the proximal right main pulmonary   artery. Endotracheal and enteric tubes have been removed. A left basilar chest tube is stable. Lungs are slightly less inflated than on the previous exam and bibasilar atelectatic changes are noted. No pneumothorax is visualized.       Impression:      Impression:    1. Postsurgical changes of median sternotomy with wires and tubes as described.  2. Cardiac silhouette is enlarged with a widened appearance of the upper mediastinum which is similar to the  previous exam. This may be at least in part due to decreased lung volumes. However, clinical correlation is recommended.  3. Patchy bibasilar atelectatic changes.      Electronically Signed: Shaina Sanders MD    2/23/2024 7:38 AM EST    Workstation ID: [id number]    XR Chest 1 View [[id number]] Collected: 02/22/24 1406     Updated: 02/22/24 1410    Narrative:      XR CHEST 1 VW    Date of Exam: 2/22/2024 1:31 PM EST    Indication: Post-Op Check Line & Tube Placement    Comparison: 2/20/2024    Findings:  Sternotomy wires overlie the midline. There is an ET tube present with the tip 4.7 cm from the rich. There is a left IJ introducer sheath and [place]-Fatmata catheter noted. The tip is not well distinguished due to overlying leads, but appears to be in the   proximal right main pulmonary artery. Esophagogastric tube courses through midline below the diaphragm, tip not seen. Left basilar chest tube is noted. No pneumothorax is seen. Linear opacities in the left suprahilar and basilar region suggestive   atelectasis. No other focal pulmonary opacities. No pneumothorax identified. There is some widening of the upper mediastinum which is likely postsurgical. Heart size otherwise appears unchanged.      Impression:      Impression:    1. Postsurgical changes from median sternotomy. Tubes and lines as above.  2. Left suprahilar and basilar atelectasis.      Electronically Signed: Manfred Eric MD    2/22/2024 2:08 PM EST    Workstation ID: [id number]          ECG/EMG Results (most recent)       Procedure Component Value Units Date/Time    ECG 12 Lead Chest Pain [[id number]] Collected: 02/20/24 0719     Updated: 02/20/24 0720     QT Interval 498 ms      QTC Interval 464 ms     Narrative:      HEART RATE= 52  bpm  RR Interval= 1152  ms  SC Interval= 172  ms  P Horizontal Axis= 0  deg  P Front Axis= 39  deg  QRSD Interval= 94  ms  QT Interval= 498  ms  QTcB= 464  ms  QRS Axis= -4  deg  T Wave Axis= 146  deg  - ABNORMAL ECG -  Sinus  bradycardia  Repol abnrm, prob ischemia, anterolateral lds  Electronically Signed By:   Date and Time of Study: 2024-02-20 07:19:15    SCANNED - TELEMETRY   [[id number]] Resulted: 02/20/24     Updated: 02/20/24 0811    ECG 12 Lead Chest Pain [[id number]] Collected: 02/20/24 0447     Updated: 02/20/24 1014     QT Interval 467 ms      QTC Interval 465 ms     Narrative:      HEART RATE= 59  bpm  RR Interval= 1008  ms  MI Interval= 178  ms  P Horizontal Axis= 0  deg  P Front Axis= 46  deg  QRSD Interval= 94  ms  QT Interval= 467  ms  QTcB= 465  ms  QRS Axis= 7  deg  T Wave Axis= 139  deg  - ABNORMAL ECG -  Sinus bradycardia  Repol abnrm, prob ischemia, anterolateral lds  Electronically Signed By: Pilo Manzo ([facility]) 20-Feb-2024 10:14:02  Date and Time of Study: 2024-02-20 04:47:04    SCANNED - TELEMETRY   [[id number]] Resulted: 02/20/24     Updated: 02/20/24 1241    Adult Transthoracic Echo Complete W/ Cont if Necessary Per Protocol [[id number]] Resulted: 02/20/24 1625     Updated: 02/20/24 1630     EF(MOD-bp) 59.1 %      LVIDd 4.7 cm      LVIDs 3.3 cm      IVSd 1.20 cm      LVPWd 1.10 cm      FS 29.8 %      IVS/LVPW 1.09 cm      ESV(cubed) 35.9 ml      LV Sys Vol (BSA corrected) 21.6 cm2      EDV(cubed) 103.8 ml      LV Dailey Vol (BSA corrected) 55.9 cm2      LV mass(C)d 199.6 grams      LVOT area 2.8 cm2      LVOT diam 1.90 cm      EDV(MOD-sp2) 78.6 ml      EDV(MOD-sp4) 105.0 ml      ESV(MOD-sp2) 31.4 ml      ESV(MOD-sp4) 40.5 ml      SV(MOD-sp2) 47.2 ml      SV(MOD-sp4) 64.5 ml      SI(MOD-sp2) 25.1 ml/m2      SI(MOD-sp4) 34.4 ml/m2      EF(MOD-sp2) 60.1 %      EF(MOD-sp4) 61.4 %      MV E max bruce 56.6 cm/sec      MV A max bruce 91.7 cm/sec      MV dec time 0.22 sec      MV E/A 0.62     Pulm A Revs Dur 0.16 sec      LA ESV Index (BP) 28.5 ml/m2      Med Peak E' Bruce 4.6 cm/sec      Lat Peak E' Bruce 5.1 cm/sec      Avg E/e' ratio 11.67     SV(LVOT) 60.1 ml      RVIDd 3.3 cm      RV Base 3.3 cm      RV Mid 2.9 cm      RV Length  7.2 cm      TAPSE (>1.6) 1.61 cm      RV S' 12.3 cm/sec      LA dimension (2D)  3.7 cm      Pulm Sys Bruce 66.3 cm/sec      Pulm Dailey Bruce 30.5 cm/sec      Pulm S/D 2.17     Pulm A Revs Bruce 26.4 cm/sec      LV V1 max 95.8 cm/sec      LV V1 max PG 3.7 mmHg      LV V1 mean PG 2.00 mmHg      LV V1 VTI 21.2 cm      Ao pk bruce 141.0 cm/sec      Ao max PG 8.0 mmHg      Ao mean PG 4.0 mmHg      Ao V2 VTI 32.6 cm      NURY(I,D) 1.84 cm2      MV max PG 4.1 mmHg      MV mean PG 1.00 mmHg      MV V2 VTI 27.7 cm      MV P1/2t 75.4 msec      MVA(P1/2t) 2.9 cm2      MVA(VTI) 2.17 cm2      MV dec slope 265.0 cm/sec2      RV V1 max PG 3.1 mmHg      RV V1 max 87.8 cm/sec      RV V1 VTI 20.8 cm      PA V2 max 102.0 cm/sec      Sinus 3.1 cm     Narrative:        Left ventricular ejection fraction appears to be 56 - 60%.    Indication  Chest pain    Technically satisfactory study.  Mitral valve is structurally normal.  Minimal mitral regurgitation.  Tricuspid valve is structurally normal.  Aortic valve is structurally normal.  Pulmonic valve could not be well visualized.  Left atrium is normal in size.  Right atrium is normal in size.  Left ventricle is normal in size and contractility with ejection fraction   of 60%.  Grade 1 diastolic dysfunction (MV E/A0.6)  Right ventricle is normal in size and contractility with TAPSE of 1.61..  Atrial septum is intact.  Aorta is normal.  No pericardial effusion or intracardiac thrombus is seen.    Impression  Structurally and functionally normal cardiac valves except for minimal   mitral regurgitation.  Grade 1 diastolic dysfunction is present..  Left ventricular size and contractility is normal with ejection fraction   of 60%.    SCANNED - TELEMETRY   [[id number]] Resulted: 02/20/24     Updated: 02/20/24 1834    SCANNED - TELEMETRY   [[id number]] Resulted: 02/20/24     Updated: 02/20/24 2101    ECG 12 Lead Chest Pain [[id number]] Collected: 02/20/24 2320     Updated: 02/20/24 2320     QT Interval 405 ms       QTC Interval 449 ms     Narrative:      HEART RATE= 74  bpm  RR Interval= 812  ms  WI Interval= 175  ms  P Horizontal Axis= 27  deg  P Front Axis= 73  deg  QRSD Interval= 92  ms  QT Interval= 405  ms  QTcB= 449  ms  QRS Axis= 45  deg  T Wave Axis= 143  deg  - ABNORMAL ECG -  Sinus rhythm  Repol abnrm suggests ischemia, diffuse leads  Electronically Signed By:   Date and Time of Study: 2024-02-20 23:20:10    SCANNED - TELEMETRY   [[id number]] Resulted: 02/20/24     Updated: 02/21/24 0002    SCANNED - TELEMETRY   [[id number]] Resulted: 02/20/24     Updated: 02/21/24 0843    SCANNED - TELEMETRY   [[id number]] Resulted: 02/20/24     Updated: 02/21/24 1019    ECG 12 Lead Other; s/p CABG [[id number]] Collected: 02/22/24 1811     Updated: 02/22/24 1922     QT Interval 419 ms      QTC Interval 477 ms     Narrative:      HEART RATE= 78  bpm  RR Interval= 772  ms  WI Interval= 170  ms  P Horizontal Axis= -5  deg  P Front Axis= 33  deg  QRSD Interval= 87  ms  QT Interval= 419  ms  QTcB= 477  ms  QRS Axis= 33  deg  T Wave Axis= 133  deg  - ABNORMAL ECG -  Sinus rhythm  Repol abnrm, prob ischemia, anterolateral lds  When compared with ECG of 21-Feb-2024 10:14:36,  Nonspecific significant change  Electronically Signed By: Hernan Ryder ([facility]) 22-Feb-2024 19:22:02  Date and Time of Study: 2024-02-22 18:11:37    ECG 12 Lead Chest Pain [[id number]] Collected: 02/21/24 1014     Updated: 02/22/24 1922     QT Interval 390 ms      QTC Interval 451 ms     Narrative:      HEART RATE= 80  bpm  RR Interval= 748  ms  WI Interval= 178  ms  P Horizontal Axis= 42  deg  P Front Axis= 60  deg  QRSD Interval= 95  ms  QT Interval= 390  ms  QTcB= 451  ms  QRS Axis= 8  deg  T Wave Axis= 108  deg  - ABNORMAL ECG -  Sinus rhythm  Repol abnrm suggests ischemia, anterolateral  When compared with ECG of 20-Feb-2024 23:20:10,  Nonspecific significant change  Electronically Signed By: Hernan Ryder ([facility]) 22-Feb-2024 19:22:13  Date and Time of Study:  2024-02-21 10:14:36    ECG 12 Lead Other; s/p CABG [[id number]] Collected: 02/23/24 0521     Updated: 02/23/24 1042     QT Interval 407 ms      QTC Interval 449 ms     Narrative:      HEART RATE= 73  bpm  RR Interval= 820  ms  SD Interval= 172  ms  P Horizontal Axis= 24  deg  P Front Axis= 30  deg  QRSD Interval= 89  ms  QT Interval= 407  ms  QTcB= 449  ms  QRS Axis= 40  deg  T Wave Axis= 140  deg  - ABNORMAL ECG -  Sinus rhythm  Repol abnrm, prob ischemia, anterolateral lds  ST elevation, consider inferior injury  When compared with ECG of 22-Feb-2024 18:11:37,  No significant change  Electronically Signed By: Quan Sainz ([facility]) 23-Feb-2024 10:42:25  Date and Time of Study: 2024-02-23 05:21:46    ECG 12 Lead Other; s/p CABG [[id number]] Collected: 02/24/24 0539     Updated: 02/24/24 0626     QT Interval 349 ms      QTC Interval 426 ms     Narrative:      HEART RATE= 89  bpm  RR Interval= 672  ms  SD Interval= 166  ms  P Horizontal Axis= -27  deg  P Front Axis= 44  deg  QRSD Interval= 93  ms  QT Interval= 349  ms  QTcB= 426  ms  QRS Axis= 6  deg  T Wave Axis= 131  deg  - ABNORMAL ECG -  Sinus rhythm  Repol abnrm suggests ischemia, anterolateral  When compared with ECG of 23-Feb-2024 5:21:46,  No significant change  Electronically Signed By:   Date and Time of Study: 2024-02-24 05:39:49          CBC    Results from last 7 days   Lab Units 02/24/24  0430 02/23/24  0347 02/22/24  2232 02/22/24  2129 02/22/24  1453 02/22/24  1415 02/22/24  1319 02/22/24  1257 02/22/24  0923 02/22/24  0457 02/21/24  1150 02/21/24  0421 02/20/24  0459   WBC 10*3/mm3 10.20 9.60  --   --   --   --   --  10.30  --  9.10 9.80 9.10 7.20   HEMOGLOBIN g/dL 10.0* 10.4*  --   --   --   --   --  11.8*  --  12.6* 14.1 13.1 13.5   HEMOGLOBIN, POC g/dL  --   --  9.5* 9.6* 9.7* 10.2* 10.3*  --    < >  --   --   --   --    PLATELETS 10*3/mm3 104* 107*  --   --   --   --   --  114*  --  177 173 170 169    < > = values in this interval not displayed.      BMP   Results from last 7 days   Lab Units 02/24/24  0430 02/23/24 0347 02/22/24 2232 02/22/24 2129 02/22/24 2126 02/22/24  1600 02/22/24  1257 02/22/24 0457 02/21/24  1150 02/21/24  0421 02/20/24  0650 02/20/24  0459   SODIUM mmol/L 146* 148*  --   --   --   --  142 141 139 138  --  138   POTASSIUM mmol/L 4.2 4.4  --   --  4.4 3.9 4.4  4.4 4.3 4.2 4.2  --  4.6   CHLORIDE mmol/L 112* 111*  --   --   --   --  106 106 106 104  --  101   CO2 mmol/L 27.0 22.0  --   --   --   --  24.0 24.0 24.0 24.0  --  26.0   BUN mg/dL 25* 25*  --   --   --   --  30* 33* 31* 34*  --  35*   CREATININE mg/dL 1.49* 1.52* 1.63* 1.58*  --   --  1.66* 1.59* 1.46* 1.69*  --  1.76*   GLUCOSE mg/dL 118* 114*  --   --   --   --  94 99 130* 104*  --  99   MAGNESIUM mg/dL  --  2.3  --   --   --   --   --   --   --  1.9 2.1  --    PHOSPHORUS mg/dL 2.5 4.8*  --   --   --   --  2.3* 2.9  --  3.1  --   --      CMP   Results from last 7 days   Lab Units 02/24/24  0430 02/23/24 0347 02/22/24 2232 02/22/24 2129 02/22/24 2126 02/22/24  1600 02/22/24  1257 02/22/24 0457 02/21/24  1150 02/21/24  0421 02/20/24  0459   SODIUM mmol/L 146* 148*  --   --   --   --  142 141 139 138 138   POTASSIUM mmol/L 4.2 4.4  --   --  4.4 3.9 4.4  4.4 4.3 4.2 4.2 4.6   CHLORIDE mmol/L 112* 111*  --   --   --   --  106 106 106 104 101   CO2 mmol/L 27.0 22.0  --   --   --   --  24.0 24.0 24.0 24.0 26.0   BUN mg/dL 25* 25*  --   --   --   --  30* 33* 31* 34* 35*   CREATININE mg/dL 1.49* 1.52* 1.63* 1.58*  --   --  1.66* 1.59* 1.46* 1.69* 1.76*   GLUCOSE mg/dL 118* 114*  --   --   --   --  94 99 130* 104* 99   ALBUMIN g/dL 3.7 4.3  --   --   --   --  3.7 4.1 4.4 4.4  --    BILIRUBIN mg/dL  --   --   --   --   --   --   --   --  0.9 0.7  --    ALK PHOS U/L  --   --   --   --   --   --   --   --  92 83  --    AST (SGOT) U/L  --   --   --   --   --   --   --   --  27 25  --    ALT (SGPT) U/L  --   --   --   --   --   --   --   --  21 21  --      Cardiac  Studies:  Echo- Results for orders placed during the hospital encounter of 02/20/24    Adult Transthoracic Echo Complete W/ Cont if Necessary Per Protocol    Interpretation Summary    Left ventricular ejection fraction appears to be 56 - 60%.    Indication  Chest pain    Technically satisfactory study.  Mitral valve is structurally normal.  Minimal mitral regurgitation.  Tricuspid valve is structurally normal.  Aortic valve is structurally normal.  Pulmonic valve could not be well visualized.  Left atrium is normal in size.  Right atrium is normal in size.  Left ventricle is normal in size and contractility with ejection fraction of 60%.  Grade 1 diastolic dysfunction (MV E/A0.6)  Right ventricle is normal in size and contractility with TAPSE of 1.61..  Atrial septum is intact.  Aorta is normal.  No pericardial effusion or intracardiac thrombus is seen.    Impression  Structurally and functionally normal cardiac valves except for minimal mitral regurgitation.  Grade 1 diastolic dysfunction is present..  Left ventricular size and contractility is normal with ejection fraction of 60%.    Stress Myoview-  Cath-      Medication Review:   Scheduled Meds:amLODIPine, 10 mg, Oral, Q24H  aspirin, 81 mg, Oral, Daily  carvedilol, 25 mg, Oral, BID With Meals  chlorhexidine, 15 mL, Mouth/Throat, Q12H  enoxaparin, 40 mg, Subcutaneous, Daily  folic acid, 1 mg, Oral, Daily  insulin lispro, 2-7 Units, Subcutaneous, 4x Daily AC & at Bedtime  lisinopril, 40 mg, Oral, Q24H  mupirocin, , Each Nare, BID  pantoprazole, 40 mg, Oral, Q AM  polyethylene glycol, 17 g, Oral, BID  rosuvastatin, 40 mg, Oral, Nightly  senna-docusate sodium, 2 tablet, Oral, BID      Continuous Infusions:   PRN Meds:.  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  Unstable angina/non-ST elevation myocardial infarction:    Patient underwent cardiac catheterization and multivessel coronary artery disease noted.  Patient was recommended for CABG    2.  CAD/CABG:    Patient underwent CABG x 3.  Chest tubes are being removed today    4.  Hypertension:    Patient is on lisinopril and doing well.    5.  Hyperlipidemia:    Patient is on Crestor repeat lipid panel testing    Ahsan Tee MD  02/24/24  13:51 EST

## 2024-02-24 NOTE — PROGRESS NOTES
S/P POD# 2 CABG x3 with LIMA--Camporrotondo  EF 55-60% (echo)    Subjective:  Patient sitting up in chair. Feeling much better this morning.    Wt is up 1.5 kgs from preop  CXR:  atel      Intake/Output Summary (Last 24 hours) at 2/24/2024 0842  Last data filed at 2/24/2024 0400  Gross per 24 hour   Intake 1871.9 ml   Output 1615 ml   Net 256.9 ml     Temp:  [97.1 °F (36.2 °C)-100.8 °F (38.2 °C)] 99.3 °F (37.4 °C)  Heart Rate:  [76-94] 92  Resp:  [15-25] 19  BP: (116-154)/(50-84) 154/84  CT 60/8    Results from last 7 days   Lab Units 02/24/24  0430 02/23/24  0347 02/22/24  1319 02/22/24  1257 02/22/24  0457 02/21/24  1150   WBC 10*3/mm3 10.20 9.60  --  10.30   < > 9.80   HEMOGLOBIN g/dL 10.0* 10.4*  --  11.8*   < > 14.1   HEMOGLOBIN, POC   --   --    < >  --    < >  --    HEMATOCRIT % 29.9* 31.8*  --  34.6*   < > 41.9   HEMATOCRIT POC   --   --    < >  --    < >  --    PLATELETS 10*3/mm3 104* 107*  --  114*   < > 173   INR   --  1.10  --  1.11*  --  1.08    < > = values in this interval not displayed.     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  DC central line, minaya catheter.  Cardene off, will add home dose of lisinopril this morinng.  On asa/statin, Coreg.  Creatinine slowly improving, 1.49 this morning.  UO ~1,500/24 hr yest.  Diuretics per renal.  Chest tube output clearer, dc chest tubes. Okay to dc gluccomander. Resume maintenance home meds.    Routine care--as above  Anticipate home at discharge    Jia Palomino, APRN  2024  08:42 EST

## 2024-02-25 PROBLEM — Z95.1 S/P CABG X 3: Status: ACTIVE | Noted: 2024-02-25

## 2024-02-25 LAB
ANION GAP SERPL CALCULATED.3IONS-SCNC: 8 MMOL/L (ref 5–15)
BH BB BLOOD EXPIRATION DATE: NORMAL
BH BB BLOOD EXPIRATION DATE: NORMAL
BH BB BLOOD TYPE BARCODE: 5100
BH BB BLOOD TYPE BARCODE: 5100
BH BB DISPENSE STATUS: NORMAL
BH BB DISPENSE STATUS: NORMAL
BH BB PRODUCT CODE: NORMAL
BH BB PRODUCT CODE: NORMAL
BH BB UNIT NUMBER: NORMAL
BH BB UNIT NUMBER: NORMAL
BUN SERPL-MCNC: 23 MG/DL (ref 8–23)
BUN/CREAT SERPL: 16.8 (ref 7–25)
CALCIUM SPEC-SCNC: 9.1 MG/DL (ref 8.6–10.5)
CHLORIDE SERPL-SCNC: 105 MMOL/L (ref 98–107)
CO2 SERPL-SCNC: 29 MMOL/L (ref 22–29)
CREAT SERPL-MCNC: 1.37 MG/DL (ref 0.76–1.27)
CROSSMATCH INTERPRETATION: NORMAL
CROSSMATCH INTERPRETATION: NORMAL
DEPRECATED RDW RBC AUTO: 48.6 FL (ref 37–54)
EGFRCR SERPLBLD CKD-EPI 2021: 56.9 ML/MIN/1.73
ERYTHROCYTE [DISTWIDTH] IN BLOOD BY AUTOMATED COUNT: 15.2 % (ref 12.3–15.4)
GLUCOSE BLDC GLUCOMTR-MCNC: 100 MG/DL (ref 70–105)
GLUCOSE BLDC GLUCOMTR-MCNC: 121 MG/DL (ref 70–105)
GLUCOSE BLDC GLUCOMTR-MCNC: 124 MG/DL (ref 70–105)
GLUCOSE BLDC GLUCOMTR-MCNC: 127 MG/DL (ref 70–105)
GLUCOSE BLDC GLUCOMTR-MCNC: 137 MG/DL (ref 70–105)
GLUCOSE SERPL-MCNC: 105 MG/DL (ref 65–99)
HCT VFR BLD AUTO: 32.2 % (ref 37.5–51)
HGB BLD-MCNC: 10.7 G/DL (ref 13–17.7)
MCH RBC QN AUTO: 31 PG (ref 26.6–33)
MCHC RBC AUTO-ENTMCNC: 33.3 G/DL (ref 31.5–35.7)
MCV RBC AUTO: 93 FL (ref 79–97)
PLATELET # BLD AUTO: 130 10*3/MM3 (ref 140–450)
PMV BLD AUTO: 7.9 FL (ref 6–12)
POTASSIUM SERPL-SCNC: 4.1 MMOL/L (ref 3.5–5.2)
RBC # BLD AUTO: 3.47 10*6/MM3 (ref 4.14–5.8)
SODIUM SERPL-SCNC: 142 MMOL/L (ref 136–145)
UNIT  ABO: NORMAL
UNIT  ABO: NORMAL
UNIT  RH: NORMAL
UNIT  RH: NORMAL
WBC NRBC COR # BLD AUTO: 11 10*3/MM3 (ref 3.4–10.8)

## 2024-02-25 PROCEDURE — 82948 REAGENT STRIP/BLOOD GLUCOSE: CPT

## 2024-02-25 PROCEDURE — 85027 COMPLETE CBC AUTOMATED: CPT | Performed by: NURSE PRACTITIONER

## 2024-02-25 PROCEDURE — 99232 SBSQ HOSP IP/OBS MODERATE 35: CPT | Performed by: INTERNAL MEDICINE

## 2024-02-25 PROCEDURE — 25010000002 ENOXAPARIN PER 10 MG: Performed by: NURSE PRACTITIONER

## 2024-02-25 PROCEDURE — 82948 REAGENT STRIP/BLOOD GLUCOSE: CPT | Performed by: NURSE PRACTITIONER

## 2024-02-25 PROCEDURE — 80048 BASIC METABOLIC PNL TOTAL CA: CPT | Performed by: NURSE PRACTITIONER

## 2024-02-25 RX ORDER — TERAZOSIN 5 MG/1
5 CAPSULE ORAL NIGHTLY
Status: DISCONTINUED | OUTPATIENT
Start: 2024-02-25 | End: 2024-02-26 | Stop reason: HOSPADM

## 2024-02-25 RX ORDER — HYDRALAZINE HYDROCHLORIDE 20 MG/ML
10 INJECTION INTRAMUSCULAR; INTRAVENOUS EVERY 4 HOURS PRN
Status: DISCONTINUED | OUTPATIENT
Start: 2024-02-25 | End: 2024-02-26 | Stop reason: HOSPADM

## 2024-02-25 RX ORDER — FUROSEMIDE 20 MG/1
20 TABLET ORAL DAILY
Status: DISCONTINUED | OUTPATIENT
Start: 2024-02-25 | End: 2024-02-26 | Stop reason: HOSPADM

## 2024-02-25 RX ADMIN — HYDROCODONE BITARTRATE AND ACETAMINOPHEN 1 TABLET: 5; 325 TABLET ORAL at 23:18

## 2024-02-25 RX ADMIN — ASPIRIN 81 MG: 81 TABLET, COATED ORAL at 09:07

## 2024-02-25 RX ADMIN — CHLORHEXIDINE GLUCONATE, 0.12% ORAL RINSE 15 ML: 1.2 SOLUTION DENTAL at 21:25

## 2024-02-25 RX ADMIN — FOLIC ACID 1 MG: 1 TABLET ORAL at 09:07

## 2024-02-25 RX ADMIN — MUPIROCIN 1 APPLICATION: 20 OINTMENT TOPICAL at 21:25

## 2024-02-25 RX ADMIN — PANTOPRAZOLE SODIUM 40 MG: 40 TABLET, DELAYED RELEASE ORAL at 06:38

## 2024-02-25 RX ADMIN — CARVEDILOL 25 MG: 25 TABLET, FILM COATED ORAL at 09:07

## 2024-02-25 RX ADMIN — DOCUSATE SODIUM 50MG AND SENNOSIDES 8.6MG 2 TABLET: 8.6; 5 TABLET, FILM COATED ORAL at 09:07

## 2024-02-25 RX ADMIN — ROSUVASTATIN 40 MG: 10 TABLET, FILM COATED ORAL at 21:26

## 2024-02-25 RX ADMIN — TERAZOSIN HYDROCHLORIDE 5 MG: 5 CAPSULE ORAL at 21:25

## 2024-02-25 RX ADMIN — LISINOPRIL 40 MG: 20 TABLET ORAL at 09:07

## 2024-02-25 RX ADMIN — CHLORHEXIDINE GLUCONATE, 0.12% ORAL RINSE 15 ML: 1.2 SOLUTION DENTAL at 09:07

## 2024-02-25 RX ADMIN — POLYETHYLENE GLYCOL 3350 17 G: 17 POWDER, FOR SOLUTION ORAL at 09:08

## 2024-02-25 RX ADMIN — CARVEDILOL 25 MG: 25 TABLET, FILM COATED ORAL at 17:49

## 2024-02-25 RX ADMIN — AMLODIPINE BESYLATE 10 MG: 5 TABLET ORAL at 09:07

## 2024-02-25 RX ADMIN — ENOXAPARIN SODIUM 40 MG: 100 INJECTION SUBCUTANEOUS at 16:28

## 2024-02-25 RX ADMIN — FUROSEMIDE 20 MG: 20 TABLET ORAL at 14:12

## 2024-02-25 RX ADMIN — MUPIROCIN 1 APPLICATION: 20 OINTMENT TOPICAL at 09:08

## 2024-02-25 NOTE — PROGRESS NOTES
S/P POD# 3 CABG x3 with LIMA--Camporrotondo  EF 55-60% (echo)    Subjective:  Patient sitting up in chair. No complaints this morning.    Wt is even from preop  CXR:  atel      Intake/Output Summary (Last 24 hours) at 2/25/2024 0853  Last data filed at 2/25/2024 0400  Gross per 24 hour   Intake 1309.1 ml   Output 1600 ml   Net -290.9 ml     Temp:  [98.2 °F (36.8 °C)-100.8 °F (38.2 °C)] 98.3 °F (36.8 °C)  Heart Rate:  [] 92  Resp:  [17-23] 23  BP: (136-166)/(73-88) 166/86      Results from last 7 days   Lab Units 02/25/24  0803 02/24/24  0430 02/23/24  0347 02/22/24  1319 02/22/24  1257 02/22/24  0457 02/21/24  1150   WBC 10*3/mm3 11.00* 10.20 9.60  --  10.30   < > 9.80   HEMOGLOBIN g/dL 10.7* 10.0* 10.4*  --  11.8*   < > 14.1   HEMOGLOBIN, POC   --   --   --    < >  --    < >  --    HEMATOCRIT % 32.2* 29.9* 31.8*  --  34.6*   < > 41.9   HEMATOCRIT POC   --   --   --    < >  --    < >  --    PLATELETS 10*3/mm3 130* 104* 107*  --  114*   < > 173   INR   --   --  1.10  --  1.11*  --  1.08    < > = values in this interval not displayed.     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  Blood pressure still up a bit.  On asa/statin, Coreg.  Reviewed home meds, will start on home dose of terazosin. Creatinine slowly improving, 1.37 this morning.  UO ~1,600/24 hr yest.  Diuretics per renal.  DC wires today. Resume maintenance home meds. PT/OT recommending home with family assist.    Routine care--as above  Anticipate home at discharge    Jia Palomino, APRN  2024  08:53 EST

## 2024-02-25 NOTE — PROGRESS NOTES
LOS: 4 days   Admitting Physician- Soham Garrett, *    Reason For Followup:    Unstable angina  Non-ST elevation myocardial infarction  CAD  CABG  Hypertension    Subjective     Patient is sitting up in the chair.  Patient complain of sternal pain    Objective     Hemodynamics are stable    Review of Systems:   Review of Systems   Constitutional: Negative for chills and fever.   HENT:  Negative for ear discharge and nosebleeds.    Eyes:  Negative for discharge and redness.   Cardiovascular:  Negative for chest pain, orthopnea, palpitations, paroxysmal nocturnal dyspnea and syncope.   Respiratory:  Negative for cough, shortness of breath and wheezing.    Endocrine: Negative for heat intolerance.   Skin:  Negative for rash.   Musculoskeletal:  Negative for arthritis and myalgias.   Gastrointestinal:  Negative for abdominal pain, melena, nausea and vomiting.   Genitourinary:  Negative for dysuria and hematuria.   Neurological:  Negative for dizziness, light-headedness, numbness and tremors.   Psychiatric/Behavioral:  Negative for depression. The patient is not nervous/anxious.          Vital Signs  Vitals:    02/25/24 0500 02/25/24 0600 02/25/24 0800 02/25/24 0907   BP:   166/86 150/82   BP Location:   Right arm    Patient Position:   Sitting    Pulse: 86  92    Resp:   23    Temp:   98.3 °F (36.8 °C)    TempSrc:   Oral    SpO2:   90%    Weight:  75.9 kg (167 lb 6.4 oz)     Height:         Wt Readings from Last 1 Encounters:   02/25/24 75.9 kg (167 lb 6.4 oz)       Intake/Output Summary (Last 24 hours) at 2/25/2024 1121  Last data filed at 2/25/2024 0400  Gross per 24 hour   Intake 1309.1 ml   Output 850 ml   Net 459.1 ml     Physical Exam:  Constitutional:       Appearance: Well-developed.   Eyes:      General: No scleral icterus.        Right eye: No discharge.   HENT:      Head: Normocephalic and atraumatic.   Neck:      Thyroid: No thyromegaly.      Lymphadenopathy: No cervical adenopathy.   Pulmonary:       Effort: Pulmonary effort is normal. No respiratory distress.      Breath sounds: Normal breath sounds. No wheezing. No rales.   Cardiovascular:      Normal rate. Regular rhythm.      No gallop.    Edema:     Peripheral edema absent.   Abdominal:      Tenderness: There is no abdominal tenderness.   Skin:     Findings: No erythema or rash.   Neurological:      Mental Status: Alert and oriented to person, place, and time.         Results Review:   Lab Results (last 24 hours)       Procedure Component Value Units Date/Time    Basic Metabolic Panel [[id number]]  (Abnormal) Collected: 02/25/24 0803    Specimen: Blood Updated: 02/25/24 0827     Glucose 105 mg/dL      BUN 23 mg/dL      Creatinine 1.37 mg/dL      Sodium 142 mmol/L      Potassium 4.1 mmol/L      Chloride 105 mmol/L      CO2 29.0 mmol/L      Calcium 9.1 mg/dL      BUN/Creatinine Ratio 16.8     Anion Gap 8.0 mmol/L      eGFR 56.9 mL/min/1.73     Narrative:      GFR Normal >60  Chronic Kidney Disease <60  Kidney Failure <15      CBC (No Diff) [[id number]]  (Abnormal) Collected: 02/25/24 0803    Specimen: Blood Updated: 02/25/24 0808     WBC 11.00 10*3/mm3      RBC 3.47 10*6/mm3      Hemoglobin 10.7 g/dL      Hematocrit 32.2 %      MCV 93.0 fL      MCH 31.0 pg      MCHC 33.3 g/dL      RDW 15.2 %      RDW-SD 48.6 fl      MPV 7.9 fL      Platelets 130 10*3/mm3     POC Glucose 4x Daily Before Meals & at Bedtime [[id number]]  (Normal) Collected: 02/25/24 0806    Specimen: Blood Updated: 02/25/24 0807     Glucose 100 mg/dL      Comment: Serial Number: [id number]:  [id number]       POC Glucose Once [[id number]]  (Abnormal) Collected: 02/24/24 2227    Specimen: Blood Updated: 02/25/24 0648     Glucose 124 mg/dL      Comment: Serial Number: [id number]:  [id number]       POC Glucose 4x Daily Before Meals & at Bedtime [[id number]]  (Abnormal) Collected: 02/24/24 1700    Specimen: Blood Updated: 02/24/24 1702     Glucose 152 mg/dL      Comment: Serial Number:  [id number]:  [id number]       POC Glucose 4x Daily Before Meals & at Bedtime [[id number]]  (Abnormal) Collected: 02/24/24 1242    Specimen: Blood Updated: 02/24/24 1243     Glucose 123 mg/dL      Comment: Serial Number: [id number]:  [id number]             Imaging Results (Last 72 Hours)       Procedure Component Value Units Date/Time    XR Chest 1 View [[id number]] Collected: 02/24/24 1403     Updated: 02/24/24 1407    Narrative:      XR CHEST 1 VW    Date of Exam: 2/24/2024 1:20 PM EST    Indication: chest tube removal    Comparison: Chest radiograph same date.    Findings:  Removal of left chest tube. Sternotomy. Cardiomediastinal silhouette is unchanged. Unchanged trace/small bilateral pleural effusions and bibasilar atelectasis, left greater than right. No discrete pneumothorax on this exam. Osseous structures are   unchanged.      Impression:      Impression:    No discrete pneumothorax after left chest tube removal.    Similar trace/small bilateral pleural effusions and bibasilar atelectasis, left greater than right.      Electronically Signed: Peña Downs MD    2/24/2024 2:05 PM EST    Workstation ID: [id number]    XR Chest 1 View [[id number]] Collected: 02/24/24 0826     Updated: 02/24/24 0831    Narrative:      XR CHEST 1 VW    Date of Exam: 2/24/2024 3:25 AM EST    Indication: Post-Op Heart Surgery    Comparison: Chest radiograph 2/23/2024.    Findings:  Removal of pulmonary artery catheter with left IJ sheath remaining. Unchanged position of left chest tube. Sternotomy. Cardiomediastinal silhouette is unchanged. Trace bilateral pleural effusions with bibasilar atelectasis, left greater than right,   similar to prior exam. No enlarging pleural effusion or new focal consolidation. No discrete pneumothorax. Osseous structures are unchanged.      Impression:      Impression:  Similar left greater than right trace pleural effusions and bibasilar atelectasis. Left chest tube in place without discrete  pneumothorax. Removal of pulmonary catheter with left IJ sheath remaining.      Electronically Signed: Peña Downs MD    2/24/2024 8:29 AM EST    Workstation ID: [id number]    XR Chest 1 View [[id number]] Collected: 02/23/24 0734     Updated: 02/23/24 0740    Narrative:      XR CHEST 1 VW    Date of Exam: 2/23/2024 5:10 AM EST    Indication: Post-Op Heart Surgery    Comparison: 2/22/2024    Findings:    The heart size is stable with widening appearance of the upper mediastinum similar to the prior exam. Median sternotomy wires are again seen. There is a left IJ introducer sheath and Kinston-Fatmata catheter with the tip in the proximal right main pulmonary   artery. Endotracheal and enteric tubes have been removed. A left basilar chest tube is stable. Lungs are slightly less inflated than on the previous exam and bibasilar atelectatic changes are noted. No pneumothorax is visualized.       Impression:      Impression:    1. Postsurgical changes of median sternotomy with wires and tubes as described.  2. Cardiac silhouette is enlarged with a widened appearance of the upper mediastinum which is similar to the previous exam. This may be at least in part due to decreased lung volumes. However, clinical correlation is recommended.  3. Patchy bibasilar atelectatic changes.      Electronically Signed: Shaina Sanders MD    2/23/2024 7:38 AM EST    Workstation ID: [id number]    XR Chest 1 View [[id number]] Collected: 02/22/24 1406     Updated: 02/22/24 1410    Narrative:      XR CHEST 1 VW    Date of Exam: 2/22/2024 1:31 PM EST    Indication: Post-Op Check Line & Tube Placement    Comparison: 2/20/2024    Findings:  Sternotomy wires overlie the midline. There is an ET tube present with the tip 4.7 cm from the rich. There is a left IJ introducer sheath and Kinston-Fatmata catheter noted. The tip is not well distinguished due to overlying leads, but appears to be in the   proximal right main pulmonary artery. Esophagogastric tube courses  through midline below the diaphragm, tip not seen. Left basilar chest tube is noted. No pneumothorax is seen. Linear opacities in the left suprahilar and basilar region suggestive   atelectasis. No other focal pulmonary opacities. No pneumothorax identified. There is some widening of the upper mediastinum which is likely postsurgical. Heart size otherwise appears unchanged.      Impression:      Impression:    1. Postsurgical changes from median sternotomy. Tubes and lines as above.  2. Left suprahilar and basilar atelectasis.      Electronically Signed: Manfred Eric MD    2/22/2024 2:08 PM EST    Workstation ID: [id number]          ECG/EMG Results (most recent)       Procedure Component Value Units Date/Time    ECG 12 Lead Chest Pain [[id number]] Collected: 02/20/24 0719     Updated: 02/20/24 0720     QT Interval 498 ms      QTC Interval 464 ms     Narrative:      HEART RATE= 52  bpm  RR Interval= 1152  ms  RI Interval= 172  ms  P Horizontal Axis= 0  deg  P Front Axis= 39  deg  QRSD Interval= 94  ms  QT Interval= 498  ms  QTcB= 464  ms  QRS Axis= -4  deg  T Wave Axis= 146  deg  - ABNORMAL ECG -  Sinus bradycardia  Repol abnrm, prob ischemia, anterolateral lds  Electronically Signed By:   Date and Time of Study: 2024-02-20 07:19:15    SCANNED - TELEMETRY   [[id number]] Resulted: 02/20/24     Updated: 02/20/24 0811    ECG 12 Lead Chest Pain [[id number]] Collected: 02/20/24 0447     Updated: 02/20/24 1014     QT Interval 467 ms      QTC Interval 465 ms     Narrative:      HEART RATE= 59  bpm  RR Interval= 1008  ms  RI Interval= 178  ms  P Horizontal Axis= 0  deg  P Front Axis= 46  deg  QRSD Interval= 94  ms  QT Interval= 467  ms  QTcB= 465  ms  QRS Axis= 7  deg  T Wave Axis= 139  deg  - ABNORMAL ECG -  Sinus bradycardia  Repol abnrm, prob ischemia, anterolateral lds  Electronically Signed By: Pilo Manzo (Brodie) 20-Feb-2024 10:14:02  Date and Time of Study: 2024-02-20 04:47:04    SCANNED - TELEMETRY   [[id number]] Resulted:  02/20/24     Updated: 02/20/24 1241    Adult Transthoracic Echo Complete W/ Cont if Necessary Per Protocol [[id number]] Resulted: 02/20/24 1625     Updated: 02/20/24 1630     EF(MOD-bp) 59.1 %      LVIDd 4.7 cm      LVIDs 3.3 cm      IVSd 1.20 cm      LVPWd 1.10 cm      FS 29.8 %      IVS/LVPW 1.09 cm      ESV(cubed) 35.9 ml      LV Sys Vol (BSA corrected) 21.6 cm2      EDV(cubed) 103.8 ml      LV Dailey Vol (BSA corrected) 55.9 cm2      LV mass(C)d 199.6 grams      LVOT area 2.8 cm2      LVOT diam 1.90 cm      EDV(MOD-sp2) 78.6 ml      EDV(MOD-sp4) 105.0 ml      ESV(MOD-sp2) 31.4 ml      ESV(MOD-sp4) 40.5 ml      SV(MOD-sp2) 47.2 ml      SV(MOD-sp4) 64.5 ml      SI(MOD-sp2) 25.1 ml/m2      SI(MOD-sp4) 34.4 ml/m2      EF(MOD-sp2) 60.1 %      EF(MOD-sp4) 61.4 %      MV E max bruce 56.6 cm/sec      MV A max bruce 91.7 cm/sec      MV dec time 0.22 sec      MV E/A 0.62     Pulm A Revs Dur 0.16 sec      LA ESV Index (BP) 28.5 ml/m2      Med Peak E' Bruce 4.6 cm/sec      Lat Peak E' Bruce 5.1 cm/sec      Avg E/e' ratio 11.67     SV(LVOT) 60.1 ml      RVIDd 3.3 cm      RV Base 3.3 cm      RV Mid 2.9 cm      RV Length 7.2 cm      TAPSE (>1.6) 1.61 cm      RV S' 12.3 cm/sec      LA dimension (2D)  3.7 cm      Pulm Sys Bruce 66.3 cm/sec      Pulm Dailey Bruce 30.5 cm/sec      Pulm S/D 2.17     Pulm A Revs Bruce 26.4 cm/sec      LV V1 max 95.8 cm/sec      LV V1 max PG 3.7 mmHg      LV V1 mean PG 2.00 mmHg      LV V1 VTI 21.2 cm      Ao pk bruce 141.0 cm/sec      Ao max PG 8.0 mmHg      Ao mean PG 4.0 mmHg      Ao V2 VTI 32.6 cm      NURY(I,D) 1.84 cm2      MV max PG 4.1 mmHg      MV mean PG 1.00 mmHg      MV V2 VTI 27.7 cm      MV P1/2t 75.4 msec      MVA(P1/2t) 2.9 cm2      MVA(VTI) 2.17 cm2      MV dec slope 265.0 cm/sec2      RV V1 max PG 3.1 mmHg      RV V1 max 87.8 cm/sec      RV V1 VTI 20.8 cm      PA V2 max 102.0 cm/sec      Sinus 3.1 cm     Narrative:        Left ventricular ejection fraction appears to be 56 - 60%.    Indication  Chest  pain    Technically satisfactory study.  Mitral valve is structurally normal.  Minimal mitral regurgitation.  Tricuspid valve is structurally normal.  Aortic valve is structurally normal.  Pulmonic valve could not be well visualized.  Left atrium is normal in size.  Right atrium is normal in size.  Left ventricle is normal in size and contractility with ejection fraction   of 60%.  Grade 1 diastolic dysfunction (MV E/A0.6)  Right ventricle is normal in size and contractility with TAPSE of 1.61..  Atrial septum is intact.  Aorta is normal.  No pericardial effusion or intracardiac thrombus is seen.    Impression  Structurally and functionally normal cardiac valves except for minimal   mitral regurgitation.  Grade 1 diastolic dysfunction is present..  Left ventricular size and contractility is normal with ejection fraction   of 60%.    SCANNED - TELEMETRY   [[id number]] Resulted: 02/20/24     Updated: 02/20/24 1834    SCANNED - TELEMETRY   [[id number]] Resulted: 02/20/24     Updated: 02/20/24 2101    ECG 12 Lead Chest Pain [[id number]] Collected: 02/20/24 2320     Updated: 02/20/24 2320     QT Interval 405 ms      QTC Interval 449 ms     Narrative:      HEART RATE= 74  bpm  RR Interval= 812  ms  SD Interval= 175  ms  P Horizontal Axis= 27  deg  P Front Axis= 73  deg  QRSD Interval= 92  ms  QT Interval= 405  ms  QTcB= 449  ms  QRS Axis= 45  deg  T Wave Axis= 143  deg  - ABNORMAL ECG -  Sinus rhythm  Repol abnrm suggests ischemia, diffuse leads  Electronically Signed By:   Date and Time of Study: 2024-02-20 23:20:10    SCANNED - TELEMETRY   [[id number]] Resulted: 02/20/24     Updated: 02/21/24 0002    SCANNED - TELEMETRY   [[id number]] Resulted: 02/20/24     Updated: 02/21/24 0843    SCANNED - TELEMETRY   [[id number]] Resulted: 02/20/24     Updated: 02/21/24 1019    ECG 12 Lead Other; s/p CABG [[id number]] Collected: 02/22/24 1811     Updated: 02/22/24 1922     QT Interval 419 ms      QTC Interval 477 ms     Narrative:       HEART RATE= 78  bpm  RR Interval= 772  ms  HI Interval= 170  ms  P Horizontal Axis= -5  deg  P Front Axis= 33  deg  QRSD Interval= 87  ms  QT Interval= 419  ms  QTcB= 477  ms  QRS Axis= 33  deg  T Wave Axis= 133  deg  - ABNORMAL ECG -  Sinus rhythm  Repol abnrm, prob ischemia, anterolateral lds  When compared with ECG of 21-Feb-2024 10:14:36,  Nonspecific significant change  Electronically Signed By: Hernan Ryder ([facility]) 22-Feb-2024 19:22:02  Date and Time of Study: 2024-02-22 18:11:37    ECG 12 Lead Chest Pain [[id number]] Collected: 02/21/24 1014     Updated: 02/22/24 1922     QT Interval 390 ms      QTC Interval 451 ms     Narrative:      HEART RATE= 80  bpm  RR Interval= 748  ms  HI Interval= 178  ms  P Horizontal Axis= 42  deg  P Front Axis= 60  deg  QRSD Interval= 95  ms  QT Interval= 390  ms  QTcB= 451  ms  QRS Axis= 8  deg  T Wave Axis= 108  deg  - ABNORMAL ECG -  Sinus rhythm  Repol abnrm suggests ischemia, anterolateral  When compared with ECG of 20-Feb-2024 23:20:10,  Nonspecific significant change  Electronically Signed By: Hernan Ryder ([facility]) 22-Feb-2024 19:22:13  Date and Time of Study: 2024-02-21 10:14:36    ECG 12 Lead Other; s/p CABG [[id number]] Collected: 02/23/24 0521     Updated: 02/23/24 1042     QT Interval 407 ms      QTC Interval 449 ms     Narrative:      HEART RATE= 73  bpm  RR Interval= 820  ms  HI Interval= 172  ms  P Horizontal Axis= 24  deg  P Front Axis= 30  deg  QRSD Interval= 89  ms  QT Interval= 407  ms  QTcB= 449  ms  QRS Axis= 40  deg  T Wave Axis= 140  deg  - ABNORMAL ECG -  Sinus rhythm  Repol abnrm, prob ischemia, anterolateral lds  ST elevation, consider inferior injury  When compared with ECG of 22-Feb-2024 18:11:37,  No significant change  Electronically Signed By: Quan Sainz ([facility]) 23-Feb-2024 10:42:25  Date and Time of Study: 2024-02-23 05:21:46    ECG 12 Lead Other; s/p CABG [[id number]] Collected: 02/24/24 0539     Updated: 02/24/24 0626     QT Interval 349 ms       QTC Interval 426 ms     Narrative:      HEART RATE= 89  bpm  RR Interval= 672  ms  [place] Interval= 166  ms  P Horizontal Axis= -27  deg  P Front Axis= 44  deg  QRSD Interval= 93  ms  QT Interval= 349  ms  QTcB= 426  ms  QRS Axis= 6  deg  T Wave Axis= 131  deg  - ABNORMAL ECG -  Sinus rhythm  Repol abnrm suggests ischemia, anterolateral  When compared with ECG of 23-Feb-2024 5:21:46,  No significant change  Electronically Signed By:   Date and Time of Study: 2024-02-24 05:39:49          CBC    Results from last 7 days   Lab Units 02/25/24  0803 02/24/24  0430 02/23/24  0347 02/22/24 2232 02/22/24 2129 02/22/24  1453 02/22/24  1415 02/22/24  1319 02/22/24  1257 02/22/24  0923 02/22/24  0457 02/21/24  1150 02/21/24  0421   WBC 10*3/mm3 11.00* 10.20 9.60  --   --   --   --   --  10.30  --  9.10 9.80 9.10   HEMOGLOBIN g/dL 10.7* 10.0* 10.4*  --   --   --   --   --  11.8*  --  12.6* 14.1 13.1   HEMOGLOBIN, POC g/dL  --   --   --  9.5* 9.6* 9.7* 10.2*   < >  --    < >  --   --   --    PLATELETS 10*3/mm3 130* 104* 107*  --   --   --   --   --  114*  --  177 173 170    < > = values in this interval not displayed.     BMP   Results from last 7 days   Lab Units 02/25/24  0803 02/24/24  0430 02/23/24  0347 02/22/24 2232 02/22/24 2129 02/22/24 2126 02/22/24  1600 02/22/24  1257 02/22/24  0457 02/21/24  1150 02/21/24  0421 02/20/24  0650   SODIUM mmol/L 142 146* 148*  --   --   --   --  142 141 139 138  --    POTASSIUM mmol/L 4.1 4.2 4.4  --   --  4.4 3.9 4.4  4.4 4.3 4.2 4.2  --    CHLORIDE mmol/L 105 112* 111*  --   --   --   --  106 106 106 104  --    CO2 mmol/L 29.0 27.0 22.0  --   --   --   --  24.0 24.0 24.0 24.0  --    BUN mg/dL 23 25* 25*  --   --   --   --  30* 33* 31* 34*  --    CREATININE mg/dL 1.37* 1.49* 1.52* 1.63* 1.58*  --   --  1.66* 1.59* 1.46* 1.69*  --    GLUCOSE mg/dL 105* 118* 114*  --   --   --   --  94 99 130* 104*  --    MAGNESIUM mg/dL  --   --  2.3  --   --   --   --   --   --   --  1.9 2.1    PHOSPHORUS mg/dL  --  2.5 4.8*  --   --   --   --  2.3* 2.9  --  3.1  --      CMP   Results from last 7 days   Lab Units 02/25/24  0803 02/24/24  0430 02/23/24  0347 02/22/24 2232 02/22/24 2129 02/22/24 2126 02/22/24  1600 02/22/24  1257 02/22/24  0457 02/21/24  1150 02/21/24  0421   SODIUM mmol/L 142 146* 148*  --   --   --   --  142 141 139 138   POTASSIUM mmol/L 4.1 4.2 4.4  --   --  4.4 3.9 4.4  4.4 4.3 4.2 4.2   CHLORIDE mmol/L 105 112* 111*  --   --   --   --  106 106 106 104   CO2 mmol/L 29.0 27.0 22.0  --   --   --   --  24.0 24.0 24.0 24.0   BUN mg/dL 23 25* 25*  --   --   --   --  30* 33* 31* 34*   CREATININE mg/dL 1.37* 1.49* 1.52* 1.63* 1.58*  --   --  1.66* 1.59* 1.46* 1.69*   GLUCOSE mg/dL 105* 118* 114*  --   --   --   --  94 99 130* 104*   ALBUMIN g/dL  --  3.7 4.3  --   --   --   --  3.7 4.1 4.4 4.4   BILIRUBIN mg/dL  --   --   --   --   --   --   --   --   --  0.9 0.7   ALK PHOS U/L  --   --   --   --   --   --   --   --   --  92 83   AST (SGOT) U/L  --   --   --   --   --   --   --   --   --  27 25   ALT (SGPT) U/L  --   --   --   --   --   --   --   --   --  21 21     Cardiac Studies:  Echo- Results for orders placed during the hospital encounter of 02/20/24    Adult Transthoracic Echo Complete W/ Cont if Necessary Per Protocol    Interpretation Summary    Left ventricular ejection fraction appears to be 56 - 60%.    Indication  Chest pain    Technically satisfactory study.  Mitral valve is structurally normal.  Minimal mitral regurgitation.  Tricuspid valve is structurally normal.  Aortic valve is structurally normal.  Pulmonic valve could not be well visualized.  Left atrium is normal in size.  Right atrium is normal in size.  Left ventricle is normal in size and contractility with ejection fraction of 60%.  Grade 1 diastolic dysfunction (MV E/A0.6)  Right ventricle is normal in size and contractility with TAPSE of 1.61..  Atrial septum is intact.  Aorta is normal.  No pericardial  effusion or intracardiac thrombus is seen.    Impression  Structurally and functionally normal cardiac valves except for minimal mitral regurgitation.  Grade 1 diastolic dysfunction is present..  Left ventricular size and contractility is normal with ejection fraction of 60%.    Stress Myoview-  Cath-      Medication Review:   Scheduled Meds:amLODIPine, 10 mg, Oral, Q24H  aspirin, 81 mg, Oral, Daily  carvedilol, 25 mg, Oral, BID With Meals  chlorhexidine, 15 mL, Mouth/Throat, Q12H  enoxaparin, 40 mg, Subcutaneous, Daily  folic acid, 1 mg, Oral, Daily  insulin lispro, 2-7 Units, Subcutaneous, 4x Daily AC & at Bedtime  lisinopril, 40 mg, Oral, Q24H  mupirocin, , Each Nare, BID  pantoprazole, 40 mg, Oral, Q AM  polyethylene glycol, 17 g, Oral, BID  rosuvastatin, 40 mg, Oral, Nightly  senna-docusate sodium, 2 tablet, Oral, BID  terazosin, 5 mg, Oral, Nightly      Continuous Infusions:   PRN Meds:.  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  Unstable angina/non-ST elevation myocardial infarction:    Patient underwent cardiac catheterization and multivessel coronary artery disease noted.  Patient was recommended for CABG    2.  CAD/CABG:    Patient underwent CABG x 3.  Chest tubes are being removed today.  Patient was sitting up in the chair.  Patient is feeling better.    4.  Hypertension:    Patient is on  lisinopril and doing well.  Blood pressure is slightly high but all the medicine from home has been started.  Monitor it    5.  Hyperlipidemia:    Patient is on Crestor repeat lipid panel testing    Ahsan Tee MD  02/25/24  11:21 EST

## 2024-02-25 NOTE — PROGRESS NOTES
[facility] Progress Note      Patient Name: Anthony Ayoub  : 1955  MRN: [id number]  Primary Care Physician:  Yifan Elizabeth MD  Date of admission: 2024    Subjective     Interval History:      Pod#3 cabg  No dyspnea or pain except when coughing; no n/v, enjoying the food    Review of Systems:   As noted above    Objective     Vitals:   Temp:  [98.2 °F (36.8 °C)-100.8 °F (38.2 °C)] 99.9 °F (37.7 °C)  Heart Rate:  [] 89  Resp:  [17-23] 20  BP: (138-166)/(73-86) 148/79  Flow (L/min):  [0-2] 0    Intake/Output Summary (Last 24 hours) at 2024 1335  Last data filed at 2024 0400  Gross per 24 hour   Intake 1069.1 ml   Output 850 ml   Net 219.1 ml       Physical Exam:    General Appearance: NAD  HEENT: oral mucosa normal, nonicteric sclera  Neck: supple, no JVD  Lungs: Few rhonchi  Heart: RRR, normal S1 and S2  Abdomen: soft, nondistended  Extremities: no edema  Neuro: Awake alert and moving all extremities    Scheduled Meds:     amLODIPine, 10 mg, Oral, Q24H  aspirin, 81 mg, Oral, Daily  carvedilol, 25 mg, Oral, BID With Meals  chlorhexidine, 15 mL, Mouth/Throat, Q12H  enoxaparin, 40 mg, Subcutaneous, Daily  folic acid, 1 mg, Oral, Daily  furosemide, 20 mg, Oral, Daily  insulin lispro, 2-7 Units, Subcutaneous, 4x Daily AC & at Bedtime  lisinopril, 40 mg, Oral, Q24H  mupirocin, , Each Nare, BID  pantoprazole, 40 mg, Oral, Q AM  polyethylene glycol, 17 g, Oral, BID  rosuvastatin, 40 mg, Oral, Nightly  senna-docusate sodium, 2 tablet, Oral, BID  terazosin, 5 mg, Oral, Nightly      IV Meds:          Results Reviewed:   I have personally reviewed the results from the time of this admission to 2024 13:35 EST     Results from last 7 days   Lab Units 24  0803 24  0430 24  0347 24  0457 24  1150 24  0421   SODIUM mmol/L 142 146* 148*   < > 139 138   POTASSIUM mmol/L 4.1 4.2 4.4   < > 4.2 4.2   CHLORIDE mmol/L 105  112* 111*   < > 106 104   CO2 mmol/L 29.0 27.0 22.0   < > 24.0 24.0   BUN mg/dL 23 25* 25*   < > 31* 34*   CREATININE mg/dL 1.37* 1.49* 1.52*   < > 1.46* 1.69*   CALCIUM mg/dL 9.1 8.7 8.4*   < > 9.1 9.0   BILIRUBIN mg/dL  --   --   --   --  0.9 0.7   ALK PHOS U/L  --   --   --   --  92 83   ALT (SGPT) U/L  --   --   --   --  21 21   AST (SGOT) U/L  --   --   --   --  27 25   GLUCOSE mg/dL 105* 118* 114*   < > 130* 104*    < > = values in this interval not displayed.     Estimated Creatinine Clearance: 56.9 mL/min (A) (by C-G formula based on SCr of 1.37 mg/dL (H)).  Results from last 7 days   Lab Units 02/24/24  0430 02/23/24  0347 02/22/24  1257 02/22/24  0457 02/21/24  0421 02/20/24  0650   MAGNESIUM mg/dL  --  2.3  --   --  1.9 2.1   PHOSPHORUS mg/dL 2.5 4.8* 2.3*   < > 3.1  --     < > = values in this interval not displayed.         Results from last 7 days   Lab Units 02/25/24  0803 02/24/24  0430 02/23/24  0347 02/22/24  2232 02/22/24  2129 02/22/24  1319 02/22/24  1257 02/22/24  0923 02/22/24  0457   WBC 10*3/mm3 11.00* 10.20 9.60  --   --   --  10.30  --  9.10   HEMOGLOBIN g/dL 10.7* 10.0* 10.4*  --   --   --  11.8*  --  12.6*   HEMOGLOBIN, POC g/dL  --   --   --  9.5* 9.6*   < >  --    < >  --    PLATELETS 10*3/mm3 130* 104* 107*  --   --   --  114*  --  177    < > = values in this interval not displayed.     Results from last 7 days   Lab Units 02/23/24  0347 02/22/24  1257 02/21/24  1150 02/21/24  0421 02/20/24  0509   INR  1.10 1.11* 1.08 1.06 1.02       Assessment / Plan     ASSESSMENT:    Chronic kidney disease stage IIIa.  Secondary to hypertensive nephrosclerosis.  Renal function stable.  Electrolytes, volume status okay  Coronary artery disease.  S/p CABG x 3.  Patient stable hemodynamically  Hypertension chronic kidney disease.  Coreg, amlodipine, and terazosin added, off cardene    PLAN:  Add prn bp rx  Add po lasix  Repeat labs in a.m.    Kj Mead MD  02/25/24  13:35 EST    Nephrology  [facility]  [phone]

## 2024-02-26 ENCOUNTER — READMISSION MANAGEMENT (OUTPATIENT)
Dept: CALL CENTER | Facility: HOSPITAL | Age: 69
End: 2024-02-26
Payer: MEDICARE

## 2024-02-26 VITALS
OXYGEN SATURATION: 96 % | HEIGHT: 67 IN | SYSTOLIC BLOOD PRESSURE: 131 MMHG | WEIGHT: 163.8 LBS | DIASTOLIC BLOOD PRESSURE: 81 MMHG | RESPIRATION RATE: 16 BRPM | TEMPERATURE: 97.8 F | HEART RATE: 86 BPM | BODY MASS INDEX: 25.71 KG/M2

## 2024-02-26 LAB
ANION GAP SERPL CALCULATED.3IONS-SCNC: 12 MMOL/L (ref 5–15)
BUN SERPL-MCNC: 27 MG/DL (ref 8–23)
BUN/CREAT SERPL: 20.1 (ref 7–25)
CALCIUM SPEC-SCNC: 9.2 MG/DL (ref 8.6–10.5)
CHLORIDE SERPL-SCNC: 101 MMOL/L (ref 98–107)
CO2 SERPL-SCNC: 28 MMOL/L (ref 22–29)
CREAT SERPL-MCNC: 1.34 MG/DL (ref 0.76–1.27)
DEPRECATED RDW RBC AUTO: 48.1 FL (ref 37–54)
EGFRCR SERPLBLD CKD-EPI 2021: 58.4 ML/MIN/1.73
ERYTHROCYTE [DISTWIDTH] IN BLOOD BY AUTOMATED COUNT: 14.9 % (ref 12.3–15.4)
GLUCOSE BLDC GLUCOMTR-MCNC: 124 MG/DL (ref 70–105)
GLUCOSE BLDC GLUCOMTR-MCNC: 98 MG/DL (ref 70–105)
GLUCOSE SERPL-MCNC: 115 MG/DL (ref 65–99)
HCT VFR BLD AUTO: 32.9 % (ref 37.5–51)
HGB BLD-MCNC: 11.1 G/DL (ref 13–17.7)
MCH RBC QN AUTO: 31.1 PG (ref 26.6–33)
MCHC RBC AUTO-ENTMCNC: 33.6 G/DL (ref 31.5–35.7)
MCV RBC AUTO: 92.5 FL (ref 79–97)
PLATELET # BLD AUTO: 150 10*3/MM3 (ref 140–450)
PMV BLD AUTO: 8.1 FL (ref 6–12)
POTASSIUM SERPL-SCNC: 3.6 MMOL/L (ref 3.5–5.2)
RBC # BLD AUTO: 3.56 10*6/MM3 (ref 4.14–5.8)
SODIUM SERPL-SCNC: 141 MMOL/L (ref 136–145)
WBC NRBC COR # BLD AUTO: 8.2 10*3/MM3 (ref 3.4–10.8)

## 2024-02-26 PROCEDURE — 82948 REAGENT STRIP/BLOOD GLUCOSE: CPT | Performed by: NURSE PRACTITIONER

## 2024-02-26 PROCEDURE — 80048 BASIC METABOLIC PNL TOTAL CA: CPT | Performed by: NURSE PRACTITIONER

## 2024-02-26 PROCEDURE — 85027 COMPLETE CBC AUTOMATED: CPT | Performed by: NURSE PRACTITIONER

## 2024-02-26 PROCEDURE — 99232 SBSQ HOSP IP/OBS MODERATE 35: CPT | Performed by: INTERNAL MEDICINE

## 2024-02-26 RX ORDER — CLOPIDOGREL BISULFATE 75 MG/1
75 TABLET ORAL DAILY
Qty: 30 TABLET | Refills: 3 | Status: SHIPPED | OUTPATIENT
Start: 2024-02-26

## 2024-02-26 RX ORDER — AMLODIPINE BESYLATE 5 MG/1
5 TABLET ORAL
Qty: 30 TABLET | Refills: 3 | Status: SHIPPED | OUTPATIENT
Start: 2024-02-27

## 2024-02-26 RX ORDER — POTASSIUM CHLORIDE 20 MEQ/1
40 TABLET, EXTENDED RELEASE ORAL EVERY 4 HOURS
Status: DISCONTINUED | OUTPATIENT
Start: 2024-02-26 | End: 2024-02-26 | Stop reason: HOSPADM

## 2024-02-26 RX ORDER — ASPIRIN 81 MG/1
81 TABLET ORAL DAILY
Start: 2024-02-26

## 2024-02-26 RX ORDER — AMLODIPINE BESYLATE 5 MG/1
5 TABLET ORAL
Status: DISCONTINUED | OUTPATIENT
Start: 2024-02-26 | End: 2024-02-26 | Stop reason: HOSPADM

## 2024-02-26 RX ORDER — HYDROCODONE BITARTRATE AND ACETAMINOPHEN 5; 325 MG/1; MG/1
1 TABLET ORAL EVERY 6 HOURS PRN
Qty: 10 TABLET | Refills: 0 | Status: SHIPPED | OUTPATIENT
Start: 2024-02-26 | End: 2024-03-03

## 2024-02-26 RX ORDER — POTASSIUM CHLORIDE 750 MG/1
10 CAPSULE, EXTENDED RELEASE ORAL DAILY
Qty: 30 CAPSULE | Refills: 1 | Status: SHIPPED | OUTPATIENT
Start: 2024-02-26

## 2024-02-26 RX ORDER — FUROSEMIDE 20 MG/1
20 TABLET ORAL DAILY
Qty: 30 TABLET | Refills: 1 | Status: SHIPPED | OUTPATIENT
Start: 2024-02-27

## 2024-02-26 RX ADMIN — ASPIRIN 81 MG: 81 TABLET, COATED ORAL at 09:44

## 2024-02-26 RX ADMIN — PANTOPRAZOLE SODIUM 40 MG: 40 TABLET, DELAYED RELEASE ORAL at 05:59

## 2024-02-26 RX ADMIN — FUROSEMIDE 20 MG: 20 TABLET ORAL at 09:44

## 2024-02-26 RX ADMIN — CARVEDILOL 25 MG: 25 TABLET, FILM COATED ORAL at 09:44

## 2024-02-26 RX ADMIN — CHLORHEXIDINE GLUCONATE, 0.12% ORAL RINSE 15 ML: 1.2 SOLUTION DENTAL at 09:48

## 2024-02-26 RX ADMIN — AMLODIPINE BESYLATE 5 MG: 5 TABLET ORAL at 09:44

## 2024-02-26 RX ADMIN — POTASSIUM CHLORIDE 40 MEQ: 1500 TABLET, EXTENDED RELEASE ORAL at 09:44

## 2024-02-26 RX ADMIN — FOLIC ACID 1 MG: 1 TABLET ORAL at 09:44

## 2024-02-26 RX ADMIN — LISINOPRIL 40 MG: 20 TABLET ORAL at 09:44

## 2024-02-26 NOTE — PROGRESS NOTES
Referring Provider: Soham Garrett, *    Reason for follow-up:  Unstable angina  Non-STEMI  Severe and critical coronary artery disease.  Status post CABG 2024    Patient Care Team:  Yifan Elizabeth MD as PCP - General    Subjective .      ROS  No specific symptoms were offered.      History  Past Medical History:   Diagnosis Date    CHF (congestive heart failure)     Coronary artery disease     GERD (gastroesophageal reflux disease)     Hyperlipidemia     Hypertension     Leukemia     Low back pain     Sleep apnea        Past Surgical History:   Procedure Laterality Date    BONE MARROW TRANSPLANT      CARDIAC CATHETERIZATION      CARDIAC CATHETERIZATION N/A 2024    Procedure: Coronary angiography;  Surgeon: Michelle Hernández MD;  Location: [facility] CATH INVASIVE LOCATION;  Service: Cardiovascular;  Laterality: N/A;    CARDIAC CATHETERIZATION N/A 2024    Procedure: Left Heart Cath;  Surgeon: Michelle Hernández MD;  Location: [facility] CATH INVASIVE LOCATION;  Service: Cardiovascular;  Laterality: N/A;    CHOLECYSTECTOMY N/A 2022    Procedure: CHOLECYSTECTOMY LAPAROSCOPIC;  Surgeon: Kale Acuna MD;  Location: [facility] MAIN OR;  Service: General;  Laterality: N/A;    SINUS SURGERY      TONSILLECTOMY      VASECTOMY         Family History   Problem Relation Age of Onset    Hypertension Mother     Hyperlipidemia Mother     Heart disease Father     Heart attack Father     Hypertension Sister     Hypertension Brother     Heart disease Brother        Social History     Tobacco Use    Smoking status: Former     Packs/day: 1.00     Years: 30.00     Additional pack years: 0.00     Total pack years: 30.00     Types: Cigarettes     Quit date:      Years since quittin.1    Smokeless tobacco: Never   Vaping Use    Vaping Use: Never used   Substance Use Topics    Alcohol use: No    Drug use: Defer        Medications Prior to Admission   Medication Sig Dispense Refill Last Dose    carvedilol  (COREG) 25 MG tablet Take 1 tablet by mouth 2 (Two) Times a Day.   2/19/2024    cloNIDine (CATAPRES) 0.1 MG tablet 1/2 in am  And 1/2 inpm   2/20/2024    escitalopram (LEXAPRO) 20 MG tablet Take 1 tablet by mouth Daily. 90 tablet 1 2/20/2024    ezetimibe (ZETIA) 10 MG tablet TAKE 1 TABLET BY MOUTH EVERY DAY 95 tablet 0 2/20/2024    ferrous sulfate 325 (65 FE) MG EC tablet Take 1 tablet by mouth.   2/20/2024    folic acid (FOLVITE) 1 MG tablet TAKE 1 TABLET BY MOUTH EVERY DAY 90 tablet 1 2/20/2024    isosorbide mononitrate (IMDUR) 30 MG 24 hr tablet Take 1 tablet by mouth Daily.   2/20/2024    lisinopril (PRINIVIL,ZESTRIL) 40 MG tablet Take 1 tablet by mouth Daily.   2/20/2024    pantoprazole (PROTONIX) 40 MG EC tablet Take 1 tablet by mouth Daily.   2/20/2024    rosuvastatin (CRESTOR) 40 MG tablet TAKE 1 TABLET BY MOUTH EVERYDAY AT BEDTIME 90 tablet 1 2/19/2024    terazosin (HYTRIN) 5 MG capsule Daily.   2/19/2024    vitamin D (ERGOCALCIFEROL) 1.25 MG (39129 UT) capsule capsule TAKE 1 CAPSULE BY MOUTH 1 TIME PER WEEK. 12 capsule 1 2/19/2024    aspirin 81 MG chewable tablet Chew 1 tablet Daily.   Unknown    fluticasone (FLONASE) 50 MCG/ACT nasal spray 1 spray into the nostril(s) as directed by provider 1 (One) Time. prn   Unknown    multivitamin (THERAGRAN) tablet tablet Take  by mouth.   Unknown    nitroglycerin (NITROSTAT) 0.6 MG SL tablet    Unknown    VITAMIN D PO 50,000 ut  (1.25mg)   Unknown       Allergies  Doxycycline hyclate, Penicillins, and Sulfa antibiotics    Scheduled Meds:amLODIPine, 10 mg, Oral, Q24H  aspirin, 81 mg, Oral, Daily  carvedilol, 25 mg, Oral, BID With Meals  chlorhexidine, 15 mL, Mouth/Throat, Q12H  enoxaparin, 40 mg, Subcutaneous, Daily  folic acid, 1 mg, Oral, Daily  furosemide, 20 mg, Oral, Daily  insulin lispro, 2-7 Units, Subcutaneous, 4x Daily AC & at Bedtime  lisinopril, 40 mg, Oral, Q24H  mupirocin, , Each Nare, BID  pantoprazole, 40 mg, Oral, Q AM  polyethylene glycol, 17 g, Oral,  "BID  rosuvastatin, 40 mg, Oral, Nightly  senna-docusate sodium, 2 tablet, Oral, BID  terazosin, 5 mg, Oral, Nightly      Continuous Infusions:     PRN Meds:.  acetaminophen **OR** acetaminophen **OR** acetaminophen    Calcium Replacement - Follow Nurse / BPA Driven Protocol    dextrose    dextrose    glucagon (human recombinant)    hydrALAZINE    HYDROcodone-acetaminophen    Magnesium Cardiology Dose Replacement - Follow Nurse / BPA Driven Protocol    [] Morphine **AND** naloxone    nitroglycerin    ondansetron    Phosphorus Replacement - Follow Nurse / BPA Driven Protocol    Potassium Replacement - Follow Nurse / BPA Driven Protocol    Objective     VITAL SIGNS  Vitals:    24 0300 24 0318 24 0400 24 0559   BP: 115/76 144/83 139/80    Pulse: 82 82 92    Resp:       Temp:  98.2 °F (36.8 °C)     TempSrc:  Oral     SpO2: 94% 93% 94%    Weight:    74.3 kg (163 lb 12.8 oz)   Height:           Flowsheet Rows      Flowsheet Row First Filed Value   Admission Height 167.6 cm (66\") Documented at 2024 0433   Admission Weight 75.5 kg (166 lb 7.2 oz) Documented at 2024 0433              Intake/Output Summary (Last 24 hours) at 2024 0612  Last data filed at 2024 1800  Gross per 24 hour   Intake 960 ml   Output 1650 ml   Net -690 ml        TELEMETRY: Sinus rhythm    Physical Exam:  The patient is alert, oriented and in no distress.  Vital signs as noted above.  Head and neck revealed no carotid bruits or jugular venous distention.  No thyromegaly or lymphadenopathy is present  Lungs clear.  No wheezing.  Breath sounds are normal bilaterally.  Heart normal first and second heart sounds.  No murmur. No precordial rub is present.  No gallop is present.  Abdomen soft and nontender.  No organomegaly is present.  Extremities with good peripheral pulses without any pedal edema.  Cardiac cath site looks normal.  Sheath in place.  No hematoma.  Skin warm and dry.  Visible incisions are " looking well.  Musculoskeletal system is grossly normal  CNS grossly normal    Reviewed and updated.    Results Review:   I reviewed the patient's new clinical results.  Lab Results (last 24 hours)       Procedure Component Value Units Date/Time    POC Glucose Once [[id number]]  (Abnormal) Collected: 02/25/24 2116    Specimen: Blood Updated: 02/25/24 2117     Glucose 121 mg/dL      Comment: Serial Number: [id number]:  [id number]       POC Glucose Once [[id number]]  (Abnormal) Collected: 02/25/24 1627    Specimen: Blood Updated: 02/25/24 1630     Glucose 137 mg/dL      Comment: Serial Number: [id number]:  [id number]       POC Glucose 4x Daily Before Meals & at Bedtime [[id number]]  (Abnormal) Collected: 02/25/24 1144    Specimen: Blood Updated: 02/25/24 1157     Glucose 127 mg/dL      Comment: Serial Number: [id number]:  [id number]       Basic Metabolic Panel [[id number]]  (Abnormal) Collected: 02/25/24 0803    Specimen: Blood Updated: 02/25/24 0827     Glucose 105 mg/dL      BUN 23 mg/dL      Creatinine 1.37 mg/dL      Sodium 142 mmol/L      Potassium 4.1 mmol/L      Chloride 105 mmol/L      CO2 29.0 mmol/L      Calcium 9.1 mg/dL      BUN/Creatinine Ratio 16.8     Anion Gap 8.0 mmol/L      eGFR 56.9 mL/min/1.73     Narrative:      GFR Normal >60  Chronic Kidney Disease <60  Kidney Failure <15      CBC (No Diff) [[id number]]  (Abnormal) Collected: 02/25/24 0803    Specimen: Blood Updated: 02/25/24 0808     WBC 11.00 10*3/mm3      RBC 3.47 10*6/mm3      Hemoglobin 10.7 g/dL      Hematocrit 32.2 %      MCV 93.0 fL      MCH 31.0 pg      MCHC 33.3 g/dL      RDW 15.2 %      RDW-SD 48.6 fl      MPV 7.9 fL      Platelets 130 10*3/mm3     POC Glucose 4x Daily Before Meals & at Bedtime [[id number]]  (Normal) Collected: 02/25/24 0806    Specimen: Blood Updated: 02/25/24 0807     Glucose 100 mg/dL      Comment: Serial Number: [id number]:  [id number]       POC Glucose Once [[id number]]  (Abnormal) Collected:  02/24/24 2227    Specimen: Blood Updated: 02/25/24 0648     Glucose 124 mg/dL      Comment: Serial Number: [id number]:  [id number]               Imaging Results (Last 24 Hours)       ** No results found for the last 24 hours. **        LAB RESULTS (LAST 7 DAYS)    CBC  Results from last 7 days   Lab Units 02/25/24  0803 02/24/24  0430 02/23/24 0347 02/22/24 2232 02/22/24 2129 02/22/24  1453 02/22/24  1415 02/22/24  1319 02/22/24  1257 02/22/24  0923 02/22/24  0457 02/21/24  1150 02/21/24 0421   WBC 10*3/mm3 11.00* 10.20 9.60  --   --   --   --   --  10.30  --  9.10 9.80 9.10   RBC 10*6/mm3 3.47* 3.25* 3.43*  --   --   --   --   --  3.82*  --  4.05* 4.60 4.19   HEMOGLOBIN g/dL 10.7* 10.0* 10.4*  --   --   --   --   --  11.8*  --  12.6* 14.1 13.1   HEMOGLOBIN, POC g/dL  --   --   --  9.5* 9.6* 9.7* 10.2*   < >  --    < >  --   --   --    HEMATOCRIT % 32.2* 29.9* 31.8*  --   --   --   --   --  34.6*  --  37.4* 41.9 38.9   HEMATOCRIT POC %  --   --   --  28* 28* 28* 30*   < >  --    < >  --   --   --    MCV fL 93.0 92.0 92.8  --   --   --   --   --  90.7  --  92.3 91.2 93.0   PLATELETS 10*3/mm3 130* 104* 107*  --   --   --   --   --  114*  --  177 173 170    < > = values in this interval not displayed.       BMP  Results from last 7 days   Lab Units 02/25/24  0803 02/24/24  0430 02/23/24  0347 02/22/24 2232 02/22/24 2129 02/22/24 2126 02/22/24  1600 02/22/24  1257 02/22/24  0457 02/21/24  1150 02/21/24  0421 02/20/24  0650   SODIUM mmol/L 142 146* 148*  --   --   --   --  142 141 139 138  --    POTASSIUM mmol/L 4.1 4.2 4.4  --   --  4.4 3.9 4.4  4.4 4.3 4.2 4.2  --    CHLORIDE mmol/L 105 112* 111*  --   --   --   --  106 106 106 104  --    CO2 mmol/L 29.0 27.0 22.0  --   --   --   --  24.0 24.0 24.0 24.0  --    BUN mg/dL 23 25* 25*  --   --   --   --  30* 33* 31* 34*  --    CREATININE mg/dL 1.37* 1.49* 1.52* 1.63* 1.58*  --   --  1.66* 1.59* 1.46* 1.69*  --    GLUCOSE mg/dL 105* 118* 114*  --   --   --    --  94 99 130* 104*  --    MAGNESIUM mg/dL  --   --  2.3  --   --   --   --   --   --   --  1.9 2.1   PHOSPHORUS mg/dL  --  2.5 4.8*  --   --   --   --  2.3* 2.9  --  3.1  --        CMP   Results from last 7 days   Lab Units 02/25/24  0803 02/24/24  0430 02/23/24  0347 02/22/24 2232 02/22/24 2129 02/22/24 2126 02/22/24  1600 02/22/24  1257 02/22/24  0457 02/21/24  1150 02/21/24  0421   SODIUM mmol/L 142 146* 148*  --   --   --   --  142 141 139 138   POTASSIUM mmol/L 4.1 4.2 4.4  --   --  4.4 3.9 4.4  4.4 4.3 4.2 4.2   CHLORIDE mmol/L 105 112* 111*  --   --   --   --  106 106 106 104   CO2 mmol/L 29.0 27.0 22.0  --   --   --   --  24.0 24.0 24.0 24.0   BUN mg/dL 23 25* 25*  --   --   --   --  30* 33* 31* 34*   CREATININE mg/dL 1.37* 1.49* 1.52* 1.63* 1.58*  --   --  1.66* 1.59* 1.46* 1.69*   GLUCOSE mg/dL 105* 118* 114*  --   --   --   --  94 99 130* 104*   ALBUMIN g/dL  --  3.7 4.3  --   --   --   --  3.7 4.1 4.4 4.4   BILIRUBIN mg/dL  --   --   --   --   --   --   --   --   --  0.9 0.7   ALK PHOS U/L  --   --   --   --   --   --   --   --   --  92 83   AST (SGOT) U/L  --   --   --   --   --   --   --   --   --  27 25   ALT (SGPT) U/L  --   --   --   --   --   --   --   --   --  21 21         BNP        TROPONIN  Results from last 7 days   Lab Units 02/21/24  0421   HSTROP T ng/L 149*       CoAg  Results from last 7 days   Lab Units 02/23/24  0347 02/22/24  1257 02/22/24  0457 02/21/24  2305 02/21/24  1703 02/21/24  1150 02/21/24  0421 02/20/24  0509   INR  1.10 1.11*  --   --   --  1.08 1.06 1.02   APTT seconds  --  25.8 82.8* 67.5 62.5 43.8* 26.6* 25.3*       Creatinine Clearance  Estimated Creatinine Clearance: 55.7 mL/min (A) (by C-G formula based on SCr of 1.37 mg/dL (H)).    ABG  Results from last 7 days   Lab Units 02/23/24  0338 02/22/24  2342 02/22/24  2232 02/22/24  2129 02/22/24  1458 02/22/24  1453 02/22/24  1415 02/22/24  1319 02/22/24  1135   PH, ARTERIAL pH units  --  7.312* 7.315* 7.269*  --   7.378 7.374 7.352 7.390   PCO2, ARTERIAL mm Hg  --  44.4 46.3 44.7  --  37.4 37.8 40.0 42.2   PO2 ART mm Hg  --  69.3* 89.7 85.1  --  89.2 100.7 98.5 257.0*   O2 SATURATION ART %  --  91.8* 96.0 94.8  --  96.7 97.7 97.3 100.0*   BASE EXCESS ART mmol/L -6.7* -3.6* -2.6* -6.2* -3.6* -2.8* -2.9* -3.2* 0.0       Radiology  XR Chest 1 View    Result Date: 2/24/2024  Impression: No discrete pneumothorax after left chest tube removal. Similar trace/small bilateral pleural effusions and bibasilar atelectasis, left greater than right. Electronically Signed: Peña Downs MD  2/24/2024 2:05 PM EST  Workstation ID: [id number]         EKG                    I personally viewed and interpreted the patient's EKG/Telemetry data: Sinus rhythm.  Anterolateral ischemic changes    ECHOCARDIOGRAM:    Results for orders placed during the hospital encounter of 02/20/24    Adult Transthoracic Echo Complete W/ Cont if Necessary Per Protocol    Interpretation Summary    Left ventricular ejection fraction appears to be 56 - 60%.    Indication  Chest pain    Technically satisfactory study.  Mitral valve is structurally normal.  Minimal mitral regurgitation.  Tricuspid valve is structurally normal.  Aortic valve is structurally normal.  Pulmonic valve could not be well visualized.  Left atrium is normal in size.  Right atrium is normal in size.  Left ventricle is normal in size and contractility with ejection fraction of 60%.  Grade 1 diastolic dysfunction (MV E/A0.6)  Right ventricle is normal in size and contractility with TAPSE of 1.61..  Atrial septum is intact.  Aorta is normal.  No pericardial effusion or intracardiac thrombus is seen.    Impression  Structurally and functionally normal cardiac valves except for minimal mitral regurgitation.  Grade 1 diastolic dysfunction is present..  Left ventricular size and contractility is normal with ejection fraction of 60%.          STRESS TEST        Cardiolite (Tc-99m sestamibi) stress  test    CARDIAC CATHETERIZATION  No results found for this or any previous visit.                OTHER:         Assessment & Plan     Principal Problem:    Chest pain  Active Problems:    Unstable angina    Non-ST elevated myocardial infarction (non-STEMI)    Atherosclerotic heart disease of native coronary artery with angina pectoris    S/P CABG x 3 with LIMA per Dr. Garrett on 2/22/2024    ]]]]]]]]]]]]]]]]]]]  History  ========  -Status post CABG 2/22/2024-Dr. Garrett  CABG x 3 (LIMA to LAD SVG to diagonal and SVG to marginal branch)    - Preoperative unstable angina  Possible non-STEMI.  Troponin level 59.    - Severe and critical coronary artery disease.    Cardiac catheterization 2/21/2024  Left ventricular angiogram was not performed due to pre-existing renal dysfunction.  Severe triple-vessel coronary artery disease.  Left main coronary artery has 40% disease  Left anterior descending artery is a large and long vessel that has ostial 99% disease.  Mid LAD has 80% disease.  Diagonal branches diffuse 99% disease proximally.  Circumflex coronary artery is a large and dominant vessel that provided multiple branches.  Circumflex coronary artery has 60 to 70% disease proximal to the first marginal branch.  First marginal branch has ostial 95% disease.  Second marginal branch is a relatively small caliber vessel that has 99% disease in the proximal segment that bifurcated into 2 branches.  Each branch has 90 to 95% disease.  There were 3 other marginal branches.  PDA has 99% disease in the proximal segment.  Second marginal branch has 60 to 70% ostial disease.  Right coronary artery is totally occluded in the proximal segment and distal vessel is filling through collaterals from left circulation.  Small caliber vessel.  (Chronic since 2019).  Please see the report from [facility].    Echocardiogram.-2/20/2024  Structurally and functionally normal cardiac valves except for minimal mitral  regurgitation.  Grade 1 diastolic dysfunction is present..  Left ventricular size and contractility is normal with ejection fraction of 60%.     Patient had cardiac catheterization at [facility] in 2019  70% diffuse and lengthy RCA disease 50% circumflex 95% first marginal branch disease 50% mid LAD and diagonal branch disease.  The information was obtained from a drawing that patient's wife has.     History of subtotal occlusion of 1st marginal branch at cardiac catheterization 05/18/2018      cardiac catheterization 05/18/2018 revealed normal left ventricular function 50% mid LAD, 50% diffuse diagonal branch disease 50% proximal circumflex, 1st marginal branch is subtotally occluded with 99% disease that bifurcated into 2 branches.  RCA is a   nondominant vessel that has diffuse disease.  Distal vessel filling from left circulation.     Echocardiogram 04/18/2018 is normal     Stress Cardiolite test revealed severe inferior posterior and apical ischemia 05/15/2018.  Patient had reproducible chest discomfort shortness of breath and ST abnormalities.     -  hypertension dyslipidemia GERD osteoarthritis renal dysfunction.  BUN 15 creatinine 1.4     -CML.  Status post bone marrow transplantation  2016     - status post vasectomy and sinus surgery      -former smoker     - strong family history  for coronary artery disease.  Brother had CABG and father had myocardial infarction     -allergic to penicillin sulfa and doxycycline  ============  Plan  ==========  Status post CABG 2/22/2024-Dr. Garrett  CABG x 3 (LIMA to LAD SVG to diagonal and SVG to marginal branch)    Preoperative unstable angina/non-STEMI  Severe and critical coronary artery disease enthesis cardiac cath 2/21/2024    Chronic renal insufficiency.-Dr. Lawson.  31/1.95-2/15/2024.  35/1.76-2/20/2024  34/1.69-2/21/2024  33/1.59-2/22/2024  25/1.52-2/23/2024  23/1.37-2/25/2024  Postoperative renal function will be observed closely.    Respiratory  status-extubated.    Rhythm-sinus.    Dyslipidemia-on Crestor.    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      Echocardiogram.-2/20/2024  Structurally and functionally normal cardiac valves except for minimal mitral regurgitation.  Grade 1 diastolic dysfunction is present..  Left ventricular size and contractility is normal with ejection fraction of 60%.    Medications were reviewed and updated.  Current medications include aspirin amlodipine Coreg 25 mg twice daily subcu Lovenox Lasix 20 mg daily insulin lisinopril 40 mg daily pantoprazole Crestor 40 mg a day terazosin.    Okay with discharge plans from cardiac standpoint.    Follow-up in the office in 2 weeks.    Further plan will depend on patient's progress.     Reviewed and updated-2/26/2024  ]]]]]]]]]]]]]]]]]              Michelle Hernández MD  02/26/24  06:12 EST

## 2024-02-26 NOTE — DISCHARGE SUMMARY
Date of Admission: 2024  Date of Discharge: 2024    Discharge Diagnosis:   - MV CAD, hx MI/ IABP placement (2019), NSTEMI presentation, EF 55-60% (echo)--s/p CABG x3 with LIMA to LAD, SVG to diagonal, SVG to OM (Gerry)  - HTN--takes Coreg, clonidine, lisinopril, imdur  - HLD--statin, Zetia  - CKD, stage 3--follows with Dr. 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, off immunosuppression  - BPH with elevated PSA (2/15/2024)--on Hytrin at home, has referral to [facility]  - anxiety/depression--on Lexapro  - BETY with CPAP noncompliance  - Peripheral neuropathy r/t chemo  - Premature CAD--father  at 62 of MI, brother CABG at 58  - Postop ABLA, expected--improving  - Postop TCP, consumptive--resolved    Presenting Problem/History of Present Illness  Chest pain [R07.9]  Chest pain, unspecified type [R07.9]  Chronic kidney disease, unspecified CKD stage [N18.9]  Hypertension, unspecified type [I10]  Atherosclerotic heart disease of native coronary artery with angina pectoris [I25.119]     Hospital Course  Patient is a 68 y.o. male  with known CAD, CKD stage 3, HTN, HLD, leukmeia s/p bone marrow transplant (), GERD, and former tobacco abuse. He presented to the [facility] ED with intermittent chest pain. It has been occurring the last 3 days. He states the pain is on the left without radiation.  In the ED, his troponin was elevated and he was ruled in for NSTEMI. Cardiology was consulted and he underwent cardiac cath for further evaluation on 2024. Cath showed severe MV CAD. Echo showed minimal mitral regurg and normal EF.  Dr. Garrett was consulted for surgical revascularization.  On 2024, he underwent CABG x3 with CHATTERJEE per Dr. Garrett.  Please see op note for full details.  He was extubated in a timely manner.  He did require Cardene for postop HTN.  Medications were adjusted.  Dr. Lawson  followed for his CKD stage 3.  His creatinine peaked at 1.52 on POD#2.  His creatinine was down to 1.34 on day of discharge.  Dr. Lawson continued diuretics at discharge.  Pt will have BMP weekly for 3 weeks.  Plavix was added for NSTEMI.  [place] 5/325, #10 prn pain.  He is up ambulating and eating adequately.  He has had a postop BM.  Wound care was d/w wife at bedside.    Procedures Performed  Per Dr. Garrett 2/22/2024  1. CABG x 3 (LIMA to LAD, SVG to Diagonal, SVG to OM)  2. EVH of the left legs    Per Dr. Hernández 2/21/2024  Left heart cath       Consults:   Consults       Date and Time Order Name Status Description    2/22/2024 12:56 PM Inpatient Cardiology Consult      2/21/2024  8:34 AM Inpatient Cardiothoracic Surgery Consult Completed     2/20/2024  8:59 AM Inpatient Nephrology Consult Completed             Pertinent Test Results:    Lab Results   Component Value Date    WBC 8.20 02/26/2024    HGB 11.1 (L) 02/26/2024    HCT 32.9 (L) 02/26/2024    MCV 92.5 02/26/2024     02/26/2024      Lab Results   Component Value Date    GLUCOSE 115 (H) 02/26/2024    CALCIUM 9.2 02/26/2024     02/26/2024    K 3.6 02/26/2024    CO2 28.0 02/26/2024     02/26/2024    BUN 27 (H) 02/26/2024    CREATININE 1.34 (H) 02/26/2024    EGFRIFNONA 53 (L) 01/10/2022    BCR 20.1 02/26/2024    ANIONGAP 12.0 02/26/2024     Lab Results   Component Value Date    INR 1.10 02/23/2024    PROTIME 11.9 (H) 02/23/2024         Condition on Discharge: Stable for discharge to home with family assistance.    Vital Signs  Temp:  [98.2 °F (36.8 °C)-99.9 °F (37.7 °C)] 98.6 °F (37 °C)  Heart Rate:  [77-99] 91  Resp:  [19-29] 19  BP: (115-159)/(68-92) 124/77    Physical Exam:  Neuro intact, nad, up in recliner, conversant  Tele:  SR 80s, + rub  Diminished bases, 96% RA  dsg c/d/i, no drainage  Benign abd, + BM  No edema      Discharge Disposition  Home or Self Care    Discharge Medications     Discharge Medications        New Medications         Instructions Start Date   amLODIPine 5 MG tablet  Commonly known as: NORVASC   5 mg, Oral, Every 24 Hours Scheduled   Start Date: February 27, 2024     aspirin 81 MG EC tablet  Replaces: aspirin 81 MG chewable tablet   81 mg, Oral, Daily      clopidogrel 75 MG tablet  Commonly known as: PLAVIX   75 mg, Oral, Daily      furosemide 20 MG tablet  Commonly known as: LASIX   20 mg, Oral, Daily   Start Date: February 27, 2024     HYDROcodone-acetaminophen 5-325 MG per tablet  Commonly known as: NORCO   1 tablet, Oral, Every 6 Hours PRN      potassium chloride 10 MEQ CR capsule  Commonly known as: MICRO-K   10 mEq, Oral, Daily             Continue These Medications        Instructions Start Date   carvedilol 25 MG tablet  Commonly known as: COREG   25 mg, Oral, 2 Times Daily      escitalopram 20 MG tablet  Commonly known as: LEXAPRO   20 mg, Oral, Daily      ezetimibe 10 MG tablet  Commonly known as: ZETIA   TAKE 1 TABLET BY MOUTH EVERY DAY      ferrous sulfate 325 (65 FE) MG EC tablet   325 mg, Oral      fluticasone 50 MCG/ACT nasal spray  Commonly known as: FLONASE   1 spray, Nasal, Once, prn      folic acid 1 MG tablet  Commonly known as: FOLVITE   TAKE 1 TABLET BY MOUTH EVERY DAY      lisinopril 40 MG tablet  Commonly known as: PRINIVIL,ZESTRIL   40 mg, Oral, Daily      multivitamin tablet tablet   Oral      pantoprazole 40 MG EC tablet  Commonly known as: PROTONIX   40 mg, Oral, Daily      rosuvastatin 40 MG tablet  Commonly known as: CRESTOR   TAKE 1 TABLET BY MOUTH EVERYDAY AT BEDTIME      terazosin 5 MG capsule  Commonly known as: HYTRIN   Every 24 Hours      vitamin D 1.25 MG (79100 UT) capsule capsule  Commonly known as: ERGOCALCIFEROL   TAKE 1 CAPSULE BY MOUTH 1 TIME PER WEEK.             Stop These Medications      aspirin 81 MG chewable tablet  Replaced by: aspirin 81 MG EC tablet     cloNIDine 0.1 MG tablet  Commonly known as: CATAPRES     isosorbide mononitrate 30 MG 24 hr tablet  Commonly known as:  IMDUR     nitroglycerin 0.6 MG SL tablet  Commonly known as: NITROSTAT     VITAMIN D PO              Discharge Diet:   Healthy Heart Diet    Activity at Discharge:   1. No driving for 2 weeks and off narcotic pain medications.  2. Shower daily. Clean incisions with warm water and antibacterial soap only. Do not put any lotion or ointments on incisions.  3. Ambulate for 10 minutes at least 3 times a day.  4. No heavy lifting > 10lbs until seen in office.   5. Take all medications as prescribed.     Follow-up Appointments  Future Appointments   Date Time Provider Department Center   3/14/2024  9:15 AM Annetta Mark APRN [facility]   7/18/2024  1:00 PM Yifan Elizabeth MD [facility]     Additional Instructions for the Follow-ups that You Need to Schedule       Discharge Follow-up with PCP   As directed       Currently Documented PCP:    Yifan Elizabeth MD    PCP Phone Number:    [phone]     Follow Up Details: in one month        Discharge Follow-up with Specialty: Cardiology; 2 Weeks   As directed      Specialty: Cardiology   Follow Up: 2 Weeks   Follow Up Details: call for an appt with Dr. Hernández        Discharge Follow-up with Specified Provider: Cardiac Surgery; 2 Weeks   As directed      To: Cardiac Surgery   Follow Up: 2 Weeks   Follow Up Details: NP postop follow-up appt on 3/14/2024 9:15 AM        Discharge Follow-up with Specified Provider: Dr. Lawson; 2 Weeks   As directed      To: Dr. Lawsno   Follow Up: 2 Weeks   Follow Up Details: call for a 2-4 weeks follow-up appt        Referral to Cardiac Rehab   As directed      Call MD With Problems / Concerns    Mar 26, 2024 (Approximate)      Instructions: Call for temp >101 or any surgical wound drainage    Order Comments: Instructions: Call for temp >101 or any surgical wound drainage         Basic Metabolic Panel  (Once a week)  Feb 26, 2025      Release to patient: Routine Release                Test Results Pending at  Discharge    STS information:  Pt discharged on asa/statin/bb/acei and Plavix for NSTEMI presentation.       Annetta Mark, LISA  02/26/24  10:59 EST

## 2024-02-26 NOTE — PROGRESS NOTES
S/P POD# 4 CABG x3 with LIMA--[place]  EF 55-60% (echo)    Subjective:  ready to go home    No events over weekend  Wt is up 1 kg from preop      Intake/Output Summary (Last 24 hours) at 2/26/2024 1033  Last data filed at 2/26/2024 0559  Gross per 24 hour   Intake 600 ml   Output 2100 ml   Net -1500 ml     Temp:  [98.2 °F (36.8 °C)-99.9 °F (37.7 °C)] 98.6 °F (37 °C)  Heart Rate:  [77-99] 91  Resp:  [19-29] 19  BP: (115-159)/(68-92) 124/77      Results from last 7 days   Lab Units 02/26/24  0810 02/25/24  0803 02/24/24  0430 02/23/24  0347 02/22/24  1319 02/22/24  1257 02/22/24  0457 02/21/24  1150   WBC 10*3/mm3 8.20 11.00*   < > 9.60  --  10.30   < > 9.80   HEMOGLOBIN g/dL 11.1* 10.7*   < > 10.4*  --  11.8*   < > 14.1   HEMOGLOBIN, POC   --   --   --   --    < >  --    < >  --    HEMATOCRIT % 32.9* 32.2*   < > 31.8*  --  34.6*   < > 41.9   HEMATOCRIT POC   --   --   --   --    < >  --    < >  --    PLATELETS 10*3/mm3 150 130*   < > 107*  --  114*   < > 173   INR   --   --   --  1.10  --  1.11*  --  1.08    < > = values in this interval not displayed.     Results from last 7 days   Lab Units 02/26/24  0810 02/25/24  0803 02/24/24  0430 02/23/24  0347   CREATININE mg/dL 1.34*   < > 1.49* 1.52*   POTASSIUM mmol/L 3.6   < > 4.2 4.4   SODIUM mmol/L 141   < > 146* 148*   MAGNESIUM mg/dL  --   --   --  2.3   PHOSPHORUS mg/dL  --   --  2.5 4.8*    < > = values in this interval not displayed.       Physical Exam:  Neuro intact, nad, up in recliner, conversant  Tele:  SR 80s, + rub  Diminished bases, 96% RA  dsg c/d/i, no drainage  Benign abd, + BM  No edema    Assessment/Plan:  Principal Problem:    Chest pain  Active Problems:    Unstable angina    Non-ST elevated myocardial infarction (non-STEMI)    Atherosclerotic heart disease of native coronary artery with angina pectoris    S/P CABG x 3 with LIMA per Dr. Garrett on 2/22/2024    - MV CAD, hx MI/ IABP placement (2019), NSTEMI presentation, EF 55-60%  (echo)--s/p CABG x3 with LIMA to LAD, SVG to diagonal, SVG to OM (Gerry)  - HTN--takes Coreg, clonidine, lisinopril, imdur  - HLD--statin, Zetia  - CKD, stage 3--follows with Dr. 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  Doing well. Ready to dc home.  On asa/statin/Coreg.  Add Plavix for NSTEMI.  Creatinine down to 1.34 this morning.  Diuretics per renal.      Routine care--as above  D/w pt/family, nsg, Dr. Garrett, Dr. Hernández  Home today    Annetta Henderson-Morris, APRN  2024  10:33 EST

## 2024-02-26 NOTE — PAYOR COMM NOTE
"Discharge notification for case# [id number]     ==================    THANK YOU,    MARTHA Monson, RN  Utilization Review  [facility]  Phone: [phone]  Fax: [phone]      NPI: [id number]  Tax ID: [id number]        HarshRachel bentley \"Daniel\" (68 y.o. Male)       Date of Birth   1955    Social Security Number       Address   [address] [place] [address] [place] [address]    Home Phone   [phone]    MRN   [id number]       [employer]   [facility]    Marital Status                               Admission Date   2/20/24    Admission Type   Emergency    Admitting Provider   Pilo Manzo MD    Attending Provider       Department, Room/Bed   [facility] CARDIOVASCULAR CARE UNIT, 2209/1       Discharge Date   2/26/2024    Discharge Disposition   Home or Self Care    Discharge Destination                                 Attending Provider: (none)   Allergies: Doxycycline Hyclate, Penicillins, Sulfa Antibiotics    Isolation: None   Infection: None   Code Status: CPR    Ht: 170.2 cm (67\")   Wt: 74.3 kg (163 lb 12.8 oz)    Admission Cmt: None   Principal Problem: Chest pain [R07.9]                   Active Insurance as of 2/20/2024       Primary Coverage       Payor Plan Insurance Group Employer/Plan Group    AETNA MEDICARE REPLACEMENT AETNA MEDICARE REPLACEMENT [id number]       Payor Plan Address Payor Plan Phone Number Payor Plan Fax Number Effective Dates    [address] [phone]  1/1/2022 - None Entered    [facility] [id number]         Subscriber Name Subscriber Birth Date Member ID       RACHEL PAGAN CLEMENTINE 1955 [id number]                     Emergency Contacts        (Rel.) Home Phone Work Phone Mobile Phone    Mildred Pagan (Spouse) -- -- [phone]    RACHEL PAGAN JR (Son) -- -- [phone]    LETITIA PAGAN (Son) -- -- [phone]                 Discharge Summary        Satterly-Annetta Caceres APRN at 02/26/24 1040  "         Date of Admission: 2024  Date of Discharge: 2024    Discharge Diagnosis:   - MV CAD, hx MI/ IABP placement (2019), NSTEMI presentation, EF 55-60% (echo)--s/p CABG x3 with LIMA to LAD, SVG to diagonal, SVG to OM (Gerry)  - HTN--takes Coreg, clonidine, lisinopril, imdur  - HLD--statin, Zetia  - CKD, stage 3--follows with Dr. 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, off immunosuppression  - BPH with elevated PSA (2/15/2024)--on Hytrin at home, has referral to [facility]  - anxiety/depression--on Lexapro  - BETY with CPAP noncompliance  - Peripheral neuropathy r/t chemo  - Premature CAD--father  at 62 of MI, brother CABG at 58  - Postop ABLA, expected--improving  - Postop TCP, consumptive--resolved    Presenting Problem/History of Present Illness  Chest pain [R07.9]  Chest pain, unspecified type [R07.9]  Chronic kidney disease, unspecified CKD stage [N18.9]  Hypertension, unspecified type [I10]  Atherosclerotic heart disease of native coronary artery with angina pectoris [I25.119]     Hospital Course  Patient is a 68 y.o. male  with known CAD, CKD stage 3, HTN, HLD, leukmeia s/p bone marrow transplant (), GERD, and former tobacco abuse. He presented to the [facility] ED with intermittent chest pain. It has been occurring the last 3 days. He states the pain is on the left without radiation.  In the ED, his troponin was elevated and he was ruled in for NSTEMI. Cardiology was consulted and he underwent cardiac cath for further evaluation on 2024. Cath showed severe MV CAD. Echo showed minimal mitral regurg and normal EF.  Dr. Garrett was consulted for surgical revascularization.  On 2024, he underwent CABG x3 with CHATTERJEE per Dr. Garrett.  Please see op note for full details.  He was extubated in a timely manner.  He did require Cardene for postop HTN.  Medications were adjusted.   Dr. Lawson followed for his CKD stage 3.  His creatinine peaked at 1.52 on POD#2.  His creatinine was down to 1.34 on day of discharge.  Dr. Lawson continued diuretics at discharge.  Pt will have BMP weekly for 3 weeks.  Plavix was added for NSTEMI.  [place] 5/325, #10 prn pain.  He is up ambulating and eating adequately.  He has had a postop BM.  Wound care was d/w wife at bedside.    Procedures Performed  Per Dr. Garrett 2/22/2024  1. CABG x 3 (LIMA to LAD, SVG to Diagonal, SVG to OM)  2. EVH of the left legs    Per Dr. Hernández 2/21/2024  Left heart cath       Consults:   Consults       Date and Time Order Name Status Description    2/22/2024 12:56 PM Inpatient Cardiology Consult      2/21/2024  8:34 AM Inpatient Cardiothoracic Surgery Consult Completed     2/20/2024  8:59 AM Inpatient Nephrology Consult Completed             Pertinent Test Results:    Lab Results   Component Value Date    WBC 8.20 02/26/2024    HGB 11.1 (L) 02/26/2024    HCT 32.9 (L) 02/26/2024    MCV 92.5 02/26/2024     02/26/2024      Lab Results   Component Value Date    GLUCOSE 115 (H) 02/26/2024    CALCIUM 9.2 02/26/2024     02/26/2024    K 3.6 02/26/2024    CO2 28.0 02/26/2024     02/26/2024    BUN 27 (H) 02/26/2024    CREATININE 1.34 (H) 02/26/2024    EGFRIFNONA 53 (L) 01/10/2022    BCR 20.1 02/26/2024    ANIONGAP 12.0 02/26/2024     Lab Results   Component Value Date    INR 1.10 02/23/2024    PROTIME 11.9 (H) 02/23/2024         Condition on Discharge: Stable for discharge to home with family assistance.    Vital Signs  Temp:  [98.2 °F (36.8 °C)-99.9 °F (37.7 °C)] 98.6 °F (37 °C)  Heart Rate:  [77-99] 91  Resp:  [19-29] 19  BP: (115-159)/(68-92) 124/77    Physical Exam:  Neuro intact, nad, up in recliner, conversant  Tele:  SR 80s, + rub  Diminished bases, 96% RA  dsg c/d/i, no drainage  Benign abd, + BM  No edema      Discharge Disposition  Home or Self Care    Discharge Medications     Discharge Medications        New  Medications        Instructions Start Date   amLODIPine 5 MG tablet  Commonly known as: NORVASC   5 mg, Oral, Every 24 Hours Scheduled   Start Date: February 27, 2024     aspirin 81 MG EC tablet  Replaces: aspirin 81 MG chewable tablet   81 mg, Oral, Daily      clopidogrel 75 MG tablet  Commonly known as: PLAVIX   75 mg, Oral, Daily      furosemide 20 MG tablet  Commonly known as: LASIX   20 mg, Oral, Daily   Start Date: February 27, 2024     HYDROcodone-acetaminophen 5-325 MG per tablet  Commonly known as: NORCO   1 tablet, Oral, Every 6 Hours PRN      potassium chloride 10 MEQ CR capsule  Commonly known as: MICRO-K   10 mEq, Oral, Daily             Continue These Medications        Instructions Start Date   carvedilol 25 MG tablet  Commonly known as: COREG   25 mg, Oral, 2 Times Daily      escitalopram 20 MG tablet  Commonly known as: LEXAPRO   20 mg, Oral, Daily      ezetimibe 10 MG tablet  Commonly known as: ZETIA   TAKE 1 TABLET BY MOUTH EVERY DAY      ferrous sulfate 325 (65 FE) MG EC tablet   325 mg, Oral      fluticasone 50 MCG/ACT nasal spray  Commonly known as: FLONASE   1 spray, Nasal, Once, prn      folic acid 1 MG tablet  Commonly known as: FOLVITE   TAKE 1 TABLET BY MOUTH EVERY DAY      lisinopril 40 MG tablet  Commonly known as: PRINIVIL,ZESTRIL   40 mg, Oral, Daily      multivitamin tablet tablet   Oral      pantoprazole 40 MG EC tablet  Commonly known as: PROTONIX   40 mg, Oral, Daily      rosuvastatin 40 MG tablet  Commonly known as: CRESTOR   TAKE 1 TABLET BY MOUTH EVERYDAY AT BEDTIME      terazosin 5 MG capsule  Commonly known as: HYTRIN   Every 24 Hours      vitamin D 1.25 MG (89443 UT) capsule capsule  Commonly known as: ERGOCALCIFEROL   TAKE 1 CAPSULE BY MOUTH 1 TIME PER WEEK.             Stop These Medications      aspirin 81 MG chewable tablet  Replaced by: aspirin 81 MG EC tablet     cloNIDine 0.1 MG tablet  Commonly known as: CATAPRES     isosorbide mononitrate 30 MG 24 hr tablet  Commonly  known as: IMDUR     nitroglycerin 0.6 MG SL tablet  Commonly known as: NITROSTAT     VITAMIN D PO              Discharge Diet:   Healthy Heart Diet    Activity at Discharge:   1. No driving for 2 weeks and off narcotic pain medications.  2. Shower daily. Clean incisions with warm water and antibacterial soap only. Do not put any lotion or ointments on incisions.  3. Ambulate for 10 minutes at least 3 times a day.  4. No heavy lifting > 10lbs until seen in office.   5. Take all medications as prescribed.     Follow-up Appointments  Future Appointments   Date Time Provider Department Center   3/14/2024  9:15 AM Annetta Mark APRN [facility]   7/18/2024  1:00 PM Yifan Elizabeth MD [facility]     Additional Instructions for the Follow-ups that You Need to Schedule       Discharge Follow-up with PCP   As directed       Currently Documented PCP:    Yifan Elizabeth MD    PCP Phone Number:    [phone]     Follow Up Details: in one month        Discharge Follow-up with Specialty: Cardiology; 2 Weeks   As directed      Specialty: Cardiology   Follow Up: 2 Weeks   Follow Up Details: call for an appt with Dr. Hernández        Discharge Follow-up with Specified Provider: Cardiac Surgery; 2 Weeks   As directed      To: Cardiac Surgery   Follow Up: 2 Weeks   Follow Up Details: NP postop follow-up appt on 3/14/2024 9:15 AM        Discharge Follow-up with Specified Provider: Dr. Lawson; 2 Weeks   As directed      To: Dr. Lawson   Follow Up: 2 Weeks   Follow Up Details: call for a 2-4 weeks follow-up appt        Referral to Cardiac Rehab   As directed      Call MD With Problems / Concerns    Mar 26, 2024 (Approximate)      Instructions: Call for temp >101 or any surgical wound drainage    Order Comments: Instructions: Call for temp >101 or any surgical wound drainage         Basic Metabolic Panel  (Once a week)  Feb 26, 2025      Release to patient: Routine Release                Test Results  Pending at Discharge    STS information:  Pt discharged on asa/statin/bb/acei and Plavix for NSTEMI presentation.       LISA Mcelroy  02/26/24  10:59 EST                 Electronically signed by Annetta Mark APRN at 02/26/24 1134       Discharge Order (From admission, onward)       Start     Ordered    02/26/24 1047  Discharge patient  Once        Expected Discharge Date: 02/26/24   Expected Discharge Time: Midday   Discharge Disposition: Home or Self Care   Physician of Record for Attribution - Please select from Treatment Team: ROBERTO KIRKPATRICK [[id number]]   Review needed by CMO to determine Physician of Record: No      Question Answer Comment   Physician of Record for Attribution - Please select from Treatment Team ROBERTO KIRKPATRICK    Review needed by CMO to determine Physician of Record No        02/26/24 0878

## 2024-02-26 NOTE — CASE MANAGEMENT/SOCIAL WORK
Continued Stay Note  [facility]     Patient Name: Anthony Ayoub  MRN: [id number]  Today's Date: 2/26/2024    Admit Date: 2/20/2024    Plan: Plan for home with spouse. CABG 2/22/224.   Discharge Plan       Row Name 02/26/24 1017       Plan    Plan Plan for home with spouse. CABG 2/22/224.    Provided Post Acute Provider List? N/A    Provided Post Acute Provider Quality & Resource List? N/A    Plan Comments CM spoke with patient’s nurse and [facility] NP Annetta Orozco to obtain clinical updates. NP anticipates discharge home later today. Follow up IMM delivered. No additional services or DME required. Patient will transport home via private vehicle with spouse. No barriers.                 Expected Discharge Date and Time       Expected Discharge Date Expected Discharge Time    Feb 26, 2024           Met with patient in room       Maintain distance greater than six feet and spent less than fifteen minutes in the room.      Anali Ha RN     Office Phone: [phone]  Office Cell:     [phone]

## 2024-02-26 NOTE — PROGRESS NOTES
[facility] Progress Note      Patient Name: Anthony Ayoub  : 1955  MRN: [id number]  Primary Care Physician:  Yifan Elizabeth MD  Date of admission: 2024    Subjective     Interval History:      Pod#4 cabg  Patient resting comfortably.  Mild chest pain with deep breaths and movements  Denies any shortness of breath, nausea or vomiting    Review of Systems:   As noted above    Objective     Vitals:   Temp:  [98.2 °F (36.8 °C)-99.9 °F (37.7 °C)] 98.6 °F (37 °C)  Heart Rate:  [77-99] 91  Resp:  [19-29] 19  BP: (115-159)/(68-92) 124/77  Flow (L/min):  [0] 0    Intake/Output Summary (Last 24 hours) at 2024 1010  Last data filed at 2024 0559  Gross per 24 hour   Intake 600 ml   Output 2100 ml   Net -1500 ml       Physical Exam:    General Appearance: NAD  HEENT: oral mucosa normal, nonicteric sclera  Neck: supple, no JVD  Lungs: Few rhonchi  Heart: RRR, normal S1 and S2  Abdomen: soft, nondistended  Extremities: no edema  Neuro: Awake alert and moving all extremities    Scheduled Meds:     amLODIPine, 5 mg, Oral, Q24H  aspirin, 81 mg, Oral, Daily  carvedilol, 25 mg, Oral, BID With Meals  chlorhexidine, 15 mL, Mouth/Throat, Q12H  enoxaparin, 40 mg, Subcutaneous, Daily  folic acid, 1 mg, Oral, Daily  furosemide, 20 mg, Oral, Daily  insulin lispro, 2-7 Units, Subcutaneous, 4x Daily AC & at Bedtime  lisinopril, 40 mg, Oral, Q24H  mupirocin, , Each Nare, BID  pantoprazole, 40 mg, Oral, Q AM  polyethylene glycol, 17 g, Oral, BID  potassium chloride ER, 40 mEq, Oral, Q4H  rosuvastatin, 40 mg, Oral, Nightly  senna-docusate sodium, 2 tablet, Oral, BID  terazosin, 5 mg, Oral, Nightly      IV Meds:          Results Reviewed:   I have personally reviewed the results from the time of this admission to 2024 10:10 EST     Results from last 7 days   Lab Units 24  0810 24  0803 24  0430 24  0457 24  1150 24  0421   SODIUM  mmol/L 141 142 146*   < > 139 138   POTASSIUM mmol/L 3.6 4.1 4.2   < > 4.2 4.2   CHLORIDE mmol/L 101 105 112*   < > 106 104   CO2 mmol/L 28.0 29.0 27.0   < > 24.0 24.0   BUN mg/dL 27* 23 25*   < > 31* 34*   CREATININE mg/dL 1.34* 1.37* 1.49*   < > 1.46* 1.69*   CALCIUM mg/dL 9.2 9.1 8.7   < > 9.1 9.0   BILIRUBIN mg/dL  --   --   --   --  0.9 0.7   ALK PHOS U/L  --   --   --   --  92 83   ALT (SGPT) U/L  --   --   --   --  21 21   AST (SGOT) U/L  --   --   --   --  27 25   GLUCOSE mg/dL 115* 105* 118*   < > 130* 104*    < > = values in this interval not displayed.     Estimated Creatinine Clearance: 57 mL/min (A) (by C-G formula based on SCr of 1.34 mg/dL (H)).  Results from last 7 days   Lab Units 02/24/24  0430 02/23/24  0347 02/22/24  1257 02/22/24  0457 02/21/24  0421 02/20/24  0650   MAGNESIUM mg/dL  --  2.3  --   --  1.9 2.1   PHOSPHORUS mg/dL 2.5 4.8* 2.3*   < > 3.1  --     < > = values in this interval not displayed.         Results from last 7 days   Lab Units 02/26/24  0810 02/25/24  0803 02/24/24  0430 02/23/24  0347 02/22/24  2232 02/22/24  1319 02/22/24  1257   WBC 10*3/mm3 8.20 11.00* 10.20 9.60  --   --  10.30   HEMOGLOBIN g/dL 11.1* 10.7* 10.0* 10.4*  --   --  11.8*   HEMOGLOBIN, POC g/dL  --   --   --   --  9.5*   < >  --    PLATELETS 10*3/mm3 150 130* 104* 107*  --   --  114*    < > = values in this interval not displayed.     Results from last 7 days   Lab Units 02/23/24  0347 02/22/24  1257 02/21/24  1150 02/21/24  0421 02/20/24  0509   INR  1.10 1.11* 1.08 1.06 1.02       Assessment / Plan     ASSESSMENT:    Chronic kidney disease stage IIIa.  Secondary to hypertensive nephrosclerosis.  Renal function stable. Creatinine better than previous baseline now. Electrolytes, volume status okay  Coronary artery disease.  S/p CABG x 3.  Patient stable hemodynamically  Hypertension chronic kidney disease.  blood pressure better.  On Coreg, amlodipine, lisinopril and terazosin.  Also on low-dose oral  Lasix    PLAN:    Continue current treatment  Repeat labs in a.m.  Follow-up with me in 2-4 weeks    Laureano Lawson MD  02/26/24  10:10 [facility]    [facility]  [phone]

## 2024-02-26 NOTE — OUTREACH NOTE
Prep Survey      Flowsheet Row Responses   [facility] patient discharged from? [facility]   Is LACE score < 7 ? No   Eligibility [facility]   Date of Admission 02/20/24   Date of Discharge 02/26/24   Discharge Disposition Home or Self Care   Discharge diagnosis chest pain, CABG x 3 using left leg vein harvest   Does the patient have one of the following disease processes/diagnoses(primary or secondary)? Cardiothoracic surgery   Does the patient have Home health ordered? No   Is there a DME ordered? No   Prep survey completed? Yes            Merced PETERS - Registered Nurse

## 2024-02-27 ENCOUNTER — TRANSITIONAL CARE MANAGEMENT TELEPHONE ENCOUNTER (OUTPATIENT)
Dept: CALL CENTER | Facility: HOSPITAL | Age: 69
End: 2024-02-27
Payer: MEDICARE

## 2024-02-27 NOTE — OUTREACH NOTE
Call Center TCM Note      Flowsheet Row Responses   [facility] patient discharged from? [facility]   Does the patient have one of the following disease processes/diagnoses(primary or secondary)? Cardiothoracic surgery   TCM attempt successful? No   Unsuccessful attempts Attempt 1   Call Status Left message            Hi Mcgregor RN    2/27/2024, 10:40 EST

## 2024-02-27 NOTE — OUTREACH NOTE
Call Center TCM Note      Flowsheet Row Responses   [facility] patient discharged from? [facility]   Does the patient have one of the following disease processes/diagnoses(primary or secondary)? Cardiothoracic surgery   TCM attempt successful? Yes   Call start time 1146   Call end time 1153   Discharge diagnosis chest pain, CABG x 3 using left leg vein harvest   Person spoke with today (if not patient) and relationship wife and patient   Meds reviewed with patient/caregiver? Yes   Is the patient having any side effects they believe may be caused by any medication additions or changes? No   Does the patient have all medications related to this admission filled (includes all antibiotics, pain medications, cardiac medications, etc.) Yes   Is the patient taking all medications as directed (includes completed medication regime)? Yes   Comments Has a hospital followup with surgeon on 3/14/2024   Does the patient have an appointment with their PCP within 7-14 days of discharge? Other   Nursing Interventions Patient desires to follow up with specialty only   Psychosocial issues? No   Did the patient receive a copy of their discharge instructions? Yes   Nursing interventions Reviewed instructions with patient   What is the patient's perception of their health status since discharge? Improving   Nursing interventions Nurse provided patient education   Is the patient /caregiver able to teach back basic post-op care? Continue use of incentive spirometry at least 1 week post discharge   Is the patient/caregiver able to teach back signs and symptoms of incisional infection? Increased redness, swelling or pain at the incisonal site, Increased drainage or bleeding, Incisional warmth, Pus or odor from incision, Fever   Is the patient/caregiver able to teach back steps to recovery at home? Set small, achievable goals for return to baseline health, Rest and rebuild strength, gradually increase activity   If the patient is a current  smoker, are they able to teach back resources for cessation? Not a smoker   Is the patient/caregiver able to teach back the hierarchy of who to call/visit for symptoms/problems? PCP, Specialist, Home health nurse, Urgent Care, ED, 911 Yes   TCM call completed? Yes   Wrap up additional comments Doing very well.   Call end time 1153   Would this patient benefit from a Referral to [facility] Social Work? No   Is the patient interested in additional calls from an ambulatory ? No            Hi Mcgregor RN    2/27/2024, 11:54 EST

## 2024-02-28 LAB
QT INTERVAL: 349 MS
QTC INTERVAL: 426 MS

## 2024-02-29 ENCOUNTER — TELEPHONE (OUTPATIENT)
Dept: CARDIAC SURGERY | Facility: CLINIC | Age: 69
End: 2024-02-29
Payer: MEDICARE

## 2024-02-29 ENCOUNTER — LAB (OUTPATIENT)
Dept: LAB | Facility: HOSPITAL | Age: 69
End: 2024-02-29
Payer: MEDICARE

## 2024-02-29 DIAGNOSIS — Z95.1 S/P CABG X 3: ICD-10-CM

## 2024-02-29 LAB
ANION GAP SERPL CALCULATED.3IONS-SCNC: 10 MMOL/L (ref 5–15)
BUN SERPL-MCNC: 31 MG/DL (ref 8–23)
BUN/CREAT SERPL: 19.1 (ref 7–25)
CALCIUM SPEC-SCNC: 9.4 MG/DL (ref 8.6–10.5)
CHLORIDE SERPL-SCNC: 102 MMOL/L (ref 98–107)
CO2 SERPL-SCNC: 28 MMOL/L (ref 22–29)
CREAT SERPL-MCNC: 1.62 MG/DL (ref 0.76–1.27)
EGFRCR SERPLBLD CKD-EPI 2021: 46.5 ML/MIN/1.73
GLUCOSE SERPL-MCNC: 107 MG/DL (ref 65–99)
POTASSIUM SERPL-SCNC: 4.3 MMOL/L (ref 3.5–5.2)
SODIUM SERPL-SCNC: 140 MMOL/L (ref 136–145)

## 2024-02-29 PROCEDURE — 80048 BASIC METABOLIC PNL TOTAL CA: CPT

## 2024-02-29 PROCEDURE — 36415 COLL VENOUS BLD VENIPUNCTURE: CPT

## 2024-02-29 NOTE — TELEPHONE ENCOUNTER
----- Message from Anthony Ayoub sent at 2/29/2024  1:07 PM EST -----  Regarding: Anthony Ayoub’s leg ([birth year])  Contact: [phone]  Selena,     Just called to check due to pain in bend of knee.    Thanks,  Lori Ayoub

## 2024-03-02 LAB
QT INTERVAL: 405 MS
QT INTERVAL: 498 MS
QTC INTERVAL: 449 MS
QTC INTERVAL: 464 MS

## 2024-03-07 ENCOUNTER — TELEPHONE (OUTPATIENT)
Dept: CARDIAC SURGERY | Facility: CLINIC | Age: 69
End: 2024-03-07
Payer: MEDICARE

## 2024-03-07 ENCOUNTER — TRANSCRIBE ORDERS (OUTPATIENT)
Dept: ADMINISTRATIVE | Facility: HOSPITAL | Age: 69
End: 2024-03-07
Payer: MEDICARE

## 2024-03-07 ENCOUNTER — LAB (OUTPATIENT)
Dept: LAB | Facility: HOSPITAL | Age: 69
End: 2024-03-07
Payer: MEDICARE

## 2024-03-07 ENCOUNTER — READMISSION MANAGEMENT (OUTPATIENT)
Dept: CALL CENTER | Facility: HOSPITAL | Age: 69
End: 2024-03-07
Payer: MEDICARE

## 2024-03-07 DIAGNOSIS — Z85.6 PERSONAL HISTORY OF UNSPECIFIED LEUKEMIA: ICD-10-CM

## 2024-03-07 DIAGNOSIS — I10 ESSENTIAL HYPERTENSION, MALIGNANT: ICD-10-CM

## 2024-03-07 DIAGNOSIS — Z95.1 S/P CABG X 3: ICD-10-CM

## 2024-03-07 DIAGNOSIS — N40.1 BENIGN PROSTATIC HYPERPLASIA WITH LOWER URINARY TRACT SYMPTOMS, SYMPTOM DETAILS UNSPECIFIED: ICD-10-CM

## 2024-03-07 DIAGNOSIS — N28.89 URETERAL FISTULA: ICD-10-CM

## 2024-03-07 DIAGNOSIS — N18.31 CHRONIC KIDNEY DISEASE (CKD) STAGE G3A/A1, MODERATELY DECREASED GLOMERULAR FILTRATION RATE (GFR) BETWEEN 45-59 ML/MIN/1.73 SQUARE METER AND ALBUMINURIA CREATININE RATIO LESS THAN 30 MG/G (CMS/H*: Primary | ICD-10-CM

## 2024-03-07 DIAGNOSIS — N18.31 CHRONIC KIDNEY DISEASE (CKD) STAGE G3A/A1, MODERATELY DECREASED GLOMERULAR FILTRATION RATE (GFR) BETWEEN 45-59 ML/MIN/1.73 SQUARE METER AND ALBUMINURIA CREATININE RATIO LESS THAN 30 MG/G (CMS/H*: ICD-10-CM

## 2024-03-07 DIAGNOSIS — R80.9 PROTEINURIA, UNSPECIFIED TYPE: ICD-10-CM

## 2024-03-07 LAB
25(OH)D3 SERPL-MCNC: 85.4 NG/ML (ref 30–100)
ALBUMIN SERPL-MCNC: 4.4 G/DL (ref 3.5–5.2)
ANION GAP SERPL CALCULATED.3IONS-SCNC: 11 MMOL/L (ref 5–15)
BUN SERPL-MCNC: 36 MG/DL (ref 8–23)
BUN/CREAT SERPL: 17.3 (ref 7–25)
CALCIUM SPEC-SCNC: 9.7 MG/DL (ref 8.6–10.5)
CHLORIDE SERPL-SCNC: 100 MMOL/L (ref 98–107)
CO2 SERPL-SCNC: 27 MMOL/L (ref 22–29)
CREAT SERPL-MCNC: 2.08 MG/DL (ref 0.76–1.27)
CREAT UR-MCNC: 90.5 MG/DL
DEPRECATED RDW RBC AUTO: 52.1 FL (ref 37–54)
EGFRCR SERPLBLD CKD-EPI 2021: 34.5 ML/MIN/1.73
ERYTHROCYTE [DISTWIDTH] IN BLOOD BY AUTOMATED COUNT: 15.3 % (ref 12.3–15.4)
GLUCOSE SERPL-MCNC: 107 MG/DL (ref 65–99)
HCT VFR BLD AUTO: 32.6 % (ref 37.5–51)
HGB BLD-MCNC: 10.5 G/DL (ref 13–17.7)
MCH RBC QN AUTO: 29.6 PG (ref 26.6–33)
MCHC RBC AUTO-ENTMCNC: 32.1 G/DL (ref 31.5–35.7)
MCV RBC AUTO: 92.3 FL (ref 79–97)
PHOSPHATE SERPL-MCNC: 4.7 MG/DL (ref 2.5–4.5)
PLATELET # BLD AUTO: 506 10*3/MM3 (ref 140–450)
PMV BLD AUTO: 7.4 FL (ref 6–12)
POTASSIUM SERPL-SCNC: 5.4 MMOL/L (ref 3.5–5.2)
PROT ?TM UR-MCNC: 35.7 MG/DL
PROT/CREAT UR: 394.5 MG/G CREA (ref 0–200)
PTH-INTACT SERPL-MCNC: 68.7 PG/ML (ref 15–65)
RBC # BLD AUTO: 3.53 10*6/MM3 (ref 4.14–5.8)
SODIUM SERPL-SCNC: 138 MMOL/L (ref 136–145)
WBC NRBC COR # BLD AUTO: 8.9 10*3/MM3 (ref 3.4–10.8)

## 2024-03-07 PROCEDURE — 83970 ASSAY OF PARATHORMONE: CPT

## 2024-03-07 PROCEDURE — 36415 COLL VENOUS BLD VENIPUNCTURE: CPT

## 2024-03-07 PROCEDURE — 82306 VITAMIN D 25 HYDROXY: CPT

## 2024-03-07 PROCEDURE — 82570 ASSAY OF URINE CREATININE: CPT

## 2024-03-07 PROCEDURE — 85027 COMPLETE CBC AUTOMATED: CPT

## 2024-03-07 PROCEDURE — 80069 RENAL FUNCTION PANEL: CPT

## 2024-03-07 PROCEDURE — 84156 ASSAY OF PROTEIN URINE: CPT

## 2024-03-07 NOTE — OUTREACH NOTE
CT Surgery Week 2 Survey      Flowsheet Row Responses   [facility] patient discharged from? [facility]   Does the patient have one of the following disease processes/diagnoses(primary or secondary)? Cardiothoracic surgery   Week 2 attempt successful? Yes   Call start time 1658   Call end time 1704   Discharge diagnosis chest pain, CABG x 3 using left leg vein harvest   Person spoke with today (if not patient) and relationship Mildred, wife and pt   Meds reviewed with patient/caregiver? Yes   Is the patient having any side effects they believe may be caused by any medication additions or changes? No   Does the patient have all medications related to this admission filled (includes all antibiotics, pain medications, cardiac medications, etc.) Yes   Prescription comments Potassium and Lasix on hold until hearing again from MD   Is the patient taking all medications as directed (includes completed medication regime)? Yes   Does the patient have a primary care provider?  Yes   Does the patient have an appointment scheduled with their C/T surgeon? Yes   Has the patient kept scheduled appointments due by today? N/A   Has home health visited the patient within 72 hours of discharge? N/A   Psychosocial issues? No   Did the patient receive a copy of their discharge instructions? Yes   Nursing interventions Reviewed instructions with patient   What is the patient's perception of their health status since discharge? Improving   Nursing interventions Nurse provided patient education   Is the patient/caregiver able to teach back normal signs of recovery? Pain or discomfort at incisional site   Nursing interventions Reassured on normal signs of recovery   Is the patient /caregiver able to teach back basic post-op care? Use a clean wash cloth and antibacterial bar or liquid soap to clean incisions, If the steri-strips are falling off, it is okay to remove them. (If applicable), Shower daily   Is the patient/caregiver able to teach  back signs and symptoms of incisional infection? Increased redness, swelling or pain at the incisonal site, Increased drainage or bleeding, Incisional warmth, Pus or odor from incision, Fever   Is the patient/caregiver able to teach back steps to recovery at home? Set small, achievable goals for return to baseline health, Rest and rebuild strength, gradually increase activity, Eat a well-balance diet   Is the patient /caregiver able to teach back the importance of cardiac rehab? Yes   Is the patient/caregiver able to teach back the hierarchy of who to call/visit for symptoms/problems? PCP, Specialist, Home health nurse, Urgent Care, ED, 911 Yes   Additional teach back comments appetite slow   Week 2 call completed? Yes   Call end time 1704            Ethel PETERS - Registered Nurse

## 2024-03-07 NOTE — TELEPHONE ENCOUNTER
I called pt/wife about labs.  Potassium up to 5.4 and cr up to 2.08.  Labs sent to Dr. Lawson as well.  I asked them to stop Lasix/ KCl until we know what Dr. Lawson would like them to do.    Wife reports that one of CT exit sites is red.  I asked her to paint with betadine.  He is reporting sensitivity to anything that touches his chest and it is itching as well.    He has an appt to see us next week.

## 2024-03-11 ENCOUNTER — OFFICE VISIT (OUTPATIENT)
Dept: FAMILY MEDICINE CLINIC | Facility: CLINIC | Age: 69
End: 2024-03-11
Payer: MEDICARE

## 2024-03-11 ENCOUNTER — OFFICE VISIT (OUTPATIENT)
Dept: CARDIOLOGY | Facility: CLINIC | Age: 69
End: 2024-03-11
Payer: MEDICARE

## 2024-03-11 VITALS
HEART RATE: 61 BPM | SYSTOLIC BLOOD PRESSURE: 122 MMHG | DIASTOLIC BLOOD PRESSURE: 73 MMHG | BODY MASS INDEX: 25.74 KG/M2 | WEIGHT: 164 LBS | OXYGEN SATURATION: 99 % | HEIGHT: 67 IN

## 2024-03-11 VITALS
TEMPERATURE: 97.3 F | BODY MASS INDEX: 25.58 KG/M2 | HEIGHT: 67 IN | SYSTOLIC BLOOD PRESSURE: 106 MMHG | WEIGHT: 163 LBS | OXYGEN SATURATION: 100 % | DIASTOLIC BLOOD PRESSURE: 66 MMHG | HEART RATE: 67 BPM

## 2024-03-11 DIAGNOSIS — F33.1 MODERATE EPISODE OF RECURRENT MAJOR DEPRESSIVE DISORDER: ICD-10-CM

## 2024-03-11 DIAGNOSIS — E78.5 DYSLIPIDEMIA: ICD-10-CM

## 2024-03-11 DIAGNOSIS — Z09 HOSPITAL DISCHARGE FOLLOW-UP: Primary | ICD-10-CM

## 2024-03-11 DIAGNOSIS — I10 ESSENTIAL HYPERTENSION: ICD-10-CM

## 2024-03-11 DIAGNOSIS — N18.30 STAGE 3 CHRONIC KIDNEY DISEASE, UNSPECIFIED WHETHER STAGE 3A OR 3B CKD: ICD-10-CM

## 2024-03-11 DIAGNOSIS — Z95.1 S/P CABG X 3: ICD-10-CM

## 2024-03-11 DIAGNOSIS — Z95.1 HX OF CABG: Primary | ICD-10-CM

## 2024-03-11 NOTE — PROGRESS NOTES
Encounter Date:03/11/2024  Last seen 2/26/2024      Patient ID: Anthony Ayoub is a 68 y.o. male.    Chief Complaint:  Status post CABG  Dyslipidemia  Hypertension  Renal dysfunction.    History of Present Illness  Patient recently presented with unstable angina and had possible non-STEMI.  Subsequently patient had cardiac catheterization that revealed severe and critical coronary artery disease.  Patient underwent CABG and was released home.    Since I have last seen, the patient has been without any chest discomfort ,shortness of breath, palpitations, dizziness or syncope.  Denies having any headache ,abdominal pain ,nausea, vomiting , diarrhea constipation, loss of weight or loss of appetite.  Denies having any excessive bruising ,hematuria or blood in the stool.    Review of all systems negative except as indicated.    Reviewed ROS.    Assessment and Plan       ]]]]]]]]]]]]]]]]]]  History  ========  -Status post CABG 2/22/2024-Dr. Garrett  CABG x 3 (LIMA to LAD SVG to diagonal and SVG to marginal branch)     - Preoperative unstable angina/possible non-STEMI     - Severe and critical coronary artery disease.     Cardiac catheterization 2/21/2024  Left ventricular angiogram was not performed due to pre-existing renal dysfunction.  Severe triple-vessel coronary artery disease.  Left main coronary artery has 40% disease  Left anterior descending artery is a large and long vessel that has ostial 99% disease.  Mid LAD has 80% disease.  Diagonal branches diffuse 99% disease proximally.  Circumflex coronary artery is a large and dominant vessel that provided multiple branches.  Circumflex coronary artery has 60 to 70% disease proximal to the first marginal branch.  First marginal branch has ostial 95% disease.  Second marginal branch is a relatively small caliber vessel that has 99% disease in the proximal segment that bifurcated into 2 branches.  Each branch has 90 to 95% disease.  There were 3 other  marginal branches.  PDA has 99% disease in the proximal segment.  Second marginal branch has 60 to 70% ostial disease.  Right coronary artery is totally occluded in the proximal segment and distal vessel is filling through collaterals from left circulation.  Small caliber vessel.  (Chronic since 2019).  Please see the report from [facility].     Echocardiogram.-2/20/2024  Structurally and functionally normal cardiac valves except for minimal mitral regurgitation.  Grade 1 diastolic dysfunction is present..  Left ventricular size and contractility is normal with ejection fraction of 60%.     -  hypertension dyslipidemia GERD osteoarthritis renal dysfunction.  BUN 15 creatinine 1.4     -CML.  Status post bone marrow transplantation  2016     - status post vasectomy and sinus surgery      -former smoker     - strong family history  for coronary artery disease.  Brother had CABG and father had myocardial infarction     -allergic to penicillin sulfa and doxycycline  ============  Plan  ==========  Status post CABG 2/22/2024-Dr. Garrett  CABG x 3 (LIMA to LAD SVG to diagonal and SVG to marginal branch)     Preoperative unstable angina/non-STEMI  Severe and critical coronary artery disease enthesis cardiac cath 2/21/2024    EKG showed sinus rhythm nonspecific ST-T wave abnormalities    Patient is not having any angina pectoris or congestive heart failure.  Incisions are healing well.     Chronic renal insufficiency.-Dr. Lawson.  23/1.37-2/25/2024     Dyslipidemia-on Crestor.     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      Echocardiogram.-2/20/2024-as above    Medications were reviewed and updated.  Current medications include aspirin amlodipine Coreg 25 mg twice daily Lasix 20 mg daily insulin lisinopril 40 mg daily pantoprazole Crestor 40 mg a day terazosin.     Follow-up in the office in 6 weeks with EKG.    Patient was encouraged to join cardiac rehabilitation.  Cardiac rehab referral was  made.    Further plan will depend on patient's progress.     Reviewed and updated-3/11/2024.  ]]]]]]]]]]]]]]]]]            Diagnosis Plan   1. Hx of CABG        2. Dyslipidemia        3. Essential hypertension        4. Stage 3 chronic kidney disease, unspecified whether stage 3a or 3b CKD        LAB RESULTS (LAST 7 DAYS)    CBC  Results from last 7 days   Lab Units 03/07/24  1306   WBC 10*3/mm3 8.90   RBC 10*6/mm3 3.53*   HEMOGLOBIN g/dL 10.5*   HEMATOCRIT % 32.6*   MCV fL 92.3   PLATELETS 10*3/mm3 506*       BMP  Results from last 7 days   Lab Units 03/07/24  1306   SODIUM mmol/L 138   POTASSIUM mmol/L 5.4*   CHLORIDE mmol/L 100   CO2 mmol/L 27.0   BUN mg/dL 36*   CREATININE mg/dL 2.08*   GLUCOSE mg/dL 107*   PHOSPHORUS mg/dL 4.7*       CMP   Results from last 7 days   Lab Units 03/07/24  1306   SODIUM mmol/L 138   POTASSIUM mmol/L 5.4*   CHLORIDE mmol/L 100   CO2 mmol/L 27.0   BUN mg/dL 36*   CREATININE mg/dL 2.08*   GLUCOSE mg/dL 107*   ALBUMIN g/dL 4.4         BNP        TROPONIN        CoAg        Creatinine Clearance  Estimated Creatinine Clearance: 36.8 mL/min (A) (by C-G formula based on SCr of 2.08 mg/dL (H)).    ABG        Radiology  No radiology results for the last day                The following portions of the patient's history were reviewed and updated as appropriate: allergies, current medications, past family history, past medical history, past social history, past surgical history, and problem list.    Review of Systems   Constitutional: Negative for malaise/fatigue.   Cardiovascular:  Negative for chest pain, leg swelling, palpitations and syncope.   Respiratory:  Negative for shortness of breath.    Skin:  Negative for rash.   Gastrointestinal:  Negative for nausea and vomiting.   Neurological:  Negative for dizziness, light-headedness and numbness.   All other systems reviewed and are negative.        Current Outpatient Medications:   •  amLODIPine (NORVASC) 5 MG tablet, Take 1 tablet by mouth  Daily., Disp: 30 tablet, Rfl: 3  •  aspirin 81 MG EC tablet, Take 1 tablet by mouth Daily., Disp: , Rfl:   •  carvedilol (COREG) 25 MG tablet, Take 1 tablet by mouth 2 (Two) Times a Day., Disp: , Rfl:   •  clopidogrel (PLAVIX) 75 MG tablet, Take 1 tablet by mouth Daily., Disp: 30 tablet, Rfl: 3  •  escitalopram (LEXAPRO) 20 MG tablet, Take 1 tablet by mouth Daily., Disp: 90 tablet, Rfl: 1  •  ezetimibe (ZETIA) 10 MG tablet, TAKE 1 TABLET BY MOUTH EVERY DAY, Disp: 95 tablet, Rfl: 0  •  ferrous sulfate 325 (65 FE) MG EC tablet, Take 1 tablet by mouth., Disp: , Rfl:   •  fluticasone (FLONASE) 50 MCG/ACT nasal spray, 1 spray into the nostril(s) as directed by provider 1 (One) Time. prn, Disp: , Rfl:   •  folic acid (FOLVITE) 1 MG tablet, TAKE 1 TABLET BY MOUTH EVERY DAY, Disp: 90 tablet, Rfl: 1  •  lisinopril (PRINIVIL,ZESTRIL) 40 MG tablet, Take 1 tablet by mouth Daily., Disp: , Rfl:   •  multivitamin (THERAGRAN) tablet tablet, Take  by mouth., Disp: , Rfl:   •  pantoprazole (PROTONIX) 40 MG EC tablet, Take 1 tablet by mouth Daily., Disp: , Rfl:   •  rosuvastatin (CRESTOR) 40 MG tablet, TAKE 1 TABLET BY MOUTH EVERYDAY AT BEDTIME, Disp: 90 tablet, Rfl: 1  •  terazosin (HYTRIN) 5 MG capsule, Daily., Disp: , Rfl:   •  vitamin D (ERGOCALCIFEROL) 1.25 MG (63161 UT) capsule capsule, TAKE 1 CAPSULE BY MOUTH 1 TIME PER WEEK., Disp: 12 capsule, Rfl: 1    Allergies   Allergen Reactions   • Doxycycline Hyclate Other (See Comments)     Painful skin   • Penicillins Rash   • Sulfa Antibiotics Rash       Family History   Problem Relation Age of Onset   • Hypertension Mother    • Hyperlipidemia Mother    • Heart disease Father    • Heart attack Father    • Hypertension Sister    • Hypertension Brother    • Heart disease Brother        Past Surgical History:   Procedure Laterality Date   • BONE MARROW TRANSPLANT     • CARDIAC CATHETERIZATION     • CARDIAC CATHETERIZATION N/A 2/21/2024    Procedure: Coronary angiography;  Surgeon:  Michelle Hernández MD;  Location: [facility] CATH INVASIVE LOCATION;  Service: Cardiovascular;  Laterality: N/A;   • CARDIAC CATHETERIZATION N/A 2024    Procedure: Left Heart Cath;  Surgeon: Michelle Hernández MD;  Location: [facility] CATH INVASIVE LOCATION;  Service: Cardiovascular;  Laterality: N/A;   • CHOLECYSTECTOMY N/A 2022    Procedure: CHOLECYSTECTOMY LAPAROSCOPIC;  Surgeon: Kale Acuna MD;  Location: [facility] MAIN OR;  Service: General;  Laterality: N/A;   • CORONARY ARTERY BYPASS GRAFT N/A 2024    Procedure: CORONARY ARTERY BYPASS GRAFTING, INTRAOPERATIVE TRANSESOPHAGEAL ECHOCARDIOGRAM;  Surgeon: Soham Garrett MD;  Location: [facility] CVOR;  Service: Cardiothoracic;  Laterality: N/A;  CABG X 3 (2 vein grafts, 1 LIMA graft)   • SINUS SURGERY     • TONSILLECTOMY     • VASECTOMY         Past Medical History:   Diagnosis Date   • CHF (congestive heart failure)    • Coronary artery disease    • GERD (gastroesophageal reflux disease)    • Hyperlipidemia    • Hypertension    • Leukemia    • Low back pain    • Sleep apnea        Family History   Problem Relation Age of Onset   • Hypertension Mother    • Hyperlipidemia Mother    • Heart disease Father    • Heart attack Father    • Hypertension Sister    • Hypertension Brother    • Heart disease Brother        Social History     Socioeconomic History   • Marital status:    Tobacco Use   • Smoking status: Former     Current packs/day: 0.00     Average packs/day: 1 pack/day for 30.0 years (30.0 ttl pk-yrs)     Types: Cigarettes     Start date:      Quit date: 2016     Years since quittin.1     Passive exposure: Never   • Smokeless tobacco: Never   Vaping Use   • Vaping status: Never Used   Substance and Sexual Activity   • Alcohol use: No   • Drug use: Defer   • Sexual activity: Defer           ECG 12 Lead    Date/Time: 3/11/2024 1:45 PM  Performed by: Michelle Hernández MD    Authorized by: Michelle Hernández MD  Comparison: compared with  "previous ECG   Similar to previous ECG  Comparison to previous ECG: Normal sinus rhythm nonspecific ST-T wave abnormalities 61/min normal axis normal intervals no ectopy old inferior infarction no significant change from previous EKG.        Objective:       Physical Exam    /73 (BP Location: Right arm, Patient Position: Sitting, Cuff Size: Adult)   Pulse 61   Ht 170.2 cm (67\")   Wt 74.4 kg (164 lb)   SpO2 99% Comment: RA  BMI 25.69 kg/m²   The patient is alert, oriented and in no distress.    Vital signs as noted above.    Head and neck revealed no carotid bruits or jugular venous distension.  No thyromegaly or lymphadenopathy is present.    Lungs clear.  No wheezing.  Breath sounds are normal bilaterally.    Heart normal first and second heart sounds.  No murmur..  No pericardial rub is present.  No gallop is present.    Abdomen soft and nontender.  No organomegaly is present.    Extremities revealed good peripheral pulses without any pedal edema.    Skin warm and dry.  Incisions are healing well.    Musculoskeletal system is grossly normal.    CNS grossly normal.    Reviewed and updated.        "

## 2024-03-11 NOTE — PROGRESS NOTES
"Transitional Care Follow Up Visit  Subjective     Anthony Ayoub is a 68 y.o. male who presents for a transitional care management visit.    Within 48 business hours after discharge our office contacted him via telephone to coordinate his care and needs.      I reviewed and discussed the details of that call along with the discharge summary, hospital problems, inpatient lab results, inpatient diagnostic studies, and consultation reports with Anthony.     Current outpatient and discharge medications have been reconciled for the patient.  Reviewed by: LISA Peter          2/26/2024     6:10 PM   Date of TCM Phone Call   [facility]   Date of Admission 2/20/2024   Date of Discharge 2/26/2024   Discharge Disposition Home or Self Care     Risk for Readmission (LACE) Score: 13 (2/26/2024  6:00 AM)      History of Present Illness   Course During Hospital Stay:      \"Patient is a 68 y.o. male  with known CAD, CKD stage 3, HTN, HLD, leukmeia s/p bone marrow transplant (2016), GERD, and former tobacco abuse. He presented to the [facility] ED with intermittent chest pain. It has been occurring the last 3 days. He states the pain is on the left without radiation.  In the ED, his troponin was elevated and he was ruled in for NSTEMI. Cardiology was consulted and he underwent cardiac cath for further evaluation on 2/21/2024. Cath showed severe MV CAD. Echo showed minimal mitral regurg and normal EF.  Dr. Garrett was consulted for surgical revascularization.  On 2/22/2024, he underwent CABG x3 with CHATTERJEE per Dr. Garrett.  Please see op note for full details.  He was extubated in a timely manner.  He did require Cardene for postop HTN.  Medications were adjusted.  Dr. Lawson followed for his CKD stage 3.  His creatinine peaked at 1.52 on POD#2.  His creatinine was down to 1.34 on day of discharge.  Dr. Lawson continued diuretics at discharge.  Pt will have BMP weekly for 3 weeks.  Plavix " "was added for NSTEMI.  [place] 5/325, #10 prn pain.  He is up ambulating and eating adequately.  He has had a postop BM.  Wound care was d/w wife at bedside. \"     Pt is here today following up on 02/22 CABG x 3. He is feeling really down.   He would like to hold off adding additional depression medications at this time. His chest is sore. They added amlodipine to his BP medications. He is worried his BP is too low now. He denies dizziness, SOA, weakness. He is about to start cardiac rehab and he feels this will help lift his spirits. He is very thankful to be alive, but it has been hard on him not being able to do his normal activities. He denies SI and HI.      The following portions of the patient's history were reviewed and updated as appropriate: allergies, current medications, past family history, past medical history, past social history, past surgical history, and problem list.    Review of Systems    Objective   Physical Exam  Vitals reviewed.   Constitutional:       General: He is not in acute distress.     Appearance: Normal appearance. He is not ill-appearing, toxic-appearing or diaphoretic.   Cardiovascular:      Rate and Rhythm: Normal rate and regular rhythm.      Pulses: Normal pulses.      Heart sounds: Normal heart sounds.   Pulmonary:      Effort: Pulmonary effort is normal. No respiratory distress.      Breath sounds: Normal breath sounds.   Musculoskeletal:      Right lower leg: No edema.      Left lower leg: No edema.   Skin:     General: Skin is warm and dry.      Capillary Refill: Capillary refill takes less than 2 seconds.   Neurological:      General: No focal deficit present.      Mental Status: He is alert and oriented to person, place, and time.   Psychiatric:         Attention and Perception: Attention and perception normal.         Mood and Affect: Mood normal. Mood is depressed.         Speech: Speech normal.         Behavior: Behavior normal.         Thought Content: Thought content " normal. Thought content does not include homicidal or suicidal ideation. Thought content does not include homicidal or suicidal plan.         Cognition and Memory: Cognition normal.         Judgment: Judgment normal.         Assessment & Plan   Diagnoses and all orders for this visit:    1. Hospital discharge follow-up (Primary)    2. S/P CABG x 3  Comments:  -pt is doing well  -f/u with cardiology as scheduled    3. Moderate episode of recurrent major depressive disorder  Comments:  -pt is currently on lexapro 20 mg  -offered to add an adjunct (like vraylar) to get him through this time, he refuses as of now, but will let us know if he changes his mind.

## 2024-03-11 NOTE — PATIENT INSTRUCTIONS
Call if no improvements in your mood/feeling down. We can add an adjunct medication to get you through your recovery if needed.

## 2024-03-12 ENCOUNTER — TELEPHONE (OUTPATIENT)
Dept: CARDIAC REHAB | Facility: HOSPITAL | Age: 69
End: 2024-03-12
Payer: MEDICARE

## 2024-03-13 ENCOUNTER — TELEPHONE (OUTPATIENT)
Dept: CARDIAC REHAB | Facility: HOSPITAL | Age: 69
End: 2024-03-13
Payer: MEDICARE

## 2024-03-13 NOTE — TELEPHONE ENCOUNTER
OOP left: 1420  Co-pay: 25 until OOP met, then covered @ 100%  No visit limits as long as medically necessary    Ref #: [id number]  ~mode

## 2024-03-14 ENCOUNTER — OFFICE VISIT (OUTPATIENT)
Dept: CARDIAC SURGERY | Facility: CLINIC | Age: 69
End: 2024-03-14
Payer: MEDICARE

## 2024-03-14 ENCOUNTER — LAB (OUTPATIENT)
Dept: LAB | Facility: HOSPITAL | Age: 69
End: 2024-03-14
Payer: MEDICARE

## 2024-03-14 VITALS
BODY MASS INDEX: 26.16 KG/M2 | WEIGHT: 162.8 LBS | DIASTOLIC BLOOD PRESSURE: 74 MMHG | SYSTOLIC BLOOD PRESSURE: 135 MMHG | RESPIRATION RATE: 20 BRPM | TEMPERATURE: 97.8 F | HEART RATE: 66 BPM | OXYGEN SATURATION: 98 % | HEIGHT: 66 IN

## 2024-03-14 DIAGNOSIS — Z95.1 S/P CABG X 3: Primary | ICD-10-CM

## 2024-03-14 DIAGNOSIS — N18.31 STAGE 3A CHRONIC KIDNEY DISEASE: ICD-10-CM

## 2024-03-14 DIAGNOSIS — Z95.1 S/P CABG X 3: ICD-10-CM

## 2024-03-14 LAB
ANION GAP SERPL CALCULATED.3IONS-SCNC: 11 MMOL/L (ref 5–15)
BUN SERPL-MCNC: 25 MG/DL (ref 8–23)
BUN/CREAT SERPL: 14.3 (ref 7–25)
CALCIUM SPEC-SCNC: 9.7 MG/DL (ref 8.6–10.5)
CHLORIDE SERPL-SCNC: 105 MMOL/L (ref 98–107)
CO2 SERPL-SCNC: 26 MMOL/L (ref 22–29)
CREAT SERPL-MCNC: 1.75 MG/DL (ref 0.76–1.27)
EGFRCR SERPLBLD CKD-EPI 2021: 42.4 ML/MIN/1.73
GLUCOSE SERPL-MCNC: 109 MG/DL (ref 65–99)
POTASSIUM SERPL-SCNC: 4.8 MMOL/L (ref 3.5–5.2)
SODIUM SERPL-SCNC: 142 MMOL/L (ref 136–145)

## 2024-03-14 PROCEDURE — 1159F MED LIST DOCD IN RCRD: CPT | Performed by: PHYSICIAN ASSISTANT

## 2024-03-14 PROCEDURE — 36415 COLL VENOUS BLD VENIPUNCTURE: CPT

## 2024-03-14 PROCEDURE — 3075F SYST BP GE 130 - 139MM HG: CPT | Performed by: PHYSICIAN ASSISTANT

## 2024-03-14 PROCEDURE — 80048 BASIC METABOLIC PNL TOTAL CA: CPT

## 2024-03-14 PROCEDURE — 3078F DIAST BP <80 MM HG: CPT | Performed by: PHYSICIAN ASSISTANT

## 2024-03-14 PROCEDURE — 99024 POSTOP FOLLOW-UP VISIT: CPT | Performed by: PHYSICIAN ASSISTANT

## 2024-03-14 PROCEDURE — 1160F RVW MEDS BY RX/DR IN RCRD: CPT | Performed by: PHYSICIAN ASSISTANT

## 2024-03-14 NOTE — PROGRESS NOTES
"CARDIOVASCULAR SURGERY FOLLOW-UP PROGRESS NOTE  Chief Complaint: Post-op Follow Up        HPI:   Dear Dr. Elizabeth, Yifan MARROQUIN MD and colleagues:    It was nice to see Anthony Ayoub in follow up today after cardiac surgery.  As you know, he is a 68 y.o. male with CAD who underwent CABG x 3 at [facility] by Dr. Garrett on 2/22/24. He did well postoperatively and continues to do well. He comes in today for routine follow-up. His activity level has been good and he is scheduled to start Cardiac Rehab soon. He was seen by Cardiology and his PCP on 3/11/24 and is scheduled to see Dr. Lawson with Nephrology tomorrow. He states his sense of taste has returned to normal and his appetite is improving. He is slowly regaining his strength and stamina.     Physical Exam:         /74 (BP Location: Left arm, Patient Position: Sitting, Cuff Size: Adult)   Pulse 66   Temp 97.8 °F (36.6 °C)   Resp 20   Ht 167.6 cm (66\")   Wt 73.8 kg (162 lb 12.8 oz)   SpO2 98%   BMI 26.28 kg/m²   Heart:  regular rate and rhythm, S1, S2 normal, no murmur, click, rub or gallop  Lungs:  clear to auscultation bilaterally  Extremities:  no edema  Incision(s):  Sternal incision and left leg incision ae healing well without erythema or drainage; sternum stable to palpation    Assessment/Plan:     Overall, Mr. Ayoub is recovering very well from his CABG. He is going to have a BMP checked today, per Dr. Lawson's request. I instructed him to   continue lifting restriction of 10 lbs until 6 weeks and 50 lbs until 12 weeks from the date of surgery. No excessive jarring motions or twisting motions until 12 weeks from the date of surgery. He should continue to follow-up with Cardiology, his PCP, and Nephrology as directed, but can follow-up with us as needed.     Thank you for allowing me to participate in the care of your patient.    Regards,  LINDA Edmondson    "

## 2024-03-14 NOTE — LETTER
"March 14, 2024       No Recipients    Patient: Anthony Ayoub   YOB: 1955   Date of Visit: 3/14/2024     Dear Yifan Elizabeth MD:       Thank you for referring Antohny Ayoub to me for evaluation. Below are the relevant portions of my assessment and plan of care.    If you have questions, please do not hesitate to call me. I look forward to following Anthony along with you.         Sincerely,        LINDA Edmondson        CC:   No Recipients    Chata Ritchie PA  03/14/24 1621  Sign when Signing Visit  CARDIOVASCULAR SURGERY FOLLOW-UP PROGRESS NOTE  Chief Complaint: Post-op Follow Up        HPI:   Dear Yifan Clifford MD and colleagues:    It was nice to see Anthony Ayoub in follow up today after cardiac surgery.  As you know, he is a 68 y.o. male with CAD who underwent CABG x 3 at [facility] by Dr. Garrett on 2/22/24. He did well postoperatively and continues to do well. He comes in today for routine follow-up. His activity level has been good and he is scheduled to start Cardiac Rehab soon. He was seen by Cardiology and his PCP on 3/11/24 and is scheduled to see Dr. Lawson with Nephrology tomorrow. He states his sense of taste has returned to normal and his appetite is improving. He is slowly regaining his strength and stamina.     Physical Exam:         /74 (BP Location: Left arm, Patient Position: Sitting, Cuff Size: Adult)   Pulse 66   Temp 97.8 °F (36.6 °C)   Resp 20   Ht 167.6 cm (66\")   Wt 73.8 kg (162 lb 12.8 oz)   SpO2 98%   BMI 26.28 kg/m²   Heart:  regular rate and rhythm, S1, S2 normal, no murmur, click, rub or gallop  Lungs:  clear to auscultation bilaterally  Extremities:  no edema  Incision(s):  Sternal incision and left leg incision ae healing well without erythema or drainage; sternum stable to palpation    Assessment/Plan:     Overall, Mr. Ayoub is recovering very well from his CABG. He is going to have a " BMP checked today, per Dr. Lawson's request. I instructed him to   continue lifting restriction of 10 lbs until 6 weeks and 50 lbs until 12 weeks from the date of surgery. No excessive jarring motions or twisting motions until 12 weeks from the date of surgery. He should continue to follow-up with Cardiology, his PCP, and Nephrology as directed, but can follow-up with us as needed.     Thank you for allowing me to participate in the care of your patient.    Regards,  ILNDA Edmondson

## 2024-03-14 NOTE — LETTER
"March 14, 2024       No Recipients    Patient: Anthony Ayoub   YOB: 1955   Date of Visit: 3/14/2024     Dear Michelle Hernández MD:       Thank you for referring Anthony Ayoub to me for evaluation. Below are the relevant portions of my assessment and plan of care.    If you have questions, please do not hesitate to call me. I look forward to following Anthony along with you.         Sincerely,        LINDA Edmondson        CC:   No Recipients    Chata Ritchie PA  03/14/24 1621  Sign when Signing Visit  CARDIOVASCULAR SURGERY FOLLOW-UP PROGRESS NOTE  Chief Complaint: Post-op Follow Up        HPI:   Dear Dr. Elizabeth, Yifan MARROQUIN MD and colleagues:    It was nice to see Anthony Ayoub in follow up today after cardiac surgery.  As you know, he is a 68 y.o. male with CAD who underwent CABG x 3 at [facility] by Dr. Garrett on 2/22/24. He did well postoperatively and continues to do well. He comes in today for routine follow-up. His activity level has been good and he is scheduled to start Cardiac Rehab soon. He was seen by Cardiology and his PCP on 3/11/24 and is scheduled to see Dr. Lawson with Nephrology tomorrow. He states his sense of taste has returned to normal and his appetite is improving. He is slowly regaining his strength and stamina.     Physical Exam:         /74 (BP Location: Left arm, Patient Position: Sitting, Cuff Size: Adult)   Pulse 66   Temp 97.8 °F (36.6 °C)   Resp 20   Ht 167.6 cm (66\")   Wt 73.8 kg (162 lb 12.8 oz)   SpO2 98%   BMI 26.28 kg/m²   Heart:  regular rate and rhythm, S1, S2 normal, no murmur, click, rub or gallop  Lungs:  clear to auscultation bilaterally  Extremities:  no edema  Incision(s):  Sternal incision and left leg incision ae healing well without erythema or drainage; sternum stable to palpation    Assessment/Plan:     Overall, Mr. Ayoub is recovering very well from his CABG. He is going to have a BMP " checked today, per Dr. Lawson's request. I instructed him to   continue lifting restriction of 10 lbs until 6 weeks and 50 lbs until 12 weeks from the date of surgery. No excessive jarring motions or twisting motions until 12 weeks from the date of surgery. He should continue to follow-up with Cardiology, his PCP, and Nephrology as directed, but can follow-up with us as needed.     Thank you for allowing me to participate in the care of your patient.    Regards,  LINDA Edmondson

## 2024-03-21 ENCOUNTER — OFFICE VISIT (OUTPATIENT)
Dept: CARDIAC REHAB | Facility: HOSPITAL | Age: 69
End: 2024-03-21
Payer: MEDICARE

## 2024-03-21 DIAGNOSIS — Z95.1 S/P CABG (CORONARY ARTERY BYPASS GRAFT): Primary | ICD-10-CM

## 2024-03-21 DIAGNOSIS — Z95.1 HX OF CABG: ICD-10-CM

## 2024-03-21 PROCEDURE — 93798 PHYS/QHP OP CAR RHAB W/ECG: CPT

## 2024-03-21 NOTE — PROGRESS NOTES
Initial assessment cardiac rehab. Oriented to department. Pre-outcome measurement tools explained and completed per patient. Six minute walk without difficulty. Goals obtained. Placed on schedule. See scanned session report.  MAURICE Matthews

## 2024-03-25 ENCOUNTER — TREATMENT (OUTPATIENT)
Dept: CARDIAC REHAB | Facility: HOSPITAL | Age: 69
End: 2024-03-25
Payer: MEDICARE

## 2024-03-25 DIAGNOSIS — Z95.1 S/P CABG (CORONARY ARTERY BYPASS GRAFT): Primary | ICD-10-CM

## 2024-03-25 PROCEDURE — 93798 PHYS/QHP OP CAR RHAB W/ECG: CPT

## 2024-03-26 ENCOUNTER — OFFICE VISIT (OUTPATIENT)
Dept: FAMILY MEDICINE CLINIC | Facility: CLINIC | Age: 69
End: 2024-03-26
Payer: MEDICARE

## 2024-03-26 ENCOUNTER — HOSPITAL ENCOUNTER (OUTPATIENT)
Dept: GENERAL RADIOLOGY | Facility: HOSPITAL | Age: 69
Discharge: HOME | End: 2024-03-26
Admitting: NURSE PRACTITIONER
Payer: MEDICARE

## 2024-03-26 VITALS
SYSTOLIC BLOOD PRESSURE: 102 MMHG | OXYGEN SATURATION: 98 % | DIASTOLIC BLOOD PRESSURE: 68 MMHG | HEIGHT: 66 IN | BODY MASS INDEX: 25.88 KG/M2 | TEMPERATURE: 97.1 F | WEIGHT: 161 LBS | HEART RATE: 65 BPM

## 2024-03-26 DIAGNOSIS — R07.89 CHEST TIGHTNESS: ICD-10-CM

## 2024-03-26 DIAGNOSIS — R07.89 CHEST TIGHTNESS: Primary | ICD-10-CM

## 2024-03-26 PROCEDURE — 71046 X-RAY EXAM CHEST 2 VIEWS: CPT

## 2024-03-26 NOTE — PROGRESS NOTES
Chief Complaint  Chest tightness and pain with breathing    Subjective        Anthony Ayoub presents to [facility]  History of Present Illness  Anthony is a 68-year-old male presenting today with complaints of chest tightness. He is almost 5 weeks post op triple bypass surgery. He denies any complications with recovery. Over the weekend he started having upper chest tightness and pain with inspiration. He had a fever and chills Saturday, tmax 100.8. He took Tylenol and it helped. He denies any cough or wheezing. He was at cardiac rehab yesterday and they suggested he follow up with his PCP.    The following portions of the patient's history were reviewed and updated as appropriate: allergies, current medications, past family history, past medical history, past social history, past surgical history and problem list.    Allergies   Allergen Reactions    Doxycycline Hyclate Other (See Comments)     Painful skin    Penicillins Rash    Sulfa Antibiotics Rash       Patient Active Problem List   Diagnosis    Abnormal cardiovascular stress test    Benign essential hypertension    Chronic myelomonocytic leukemia    Arteriosclerosis of coronary artery    Dyspnea on exertion    Encounter for general adult medical examination without abnormal findings    Encounter for screening for malignant neoplasm of prostate    Enlarged lymph nodes    Erectile dysfunction    Hyperlipidemia    Hypothyroidism    Leukemia    Mixed anxiety depressive disorder    Myeloid leukemia in remission    Osteoarthritis    Tobacco use    BETY treated with BiPAP    H/O non-ST elevation myocardial infarction (NSTEMI)    History of phlebitis    Sicca    Pseudophakia    Chronic diastolic heart failure    Chronic GVHD    History of leukemia    Hypertension    Keratitis    Keratoconjunctivitis sicca    Lower urinary tract symptoms due to benign prostatic hyperplasia    Meibomian gland dysfunction (MGD) of both eyes     "Meibomian gland dysfunction (MGD)    Proteinuria    Punctate keratitis    Punctate keratitis of both eyes    Pure hypercholesterolemia    Renal mass    S/P allogeneic bone marrow transplant    Stage 3a chronic kidney disease    Obstructive sleep apnea syndrome    Gallstones    Right upper quadrant abdominal pain    Acute cholecystitis    Chest pain    Unstable angina    Non-ST elevated myocardial infarction (non-STEMI)    Atherosclerotic heart disease of native coronary artery with angina pectoris    S/P CABG x 3 with [place]A per Dr. Garrett on 2/22/2024       Current Outpatient Medications   Medication Instructions    amLODIPine (NORVASC) 5 mg, Oral, Every 24 Hours Scheduled    aspirin 81 mg, Oral, Daily    carvedilol (COREG) 25 mg, Oral, 2 Times Daily    clopidogrel (PLAVIX) 75 mg, Oral, Daily    escitalopram (LEXAPRO) 20 mg, Oral, Daily    ezetimibe (ZETIA) 10 MG tablet TAKE 1 TABLET BY MOUTH EVERY DAY    ferrous sulfate 325 mg, Oral    fluticasone (FLONASE) 50 MCG/ACT nasal spray 1 spray, Nasal, Once, prn    folic acid (FOLVITE) 1 MG tablet TAKE 1 TABLET BY MOUTH EVERY DAY    lisinopril (PRINIVIL,ZESTRIL) 40 mg, Oral, Daily    multivitamin (THERAGRAN) tablet tablet Oral    pantoprazole (PROTONIX) 40 mg, Oral, Daily    rosuvastatin (CRESTOR) 40 MG tablet TAKE 1 TABLET BY MOUTH EVERYDAY AT BEDTIME    terazosin (HYTRIN) 5 MG capsule Every 24 Hours    vitamin D (ERGOCALCIFEROL) 1.25 MG (91887 UT) capsule capsule TAKE 1 CAPSULE BY MOUTH 1 TIME PER WEEK.          Objective   Vital Signs:  /68 (BP Location: Left arm, Patient Position: Sitting, Cuff Size: Large Adult)   Pulse 65   Temp 97.1 °F (36.2 °C) (Temporal)   Ht 167.6 cm (66\")   Wt 73 kg (161 lb)   SpO2 98%   BMI 25.99 kg/m²   Estimated body mass index is 25.99 kg/m² as calculated from the following:    Height as of this encounter: 167.6 cm (66\").    Weight as of this encounter: 73 kg (161 lb).               Review of Systems   Constitutional:  " Negative for chills, fatigue and fever.   HENT:  Negative for facial swelling.    Respiratory:  Positive for chest tightness. Negative for cough, shortness of breath, wheezing and stridor.    Cardiovascular:  Negative for chest pain.   Gastrointestinal:  Negative for diarrhea, nausea and vomiting.   Skin:  Negative for color change, rash and wound.   Allergic/Immunologic: Negative for environmental allergies and food allergies.   Neurological:  Negative for weakness.   Hematological:  Negative for adenopathy.        Physical Exam  Constitutional:       Appearance: Normal appearance.   Cardiovascular:      Rate and Rhythm: Normal rate and regular rhythm.   Pulmonary:      Effort: Pulmonary effort is normal.      Breath sounds: Normal breath sounds.   Chest:       Skin:     General: Skin is warm and dry.      Capillary Refill: Capillary refill takes less than 2 seconds.   Neurological:      Mental Status: He is alert and oriented to person, place, and time.   Psychiatric:         Mood and Affect: Mood normal.         Behavior: Behavior normal.         Thought Content: Thought content normal.         Judgment: Judgment normal.        Result Review :                   Assessment and Plan   Diagnoses and all orders for this visit:    1. Chest tightness (Primary)  Comments:  pt afebrile today  cxr ordered and showed mild left basilar opacity, which could reflect atelectasis or pneumonia.   Will start Levaquin 750mg. Will take every 48 hr due to low kidney function.  Call if symptoms persist.  Orders:  -     XR Chest PA & Lateral; Future             Follow Up   No follow-ups on file.  Patient was given instructions and counseling regarding his condition or for health maintenance advice. Please see specific information pulled into the AVS if appropriate.

## 2024-03-27 ENCOUNTER — APPOINTMENT (OUTPATIENT)
Dept: CARDIAC REHAB | Facility: HOSPITAL | Age: 69
End: 2024-03-27
Payer: MEDICARE

## 2024-03-27 RX ORDER — LEVOFLOXACIN 750 MG/1
750 TABLET, FILM COATED ORAL
Qty: 7 TABLET | Refills: 0 | Status: SHIPPED | OUTPATIENT
Start: 2024-03-27

## 2024-03-28 ENCOUNTER — TREATMENT (OUTPATIENT)
Dept: CARDIAC REHAB | Facility: HOSPITAL | Age: 69
End: 2024-03-28
Payer: MEDICARE

## 2024-03-28 DIAGNOSIS — Z95.1 S/P CABG (CORONARY ARTERY BYPASS GRAFT): Primary | ICD-10-CM

## 2024-03-28 PROCEDURE — 93798 PHYS/QHP OP CAR RHAB W/ECG: CPT

## 2024-04-01 ENCOUNTER — TREATMENT (OUTPATIENT)
Dept: CARDIAC REHAB | Facility: HOSPITAL | Age: 69
End: 2024-04-01
Payer: MEDICARE

## 2024-04-01 DIAGNOSIS — Z95.1 S/P CABG (CORONARY ARTERY BYPASS GRAFT): Primary | ICD-10-CM

## 2024-04-01 PROCEDURE — 93798 PHYS/QHP OP CAR RHAB W/ECG: CPT

## 2024-04-03 ENCOUNTER — TREATMENT (OUTPATIENT)
Dept: CARDIAC REHAB | Facility: HOSPITAL | Age: 69
End: 2024-04-03
Payer: MEDICARE

## 2024-04-03 DIAGNOSIS — Z95.1 S/P CABG (CORONARY ARTERY BYPASS GRAFT): Primary | ICD-10-CM

## 2024-04-03 PROCEDURE — 93798 PHYS/QHP OP CAR RHAB W/ECG: CPT

## 2024-04-03 RX ORDER — FOLIC ACID 1 MG/1
TABLET ORAL
Qty: 90 TABLET | Refills: 1 | Status: SHIPPED | OUTPATIENT
Start: 2024-04-03

## 2024-04-05 NOTE — PROGRESS NOTES
Encounter Date:04/23/2024  Last seen 3/11/2024      Patient ID: Anthony Ayoub is a 68 y.o. male.    Chief Complaint:  Status post CABG  Dyslipidemia  Hypertension  Renal dysfunction.     History of Present Illness  Patient is participating in cardiac rehabilitation.    Since I have last seen, the patient has been without any chest discomfort ,shortness of breath, palpitations, dizziness or syncope.  Denies having any headache ,abdominal pain ,nausea, vomiting , diarrhea constipation, loss of weight or loss of appetite.  Denies having any excessive bruising ,hematuria or blood in the stool.    Review of all systems negative except as indicated.    Reviewed ROS.  Assessment and Plan         ]]]]]]]]]]]]]]]]]]  History  ========  -Status post CABG 2/22/2024-Dr. Garrett  CABG x 3 (LIMA to LAD SVG to diagonal and SVG to marginal branch)     - Preoperative unstable angina/possible non-STEMI     - Severe and critical coronary artery disease.     Cardiac catheterization 2/21/2024  Left ventricular angiogram was not performed due to pre-existing renal dysfunction.  Severe triple-vessel coronary artery disease.  Left main coronary artery has 40% disease  Left anterior descending artery is a large and long vessel that has ostial 99% disease.  Mid LAD has 80% disease.  Diagonal branches diffuse 99% disease proximally.  Circumflex coronary artery is a large and dominant vessel that provided multiple branches.  Circumflex coronary artery has 60 to 70% disease proximal to the first marginal branch.  First marginal branch has ostial 95% disease.  Second marginal branch is a relatively small caliber vessel that has 99% disease in the proximal segment that bifurcated into 2 branches.  Each branch has 90 to 95% disease.  There were 3 other marginal branches.  PDA has 99% disease in the proximal segment.  Second marginal branch has 60 to 70% ostial disease.  Right coronary artery is totally occluded in the proximal segment  and distal vessel is filling through collaterals from left circulation.  Small caliber vessel.  (Chronic since 2019).  Please see the report from [facility].     Echocardiogram.-2/20/2024  Structurally and functionally normal cardiac valves except for minimal mitral regurgitation.  Grade 1 diastolic dysfunction is present..  Left ventricular size and contractility is normal with ejection fraction of 60%.     -  hypertension dyslipidemia GERD osteoarthritis renal dysfunction.  BUN 15 creatinine 1.4     -CML.  Status post bone marrow transplantation  2016     - status post vasectomy and sinus surgery      -former smoker     - strong family history  for coronary artery disease.  Brother had CABG and father had myocardial infarction     -allergic to penicillin sulfa and doxycycline  ============  Plan  ==========  Status post CABG 2/22/2024-Dr. Garrett  CABG x 3 (LIMA to LAD SVG to diagonal and SVG to marginal branch)     Preoperative unstable angina/non-STEMI  Severe and critical coronary artery disease enthesis cardiac cath 2/21/2024     EKG showed sinus rhythm nonspecific ST-T wave abnormalities-4/23/2024     Patient is not having any angina pectoris or congestive heart failure.  Incisions are healing well.     Chronic renal insufficiency.-Dr. Lawson.  23/1.37-2/25/2024     Dyslipidemia-on Crestor.     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    Patient is participating in cardiac rehabilitation.     Medications were reviewed and updated.  Current medications include aspirin amlodipine Coreg 25 mg twice daily Lasix 20 mg daily insulin lisinopril 40 mg daily pantoprazole Crestor 40 mg a day terazosin.    Okay to discontinue Plavix and 4 weeks (patient was put on Plavix    Preoperative non-STEMI)     Follow-up in the office in 3 months.     Further plan will depend on patient's progress.     Reviewed and updated-4/23/2024.  ]]]]]]]]]]]]]]]]]               Diagnosis Plan   1. Mixed hyperlipidemia         2. Hypothyroidism, unspecified type        3. Dyspnea on exertion        4. Benign essential hypertension        5. Hx of CABG        LAB RESULTS (LAST 7 DAYS)    CBC  Results from last 7 days   Lab Units 04/17/24  1533   WBC 10*3/mm3 4.87   RBC 10*6/mm3 3.78*   HEMOGLOBIN g/dL 11.0*   HEMATOCRIT % 35.6*   MCV fL 94.2   PLATELETS 10*3/mm3 162       BMP  Results from last 7 days   Lab Units 04/17/24  1533   SODIUM mmol/L 141   POTASSIUM mmol/L 4.9   CHLORIDE mmol/L 106   CO2 mmol/L 25.0   BUN mg/dL 23   CREATININE mg/dL 1.61*   GLUCOSE mg/dL 76   PHOSPHORUS mg/dL 3.3       CMP   Results from last 7 days   Lab Units 04/17/24  1533   SODIUM mmol/L 141   POTASSIUM mmol/L 4.9   CHLORIDE mmol/L 106   CO2 mmol/L 25.0   BUN mg/dL 23   CREATININE mg/dL 1.61*   GLUCOSE mg/dL 76   ALBUMIN g/dL 4.3         BNP        TROPONIN        CoAg        Creatinine Clearance  Estimated Creatinine Clearance: 46.6 mL/min (A) (by C-G formula based on SCr of 1.61 mg/dL (H)).    ABG        Radiology  No radiology results for the last day                The following portions of the patient's history were reviewed and updated as appropriate: allergies, current medications, past family history, past medical history, past social history, past surgical history, and problem list.    Review of Systems   Constitutional: Negative for malaise/fatigue.   Cardiovascular:  Negative for chest pain, leg swelling, palpitations and syncope.   Respiratory:  Negative for shortness of breath.    Skin:  Negative for rash.   Gastrointestinal:  Negative for nausea and vomiting.   Neurological:  Negative for dizziness, light-headedness and numbness.   All other systems reviewed and are negative.        Current Outpatient Medications:   •  amLODIPine (NORVASC) 5 MG tablet, Take 1 tablet by mouth Daily., Disp: 30 tablet, Rfl: 3  •  aspirin 81 MG EC tablet, Take 1 tablet by mouth Daily., Disp: , Rfl:   •  calcitriol (ROCALTROL) 0.25 MCG capsule, Take 1 capsule  by mouth Every Other Day., Disp: , Rfl:   •  carvedilol (COREG) 25 MG tablet, Take 1 tablet by mouth 2 (Two) Times a Day., Disp: , Rfl:   •  cefdinir (OMNICEF) 300 MG capsule, Take 1 capsule by mouth 2 (Two) Times a Day for 7 days., Disp: 14 capsule, Rfl: 0  •  clopidogrel (PLAVIX) 75 MG tablet, Take 1 tablet by mouth Daily., Disp: 30 tablet, Rfl: 3  •  escitalopram (LEXAPRO) 20 MG tablet, TAKE 1 TABLET BY MOUTH EVERY DAY, Disp: 90 tablet, Rfl: 1  •  ezetimibe (ZETIA) 10 MG tablet, TAKE 1 TABLET BY MOUTH EVERY DAY, Disp: 95 tablet, Rfl: 0  •  ferrous sulfate 325 (65 FE) MG EC tablet, Take 1 tablet by mouth., Disp: , Rfl:   •  folic acid (FOLVITE) 1 MG tablet, TAKE 1 TABLET BY MOUTH EVERY DAY, Disp: 90 tablet, Rfl: 1  •  lisinopril (PRINIVIL,ZESTRIL) 40 MG tablet, Take 1 tablet by mouth Daily., Disp: , Rfl:   •  pantoprazole (PROTONIX) 40 MG EC tablet, Take 1 tablet by mouth Daily., Disp: , Rfl:   •  rosuvastatin (CRESTOR) 40 MG tablet, TAKE 1 TABLET BY MOUTH EVERYDAY AT BEDTIME, Disp: 90 tablet, Rfl: 1  •  terazosin (HYTRIN) 5 MG capsule, Daily., Disp: , Rfl:   •  vitamin D (ERGOCALCIFEROL) 1.25 MG (88328 UT) capsule capsule, TAKE 1 CAPSULE BY MOUTH 1 TIME PER WEEK. (Patient taking differently: 1 capsule. EVERY OTHER WEEK), Disp: 12 capsule, Rfl: 1    Allergies   Allergen Reactions   • Doxycycline Hyclate Other (See Comments)     Painful skin   • Penicillins Rash   • Sulfa Antibiotics Rash       Family History   Problem Relation Age of Onset   • Hypertension Mother    • Hyperlipidemia Mother    • Heart disease Father    • Heart attack Father    • Hypertension Sister    • Hypertension Brother    • Heart disease Brother        Past Surgical History:   Procedure Laterality Date   • BONE MARROW TRANSPLANT     • CARDIAC CATHETERIZATION     • CARDIAC CATHETERIZATION N/A 2/21/2024    Procedure: Coronary angiography;  Surgeon: Michelle Hernández MD;  Location: [facility] INVASIVE LOCATION;  Service: Cardiovascular;   Laterality: N/A;   • CARDIAC CATHETERIZATION N/A 2024    Procedure: Left Heart Cath;  Surgeon: Michelle Hernández MD;  [facility];  Service: Cardiovascular;  Laterality: N/A;   • CHOLECYSTECTOMY N/A 2022    Procedure: CHOLECYSTECTOMY LAPAROSCOPIC;  Surgeon: Kale Acuna MD;  [facility];  Service: General;  Laterality: N/A;   • CORONARY ARTERY BYPASS GRAFT N/A 2024    Procedure: CORONARY ARTERY BYPASS GRAFTING, INTRAOPERATIVE TRANSESOPHAGEAL ECHOCARDIOGRAM;  Surgeon: Soham Garrett MD;  [facility];  Service: Cardiothoracic;  Laterality: N/A;  CABG X 3 (2 vein grafts, 1 LIMA graft)   • SINUS SURGERY     • TONSILLECTOMY     • VASECTOMY         Past Medical History:   Diagnosis Date   • CHF (congestive heart failure)    • Coronary artery disease    • GERD (gastroesophageal reflux disease)    • Hyperlipidemia    • Hypertension    • Leukemia    • Low back pain    • Sleep apnea        Family History   Problem Relation Age of Onset   • Hypertension Mother    • Hyperlipidemia Mother    • Heart disease Father    • Heart attack Father    • Hypertension Sister    • Hypertension Brother    • Heart disease Brother        Social History     Socioeconomic History   • Marital status:    Tobacco Use   • Smoking status: Former     Current packs/day: 0.00     Average packs/day: 1 pack/day for 30.0 years (30.0 ttl pk-yrs)     Types: Cigarettes     Start date:      Quit date: 2016     Years since quittin.3     Passive exposure: Never   • Smokeless tobacco: Never   Vaping Use   • Vaping status: Never Used   Substance and Sexual Activity   • Alcohol use: No   • Drug use: Defer   • Sexual activity: Defer           ECG 12 Lead    Date/Time: 2024 1:43 PM  Performed by: Michelle Hernández MD    Authorized by: Michelle Hernández MD  Comparison: compared with previous ECG   Similar to previous ECG  Comparison to previous ECG: Normal sinus rhythm  "nonspecific ST-T wave abnormalities 60/min normal axis normal intervals no ectopy no significant change from previous EKG.        Objective:       Physical Exam    /69 (BP Location: Left arm, Patient Position: Sitting, Cuff Size: Adult)   Pulse 60   Ht 167.6 cm (66\")   Wt 73 kg (161 lb)   SpO2 99%   BMI 25.99 kg/m²   The patient is alert, oriented and in no distress.    Vital signs as noted above.    Head and neck revealed no carotid bruits or jugular venous distension.  No thyromegaly or lymphadenopathy is present.    Lungs clear.  No wheezing.  Breath sounds are normal bilaterally.    Heart normal first and second heart sounds.  No murmur..  No pericardial rub is present.  No gallop is present.    Abdomen soft and nontender.  No organomegaly is present.    Extremities revealed good peripheral pulses without any pedal edema.    Skin warm and dry.    Musculoskeletal system is grossly normal.    CNS grossly normal.    Reviewed and updated.        "

## 2024-04-08 ENCOUNTER — APPOINTMENT (OUTPATIENT)
Dept: CARDIAC REHAB | Facility: HOSPITAL | Age: 69
End: 2024-04-08
Payer: MEDICARE

## 2024-04-09 ENCOUNTER — HOSPITAL ENCOUNTER (EMERGENCY)
Facility: HOSPITAL | Age: 69
Discharge: HOME | End: 2024-04-09
Attending: EMERGENCY MEDICINE | Admitting: EMERGENCY MEDICINE
Payer: MEDICARE

## 2024-04-09 ENCOUNTER — APPOINTMENT (OUTPATIENT)
Dept: GENERAL RADIOLOGY | Facility: HOSPITAL | Age: 69
End: 2024-04-09
Payer: MEDICARE

## 2024-04-09 VITALS
WEIGHT: 164.24 LBS | HEIGHT: 66 IN | DIASTOLIC BLOOD PRESSURE: 76 MMHG | BODY MASS INDEX: 26.4 KG/M2 | SYSTOLIC BLOOD PRESSURE: 140 MMHG | TEMPERATURE: 99.2 F | HEART RATE: 64 BPM | RESPIRATION RATE: 21 BRPM | OXYGEN SATURATION: 98 %

## 2024-04-09 DIAGNOSIS — B34.9 VIRAL SYNDROME: ICD-10-CM

## 2024-04-09 DIAGNOSIS — R50.9 FEVER, UNSPECIFIED FEVER CAUSE: Primary | ICD-10-CM

## 2024-04-09 LAB
ANION GAP SERPL CALCULATED.3IONS-SCNC: 9 MMOL/L (ref 5–15)
B PARAPERT DNA SPEC QL NAA+PROBE: NOT DETECTED
B PERT DNA SPEC QL NAA+PROBE: NOT DETECTED
BACTERIA UR QL AUTO: NORMAL /HPF
BASOPHILS # BLD AUTO: 0.04 10*3/MM3 (ref 0–0.2)
BASOPHILS NFR BLD AUTO: 0.9 % (ref 0–1.5)
BILIRUB UR QL STRIP: NEGATIVE
BUN SERPL-MCNC: 32 MG/DL (ref 8–23)
BUN/CREAT SERPL: 15.2 (ref 7–25)
C PNEUM DNA NPH QL NAA+NON-PROBE: NOT DETECTED
CALCIUM SPEC-SCNC: 9.1 MG/DL (ref 8.6–10.5)
CHLORIDE SERPL-SCNC: 105 MMOL/L (ref 98–107)
CK SERPL-CCNC: 98 U/L (ref 20–200)
CLARITY UR: CLEAR
CO2 SERPL-SCNC: 25 MMOL/L (ref 22–29)
COLOR UR: YELLOW
CREAT SERPL-MCNC: 2.11 MG/DL (ref 0.76–1.27)
DEPRECATED RDW RBC AUTO: 53.5 FL (ref 37–54)
EGFRCR SERPLBLD CKD-EPI 2021: 33.9 ML/MIN/1.73
EOSINOPHIL # BLD AUTO: 0.17 10*3/MM3 (ref 0–0.4)
EOSINOPHIL NFR BLD AUTO: 3.7 % (ref 0.3–6.2)
ERYTHROCYTE [DISTWIDTH] IN BLOOD BY AUTOMATED COUNT: 15.3 % (ref 12.3–15.4)
FLUAV SUBTYP SPEC NAA+PROBE: NOT DETECTED
FLUBV RNA ISLT QL NAA+PROBE: NOT DETECTED
GLUCOSE SERPL-MCNC: 95 MG/DL (ref 65–99)
GLUCOSE UR STRIP-MCNC: NEGATIVE MG/DL
HADV DNA SPEC NAA+PROBE: NOT DETECTED
HCOV 229E RNA SPEC QL NAA+PROBE: NOT DETECTED
HCOV HKU1 RNA SPEC QL NAA+PROBE: NOT DETECTED
HCOV NL63 RNA SPEC QL NAA+PROBE: NOT DETECTED
HCOV OC43 RNA SPEC QL NAA+PROBE: NOT DETECTED
HCT VFR BLD AUTO: 33.6 % (ref 37.5–51)
HGB BLD-MCNC: 10.6 G/DL (ref 13–17.7)
HGB UR QL STRIP.AUTO: NEGATIVE
HMPV RNA NPH QL NAA+NON-PROBE: NOT DETECTED
HOLD SPECIMEN: NORMAL
HPIV1 RNA ISLT QL NAA+PROBE: NOT DETECTED
HPIV2 RNA SPEC QL NAA+PROBE: NOT DETECTED
HPIV3 RNA NPH QL NAA+PROBE: NOT DETECTED
HPIV4 P GENE NPH QL NAA+PROBE: NOT DETECTED
HYALINE CASTS UR QL AUTO: NORMAL /LPF
IMM GRANULOCYTES # BLD AUTO: 0.06 10*3/MM3 (ref 0–0.05)
IMM GRANULOCYTES NFR BLD AUTO: 1.3 % (ref 0–0.5)
KETONES UR QL STRIP: NEGATIVE
LEUKOCYTE ESTERASE UR QL STRIP.AUTO: NEGATIVE
LYMPHOCYTES # BLD AUTO: 1.77 10*3/MM3 (ref 0.7–3.1)
LYMPHOCYTES NFR BLD AUTO: 38.9 % (ref 19.6–45.3)
M PNEUMO IGG SER IA-ACNC: NOT DETECTED
MCH RBC QN AUTO: 29.8 PG (ref 26.6–33)
MCHC RBC AUTO-ENTMCNC: 31.5 G/DL (ref 31.5–35.7)
MCV RBC AUTO: 94.4 FL (ref 79–97)
MONOCYTES # BLD AUTO: 0.8 10*3/MM3 (ref 0.1–0.9)
MONOCYTES NFR BLD AUTO: 17.6 % (ref 5–12)
NEUTROPHILS NFR BLD AUTO: 1.71 10*3/MM3 (ref 1.7–7)
NEUTROPHILS NFR BLD AUTO: 37.6 % (ref 42.7–76)
NITRITE UR QL STRIP: NEGATIVE
NRBC BLD AUTO-RTO: 0 /100 WBC (ref 0–0.2)
PH UR STRIP.AUTO: <=5 [PH] (ref 5–8)
PLATELET # BLD AUTO: 219 10*3/MM3 (ref 140–450)
PMV BLD AUTO: 9.5 FL (ref 6–12)
POTASSIUM SERPL-SCNC: 4.8 MMOL/L (ref 3.5–5.2)
PROT UR QL STRIP: ABNORMAL
RBC # BLD AUTO: 3.56 10*6/MM3 (ref 4.14–5.8)
RBC # UR STRIP: NORMAL /HPF
REF LAB TEST METHOD: NORMAL
RHINOVIRUS RNA SPEC NAA+PROBE: NOT DETECTED
RSV RNA NPH QL NAA+NON-PROBE: NOT DETECTED
SARS-COV-2 RNA NPH QL NAA+NON-PROBE: NOT DETECTED
SODIUM SERPL-SCNC: 139 MMOL/L (ref 136–145)
SP GR UR STRIP: 1.01 (ref 1–1.03)
SQUAMOUS #/AREA URNS HPF: NORMAL /HPF
UROBILINOGEN UR QL STRIP: ABNORMAL
WBC # UR STRIP: NORMAL /HPF
WBC NRBC COR # BLD AUTO: 4.55 10*3/MM3 (ref 3.4–10.8)
WHOLE BLOOD HOLD COAG: NORMAL

## 2024-04-09 PROCEDURE — 99283 EMERGENCY DEPT VISIT LOW MDM: CPT

## 2024-04-09 PROCEDURE — 81001 URINALYSIS AUTO W/SCOPE: CPT | Performed by: PHYSICIAN ASSISTANT

## 2024-04-09 PROCEDURE — 0202U NFCT DS 22 TRGT SARS-COV-2: CPT | Performed by: NURSE PRACTITIONER

## 2024-04-09 PROCEDURE — 71046 X-RAY EXAM CHEST 2 VIEWS: CPT

## 2024-04-09 PROCEDURE — 36415 COLL VENOUS BLD VENIPUNCTURE: CPT

## 2024-04-09 PROCEDURE — 87040 BLOOD CULTURE FOR BACTERIA: CPT | Performed by: NURSE PRACTITIONER

## 2024-04-09 PROCEDURE — 80048 BASIC METABOLIC PNL TOTAL CA: CPT | Performed by: NURSE PRACTITIONER

## 2024-04-09 PROCEDURE — 85025 COMPLETE CBC W/AUTO DIFF WBC: CPT | Performed by: NURSE PRACTITIONER

## 2024-04-09 PROCEDURE — 82550 ASSAY OF CK (CPK): CPT | Performed by: PHYSICIAN ASSISTANT

## 2024-04-09 RX ORDER — TIZANIDINE HYDROCHLORIDE 4 MG/1
4 CAPSULE, GELATIN COATED ORAL 3 TIMES DAILY
Qty: 15 CAPSULE | Refills: 0 | Status: SHIPPED | OUTPATIENT
Start: 2024-04-09

## 2024-04-09 NOTE — ED PROVIDER NOTES
Subjective   Provider in Triage Note  68-year-old male who presents with significant other with complaints of generalized myalgias and joint pain that started yesterday, temperature Tmax 100.6 today.  Denies any cough congestion nausea vomiting diarrhea urinary complaints.  Status post CABG February 2024.  He had a follow-up with family provider 2 weeks ago who put him on 7 tablets of Levaquin every other day that he finished yesterday for questionable pneumonia on chest x-ray.  Bone marrow transplant 2016    Due to significant overcrowding in the emergency department patient was initially seen and evaluated in triage.  Provider in triage recommended patient placement in the treatment area to initiate therapy and movement to an ER bed as soon as possible.   Orders placed; medications will be deferred to main provider per protocol.        History of Present Illness  Pit note reviewed.    Patient is a 68-year-old male history of CHF, CAD GERD hypertension presents to the ER with complaints of bodyaches fatigue and headache for few days.  He reports fever this morning 100.6.  He states that he had three-vessel bypass in February of this year but denies any chest pain or shortness of breath.  No cough congestion or sore throat.  No abdominal pain nausea vomiting diarrhea.  No rash.  No lower extremity swelling.  No recent sick contacts.    History provided by:  Patient      Review of Systems   Constitutional:  Positive for chills, fatigue and fever.   HENT:  Negative for sore throat and trouble swallowing.    Respiratory:  Negative for shortness of breath and wheezing.    Cardiovascular:  Negative for chest pain.   Gastrointestinal:  Negative for abdominal pain, diarrhea, nausea and vomiting.   Genitourinary:  Negative for dysuria.   Musculoskeletal:  Positive for myalgias.   Skin:  Negative for rash.   Neurological:  Positive for headaches. Negative for weakness.   Psychiatric/Behavioral:  Negative for behavioral  problems.    All other systems reviewed and are negative.      Past Medical History:   Diagnosis Date    CHF (congestive heart failure)     Coronary artery disease     GERD (gastroesophageal reflux disease)     Hyperlipidemia     Hypertension     Leukemia     Low back pain     Sleep apnea        Allergies   Allergen Reactions    Doxycycline Hyclate Other (See Comments)     Painful skin    Penicillins Rash    Sulfa Antibiotics Rash       Past Surgical History:   Procedure Laterality Date    BONE MARROW TRANSPLANT      CARDIAC CATHETERIZATION      CARDIAC CATHETERIZATION N/A 2/21/2024    Procedure: Coronary angiography;  Surgeon: Michelle Hernández MD;  Location: [facility] CATH INVASIVE LOCATION;  Service: Cardiovascular;  Laterality: N/A;    CARDIAC CATHETERIZATION N/A 2/21/2024    Procedure: Left Heart Cath;  Surgeon: Michelle Hernández MD;  Location: [facility] CATH INVASIVE LOCATION;  Service: Cardiovascular;  Laterality: N/A;    CHOLECYSTECTOMY N/A 4/23/2022    Procedure: CHOLECYSTECTOMY LAPAROSCOPIC;  Surgeon: Kale Acuna MD;  Location: [facility] MAIN OR;  Service: General;  Laterality: N/A;    CORONARY ARTERY BYPASS GRAFT N/A 2/22/2024    Procedure: CORONARY ARTERY BYPASS GRAFTING, INTRAOPERATIVE TRANSESOPHAGEAL ECHOCARDIOGRAM;  Surgeon: Soham Garrett MD;  Location: [facility] CVOR;  Service: Cardiothoracic;  Laterality: N/A;  CABG X 3 (2 vein grafts, 1 LIMA graft)    SINUS SURGERY      TONSILLECTOMY      VASECTOMY         Family History   Problem Relation Age of Onset    Hypertension Mother     Hyperlipidemia Mother     Heart disease Father     Heart attack Father     Hypertension Sister     Hypertension Brother     Heart disease Brother        Social History     Socioeconomic History    Marital status:    Tobacco Use    Smoking status: Former     Current packs/day: 0.00     Average packs/day: 1 pack/day for 30.0 years (30.0 ttl pk-yrs)     Types: Cigarettes     Start date: 1986     Quit date: 2016      "Years since quittin.2     Passive exposure: Never    Smokeless tobacco: Never   Vaping Use    Vaping status: Never Used   Substance and Sexual Activity    Alcohol use: No    Drug use: Defer    Sexual activity: Defer           Objective   Physical Exam  Vitals and nursing note reviewed.   Constitutional:       General: He is not in acute distress.     Appearance: Normal appearance. He is normal weight. He is not diaphoretic.   Cardiovascular:      Rate and Rhythm: Normal rate and regular rhythm.      Pulses: Normal pulses.      Heart sounds: Normal heart sounds. No murmur heard.  Pulmonary:      Effort: Pulmonary effort is normal.      Breath sounds: Normal breath sounds. No wheezing or rhonchi.   Abdominal:      General: Abdomen is flat.      Tenderness: There is no abdominal tenderness. There is no right CVA tenderness or left CVA tenderness.   Skin:     General: Skin is warm.      Capillary Refill: Capillary refill takes less than 2 seconds.   Neurological:      General: No focal deficit present.      Mental Status: He is alert and oriented to person, place, and time.      Motor: No weakness.   Psychiatric:         Mood and Affect: Mood normal.         Behavior: Behavior normal.         Procedures           ED Course  ED Course as of 04/10/24 0131   Tue 2024   185 Creatinine(!): 2.11  3 weeks ago creatinine 1.76.  1 month ago 2.08. []    Waiting to hear from Dr. Lawson []      ED Course User Index  [] Tracy Mckee PA    /76 (BP Location: Right arm, Patient Position: Lying)   Pulse 64   Temp 99.2 °F (37.3 °C)   Resp 21   Ht 167.6 cm (66\")   Wt 74.5 kg (164 lb 3.9 oz)   SpO2 98%   BMI 26.51 kg/m²   Labs Reviewed   BASIC METABOLIC PANEL - Abnormal; Notable for the following components:       Result Value    BUN 32 (*)     Creatinine 2.11 (*)     eGFR 33.9 (*)     All other components within normal limits    Narrative:     GFR Normal >60  Chronic Kidney Disease <60  Kidney Failure " <15     CBC WITH AUTO DIFFERENTIAL - Abnormal; Notable for the following components:    RBC 3.56 (*)     Hemoglobin 10.6 (*)     Hematocrit 33.6 (*)     Neutrophil % 37.6 (*)     Monocyte % 17.6 (*)     Immature Grans % 1.3 (*)     Immature Grans, Absolute 0.06 (*)     All other components within normal limits   URINALYSIS W/ MICROSCOPIC IF INDICATED (NO CULTURE) - Abnormal; Notable for the following components:    Protein, UA 30 mg/dL (1+) (*)     All other components within normal limits   RESPIRATORY PANEL PCR W/ COVID-19 (SARS-COV-2), NP SWAB IN UTM/VTP, 2 HR TAT - Normal    Narrative:     In the setting of a positive respiratory panel with a viral infection PLUS a negative procalcitonin without other underlying concern for bacterial infection, consider observing off antibiotics or discontinuation of antibiotics and continue supportive care. If the respiratory panel is positive for atypical bacterial infection (Bordetella pertussis, Chlamydophila pneumoniae, or Mycoplasma pneumoniae), consider antibiotic de-escalation to target atypical bacterial infection.   CK - Normal   BLOOD CULTURE   BLOOD CULTURE   URINALYSIS, MICROSCOPIC ONLY   CBC AND DIFFERENTIAL    Narrative:     The following orders were created for panel order CBC & Differential.  Procedure                               Abnormality         Status                     ---------                               -----------         ------                     CBC Auto Differential[[id number]]        Abnormal            Final result                 Please view results for these tests on the individual orders.   EXTRA TUBES    Narrative:     The following orders were created for panel order Extra Tubes.  Procedure                               Abnormality         Status                     ---------                               -----------         ------                     Gold Top - SST[[id number]]                                   Final result                Light Blue Top[[id number]]                                   Final result                 Please view results for these tests on the individual orders.   GOLD TOP - SST   LIGHT BLUE TOP     Medications - No data to display  XR Chest 2 View    Result Date: 4/9/2024  Impression: No radiographic evidence of acute chest process. Electronically Signed: Michael Malagon MD  4/9/2024 4:56 PM EDT  Workstation ID: [id number]                                            Medical Decision Making  Differential Dx (Includes but not limited to): Viral syndrome pneumonia UTI  Medical Records Reviewed: Patient had bypass surgery February of this year.  He was taken off of his Lasix per CT surgery  Labs: On my interpretation urinalysis negative for UTI.  Respiratory panel negative.  Blood cultures pending.  CBC no leukocytosis.  BMP BUN 32 creatinine 2.11.  Imaging: Imaging was reviewed by myself, independently interpreted by radiology chest x-ray shows no evidence of pneumonia or pleural effusion  Telemetry: N/A, patient denies chest pain or shortness of breath  Testing considered but not ordered: CT head patient denies severe headache dizziness or dizziness  Nature of Complaint: Acute  Admission vs Discharge: Discharge  Discussion: While in the ED IV was placed and labs were obtained appropriate PPE was worn during exam and throughout all encounters with the patient.  Patient had the above evaluation.  He is afebrile nontoxic appearance in no acute distress.  Patient main complaint of bodyaches, fatigue.  Reports fever earlier today but not currently.  He reports no other symptoms.  No cough congestion abdominal pain nausea vomiting.  No chest pain or shortness of breath.  Respiratory panel is negative.  Urinalysis negative.  Patient's creatinine slightly elevated 2.11, previously 1.76.  I spoke with Dr. Lawson, requested patient to hold Lasix and decrease lisinopril to 20 mg for the next 3 to 4 days and follow-up in nephrology office for  recheck.  After discussion with the patient he states that he has been taken off of his Lasix since his surgery.  Patient's mild ONEYDA could be due to dehydration versus medication induced.  Patient's myalgias felt to be due to viral syndrome.  He reports symptoms started yesterday, suspect possible flu or other viral syndrome that was not detected by today's respiratory panel.  Patient will be discharged, recommended to take Tylenol as needed for fever and bodyaches.  Will also send low-dose muscle relaxer.  Strict return precautions discussed.  I see no evidence of sepsis or infectious process at this time.  Vital signs stable.    Discharge plan and instructions were discussed with the patient who verbalized understanding and is in agreement with the plan, all questions were answered at this time.  Patient is aware of signs symptoms that would require immediate return to the emergency room.  Patient understands importance of following up with primary care provider for further evaluation and worsening concerns as well as blood pressure recheck in the next 4 weeks.    Patient was discharged in improved stable condition with an upright steady gait.    Patient is aware that discharge does not mean that nothing is wrong but indicates no emergencies present and they must continue care with follow-up as given below or physician of their choice.    Problems Addressed:  Fever, unspecified fever cause: acute illness or injury  Viral syndrome: acute illness or injury    Amount and/or Complexity of Data Reviewed  Labs: ordered. Decision-making details documented in ED Course.  Radiology: ordered. Decision-making details documented in ED Course.    Risk  Prescription drug management.        Final diagnoses:   Fever, unspecified fever cause   Viral syndrome       ED Disposition  ED Disposition       ED Disposition   Discharge    Condition   Stable    Comment   --               Yifan Elizabeth MD  [address]  [facility] [address]  [place] [address]  [phone]    Schedule an appointment as soon as possible for a visit in 2 days  If symptoms worsen    Laureano Lawson MD  [address]  [facility] [address]  [place] [address]  [phone]    Schedule an appointment as soon as possible for a visit in 2 days  As needed, If symptoms worsen         Medication List        New Prescriptions      TiZANidine 4 MG capsule  Commonly known as: ZANAFLEX  Take 1 capsule by mouth 3 (Three) Times a Day.               Where to Get Your Medications        These medications were sent to [facility]/pharmacy #[id number] - [place], [place] - [address] [phone] [facility] [phone]   [address] [facility] [place] [address]      Phone: [phone]   TiZANidine 4 MG capsule            Tracy Mckee PA  04/10/24 0132

## 2024-04-10 ENCOUNTER — APPOINTMENT (OUTPATIENT)
Dept: CARDIAC REHAB | Facility: HOSPITAL | Age: 69
End: 2024-04-10
Payer: MEDICARE

## 2024-04-10 NOTE — DISCHARGE INSTRUCTIONS
Take Tylenol every 4 hours as needed for fever or bodyaches.  Maximum of 4 g in a 24-hour period    Decrease your lisinopril to 20 mg for the next 3 days.    Monitor your symptoms, if they become worse, you develop cough congestion vomiting worsening fever you may need to be reevaluated or reswabbed.    Follow-up with primary care.  With your nephrologist, Dr. Lawson in 1 to 2 weeks    Return to the ER for new or worsening symptoms

## 2024-04-11 ENCOUNTER — OFFICE VISIT (OUTPATIENT)
Dept: FAMILY MEDICINE CLINIC | Facility: CLINIC | Age: 69
End: 2024-04-11
Payer: MEDICARE

## 2024-04-11 ENCOUNTER — LAB (OUTPATIENT)
Dept: FAMILY MEDICINE CLINIC | Facility: CLINIC | Age: 69
End: 2024-04-11
Payer: MEDICARE

## 2024-04-11 VITALS
SYSTOLIC BLOOD PRESSURE: 127 MMHG | OXYGEN SATURATION: 98 % | HEART RATE: 67 BPM | BODY MASS INDEX: 25.99 KG/M2 | DIASTOLIC BLOOD PRESSURE: 79 MMHG | TEMPERATURE: 98.4 F | WEIGHT: 161 LBS

## 2024-04-11 DIAGNOSIS — R50.9 FEVER, UNSPECIFIED FEVER CAUSE: Primary | ICD-10-CM

## 2024-04-11 DIAGNOSIS — Z95.1 HX OF CABG: ICD-10-CM

## 2024-04-11 LAB
EXPIRATION DATE: NORMAL
EXPIRATION DATE: NORMAL
FLUAV AG NPH QL: NEGATIVE
FLUBV AG NPH QL: NEGATIVE
INTERNAL CONTROL: NORMAL
INTERNAL CONTROL: NORMAL
Lab: NORMAL
Lab: NORMAL
SARS-COV-2 AG UPPER RESP QL IA.RAPID: NOT DETECTED

## 2024-04-11 PROCEDURE — 85025 COMPLETE CBC W/AUTO DIFF WBC: CPT | Performed by: NURSE PRACTITIONER

## 2024-04-11 NOTE — PROGRESS NOTES
Subjective     Anthony Ayoub is a 68 y.o. male.     History of Present Illness  Pt is here today with c/o body aches and fever.  Pt is also having back and leg pain.   He states his skin is very sensitive to touch.  He had a CABG x3 on 2/22/  He states his legs and his calves are sore.  He sees a renal doctor for kidney failure.  He has seen PM in the past and gets SI injections  He went to the ER 2 days ago and his lab work was stable  WBC was normal, respiratory panel was negative  He had a CXR a few weeks ago and it showed atelectasis  He completed levaquin a few days ago  The ER gave him tizanidine and it knocked him out  His BP last night went to the 70s/50s  He held his carvedilol and terazosin last night and lisinopril today.   Denies CP, SOA.  He has been running 100-101 temp  Has a HA.  He is on plavix and baby ASA.   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    Review of Systems   Constitutional:  Positive for chills, fatigue and fever.   Respiratory:  Negative for chest tightness and shortness of breath.    Cardiovascular:  Negative for chest pain and palpitations.   Gastrointestinal:  Negative for abdominal pain.   Musculoskeletal:  Positive for arthralgias and myalgias.   Neurological:  Negative for dizziness and headache.       Objective     /79 (BP Location: Left arm, Patient Position: Sitting, Cuff Size: Large Adult)   Pulse 67   Temp 98.4 °F (36.9 °C) (Tympanic)   Wt 73 kg (161 lb)   SpO2 98%   BMI 25.99 kg/m²     Current Outpatient Medications on File Prior to Visit   Medication Sig Dispense Refill    amLODIPine (NORVASC) 5 MG tablet Take 1 tablet by mouth Daily. 30 tablet 3    aspirin 81 MG EC tablet Take 1 tablet by mouth Daily.       carvedilol (COREG) 25 MG tablet Take 1 tablet by mouth 2 (Two) Times a Day.      clopidogrel (PLAVIX) 75 MG tablet Take 1 tablet by mouth Daily. 30 tablet 3    escitalopram (LEXAPRO) 20 MG tablet Take 1 tablet by mouth Daily. 90 tablet 1    ezetimibe (ZETIA) 10 MG tablet TAKE 1 TABLET BY MOUTH EVERY DAY 95 tablet 0    ferrous sulfate 325 (65 FE) MG EC tablet Take 1 tablet by mouth.      fluticasone (FLONASE) 50 MCG/ACT nasal spray 1 spray into the nostril(s) as directed by provider 1 (One) Time. prn      folic acid (FOLVITE) 1 MG tablet TAKE 1 TABLET BY MOUTH EVERY DAY 90 tablet 1    lisinopril (PRINIVIL,ZESTRIL) 40 MG tablet Take 1 tablet by mouth Daily.      pantoprazole (PROTONIX) 40 MG EC tablet Take 1 tablet by mouth Daily.      rosuvastatin (CRESTOR) 40 MG tablet TAKE 1 TABLET BY MOUTH EVERYDAY AT BEDTIME 90 tablet 1    terazosin (HYTRIN) 5 MG capsule Daily.      TiZANidine (ZANAFLEX) 4 MG capsule Take 1 capsule by mouth 3 (Three) Times a Day. 15 capsule 0    vitamin D (ERGOCALCIFEROL) 1.25 MG (08739 UT) capsule capsule TAKE 1 CAPSULE BY MOUTH 1 TIME PER WEEK. 12 capsule 1    [DISCONTINUED] levoFLOXacin (Levaquin) 750 MG tablet Take 1 tablet by mouth Every Other Day. 7 tablet 0    [DISCONTINUED] multivitamin (THERAGRAN) tablet tablet Take  by mouth.       No current facility-administered medications on file prior to visit.                 Physical Exam  Constitutional:       Comments: pale   HENT:      Head: Normocephalic and atraumatic.   Cardiovascular:      Rate and Rhythm: Normal rate and regular rhythm.      Heart sounds: No murmur heard.  Pulmonary:      Effort: Pulmonary effort is normal. No respiratory distress.      Breath sounds: Normal breath sounds.   Musculoskeletal:         General: Normal range of motion.   Skin:     General: Skin is warm and dry.      Coloration: Skin is pale.   Neurological:      General: No focal deficit present.      Mental Status: He is alert and oriented to person, place,  and time.   Psychiatric:         Mood and Affect: Mood normal.         Behavior: Behavior normal.         Thought Content: Thought content normal.         Judgment: Judgment normal.           Assessment & Plan     Diagnoses and all orders for this visit:    1. Fever, unspecified fever cause (Primary)  Comments:  unknown etiology  has been to the ER  labs reviewed  respi panel neg- flu and covid neg today  CK negative for rhabdo  recheck CBC  get CT chest to r/o abscess  Orders:  -     CBC & Differential  -     CT Chest Without Contrast; Future  -     POCT Influenza A/B  -     POCT SARS-CoV-2 Antigen PETTY    2. Hx of CABG  Comments:  7 wks ago  Orders:  -     CBC & Differential  -     CT Chest Without Contrast; Future      Pt has negative holmans- I dont believe it is a blood clot due to have bilateral pain all over, no swelling or redness  Will hold off on ddimer due to likelihood of elevation from recent surgery

## 2024-04-12 ENCOUNTER — HOSPITAL ENCOUNTER (OUTPATIENT)
Dept: CT IMAGING | Facility: HOSPITAL | Age: 69
Discharge: HOME | End: 2024-04-12
Payer: MEDICARE

## 2024-04-12 DIAGNOSIS — R50.9 FEVER, UNSPECIFIED FEVER CAUSE: ICD-10-CM

## 2024-04-12 DIAGNOSIS — Z95.1 HX OF CABG: ICD-10-CM

## 2024-04-12 LAB
BASOPHILS # BLD AUTO: 0.01 10*3/MM3 (ref 0–0.2)
BASOPHILS NFR BLD AUTO: 0.3 % (ref 0–1.5)
DEPRECATED RDW RBC AUTO: 47.5 FL (ref 37–54)
EOSINOPHIL # BLD AUTO: 0.11 10*3/MM3 (ref 0–0.4)
EOSINOPHIL NFR BLD AUTO: 3.5 % (ref 0.3–6.2)
ERYTHROCYTE [DISTWIDTH] IN BLOOD BY AUTOMATED COUNT: 14.2 % (ref 12.3–15.4)
HCT VFR BLD AUTO: 34.8 % (ref 37.5–51)
HGB BLD-MCNC: 11.3 G/DL (ref 13–17.7)
IMM GRANULOCYTES # BLD AUTO: 0.03 10*3/MM3 (ref 0–0.05)
IMM GRANULOCYTES NFR BLD AUTO: 1 % (ref 0–0.5)
LYMPHOCYTES # BLD AUTO: 1.58 10*3/MM3 (ref 0.7–3.1)
LYMPHOCYTES NFR BLD AUTO: 50.3 % (ref 19.6–45.3)
MCH RBC QN AUTO: 29.7 PG (ref 26.6–33)
MCHC RBC AUTO-ENTMCNC: 32.5 G/DL (ref 31.5–35.7)
MCV RBC AUTO: 91.6 FL (ref 79–97)
MONOCYTES # BLD AUTO: 0.36 10*3/MM3 (ref 0.1–0.9)
MONOCYTES NFR BLD AUTO: 11.5 % (ref 5–12)
NEUTROPHILS NFR BLD AUTO: 1.05 10*3/MM3 (ref 1.7–7)
NEUTROPHILS NFR BLD AUTO: 33.4 % (ref 42.7–76)
NRBC BLD AUTO-RTO: 0 /100 WBC (ref 0–0.2)
PLATELET # BLD AUTO: 192 10*3/MM3 (ref 140–450)
PMV BLD AUTO: 10.4 FL (ref 6–12)
RBC # BLD AUTO: 3.8 10*6/MM3 (ref 4.14–5.8)
WBC NRBC COR # BLD AUTO: 3.14 10*3/MM3 (ref 3.4–10.8)

## 2024-04-12 PROCEDURE — 71250 CT THORAX DX C-: CPT

## 2024-04-12 RX ORDER — ESCITALOPRAM OXALATE 20 MG/1
20 TABLET ORAL DAILY
Qty: 90 TABLET | Refills: 1 | Status: SHIPPED | OUTPATIENT
Start: 2024-04-12

## 2024-04-14 LAB
BACTERIA SPEC AEROBE CULT: NORMAL
BACTERIA SPEC AEROBE CULT: NORMAL

## 2024-04-15 ENCOUNTER — TREATMENT (OUTPATIENT)
Dept: CARDIAC REHAB | Facility: HOSPITAL | Age: 69
End: 2024-04-15
Payer: MEDICARE

## 2024-04-15 ENCOUNTER — TELEPHONE (OUTPATIENT)
Dept: CARDIOLOGY | Facility: CLINIC | Age: 69
End: 2024-04-15
Payer: MEDICARE

## 2024-04-15 ENCOUNTER — TELEPHONE (OUTPATIENT)
Dept: ORTHOPEDICS | Facility: OTHER | Age: 69
End: 2024-04-15

## 2024-04-15 DIAGNOSIS — Z95.1 S/P CABG (CORONARY ARTERY BYPASS GRAFT): Primary | ICD-10-CM

## 2024-04-15 DIAGNOSIS — R91.1 LUNG NODULE: ICD-10-CM

## 2024-04-15 DIAGNOSIS — I97.641 POSTOPERATIVE SEROMA INVOLVING CIRCULATORY SYSTEM AFTER CARDIAC BYPASS: Primary | ICD-10-CM

## 2024-04-15 PROCEDURE — 93798 PHYS/QHP OP CAR RHAB W/ECG: CPT

## 2024-04-15 NOTE — TELEPHONE ENCOUNTER
Caller: CHAPARRO PAGAN    Relationship to patient: [facility]    Best call back number:[phone]    Chief complaint: BODY ACHES- PAIN     Type of visit: FOLLOW UP - [facility]     Requested date: ASAP      Additional notes:[facility] CALLED AND STATES PT IS NOT FEELING WELL- BODY ACHES AND SLIGHT LOW GRADE FEVER- CALLED PRACTICE -S/W RAJENDRA ADVISED OF ISSUE.- STATES HE WILL F/U WITH  AND CALL BACK TODAY.

## 2024-04-15 NOTE — TELEPHONE ENCOUNTER
[facility] ER RECENT BUT HAVING LOW AND HIGH BP READINGS , ACHES ALL OVER, STATES THERE HAS BEEN SEVERAL MEDICATION CHANGES  4/12-BP-161/100, 4/13 BP-103/65  , 4/14 BP-144/77 4/15 BP -143/92  PLEASE ADVISE

## 2024-04-17 ENCOUNTER — TREATMENT (OUTPATIENT)
Dept: CARDIAC REHAB | Facility: HOSPITAL | Age: 69
End: 2024-04-17
Payer: MEDICARE

## 2024-04-17 ENCOUNTER — LAB (OUTPATIENT)
Dept: LAB | Facility: HOSPITAL | Age: 69
End: 2024-04-17
Payer: MEDICARE

## 2024-04-17 ENCOUNTER — TRANSCRIBE ORDERS (OUTPATIENT)
Dept: ADMINISTRATIVE | Facility: HOSPITAL | Age: 69
End: 2024-04-17
Payer: MEDICARE

## 2024-04-17 DIAGNOSIS — Z95.1 S/P CABG (CORONARY ARTERY BYPASS GRAFT): Primary | ICD-10-CM

## 2024-04-17 DIAGNOSIS — R80.9 PROTEINURIA, UNSPECIFIED TYPE: ICD-10-CM

## 2024-04-17 DIAGNOSIS — N18.31 CHRONIC KIDNEY DISEASE (CKD) STAGE G3A/A1, MODERATELY DECREASED GLOMERULAR FILTRATION RATE (GFR) BETWEEN 45-59 ML/MIN/1.73 SQUARE METER AND ALBUMINURIA CREATININE RATIO LESS THAN 30 MG/G (CMS/H*: ICD-10-CM

## 2024-04-17 DIAGNOSIS — N28.89 URETERAL FISTULA: ICD-10-CM

## 2024-04-17 DIAGNOSIS — N13.8 ENLARGED PROSTATE WITH URINARY OBSTRUCTION: ICD-10-CM

## 2024-04-17 DIAGNOSIS — Z85.6 PERSONAL HISTORY OF UNSPECIFIED LEUKEMIA: ICD-10-CM

## 2024-04-17 DIAGNOSIS — I10 ESSENTIAL HYPERTENSION, MALIGNANT: ICD-10-CM

## 2024-04-17 DIAGNOSIS — N40.1 ENLARGED PROSTATE WITH URINARY OBSTRUCTION: ICD-10-CM

## 2024-04-17 DIAGNOSIS — N18.31 CHRONIC KIDNEY DISEASE (CKD) STAGE G3A/A1, MODERATELY DECREASED GLOMERULAR FILTRATION RATE (GFR) BETWEEN 45-59 ML/MIN/1.73 SQUARE METER AND ALBUMINURIA CREATININE RATIO LESS THAN 30 MG/G (CMS/H*: Primary | ICD-10-CM

## 2024-04-17 LAB
25(OH)D3 SERPL-MCNC: 84 NG/ML (ref 30–100)
ALBUMIN SERPL-MCNC: 4.3 G/DL (ref 3.5–5.2)
ANION GAP SERPL CALCULATED.3IONS-SCNC: 10 MMOL/L (ref 5–15)
ANISOCYTOSIS BLD QL: ABNORMAL
BUN SERPL-MCNC: 23 MG/DL (ref 8–23)
BUN/CREAT SERPL: 14.3 (ref 7–25)
BURR CELLS BLD QL SMEAR: ABNORMAL
CALCIUM SPEC-SCNC: 9.4 MG/DL (ref 8.6–10.5)
CHLORIDE SERPL-SCNC: 106 MMOL/L (ref 98–107)
CO2 SERPL-SCNC: 25 MMOL/L (ref 22–29)
CREAT SERPL-MCNC: 1.61 MG/DL (ref 0.76–1.27)
DEPRECATED RDW RBC AUTO: 53.2 FL (ref 37–54)
EGFRCR SERPLBLD CKD-EPI 2021: 46.9 ML/MIN/1.73
EOSINOPHIL # BLD MANUAL: 0.34 10*3/MM3 (ref 0–0.4)
EOSINOPHIL NFR BLD MANUAL: 7 % (ref 0.3–6.2)
ERYTHROCYTE [DISTWIDTH] IN BLOOD BY AUTOMATED COUNT: 15.3 % (ref 12.3–15.4)
GLUCOSE SERPL-MCNC: 76 MG/DL (ref 65–99)
HCT VFR BLD AUTO: 35.6 % (ref 37.5–51)
HGB BLD-MCNC: 11 G/DL (ref 13–17.7)
HYPOCHROMIA BLD QL: ABNORMAL
LARGE PLATELETS: ABNORMAL
LYMPHOCYTES # BLD MANUAL: 2.34 10*3/MM3 (ref 0.7–3.1)
LYMPHOCYTES NFR BLD MANUAL: 6 % (ref 5–12)
MCH RBC QN AUTO: 29.1 PG (ref 26.6–33)
MCHC RBC AUTO-ENTMCNC: 30.9 G/DL (ref 31.5–35.7)
MCV RBC AUTO: 94.2 FL (ref 79–97)
MICROCYTES BLD QL: ABNORMAL
MONOCYTES # BLD: 0.29 10*3/MM3 (ref 0.1–0.9)
NEUTROPHILS # BLD AUTO: 1.9 10*3/MM3 (ref 1.7–7)
NEUTROPHILS NFR BLD MANUAL: 38 % (ref 42.7–76)
NEUTS BAND NFR BLD MANUAL: 1 % (ref 0–5)
PHOSPHATE SERPL-MCNC: 3.3 MG/DL (ref 2.5–4.5)
PLATELET # BLD AUTO: 162 10*3/MM3 (ref 140–450)
PMV BLD AUTO: 10.7 FL (ref 6–12)
POIKILOCYTOSIS BLD QL SMEAR: ABNORMAL
POTASSIUM SERPL-SCNC: 4.9 MMOL/L (ref 3.5–5.2)
PTH-INTACT SERPL-MCNC: 90.4 PG/ML (ref 15–65)
RBC # BLD AUTO: 3.78 10*6/MM3 (ref 4.14–5.8)
SCAN SLIDE: NORMAL
SMALL PLATELETS BLD QL SMEAR: ADEQUATE
SODIUM SERPL-SCNC: 141 MMOL/L (ref 136–145)
VARIANT LYMPHS NFR BLD MANUAL: 43 % (ref 19.6–45.3)
VARIANT LYMPHS NFR BLD MANUAL: 5 % (ref 0–5)
WBC MORPH BLD: NORMAL
WBC NRBC COR # BLD AUTO: 4.87 10*3/MM3 (ref 3.4–10.8)

## 2024-04-17 PROCEDURE — 36415 COLL VENOUS BLD VENIPUNCTURE: CPT

## 2024-04-17 PROCEDURE — 82570 ASSAY OF URINE CREATININE: CPT

## 2024-04-17 PROCEDURE — 80069 RENAL FUNCTION PANEL: CPT

## 2024-04-17 PROCEDURE — 85025 COMPLETE CBC W/AUTO DIFF WBC: CPT

## 2024-04-17 PROCEDURE — 84156 ASSAY OF PROTEIN URINE: CPT

## 2024-04-17 PROCEDURE — 82306 VITAMIN D 25 HYDROXY: CPT

## 2024-04-17 PROCEDURE — 93798 PHYS/QHP OP CAR RHAB W/ECG: CPT

## 2024-04-17 PROCEDURE — 85007 BL SMEAR W/DIFF WBC COUNT: CPT

## 2024-04-17 PROCEDURE — 83970 ASSAY OF PARATHORMONE: CPT

## 2024-04-18 LAB
CREAT UR-MCNC: 180.6 MG/DL
PROT ?TM UR-MCNC: 181 MG/DL
PROT/CREAT UR: 1002.2 MG/G CREA (ref 0–200)

## 2024-04-22 ENCOUNTER — APPOINTMENT (OUTPATIENT)
Dept: CARDIAC REHAB | Facility: HOSPITAL | Age: 69
End: 2024-04-22
Payer: MEDICARE

## 2024-04-22 RX ORDER — POTASSIUM CHLORIDE 750 MG/1
10 CAPSULE, EXTENDED RELEASE ORAL DAILY
Qty: 30 CAPSULE | Refills: 1 | OUTPATIENT
Start: 2024-04-22

## 2024-04-23 ENCOUNTER — OFFICE VISIT (OUTPATIENT)
Dept: CARDIOLOGY | Facility: CLINIC | Age: 69
End: 2024-04-23
Payer: MEDICARE

## 2024-04-23 VITALS
BODY MASS INDEX: 25.88 KG/M2 | SYSTOLIC BLOOD PRESSURE: 112 MMHG | HEART RATE: 60 BPM | OXYGEN SATURATION: 99 % | WEIGHT: 161 LBS | DIASTOLIC BLOOD PRESSURE: 69 MMHG | HEIGHT: 66 IN

## 2024-04-23 DIAGNOSIS — Z95.1 HX OF CABG: ICD-10-CM

## 2024-04-23 DIAGNOSIS — E78.5 DYSLIPIDEMIA: ICD-10-CM

## 2024-04-23 DIAGNOSIS — E03.9 HYPOTHYROIDISM, UNSPECIFIED TYPE: ICD-10-CM

## 2024-04-23 DIAGNOSIS — I10 ESSENTIAL HYPERTENSION: ICD-10-CM

## 2024-04-23 DIAGNOSIS — E78.2 MIXED HYPERLIPIDEMIA: Primary | ICD-10-CM

## 2024-04-23 DIAGNOSIS — N18.30 STAGE 3 CHRONIC KIDNEY DISEASE, UNSPECIFIED WHETHER STAGE 3A OR 3B CKD: ICD-10-CM

## 2024-04-23 DIAGNOSIS — R06.09 DYSPNEA ON EXERTION: ICD-10-CM

## 2024-04-23 DIAGNOSIS — I10 BENIGN ESSENTIAL HYPERTENSION: ICD-10-CM

## 2024-04-23 RX ORDER — CALCITRIOL 0.25 UG/1
0.25 CAPSULE, LIQUID FILLED ORAL EVERY OTHER DAY
COMMUNITY
Start: 2024-04-22

## 2024-04-24 ENCOUNTER — TREATMENT (OUTPATIENT)
Dept: CARDIAC REHAB | Facility: HOSPITAL | Age: 69
End: 2024-04-24
Payer: MEDICARE

## 2024-04-24 DIAGNOSIS — Z95.1 S/P CABG (CORONARY ARTERY BYPASS GRAFT): Primary | ICD-10-CM

## 2024-04-24 PROCEDURE — 93798 PHYS/QHP OP CAR RHAB W/ECG: CPT

## 2024-04-29 ENCOUNTER — TREATMENT (OUTPATIENT)
Dept: CARDIAC REHAB | Facility: HOSPITAL | Age: 69
End: 2024-04-29
Payer: MEDICARE

## 2024-04-29 DIAGNOSIS — Z95.1 S/P CABG (CORONARY ARTERY BYPASS GRAFT): Primary | ICD-10-CM

## 2024-04-29 PROCEDURE — 93798 PHYS/QHP OP CAR RHAB W/ECG: CPT

## 2024-05-01 ENCOUNTER — TREATMENT (OUTPATIENT)
Dept: CARDIAC REHAB | Facility: HOSPITAL | Age: 69
End: 2024-05-01
Payer: MEDICARE

## 2024-05-01 DIAGNOSIS — Z95.1 S/P CABG (CORONARY ARTERY BYPASS GRAFT): Primary | ICD-10-CM

## 2024-05-01 PROCEDURE — 93798 PHYS/QHP OP CAR RHAB W/ECG: CPT

## 2024-05-06 ENCOUNTER — TREATMENT (OUTPATIENT)
Dept: CARDIAC REHAB | Facility: HOSPITAL | Age: 69
End: 2024-05-06
Payer: MEDICARE

## 2024-05-06 DIAGNOSIS — Z95.1 S/P CABG (CORONARY ARTERY BYPASS GRAFT): Primary | ICD-10-CM

## 2024-05-06 PROCEDURE — 93798 PHYS/QHP OP CAR RHAB W/ECG: CPT

## 2024-05-07 RX ORDER — ERGOCALCIFEROL 1.25 MG/1
CAPSULE ORAL
Qty: 12 CAPSULE | Refills: 1 | Status: SHIPPED | OUTPATIENT
Start: 2024-05-07

## 2024-05-08 ENCOUNTER — TREATMENT (OUTPATIENT)
Dept: CARDIAC REHAB | Facility: HOSPITAL | Age: 69
End: 2024-05-08
Payer: MEDICARE

## 2024-05-08 DIAGNOSIS — Z95.1 S/P CABG (CORONARY ARTERY BYPASS GRAFT): Primary | ICD-10-CM

## 2024-05-08 PROCEDURE — 93798 PHYS/QHP OP CAR RHAB W/ECG: CPT

## 2024-05-13 ENCOUNTER — TREATMENT (OUTPATIENT)
Dept: CARDIAC REHAB | Facility: HOSPITAL | Age: 69
End: 2024-05-13
Payer: MEDICARE

## 2024-05-13 DIAGNOSIS — Z95.1 S/P CABG (CORONARY ARTERY BYPASS GRAFT): Primary | ICD-10-CM

## 2024-05-13 PROCEDURE — 93798 PHYS/QHP OP CAR RHAB W/ECG: CPT

## 2024-05-15 ENCOUNTER — APPOINTMENT (OUTPATIENT)
Dept: CARDIAC REHAB | Facility: HOSPITAL | Age: 69
End: 2024-05-15
Payer: MEDICARE

## 2024-05-20 ENCOUNTER — TREATMENT (OUTPATIENT)
Dept: CARDIAC REHAB | Facility: HOSPITAL | Age: 69
End: 2024-05-20
Payer: MEDICARE

## 2024-05-20 DIAGNOSIS — Z95.1 S/P CABG (CORONARY ARTERY BYPASS GRAFT): Primary | ICD-10-CM

## 2024-05-20 PROCEDURE — 93798 PHYS/QHP OP CAR RHAB W/ECG: CPT

## 2024-05-22 ENCOUNTER — TREATMENT (OUTPATIENT)
Dept: CARDIAC REHAB | Facility: HOSPITAL | Age: 69
End: 2024-05-22
Payer: MEDICARE

## 2024-05-22 DIAGNOSIS — Z95.1 S/P CABG (CORONARY ARTERY BYPASS GRAFT): Primary | ICD-10-CM

## 2024-05-22 PROCEDURE — 93798 PHYS/QHP OP CAR RHAB W/ECG: CPT

## 2024-05-27 ENCOUNTER — APPOINTMENT (OUTPATIENT)
Dept: CARDIAC REHAB | Facility: HOSPITAL | Age: 69
End: 2024-05-27
Payer: MEDICARE

## 2024-05-27 RX ORDER — EZETIMIBE 10 MG/1
TABLET ORAL
Qty: 95 TABLET | Refills: 0 | Status: SHIPPED | OUTPATIENT
Start: 2024-05-27

## 2024-05-29 ENCOUNTER — TREATMENT (OUTPATIENT)
Dept: CARDIAC REHAB | Facility: HOSPITAL | Age: 69
End: 2024-05-29
Payer: MEDICARE

## 2024-05-29 DIAGNOSIS — Z95.1 S/P CABG (CORONARY ARTERY BYPASS GRAFT): Primary | ICD-10-CM

## 2024-05-29 PROCEDURE — 93798 PHYS/QHP OP CAR RHAB W/ECG: CPT

## 2024-05-30 ENCOUNTER — TRANSCRIBE ORDERS (OUTPATIENT)
Dept: ADMINISTRATIVE | Facility: HOSPITAL | Age: 69
End: 2024-05-30
Payer: MEDICARE

## 2024-05-30 ENCOUNTER — LAB (OUTPATIENT)
Dept: LAB | Facility: HOSPITAL | Age: 69
End: 2024-05-30
Payer: MEDICARE

## 2024-05-30 DIAGNOSIS — D89.811 CHRONIC GRAFT-VERSUS-HOST DISEASE: ICD-10-CM

## 2024-05-30 DIAGNOSIS — Z94.81 STATUS POST BONE MARROW TRANSPLANT: ICD-10-CM

## 2024-05-30 DIAGNOSIS — C93.10 CHRONIC MONOCYTOID LEUKEMIA: ICD-10-CM

## 2024-05-30 DIAGNOSIS — C93.10 CHRONIC MONOCYTOID LEUKEMIA: Primary | ICD-10-CM

## 2024-05-30 PROCEDURE — 36415 COLL VENOUS BLD VENIPUNCTURE: CPT

## 2024-05-30 PROCEDURE — 80053 COMPREHEN METABOLIC PANEL: CPT

## 2024-05-30 PROCEDURE — 85025 COMPLETE CBC W/AUTO DIFF WBC: CPT

## 2024-05-31 LAB
ALBUMIN SERPL-MCNC: 4.2 G/DL (ref 3.5–5.2)
ALBUMIN/GLOB SERPL: 1.6 G/DL
ALP SERPL-CCNC: 90 U/L (ref 39–117)
ALT SERPL W P-5'-P-CCNC: 15 U/L (ref 1–41)
ANION GAP SERPL CALCULATED.3IONS-SCNC: 12.4 MMOL/L (ref 5–15)
AST SERPL-CCNC: 15 U/L (ref 1–40)
BASOPHILS # BLD AUTO: 0.09 10*3/MM3 (ref 0–0.2)
BASOPHILS NFR BLD AUTO: 1.2 % (ref 0–1.5)
BILIRUB SERPL-MCNC: 0.5 MG/DL (ref 0–1.2)
BUN SERPL-MCNC: 26 MG/DL (ref 8–23)
BUN/CREAT SERPL: 15.7 (ref 7–25)
CALCIUM SPEC-SCNC: 9.2 MG/DL (ref 8.6–10.5)
CHLORIDE SERPL-SCNC: 104 MMOL/L (ref 98–107)
CO2 SERPL-SCNC: 23.6 MMOL/L (ref 22–29)
CREAT SERPL-MCNC: 1.66 MG/DL (ref 0.76–1.27)
DEPRECATED RDW RBC AUTO: 52.5 FL (ref 37–54)
EGFRCR SERPLBLD CKD-EPI 2021: 45.2 ML/MIN/1.73
EOSINOPHIL # BLD AUTO: 1.05 10*3/MM3 (ref 0–0.4)
EOSINOPHIL NFR BLD AUTO: 13.5 % (ref 0.3–6.2)
ERYTHROCYTE [DISTWIDTH] IN BLOOD BY AUTOMATED COUNT: 15.3 % (ref 12.3–15.4)
GLOBULIN UR ELPH-MCNC: 2.6 GM/DL
GLUCOSE SERPL-MCNC: 91 MG/DL (ref 65–99)
HCT VFR BLD AUTO: 36.1 % (ref 37.5–51)
HGB BLD-MCNC: 11.5 G/DL (ref 13–17.7)
IMM GRANULOCYTES # BLD AUTO: 0.01 10*3/MM3 (ref 0–0.05)
IMM GRANULOCYTES NFR BLD AUTO: 0.1 % (ref 0–0.5)
LYMPHOCYTES # BLD AUTO: 2.46 10*3/MM3 (ref 0.7–3.1)
LYMPHOCYTES NFR BLD AUTO: 31.5 % (ref 19.6–45.3)
MCH RBC QN AUTO: 29.7 PG (ref 26.6–33)
MCHC RBC AUTO-ENTMCNC: 31.9 G/DL (ref 31.5–35.7)
MCV RBC AUTO: 93.3 FL (ref 79–97)
MONOCYTES # BLD AUTO: 0.56 10*3/MM3 (ref 0.1–0.9)
MONOCYTES NFR BLD AUTO: 7.2 % (ref 5–12)
NEUTROPHILS NFR BLD AUTO: 3.63 10*3/MM3 (ref 1.7–7)
NEUTROPHILS NFR BLD AUTO: 46.5 % (ref 42.7–76)
NRBC BLD AUTO-RTO: 0 /100 WBC (ref 0–0.2)
PLATELET # BLD AUTO: 215 10*3/MM3 (ref 140–450)
PMV BLD AUTO: 10.3 FL (ref 6–12)
POTASSIUM SERPL-SCNC: 4.4 MMOL/L (ref 3.5–5.2)
PROT SERPL-MCNC: 6.8 G/DL (ref 6–8.5)
RBC # BLD AUTO: 3.87 10*6/MM3 (ref 4.14–5.8)
SODIUM SERPL-SCNC: 140 MMOL/L (ref 136–145)
WBC NRBC COR # BLD AUTO: 7.8 10*3/MM3 (ref 3.4–10.8)

## 2024-06-03 ENCOUNTER — TREATMENT (OUTPATIENT)
Dept: CARDIAC REHAB | Facility: HOSPITAL | Age: 69
End: 2024-06-03
Payer: MEDICARE

## 2024-06-03 DIAGNOSIS — Z95.1 S/P CABG (CORONARY ARTERY BYPASS GRAFT): Primary | ICD-10-CM

## 2024-06-03 PROCEDURE — 93798 PHYS/QHP OP CAR RHAB W/ECG: CPT

## 2024-06-05 ENCOUNTER — TREATMENT (OUTPATIENT)
Dept: CARDIAC REHAB | Facility: HOSPITAL | Age: 69
End: 2024-06-05
Payer: MEDICARE

## 2024-06-05 DIAGNOSIS — Z95.1 S/P CABG (CORONARY ARTERY BYPASS GRAFT): Primary | ICD-10-CM

## 2024-06-05 PROCEDURE — 93798 PHYS/QHP OP CAR RHAB W/ECG: CPT

## 2024-06-08 DIAGNOSIS — M15.9 OSTEOARTHRITIS OF MULTIPLE JOINTS, UNSPECIFIED OSTEOARTHRITIS TYPE: Primary | ICD-10-CM

## 2024-06-10 ENCOUNTER — TREATMENT (OUTPATIENT)
Dept: CARDIAC REHAB | Facility: HOSPITAL | Age: 69
End: 2024-06-10
Payer: MEDICARE

## 2024-06-10 DIAGNOSIS — Z95.1 S/P CABG (CORONARY ARTERY BYPASS GRAFT): Primary | ICD-10-CM

## 2024-06-10 PROCEDURE — 93798 PHYS/QHP OP CAR RHAB W/ECG: CPT

## 2024-06-12 ENCOUNTER — APPOINTMENT (OUTPATIENT)
Dept: CARDIAC REHAB | Facility: HOSPITAL | Age: 69
End: 2024-06-12
Payer: MEDICARE

## 2024-06-17 ENCOUNTER — TREATMENT (OUTPATIENT)
Dept: CARDIAC REHAB | Facility: HOSPITAL | Age: 69
End: 2024-06-17
Payer: MEDICARE

## 2024-06-17 DIAGNOSIS — Z95.1 S/P CABG (CORONARY ARTERY BYPASS GRAFT): Primary | ICD-10-CM

## 2024-06-17 PROCEDURE — 93798 PHYS/QHP OP CAR RHAB W/ECG: CPT

## 2024-06-19 ENCOUNTER — TREATMENT (OUTPATIENT)
Dept: CARDIAC REHAB | Facility: HOSPITAL | Age: 69
End: 2024-06-19
Payer: MEDICARE

## 2024-06-19 DIAGNOSIS — Z95.1 S/P CABG (CORONARY ARTERY BYPASS GRAFT): Primary | ICD-10-CM

## 2024-06-19 PROCEDURE — 93798 PHYS/QHP OP CAR RHAB W/ECG: CPT

## 2024-06-21 RX ORDER — ROSUVASTATIN CALCIUM 40 MG/1
TABLET, COATED ORAL
Qty: 90 TABLET | Refills: 1 | Status: SHIPPED | OUTPATIENT
Start: 2024-06-21

## 2024-06-24 ENCOUNTER — TREATMENT (OUTPATIENT)
Dept: CARDIAC REHAB | Facility: HOSPITAL | Age: 69
End: 2024-06-24
Payer: MEDICARE

## 2024-06-24 DIAGNOSIS — Z95.1 S/P CABG (CORONARY ARTERY BYPASS GRAFT): Primary | ICD-10-CM

## 2024-06-24 PROCEDURE — 93798 PHYS/QHP OP CAR RHAB W/ECG: CPT

## 2024-06-26 ENCOUNTER — APPOINTMENT (OUTPATIENT)
Dept: CARDIAC REHAB | Facility: HOSPITAL | Age: 69
End: 2024-06-26
Payer: MEDICARE

## 2024-06-27 ENCOUNTER — OFFICE VISIT (OUTPATIENT)
Dept: PAIN MEDICINE | Facility: CLINIC | Age: 69
End: 2024-06-27
Payer: MEDICARE

## 2024-06-27 VITALS
HEART RATE: 66 BPM | SYSTOLIC BLOOD PRESSURE: 122 MMHG | WEIGHT: 167 LBS | BODY MASS INDEX: 26.95 KG/M2 | OXYGEN SATURATION: 96 % | DIASTOLIC BLOOD PRESSURE: 73 MMHG | RESPIRATION RATE: 16 BRPM

## 2024-06-27 DIAGNOSIS — M47.817 LUMBOSACRAL SPONDYLOSIS WITHOUT MYELOPATHY: ICD-10-CM

## 2024-06-27 DIAGNOSIS — G89.4 CHRONIC PAIN SYNDROME: Primary | ICD-10-CM

## 2024-06-27 DIAGNOSIS — M25.561 CHRONIC PAIN OF RIGHT KNEE: ICD-10-CM

## 2024-06-27 DIAGNOSIS — G89.29 CHRONIC PAIN OF RIGHT KNEE: ICD-10-CM

## 2024-06-27 DIAGNOSIS — M79.2 NEUROPATHIC PAIN: ICD-10-CM

## 2024-06-27 DIAGNOSIS — M46.1 SACROILIITIS: ICD-10-CM

## 2024-06-27 DIAGNOSIS — M17.11 PRIMARY OSTEOARTHRITIS OF RIGHT KNEE: ICD-10-CM

## 2024-06-27 NOTE — PROGRESS NOTES
Subjective   CC back pain  Anthony Ayoub is a 68 y.o. male with multiple comorbidity including CKD stage III, history of leukemia post bone marrow transplant 2016, chronic polyarthralgia, chronic back pain here for follow-up.  Last seen several months ago, in the interim had CABG, recovering well participating in cardiac rehab.  Complains of worsening bilateral SI pain significantly impairing ADL.  He has had 80% relief with bilateral SI injections lasting 3 months in the past.  Request repeat.  He also complains of worsening right knee pain.  Had steroid injection with Ortho with marginal relief.    Chronic right knee pain.  He has seen Ortho in [place] and Dr. Montiel in [place].  Had steroid injection with marginal relief.  He would like to try gel injection.  Overall worsening polyarthralgia.  Chronic back pain mostly axial but radiating to both hips and both lower extremity with cramping weakness feeling in the legs constant but worse with standing walking or prolonged activity.  Denies saddle anesthesia, bladder incontinence.  Also complains of bilateral feet neuropathic pain.  EMG/NCS pending.  Participating in physical therapy with marginal relief.  He had tried Tylenol, topical anti-inflammatories without relief.  Back pain is impairing ADL, interfering with sleep.      Pain Assessment   Location of Pain: Lower Back, R Hip, L Hip,   Description of Pain: Dull/Aching, Throbbing, Stabbing  Previous Pain Rating :7  Current Pain Ratin  Aggravating Factors: Activity  Alleviating Factors: Rest, Medication  Pain onset over 12 weeks  Interferes with ADL's.   [place] back pain disability scale on chart    PEG Assessment   What number best describes your pain on average in the past week?4  What number best describes how, during the past week, pain has interfered with your enjoyment of life?3  What number best describes how, during the past week, pain has interfered with your general activity?  10    The  following portions of the patient's history were reviewed and updated as appropriate: allergies, current medications, past family history, past medical history, past social history, past surgical history and problem list.    Review of Systems   Musculoskeletal:  Positive for arthralgias and back pain.   All other systems reviewed and are negative.      Objective   Physical Exam  Vitals reviewed.   Constitutional:       General: He is not in acute distress.  Musculoskeletal:      Lumbar back: Tenderness present. Decreased range of motion. Positive right straight leg raise test and positive left straight leg raise test.      Comments: Lumbar loading positive, pain on extension of low back past 5 degrees.  TTP on the lumbar facets noted.  Positive Chad bilaterally, positive [place]len bilaterally, positive SI compression test bilaterally.       /73 (BP Location: Left arm, Patient Position: Sitting, Cuff Size: Adult)   Pulse 66   Resp 16   Wt 75.8 kg (167 lb)   SpO2 96%   BMI 26.95 kg/m²     PHQ 9 on chart  Opioid risk tool low risk    Assessment & Plan   Diagnoses and all orders for this visit:    1. Chronic pain syndrome (Primary)    2. Sacroiliitis  -     SI Joint Injection    3. Lumbosacral spondylosis without myelopathy    4. Neuropathic pain    5. Chronic pain of right knee  -     Arthrocentesis    6. Primary osteoarthritis of right knee  -     Arthrocentesis      Summary  Anthony Ayoub is a 68 y.o. male with multiple comorbidity including CKD stage III, history of leukemia post bone marrow transplant 2016, chronic polyarthralgia, chronic back pain here for follow-up.  Chronic pain from lumbar DDD spondylosis, and stenosis with neurogenic medication.  Polyarthralgia, right knee pain.  80% relief of neurogenic claudication symptoms with LESI.  70% relief of lumbar RFA     Last seen several months ago, in the interim had CABG, recovering well participating in cardiac rehab.  Complains of worsening  bilateral SI pain significantly impairing ADL.  He has had 80% relief with bilateral SI injections lasting 3 months in the past.  Denies radicular pain.  Request repeat.  He also complains of worsening right knee pain.  Had steroid injection with Ortho with marginal relief.    Will schedule for repeat bilateral SI injections.  Risk and benefits discussed.  Schedule right knee gel 1 injection.  Risk and benefits discussed.    RTC for procedure

## 2024-06-28 ENCOUNTER — OFFICE VISIT (OUTPATIENT)
Dept: FAMILY MEDICINE CLINIC | Facility: CLINIC | Age: 69
End: 2024-06-28
Payer: MEDICARE

## 2024-06-28 ENCOUNTER — TELEPHONE (OUTPATIENT)
Dept: CARDIOLOGY | Facility: CLINIC | Age: 69
End: 2024-06-28
Payer: MEDICARE

## 2024-06-28 VITALS
WEIGHT: 166.8 LBS | HEART RATE: 62 BPM | TEMPERATURE: 98 F | SYSTOLIC BLOOD PRESSURE: 117 MMHG | DIASTOLIC BLOOD PRESSURE: 72 MMHG | BODY MASS INDEX: 26.81 KG/M2 | OXYGEN SATURATION: 98 % | HEIGHT: 66 IN
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
## 2024-06-28 DIAGNOSIS — R60.0 BILATERAL LOWER EXTREMITY EDEMA: Primary | ICD-10-CM

## 2024-06-28 PROCEDURE — 3044F HG A1C LEVEL LT 7.0%: CPT | Performed by: NURSE PRACTITIONER

## 2024-06-28 PROCEDURE — 1125F AMNT PAIN NOTED PAIN PRSNT: CPT | Performed by: NURSE PRACTITIONER

## 2024-06-28 PROCEDURE — 3078F DIAST BP <80 MM HG: CPT | Performed by: NURSE PRACTITIONER

## 2024-06-28 PROCEDURE — 3074F SYST BP LT 130 MM HG: CPT | Performed by: NURSE PRACTITIONER

## 2024-06-28 PROCEDURE — 99213 OFFICE O/P EST LOW 20 MIN: CPT | Performed by: NURSE PRACTITIONER

## 2024-06-28 NOTE — TELEPHONE ENCOUNTER
PATIENT IS HAVING SOME EDEMA AND WENT TO SEE HIS PCP TODAY   1. Bilateral lower extremity edema (Primary)  Comments:  likely from amlodipine  trial d/c for 1 week and monitor BP  may d/c if BP is stable  f/u with cardiology    PATIENTS WIFE JUST WANTS TO MAKE SURE THAT YOU ARE AWARE OF THIS AND WANTS TO MAKE SURE THAT THE AMLODIPINE IS ONLY FOR BP CONTROL.     PLEASE ADVISE.

## 2024-06-28 NOTE — PROGRESS NOTES
Chief Complaint  Leg Swelling    Subjective        Anthony Ayoub presents to [facility]  History of Present Illness  Anthony is a 68-year-old male presenting today with complaints of leg swelling. Patient had triple bypass surgery in February. He was started on Amlodipine to help control his blood pressure. He is also on Lisinopril, Carvedilol, and Terazosin. He has been on Amlodipine in the past and it caused swelling of his lower extremities. Since February, he has noticed his legs are swollen by the end of the day. The edema is pitting. He denies color change.         The following portions of the patient's history were reviewed and updated as appropriate: allergies, current medications, past family history, past medical history, past social history, past surgical history and problem list.    Allergies   Allergen Reactions    Doxycycline Hyclate Other (See Comments)     Painful skin    Penicillins Rash    Sulfa Antibiotics Rash       Patient Active Problem List   Diagnosis    Abnormal cardiovascular stress test    Benign essential hypertension    Chronic myelomonocytic leukemia    Arteriosclerosis of coronary artery    Dyspnea on exertion    Encounter for general adult medical examination without abnormal findings    Encounter for screening for malignant neoplasm of prostate    Enlarged lymph nodes    Erectile dysfunction    Hyperlipidemia    Hypothyroidism    Leukemia    Mixed anxiety depressive disorder    Myeloid leukemia in remission    Osteoarthritis    Tobacco use    BETY treated with BiPAP    H/O non-ST elevation myocardial infarction (NSTEMI)    History of phlebitis    Sicca    Pseudophakia    Chronic diastolic heart failure    Chronic GVHD    History of leukemia    Hypertension    Keratitis    Keratoconjunctivitis sicca    Lower urinary tract symptoms due to benign prostatic hyperplasia    Meibomian gland dysfunction (MGD) of both eyes    Meibomian gland dysfunction  "(MGD)    Proteinuria    Punctate keratitis    Punctate keratitis of both eyes    Pure hypercholesterolemia    Renal mass    S/P allogeneic bone marrow transplant    Stage 3a chronic kidney disease    Obstructive sleep apnea syndrome    Gallstones    Right upper quadrant abdominal pain    Acute cholecystitis    Chest pain    Unstable angina    Non-ST elevated myocardial infarction (non-STEMI)    Atherosclerotic heart disease of native coronary artery with angina pectoris    S/P CABG x 3 with LIMA per Dr. Garrett on 2/22/2024       Current Outpatient Medications   Medication Instructions    amLODIPine (NORVASC) 5 mg, Oral, Every 24 Hours Scheduled    aspirin 81 mg, Oral, Daily    calcitriol (ROCALTROL) 0.25 mcg, Oral, Every Other Day    carvedilol (COREG) 25 mg, Oral, 2 Times Daily    clopidogrel (PLAVIX) 75 mg, Oral, Daily    escitalopram (LEXAPRO) 20 mg, Oral, Daily    ezetimibe (ZETIA) 10 MG tablet TAKE 1 TABLET BY MOUTH EVERY DAY    ferrous sulfate 325 mg, Oral    folic acid (FOLVITE) 1 MG tablet TAKE 1 TABLET BY MOUTH EVERY DAY    lisinopril (PRINIVIL,ZESTRIL) 40 mg, Oral, Daily    pantoprazole (PROTONIX) 40 mg, Oral, Daily    rosuvastatin (CRESTOR) 40 MG tablet TAKE 1 TABLET BY MOUTH EVERYDAY AT BEDTIME    terazosin (HYTRIN) 5 MG capsule Every 24 Hours    vitamin D (ERGOCALCIFEROL) 1.25 MG (44422 UT) capsule capsule TAKE 1 CAPSULE BY MOUTH 1 TIME PER WEEK.          Objective   Vital Signs:  /72 (BP Location: Left arm, Patient Position: Sitting, Cuff Size: Large Adult)   Pulse 62   Temp 98 °F (36.7 °C) (Temporal)   Ht 167.6 cm (66\")   Wt 75.7 kg (166 lb 12.8 oz)   SpO2 98%   BMI 26.92 kg/m²   Estimated body mass index is 26.92 kg/m² as calculated from the following:    Height as of this encounter: 167.6 cm (66\").    Weight as of this encounter: 75.7 kg (166 lb 12.8 oz).               Review of Systems   Constitutional:  Negative for appetite change, diaphoresis, fatigue and unexpected weight " change.   Eyes:  Negative for visual disturbance.   Respiratory:  Negative for cough, chest tightness, shortness of breath and wheezing.    Cardiovascular:  Positive for leg swelling. Negative for chest pain and palpitations.   Gastrointestinal:  Negative for nausea and vomiting.   Musculoskeletal:  Negative for back pain and gait problem.   Neurological:  Negative for dizziness, syncope, weakness, light-headedness, numbness and headaches.   Psychiatric/Behavioral:  Negative for confusion. The patient is not nervous/anxious.         Physical Exam  Constitutional:       Appearance: Normal appearance.   Cardiovascular:      Rate and Rhythm: Normal rate and regular rhythm.      Pulses: Normal pulses.      Heart sounds: Normal heart sounds.   Pulmonary:      Effort: Pulmonary effort is normal.      Breath sounds: Normal breath sounds.   Musculoskeletal:      Right lower le+ Pitting Edema present.      Left lower le+ Pitting Edema present.   Skin:     General: Skin is warm and dry.      Capillary Refill: Capillary refill takes less than 2 seconds.   Neurological:      Mental Status: He is alert and oriented to person, place, and time.   Psychiatric:         Mood and Affect: Mood normal.         Behavior: Behavior normal.         Thought Content: Thought content normal.         Judgment: Judgment normal.        Result Review :                   Assessment and Plan   Diagnoses and all orders for this visit:    1. Bilateral lower extremity edema (Primary)  Comments:  likely from amlodipine  trial d/c for 1 week and monitor BP  may d/c if BP is stable  f/u with cardiology             Follow Up   No follow-ups on file.  Patient was given instructions and counseling regarding his condition or for health maintenance advice. Please see specific information pulled into the AVS if appropriate.

## 2024-07-01 ENCOUNTER — TREATMENT (OUTPATIENT)
Dept: CARDIAC REHAB | Facility: HOSPITAL | Age: 69
End: 2024-07-01
Payer: MEDICARE

## 2024-07-01 DIAGNOSIS — Z95.1 S/P CABG (CORONARY ARTERY BYPASS GRAFT): Primary | ICD-10-CM

## 2024-07-01 PROCEDURE — 93798 PHYS/QHP OP CAR RHAB W/ECG: CPT

## 2024-07-03 ENCOUNTER — APPOINTMENT (OUTPATIENT)
Dept: CARDIAC REHAB | Facility: HOSPITAL | Age: 69
End: 2024-07-03
Payer: MEDICARE

## 2024-07-03 ENCOUNTER — LAB (OUTPATIENT)
Dept: FAMILY MEDICINE CLINIC | Facility: CLINIC | Age: 69
End: 2024-07-03
Payer: MEDICARE
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
## 2024-07-03 PROCEDURE — 86618 LYME DISEASE ANTIBODY: CPT | Performed by: FAMILY MEDICINE

## 2024-07-04 LAB — B BURGDOR IGG+IGM SER QL IA: NEGATIVE

## 2024-07-08 ENCOUNTER — TREATMENT (OUTPATIENT)
Dept: CARDIAC REHAB | Facility: HOSPITAL | Age: 69
End: 2024-07-08
Payer: MEDICARE

## 2024-07-08 DIAGNOSIS — Z95.1 S/P CABG (CORONARY ARTERY BYPASS GRAFT): Primary | ICD-10-CM

## 2024-07-08 PROCEDURE — 93798 PHYS/QHP OP CAR RHAB W/ECG: CPT

## 2024-07-10 ENCOUNTER — HOSPITAL ENCOUNTER (OUTPATIENT)
Dept: PAIN MEDICINE | Facility: HOSPITAL | Age: 69
Discharge: HOME | End: 2024-07-10
Payer: MEDICARE

## 2024-07-10 ENCOUNTER — APPOINTMENT (OUTPATIENT)
Dept: CARDIAC REHAB | Facility: HOSPITAL | Age: 69
End: 2024-07-10
Payer: MEDICARE

## 2024-07-10 VITALS
TEMPERATURE: 97.1 F | RESPIRATION RATE: 16 BRPM | SYSTOLIC BLOOD PRESSURE: 126 MMHG | HEART RATE: 59 BPM | DIASTOLIC BLOOD PRESSURE: 59 MMHG | HEIGHT: 66 IN | BODY MASS INDEX: 27 KG/M2 | OXYGEN SATURATION: 95 % | WEIGHT: 168 LBS

## 2024-07-10 DIAGNOSIS — R52 PAIN: ICD-10-CM

## 2024-07-10 DIAGNOSIS — M46.1 SACROILIITIS: Primary | ICD-10-CM

## 2024-07-10 PROCEDURE — 25010000002 METHYLPREDNISOLONE PER 40 MG: Performed by: ANESTHESIOLOGY

## 2024-07-10 PROCEDURE — 25510000001 IOPAMIDOL 41 % SOLUTION: Performed by: ANESTHESIOLOGY

## 2024-07-10 PROCEDURE — 27096 INJECT SACROILIAC JOINT: CPT | Performed by: ANESTHESIOLOGY

## 2024-07-10 PROCEDURE — 25010000002 BUPIVACAINE (PF) 0.25 % SOLUTION: Performed by: ANESTHESIOLOGY

## 2024-07-10 PROCEDURE — 77003 FLUOROGUIDE FOR SPINE INJECT: CPT

## 2024-07-10 RX ORDER — METHYLPREDNISOLONE ACETATE 40 MG/ML
40 INJECTION, SUSPENSION INTRA-ARTICULAR; INTRALESIONAL; INTRAMUSCULAR; SOFT TISSUE ONCE
Status: COMPLETED | OUTPATIENT
Start: 2024-07-10 | End: 2024-07-10

## 2024-07-10 RX ORDER — BUPIVACAINE HYDROCHLORIDE 2.5 MG/ML
10 INJECTION, SOLUTION EPIDURAL; INFILTRATION; INTRACAUDAL ONCE
Status: COMPLETED | OUTPATIENT
Start: 2024-07-10 | End: 2024-07-10

## 2024-07-10 RX ORDER — IOPAMIDOL 408 MG/ML
3 INJECTION, SOLUTION INTRATHECAL
Status: COMPLETED | OUTPATIENT
Start: 2024-07-10 | End: 2024-07-10

## 2024-07-10 RX ADMIN — BUPIVACAINE HYDROCHLORIDE 10 ML: 2.5 INJECTION, SOLUTION EPIDURAL; INFILTRATION; INTRACAUDAL; PERINEURAL at 14:33

## 2024-07-10 RX ADMIN — METHYLPREDNISOLONE ACETATE 40 MG: 40 INJECTION, SUSPENSION INTRA-ARTICULAR; INTRALESIONAL; INTRAMUSCULAR; INTRASYNOVIAL; SOFT TISSUE at 14:33

## 2024-07-10 RX ADMIN — METHYLPREDNISOLONE ACETATE 40 MG: 40 INJECTION, SUSPENSION INTRA-ARTICULAR; INTRALESIONAL; INTRAMUSCULAR; INTRASYNOVIAL; SOFT TISSUE at 14:36

## 2024-07-10 RX ADMIN — IOPAMIDOL 3 ML: 408 INJECTION, SOLUTION INTRATHECAL at 14:33

## 2024-07-10 NOTE — PROCEDURES
"Subjective   CC back pain  Anthony Ayoub is a 68 y.o. male with sacroiliitis here for repeat bilateral SI injections.  No anticoagulation    Pain Assessment   Location of Pain: Lower Back, R Hip, L Hip, legs  Description of Pain: Dull/Aching, Throbbing, Stabbing  Previous Pain Rating :8  Current Pain Ratin  Aggravating Factors: Activity  Alleviating Factors: Rest, Medication  Pain onset over 12 weeks  Pain interferes with ADL's    The following portions of the patient's history were reviewed and updated as appropriate: allergies, current medications, past family history, past medical history, past social history, past surgical history and problem list.      Review of Systems  As in HPI  Objective   Physical Exam  Vitals reviewed.   Constitutional:       General: He is not in acute distress.  Pulmonary:      Effort: Pulmonary effort is normal.       /59 (BP Location: Left arm, Patient Position: Sitting)   Pulse 59   Temp 97.1 °F (36.2 °C) (Skin)   Resp 16   Ht 167.6 cm (66\")   Wt 76.2 kg (168 lb)   SpO2 95%   BMI 27.12 kg/m²     Assessment & Plan    underwent bilateral SI injection.    RTC 4-6 weeks or as needed for repeat    DATE OF PROCEDURE:  7/10/2024    PREOPERATIVE DIAGNOSIS: sacroiliitis    POSTOPERATIVE DIAGNOSIS: same    PROCEDURE PERFORMED: Bilateral SACROILIAC JOINT INJECTION    The patient understands the risks and benefits of the procedure and wishes to proceed. The patient was seen in the preoperative area. Patient's consent was obtained and updated. Vitals were taken. Patient was then brought to the procedure suite and placed in prone position for sacroiliac joint injection. The appropriate anatomic area was widely prepped with Chloraprep and draped in a sterile fashion. Noninvasive monitoring per routine anesthesia protocol was placed. Under fluoroscopic guidance using an AP view, a 22 gauge curved tip spinal needle was passed through skin anesthetized with 1% Lidocaine without " epinephrine. The needle tip was guided to the lower pole of the joint using fluoroscopy. 1 mL of  preservative free contrast was injected into the joint to confirm location. A clear outline was obtained and 5 mL of steroid solution containing 4 mL 0.25% bupivacaine, and 1mL 40mg Depomedrol was injected on each side. The patient tolerated with no pierre-procedural complications.  A sterile dressing was placed over the puncture sites.

## 2024-07-11 ENCOUNTER — TELEPHONE (OUTPATIENT)
Dept: PAIN MEDICINE | Facility: HOSPITAL | Age: 69
End: 2024-07-11
Payer: MEDICARE

## 2024-07-11 NOTE — TELEPHONE ENCOUNTER
Spoke to pt's wife.  She sts he was unavailable so unable to obtain pain level.  Sts he did well yesterday.  Told to call with questions or concerns.

## 2024-07-15 ENCOUNTER — TREATMENT (OUTPATIENT)
Dept: CARDIAC REHAB | Facility: HOSPITAL | Age: 69
End: 2024-07-15
Payer: MEDICARE

## 2024-07-15 DIAGNOSIS — Z95.1 S/P CABG (CORONARY ARTERY BYPASS GRAFT): Primary | ICD-10-CM

## 2024-07-15 PROCEDURE — 93798 PHYS/QHP OP CAR RHAB W/ECG: CPT

## 2024-07-15 RX ORDER — CARVEDILOL 25 MG/1
25 TABLET ORAL 2 TIMES DAILY
Qty: 60 TABLET | Refills: 0 | Status: SHIPPED | OUTPATIENT
Start: 2024-07-15

## 2024-07-15 NOTE — PROGRESS NOTES
Encounter Date:07/23/2024    Last seen 4/23/2024      Patient ID: Antohny Ayoub is a 68 y.o. male.    Chief Complaint:  Status post CABG  Dyslipidemia  Hypertension  Renal dysfunction.     History of Present Illness  Since I have last seen, the patient has been without any chest discomfort ,shortness of breath, palpitations, dizziness or syncope.  Denies having any headache ,abdominal pain ,nausea, vomiting , diarrhea constipation, loss of weight or loss of appetite.  Denies having any excessive bruising ,hematuria or blood in the stool.    Review of all systems negative except as indicated.    Reviewed ROS.    Assessment and Plan         ]]]]]]]]]]]]]]]]]]  History  ========  -Status post CABG 2/22/2024-Dr. Garrett  CABG x 3 (LIMA to LAD SVG to diagonal and SVG to marginal branch)     - Preoperative unstable angina/possible non-STEMI  And severe and critical coronary artery disease      Cardiac catheterization 2/21/2024  Left ventricular angiogram was not performed due to pre-existing renal dysfunction.  Severe triple-vessel coronary artery disease.  Left main coronary artery has 40% disease  Left anterior descending artery is a large and long vessel that has ostial 99% disease.  Mid LAD has 80% disease.  Diagonal branches diffuse 99% disease proximally.  Circumflex coronary artery is a large and dominant vessel that provided multiple branches.  Circumflex coronary artery has 60 to 70% disease proximal to the first marginal branch.  First marginal branch has ostial 95% disease.  Second marginal branch is a relatively small caliber vessel that has 99% disease in the proximal segment that bifurcated into 2 branches.  Each branch has 90 to 95% disease.  There were 3 other marginal branches.  PDA has 99% disease in the proximal segment.  Second marginal branch has 60 to 70% ostial disease.  Right coronary artery is totally occluded in the proximal segment and distal vessel is filling through collaterals from  left circulation.  Small caliber vessel.  (Chronic since 2019).  Please see the report from [facility].     Echocardiogram.-2/20/2024  Structurally and functionally normal cardiac valves except for minimal mitral regurgitation.  Grade 1 diastolic dysfunction is present..  Left ventricular size and contractility is normal with ejection fraction of 60%.     -  hypertension dyslipidemia GERD osteoarthritis renal dysfunction.  BUN 15 creatinine 1.4     -CML.  Status post bone marrow transplantation  2016     - status post vasectomy and sinus surgery      -former smoker     - strong family history  for coronary artery disease.  Brother had CABG and father had myocardial infarction     -allergic to penicillin sulfa and doxycycline  ============  Plan  ==========  Status post CABG 2/22/2024-Dr. Garrett  CABG x 3 (LIMA to LAD SVG to diagonal and SVG to marginal branch)      EKG showed sinus rhythm nonspecific ST-T wave abnormalities-4/23/2024  EKG was not performed today.  7/23/2024.      Chronic renal insufficiency.-Dr. Lawson.  23/1.37-2/25/2024     Dyslipidemia-on Crestor.     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     Medications were reviewed and updated.  Current medications include aspirin Coreg 25 mg twice daily Lasix 20 mg daily insulin lisinopril 40 mg daily pantoprazole Crestor 40 mg a day terazosin.    Patient is off Plavix and amlodipine.     Follow-up in the office in 6 months.     Further plan will depend on patient's progress.     Reviewed and updated-7/23/2024  ]]]]]]]]]]]]]]]]]                   Diagnosis Plan   1. Mixed hyperlipidemia        2. Dyslipidemia        3. Hypothyroidism, unspecified type        4. Essential hypertension        5. Dyspnea on exertion        6. Benign essential hypertension        7. Hx of CABG        8. Stage 3 chronic kidney disease, unspecified whether stage 3a or 3b CKD        LAB RESULTS (LAST 7 DAYS)    CBC        BMP        CMP         BNP         TROPONIN        CoAg        Creatinine Clearance  CrCl cannot be calculated (Patient's most recent lab result is older than the maximum 30 days allowed.).    ABG        Radiology  No radiology results for the last day                The following portions of the patient's history were reviewed and updated as appropriate: allergies, current medications, past family history, past medical history, past social history, past surgical history, and problem list.    Review of Systems   Constitutional: Negative for malaise/fatigue.   Cardiovascular:  Negative for chest pain, leg swelling, palpitations and syncope.   Respiratory:  Negative for shortness of breath.    Skin:  Negative for rash.   Gastrointestinal:  Negative for nausea and vomiting.   Neurological:  Negative for dizziness, light-headedness and numbness.   All other systems reviewed and are negative.      Current Outpatient Medications:     aspirin 81 MG EC tablet, Take 1 tablet by mouth Daily., Disp: , Rfl:     calcitriol (ROCALTROL) 0.25 MCG capsule, Take 1 capsule by mouth Every Other Day., Disp: , Rfl:     carvedilol (COREG) 25 MG tablet, Take 1 tablet by mouth 2 (Two) Times a Day., Disp: 60 tablet, Rfl: 0    escitalopram (LEXAPRO) 20 MG tablet, TAKE 1 TABLET BY MOUTH EVERY DAY, Disp: 90 tablet, Rfl: 1    ezetimibe (ZETIA) 10 MG tablet, TAKE 1 TABLET BY MOUTH EVERY DAY, Disp: 95 tablet, Rfl: 0    ferrous sulfate 325 (65 FE) MG EC tablet, Take 1 tablet by mouth., Disp: , Rfl:     folic acid (FOLVITE) 1 MG tablet, TAKE 1 TABLET BY MOUTH EVERY DAY, Disp: 90 tablet, Rfl: 1    lisinopril (PRINIVIL,ZESTRIL) 40 MG tablet, Take 1 tablet by mouth Daily., Disp: , Rfl:     methocarbamol (ROBAXIN) 500 MG tablet, Take 1 tablet by mouth 4 (Four) Times a Day., Disp: 20 tablet, Rfl: 0    pantoprazole (PROTONIX) 40 MG EC tablet, Take 1 tablet by mouth Daily., Disp: , Rfl:     rosuvastatin (CRESTOR) 40 MG tablet, TAKE 1 TABLET BY MOUTH EVERYDAY AT BEDTIME, Disp: 90 tablet,  Rfl: 1    terazosin (HYTRIN) 5 MG capsule, Daily., Disp: , Rfl:     vitamin D (ERGOCALCIFEROL) 1.25 MG (62018 UT) capsule capsule, TAKE 1 CAPSULE BY MOUTH 1 TIME PER WEEK., Disp: 12 capsule, Rfl: 1    amLODIPine (NORVASC) 5 MG tablet, Take 1 tablet by mouth Daily. (Patient not taking: Reported on 7/23/2024), Disp: 30 tablet, Rfl: 3    clopidogrel (PLAVIX) 75 MG tablet, Take 1 tablet by mouth Daily., Disp: 30 tablet, Rfl: 3    Allergies   Allergen Reactions    Doxycycline Hyclate Other (See Comments)     Painful skin    Penicillins Rash    Sulfa Antibiotics Rash       Family History   Problem Relation Age of Onset    Hypertension Mother     Hyperlipidemia Mother     Heart disease Father     Heart attack Father     Hypertension Sister     Hypertension Brother     Heart disease Brother        Past Surgical History:   Procedure Laterality Date    BONE MARROW TRANSPLANT      CARDIAC CATHETERIZATION      CARDIAC CATHETERIZATION N/A 2/21/2024    Procedure: Coronary angiography;  Surgeon: Michelle Hernández MD;  Location: [facility] CATH INVASIVE LOCATION;  Service: Cardiovascular;  Laterality: N/A;    CARDIAC CATHETERIZATION N/A 2/21/2024    Procedure: Left Heart Cath;  Surgeon: Michelle Hernández MD;  Location: [facility] CATH INVASIVE LOCATION;  Service: Cardiovascular;  Laterality: N/A;    CHOLECYSTECTOMY N/A 4/23/2022    Procedure: CHOLECYSTECTOMY LAPAROSCOPIC;  Surgeon: Kale Acuna MD;  Location: [facility] MAIN OR;  Service: General;  Laterality: N/A;    CORONARY ARTERY BYPASS GRAFT N/A 2/22/2024    Procedure: CORONARY ARTERY BYPASS GRAFTING, INTRAOPERATIVE TRANSESOPHAGEAL ECHOCARDIOGRAM;  Surgeon: Soham Garrett MD;  Location: [facility] CVOR;  Service: Cardiothoracic;  Laterality: N/A;  CABG X 3 (2 vein grafts, 1 LIMA graft)    SINUS SURGERY      TONSILLECTOMY      VASECTOMY         Past Medical History:   Diagnosis Date    CHF (congestive heart failure)     Coronary artery disease     GERD (gastroesophageal reflux  "disease)     Hyperlipidemia     Hypertension     Leukemia     Low back pain     Sleep apnea        Family History   Problem Relation Age of Onset    Hypertension Mother     Hyperlipidemia Mother     Heart disease Father     Heart attack Father     Hypertension Sister     Hypertension Brother     Heart disease Brother        Social History     Socioeconomic History    Marital status:    Tobacco Use    Smoking status: Former     Current packs/day: 0.00     Average packs/day: 1 pack/day for 30.0 years (30.0 ttl pk-yrs)     Types: Cigarettes     Start date:      Quit date:      Years since quittin.5     Passive exposure: Past    Smokeless tobacco: Never   Vaping Use    Vaping status: Never Used   Substance and Sexual Activity    Alcohol use: No    Drug use: Defer    Sexual activity: Defer         Procedures      Objective:       Physical Exam    /75 (BP Location: Left arm, Patient Position: Sitting, Cuff Size: Adult)   Pulse 62   Ht 167.6 cm (66\")   Wt 75.8 kg (167 lb)   SpO2 98% Comment: RA  BMI 26.95 kg/m²   The patient is alert, oriented and in no distress.    Vital signs as noted above.    Head and neck revealed no carotid bruits or jugular venous distension.  No thyromegaly or lymphadenopathy is present.    Lungs clear.  No wheezing.  Breath sounds are normal bilaterally.    Heart normal first and second heart sounds.  No murmur..  No pericardial rub is present.  No gallop is present.    Abdomen soft and nontender.  No organomegaly is present.    Extremities revealed good peripheral pulses without any pedal edema.    Skin warm and dry.    Musculoskeletal system is grossly normal.    CNS grossly normal.    Reviewed and updated.        "

## 2024-07-17 ENCOUNTER — APPOINTMENT (OUTPATIENT)
Dept: CARDIAC REHAB | Facility: HOSPITAL | Age: 69
End: 2024-07-17
Payer: MEDICARE

## 2024-07-22 ENCOUNTER — HOSPITAL ENCOUNTER (OUTPATIENT)
Dept: CT IMAGING | Facility: HOSPITAL | Age: 69
Discharge: HOME | End: 2024-07-22
Admitting: NURSE PRACTITIONER
Payer: MEDICARE

## 2024-07-22 ENCOUNTER — APPOINTMENT (OUTPATIENT)
Dept: CARDIAC REHAB | Facility: HOSPITAL | Age: 69
End: 2024-07-22
Payer: MEDICARE

## 2024-07-22 DIAGNOSIS — I97.641 POSTOPERATIVE SEROMA INVOLVING CIRCULATORY SYSTEM AFTER CARDIAC BYPASS: ICD-10-CM

## 2024-07-22 DIAGNOSIS — R91.1 LUNG NODULE: ICD-10-CM

## 2024-07-22 PROCEDURE — 71250 CT THORAX DX C-: CPT

## 2024-07-23 ENCOUNTER — TELEPHONE (OUTPATIENT)
Dept: PAIN MEDICINE | Facility: CLINIC | Age: 69
End: 2024-07-23
Payer: MEDICARE

## 2024-07-23 ENCOUNTER — TELEPHONE (OUTPATIENT)
Dept: CARDIAC REHAB | Facility: HOSPITAL | Age: 69
End: 2024-07-23
Payer: MEDICARE

## 2024-07-23 ENCOUNTER — OFFICE VISIT (OUTPATIENT)
Dept: CARDIOLOGY | Facility: CLINIC | Age: 69
End: 2024-07-23
Payer: MEDICARE

## 2024-07-23 VITALS
WEIGHT: 167 LBS | HEART RATE: 62 BPM | SYSTOLIC BLOOD PRESSURE: 135 MMHG | OXYGEN SATURATION: 98 % | DIASTOLIC BLOOD PRESSURE: 75 MMHG | HEIGHT: 66 IN | BODY MASS INDEX: 26.84 KG/M2

## 2024-07-23 DIAGNOSIS — N18.30 STAGE 3 CHRONIC KIDNEY DISEASE, UNSPECIFIED WHETHER STAGE 3A OR 3B CKD: ICD-10-CM

## 2024-07-23 DIAGNOSIS — E78.2 MIXED HYPERLIPIDEMIA: Primary | ICD-10-CM

## 2024-07-23 DIAGNOSIS — R06.09 DYSPNEA ON EXERTION: ICD-10-CM

## 2024-07-23 DIAGNOSIS — I10 BENIGN ESSENTIAL HYPERTENSION: ICD-10-CM

## 2024-07-23 DIAGNOSIS — E78.5 DYSLIPIDEMIA: ICD-10-CM

## 2024-07-23 DIAGNOSIS — Z95.1 HX OF CABG: ICD-10-CM

## 2024-07-23 DIAGNOSIS — I10 ESSENTIAL HYPERTENSION: ICD-10-CM

## 2024-07-23 DIAGNOSIS — E03.9 HYPOTHYROIDISM, UNSPECIFIED TYPE: ICD-10-CM

## 2024-07-23 PROCEDURE — 3078F DIAST BP <80 MM HG: CPT | Performed by: INTERNAL MEDICINE

## 2024-07-23 PROCEDURE — 1159F MED LIST DOCD IN RCRD: CPT | Performed by: INTERNAL MEDICINE

## 2024-07-23 PROCEDURE — 3075F SYST BP GE 130 - 139MM HG: CPT | Performed by: INTERNAL MEDICINE

## 2024-07-23 PROCEDURE — 1160F RVW MEDS BY RX/DR IN RCRD: CPT | Performed by: INTERNAL MEDICINE

## 2024-07-23 PROCEDURE — 99214 OFFICE O/P EST MOD 30 MIN: CPT | Performed by: INTERNAL MEDICINE

## 2024-07-23 NOTE — TELEPHONE ENCOUNTER
Patient and spouse tested positive for COVID. Patient will not return to cardiac rehab until 7/29/2024.

## 2024-07-23 NOTE — TELEPHONE ENCOUNTER
Caller: Mildred Ayoub    Relationship to patient: Emergency Contact    Best call back number: [phone]    Chief complaint: RIGHT KNEE     Type of visit: INJECTION     Requested date: ASAP      If rescheduling, when is the original appointment: 7-24-24 AT THE HOSPITAL      Additional notes:PATIENT TESTED POSITIVE FOR COVID TODAY. PLEASE CALL TO DISCUSS SCHEDULING.

## 2024-07-29 ENCOUNTER — TREATMENT (OUTPATIENT)
Dept: CARDIAC REHAB | Facility: HOSPITAL | Age: 69
End: 2024-07-29
Payer: MEDICARE

## 2024-07-29 DIAGNOSIS — Z95.1 S/P CABG (CORONARY ARTERY BYPASS GRAFT): Primary | ICD-10-CM

## 2024-07-29 PROCEDURE — 93798 PHYS/QHP OP CAR RHAB W/ECG: CPT

## 2024-07-31 ENCOUNTER — APPOINTMENT (OUTPATIENT)
Dept: CARDIAC REHAB | Facility: HOSPITAL | Age: 69
End: 2024-07-31
Payer: MEDICARE

## 2024-08-07 ENCOUNTER — TREATMENT (OUTPATIENT)
Dept: CARDIAC REHAB | Facility: HOSPITAL | Age: 69
End: 2024-08-07
Payer: MEDICARE

## 2024-08-07 DIAGNOSIS — Z95.1 S/P CABG (CORONARY ARTERY BYPASS GRAFT): Primary | ICD-10-CM

## 2024-08-07 PROCEDURE — 93798 PHYS/QHP OP CAR RHAB W/ECG: CPT

## 2024-08-08 ENCOUNTER — HOSPITAL ENCOUNTER (OUTPATIENT)
Dept: PAIN MEDICINE | Facility: HOSPITAL | Age: 69
Discharge: HOME | End: 2024-08-08
Payer: MEDICARE

## 2024-08-08 VITALS
OXYGEN SATURATION: 96 % | HEART RATE: 57 BPM | DIASTOLIC BLOOD PRESSURE: 87 MMHG | RESPIRATION RATE: 16 BRPM | BODY MASS INDEX: 26.84 KG/M2 | WEIGHT: 167 LBS | TEMPERATURE: 97.3 F | SYSTOLIC BLOOD PRESSURE: 146 MMHG | HEIGHT: 66 IN

## 2024-08-08 DIAGNOSIS — G89.29 CHRONIC PAIN OF RIGHT KNEE: Primary | ICD-10-CM

## 2024-08-08 DIAGNOSIS — M25.561 CHRONIC PAIN OF RIGHT KNEE: Primary | ICD-10-CM

## 2024-08-08 DIAGNOSIS — R52 PAIN: ICD-10-CM

## 2024-08-08 PROCEDURE — 25510000001 IOPAMIDOL 41 % SOLUTION: Performed by: ANESTHESIOLOGY

## 2024-08-08 PROCEDURE — 25010000002 HYLAN 48 MG/6ML SOLUTION PREFILLED SYRINGE

## 2024-08-08 PROCEDURE — 77003 FLUOROGUIDE FOR SPINE INJECT: CPT

## 2024-08-08 RX ORDER — IOPAMIDOL 408 MG/ML
3 INJECTION, SOLUTION INTRATHECAL
Status: COMPLETED | OUTPATIENT
Start: 2024-08-08 | End: 2024-08-08

## 2024-08-08 RX ADMIN — IOPAMIDOL 3 ML: 408 INJECTION, SOLUTION INTRATHECAL at 15:49

## 2024-08-08 RX ADMIN — Medication 48 MG: at 15:49

## 2024-08-09 NOTE — PROCEDURES
"Subjective   CC right knee pain  Anthony Ayoub is a 68 y.o. male with chronic right knee pain/osteoarthritis here for right knee Synvisc 1 injection.  No anticoagulation    Pain Assessment   Location of Pain: Lower Back, right knee joint  Description of Pain: Dull/Aching, Throbbing, Stabbing  Previous Pain Rating :2  Current Pain Ratin  Aggravating Factors: Activity  Alleviating Factors: Rest, Medication  Pain onset over 12 weeks ago  Pain interferes with ADL's  [place] back pain disability scale scanned in chart     The following portions of the patient's history were reviewed and updated as appropriate: allergies, current medications, past family history, past medical history, past social history, past surgical history and problem list.      Review of Systems  As in HPI  Objective   Physical Exam  Vitals reviewed.   Constitutional:       General: He is not in acute distress.  Pulmonary:      Effort: Pulmonary effort is normal.       /87 (BP Location: Left arm, Patient Position: Sitting)   Pulse 57   Temp 97.3 °F (36.3 °C) (Skin)   Resp 16   Ht 167.6 cm (66\")   Wt 75.8 kg (167 lb)   SpO2 96%   BMI 26.95 kg/m²       Assessment & Plan    underwent right knee Synvisc 1 injection    RTC 4-6 weeks or as needed for repeat      DATE OF PROCEDURE: 2024    PREOPERATIVE DIAGNOSIS:    Knee pain/ osteoarthritis    POSTOPERATIVE DIAGNOSIS:  same    PROCEDURE PERFORMED: Right   KNEE Synvisc 1 INJECTION     The patient understands the risks and benefits of the procedure and wishes to proceed. The patient was seen in the preoperative area. Patient's consent was obtained and updated. Vitals were taken. Patient was then brought to the procedure suite and placed in a supine position for the injection. The appropriate anatomic area was widely prepped with Chloraprep and draped in a sterile fashion. Noninvasive monitoring per routine anesthesia protocol was placed. Under fluoroscopic guidance using an AP view, a " 22 gauge straight tip spinal needle was passed through skin anesthetized with 1% Lidocaine without epinephrine. The needle tip was guided to the joint using fluoroscopy. 1mL of  preservative free contrast were injected into the joint to confirm location. A clear outline was obtained and 4mL of Synvisc 1 was injected. The patient tolerated with no pierre-procedural complications.  A sterile dressing was placed over the puncture sites.

## 2024-08-12 ENCOUNTER — TREATMENT (OUTPATIENT)
Dept: CARDIAC REHAB | Facility: HOSPITAL | Age: 69
End: 2024-08-12
Payer: MEDICARE

## 2024-08-12 DIAGNOSIS — Z95.1 S/P CABG (CORONARY ARTERY BYPASS GRAFT): Primary | ICD-10-CM

## 2024-08-12 PROCEDURE — 93798 PHYS/QHP OP CAR RHAB W/ECG: CPT

## 2024-08-14 ENCOUNTER — TREATMENT (OUTPATIENT)
Dept: CARDIAC REHAB | Facility: HOSPITAL | Age: 69
End: 2024-08-14
Payer: MEDICARE

## 2024-08-14 DIAGNOSIS — Z95.1 S/P CABG (CORONARY ARTERY BYPASS GRAFT): Primary | ICD-10-CM

## 2024-08-14 PROCEDURE — 93798 PHYS/QHP OP CAR RHAB W/ECG: CPT

## 2024-08-19 ENCOUNTER — TREATMENT (OUTPATIENT)
Dept: CARDIAC REHAB | Facility: HOSPITAL | Age: 69
End: 2024-08-19
Payer: MEDICARE

## 2024-08-19 DIAGNOSIS — Z95.1 S/P CABG (CORONARY ARTERY BYPASS GRAFT): Primary | ICD-10-CM

## 2024-08-19 PROCEDURE — 93798 PHYS/QHP OP CAR RHAB W/ECG: CPT

## 2024-08-21 ENCOUNTER — TREATMENT (OUTPATIENT)
Dept: CARDIAC REHAB | Facility: HOSPITAL | Age: 69
End: 2024-08-21
Payer: MEDICARE

## 2024-08-21 DIAGNOSIS — Z95.1 S/P CABG (CORONARY ARTERY BYPASS GRAFT): Primary | ICD-10-CM

## 2024-08-21 PROCEDURE — 93798 PHYS/QHP OP CAR RHAB W/ECG: CPT

## 2024-08-26 ENCOUNTER — TREATMENT (OUTPATIENT)
Dept: CARDIAC REHAB | Facility: HOSPITAL | Age: 69
End: 2024-08-26
Payer: MEDICARE

## 2024-08-26 DIAGNOSIS — Z95.1 S/P CABG (CORONARY ARTERY BYPASS GRAFT): Primary | ICD-10-CM

## 2024-08-26 PROCEDURE — 93798 PHYS/QHP OP CAR RHAB W/ECG: CPT

## 2024-08-29 RX ORDER — EZETIMIBE 10 MG/1
TABLET ORAL
Qty: 95 TABLET | Refills: 1 | Status: SHIPPED | OUTPATIENT
Start: 2024-08-29

## 2024-09-02 ENCOUNTER — APPOINTMENT (OUTPATIENT)
Dept: CARDIAC REHAB | Facility: HOSPITAL | Age: 69
End: 2024-09-02
Payer: MEDICARE

## 2024-09-04 ENCOUNTER — OFFICE VISIT (OUTPATIENT)
Dept: PAIN MEDICINE | Facility: CLINIC | Age: 69
End: 2024-09-04
Payer: MEDICARE

## 2024-09-04 ENCOUNTER — TREATMENT (OUTPATIENT)
Dept: CARDIAC REHAB | Facility: HOSPITAL | Age: 69
End: 2024-09-04
Payer: MEDICARE

## 2024-09-04 VITALS
SYSTOLIC BLOOD PRESSURE: 138 MMHG | DIASTOLIC BLOOD PRESSURE: 83 MMHG | HEART RATE: 62 BPM | RESPIRATION RATE: 16 BRPM | WEIGHT: 172 LBS | BODY MASS INDEX: 27.76 KG/M2 | OXYGEN SATURATION: 97 %

## 2024-09-04 DIAGNOSIS — G89.4 CHRONIC PAIN SYNDROME: Primary | ICD-10-CM

## 2024-09-04 DIAGNOSIS — Z95.1 S/P CABG (CORONARY ARTERY BYPASS GRAFT): Primary | ICD-10-CM

## 2024-09-04 DIAGNOSIS — M25.50 POLYARTHRALGIA: ICD-10-CM

## 2024-09-04 DIAGNOSIS — M46.1 SACROILIITIS: ICD-10-CM

## 2024-09-04 DIAGNOSIS — M47.817 LUMBOSACRAL SPONDYLOSIS WITHOUT MYELOPATHY: ICD-10-CM

## 2024-09-04 PROCEDURE — 1159F MED LIST DOCD IN RCRD: CPT | Performed by: ANESTHESIOLOGY

## 2024-09-04 PROCEDURE — 3079F DIAST BP 80-89 MM HG: CPT | Performed by: ANESTHESIOLOGY

## 2024-09-04 PROCEDURE — 93798 PHYS/QHP OP CAR RHAB W/ECG: CPT

## 2024-09-04 PROCEDURE — 99214 OFFICE O/P EST MOD 30 MIN: CPT | Performed by: ANESTHESIOLOGY

## 2024-09-04 PROCEDURE — 1160F RVW MEDS BY RX/DR IN RCRD: CPT | Performed by: ANESTHESIOLOGY

## 2024-09-04 PROCEDURE — 1125F AMNT PAIN NOTED PAIN PRSNT: CPT | Performed by: ANESTHESIOLOGY

## 2024-09-04 PROCEDURE — 3075F SYST BP GE 130 - 139MM HG: CPT | Performed by: ANESTHESIOLOGY

## 2024-09-05 NOTE — PROGRESS NOTES
Subjective   CC back pain  Anthony Ayoub is a 68 y.o. male with multiple comorbidity including CKD stage III, history of leukemia post bone marrow transplant 2016, chronic polyarthralgia, chronic back pain here for follow-up.  Right knee Synvisc 1 injection last visit reports 70% relief.  He continues to participate in cardiac rehab and recovering well.  However complains of worsening axial back pain.  He has had 70% relief with lumbar RFA which is lasted over 7 to 8 months.  Now symptoms are returning interfering with ADL and cardiac rehab.    Chronic right knee pain.  He has seen Ortho in [place] and Dr. Montiel in [place].  Had steroid injection with marginal relief.  He would like to try gel injection.  Overall worsening polyarthralgia.  Chronic back pain mostly axial but radiating to both hips and both lower extremity with cramping weakness feeling in the legs constant but worse with standing walking or prolonged activity.  Denies saddle anesthesia, bladder incontinence.  Also complains of bilateral feet neuropathic pain.  EMG/NCS pending.  Participating in physical therapy with marginal relief.  He had tried Tylenol, topical anti-inflammatories without relief.  Back pain is impairing ADL, interfering with sleep.      Pain Assessment   Location of Pain: Lower Back, R Hip, L Hip,   Description of Pain: Dull/Aching, Throbbing, Stabbing  Previous Pain Rating :7  Current Pain Ratin  Aggravating Factors: Activity  Alleviating Factors: Rest, Medication  Pain onset over 12 weeks  Interferes with ADL's.   [place] back pain disability scale on chart    PEG Assessment   What number best describes your pain on average in the past week?4  What number best describes how, during the past week, pain has interfered with your enjoyment of life?3  What number best describes how, during the past week, pain has interfered with your general activity?  10    The following portions of the patient's history were reviewed  and updated as appropriate: allergies, current medications, past family history, past medical history, past social history, past surgical history and problem list.    Review of Systems   Musculoskeletal:  Positive for arthralgias and back pain.   All other systems reviewed and are negative.      Objective   Physical Exam  Vitals reviewed.   Constitutional:       General: He is not in acute distress.  Musculoskeletal:      Lumbar back: Tenderness present. Decreased range of motion. Positive right straight leg raise test and positive left straight leg raise test.      Comments: Lumbar loading positive, pain on extension of low back past 5 degrees.  TTP on the lumbar facets noted.  Positive Chad bilaterally, positive [place]len bilaterally, positive SI compression test bilaterally.       /83   Pulse 62   Resp 16   Wt 78 kg (172 lb)   SpO2 97%   BMI 27.76 kg/m²     PHQ 9 on chart  Opioid risk tool low risk    Assessment & Plan   Diagnoses and all orders for this visit:    1. Chronic pain syndrome (Primary)    2. Lumbosacral spondylosis without myelopathy  -     Facet    3. Sacroiliitis    4. Polyarthralgia      Summary  Anthony Ayoub is a 68 y.o. male with multiple comorbidity including CKD stage III, history of leukemia post bone marrow transplant 2016, chronic polyarthralgia, chronic back pain here for follow-up.  Chronic pain from lumbar DDD spondylosis, and stenosis with neurogenic medication.  Polyarthralgia, right knee pain.  80% relief of neurogenic claudication symptoms with LESI.  70% relief of lumbar RFA     Right knee Synvisc 1 injection last visit reports 70% relief.  He continues to participate in h cardiac rehab and recovering well.    However complains of worsening axial back pain.  He has had 70% relief with lumbar RFA which is lasted over 7 to 8 months.    Now symptoms are returning interfering with ADL and cardiac rehab.    Will schedule for repeat lumbar RFA bilaterally at L4/5, L5/S1  without sedation.  Risk and benefits discussed    RTC for procedure

## 2024-09-09 ENCOUNTER — TREATMENT (OUTPATIENT)
Dept: CARDIAC REHAB | Facility: HOSPITAL | Age: 69
End: 2024-09-09
Payer: MEDICARE

## 2024-09-09 DIAGNOSIS — Z95.1 S/P CABG (CORONARY ARTERY BYPASS GRAFT): Primary | ICD-10-CM

## 2024-09-09 PROCEDURE — 93798 PHYS/QHP OP CAR RHAB W/ECG: CPT

## 2024-09-11 ENCOUNTER — APPOINTMENT (OUTPATIENT)
Dept: CARDIAC REHAB | Facility: HOSPITAL | Age: 69
End: 2024-09-11
Payer: MEDICARE

## 2024-09-12 RX ORDER — FOLIC ACID 1 MG/1
TABLET ORAL
Qty: 90 TABLET | Refills: 1 | Status: SHIPPED | OUTPATIENT
Start: 2024-09-12

## 2024-09-13 ENCOUNTER — HOSPITAL ENCOUNTER (OUTPATIENT)
Dept: PAIN MEDICINE | Facility: HOSPITAL | Age: 69
Discharge: HOME | End: 2024-09-13
Payer: MEDICARE

## 2024-09-13 VITALS
TEMPERATURE: 97.1 F | WEIGHT: 172 LBS | DIASTOLIC BLOOD PRESSURE: 89 MMHG | BODY MASS INDEX: 27.64 KG/M2 | HEART RATE: 58 BPM | SYSTOLIC BLOOD PRESSURE: 132 MMHG | OXYGEN SATURATION: 97 % | HEIGHT: 66 IN | RESPIRATION RATE: 16 BRPM

## 2024-09-13 DIAGNOSIS — M47.817 LUMBOSACRAL SPONDYLOSIS WITHOUT MYELOPATHY: Primary | ICD-10-CM

## 2024-09-13 DIAGNOSIS — R52 PAIN: ICD-10-CM

## 2024-09-13 PROCEDURE — 25010000002 METHYLPREDNISOLONE PER 40 MG: Performed by: ANESTHESIOLOGY

## 2024-09-13 PROCEDURE — 25010000002 BUPIVACAINE (PF) 0.25 % SOLUTION: Performed by: ANESTHESIOLOGY

## 2024-09-13 PROCEDURE — 64635 DESTROY LUMB/SAC FACET JNT: CPT | Performed by: ANESTHESIOLOGY

## 2024-09-13 PROCEDURE — 77003 FLUOROGUIDE FOR SPINE INJECT: CPT

## 2024-09-13 PROCEDURE — 64636 DESTROY L/S FACET JNT ADDL: CPT | Performed by: ANESTHESIOLOGY

## 2024-09-13 RX ORDER — LIDOCAINE HYDROCHLORIDE 10 MG/ML
5 INJECTION, SOLUTION EPIDURAL; INFILTRATION; INTRACAUDAL; PERINEURAL ONCE
Status: COMPLETED | OUTPATIENT
Start: 2024-09-13 | End: 2024-09-13

## 2024-09-13 RX ORDER — METHYLPREDNISOLONE ACETATE 40 MG/ML
40 INJECTION, SUSPENSION INTRA-ARTICULAR; INTRALESIONAL; INTRAMUSCULAR; SOFT TISSUE ONCE
Status: COMPLETED | OUTPATIENT
Start: 2024-09-13 | End: 2024-09-13

## 2024-09-13 RX ORDER — BUPIVACAINE HYDROCHLORIDE 2.5 MG/ML
10 INJECTION, SOLUTION EPIDURAL; INFILTRATION; INTRACAUDAL ONCE
Status: COMPLETED | OUTPATIENT
Start: 2024-09-13 | End: 2024-09-13

## 2024-09-13 RX ADMIN — METHYLPREDNISOLONE ACETATE 40 MG: 40 INJECTION, SUSPENSION INTRA-ARTICULAR; INTRALESIONAL; INTRAMUSCULAR; INTRASYNOVIAL; SOFT TISSUE at 11:00

## 2024-09-13 RX ADMIN — BUPIVACAINE HYDROCHLORIDE 10 ML: 2.5 INJECTION, SOLUTION EPIDURAL; INFILTRATION; INTRACAUDAL; PERINEURAL at 11:00

## 2024-09-13 RX ADMIN — LIDOCAINE HYDROCHLORIDE 5 ML: 10 INJECTION, SOLUTION EPIDURAL; INFILTRATION; INTRACAUDAL; PERINEURAL at 10:55

## 2024-09-13 RX ADMIN — LIDOCAINE HYDROCHLORIDE 5 ML: 10 INJECTION, SOLUTION EPIDURAL; INFILTRATION; INTRACAUDAL; PERINEURAL at 10:51

## 2024-09-13 RX ADMIN — LIDOCAINE HYDROCHLORIDE 5 ML: 10 INJECTION, SOLUTION EPIDURAL; INFILTRATION; INTRACAUDAL; PERINEURAL at 10:50

## 2024-09-13 NOTE — DISCHARGE INSTRUCTIONS
Radiofrequency Lesioning, Care After    Refer to this sheet in the next few weeks. These instructions provide you with information about caring for yourself after your procedure. Your health care provider may also give you more specific instructions. Your treatment has been planned according to current medical practices, but problems sometimes occur. Call your health care provider if you have any problems or questions after your procedure.  What can I expect after the procedure?  After the procedure, it is common to have:  Pain from the burned nerve.  You may feel a burning sensation for up to 1-2 weeks after the procedure  Temporary numbness at the site  Your leg/legs may be weak or feel numb after the procedure until the numbing medication wears off.  When you are up and walking, have assistance to prevent falling.     Follow these instructions at home:  Take over-the-counter and prescription medicines only as told by your health care provider.  Return to your normal activities as told by your health care provider. Ask your health care provider what activities are safe for you.  Pay close attention to how you feel after the procedure. If you start to have pain, write down when it hurts and how it feels. This will help you and your health care provider to know if you need an additional treatment.  Check your needle insertion site every day for signs of infection. Watch for:  Redness, swelling, or pain.  Fluid, blood, or pus.  Keep all follow-up visits as told by your health care provider. This is important.  No driving for 24 hrs-make sure you have full sensation in your legs prior to driving.  Avoid using heat on the injection site for 24 hours. You may use ice intermittently if needed by placing a               towel between your skin and the ice bag and using the ice for 20 minutes 2-3 times a day.  Do not take baths, swim or use a hot tub for 24 hours.  Leave your band-aids on for 24 hours and keep them  dry.    Contact a health care provider if:  Your pain does not get better.  You have redness, swelling, or pain at the needle insertion site.  You have fluid, blood, or pus coming from the needle insertion site.  You have a fever over 101 degrees.  You have new numb.ness in your arm or leg after the procedure    Get help right away if:  You develop sudden, severe pain.  You develop numbness or tingling near the procedure site that does not go away.  This information is not intended to replace advice given to you by your health care provider. Make sure you discuss any questions you have with your health care provider.  Document Released: 08/16/2012 Document Revised: 05/25/2017 Document Reviewed: 01/25/2016  [employer]fer Interactive Patient Education © 2019 [employer]

## 2024-09-16 ENCOUNTER — TELEPHONE (OUTPATIENT)
Dept: PAIN MEDICINE | Facility: HOSPITAL | Age: 69
End: 2024-09-16
Payer: MEDICARE

## 2024-09-16 NOTE — PROCEDURES
"Subjective   CC back pain  Anthony Ayoub is a 68 y.o. male with lumbar spondylosis here for bilateral lumbar RFA    Pain Assessment   Location of Pain: Lower Back, R Hip, L Hip, legs  Description of Pain: Dull/Aching, Throbbing, Stabbing  Previous Pain Rating :8  Current Pain Ratin  Aggravating Factors: Activity  Alleviating Factors: Rest, Medication  Pain onset over 12 weeks  Pain interferes with ADL's    The following portions of the patient's history were reviewed and updated as appropriate: allergies, current medications, past family history, past medical history, past social history, past surgical history and problem list.      Review of Systems  As in HPI  Objective   Physical Exam  Vitals reviewed.   Constitutional:       General: He is not in acute distress.  Pulmonary:      Effort: Pulmonary effort is normal.       /89 (BP Location: Left arm, Patient Position: Sitting)   Pulse 58   Temp 97.1 °F (36.2 °C) (Skin)   Resp 16   Ht 167.6 cm (66\")   Wt 78 kg (172 lb)   SpO2 97%   BMI 27.76 kg/m²     Assessment & Plan    underwent bilateral lumbar RFA at L4/5, L5/S1.    RTC 4-6 weeks or as needed for repeat    DATE OF PROCEDURE:    2024     PREOPERATIVE DIAGNOSIS:   Lumbar spondylosis without myelopathy     POSTOPERATIVE DIAGNOSIS: same     PROCEDURE PERFORMED:  Bilateral lumbar Sacral RFTC at  L4/L5, L5/S1.     The patient presents with a history of lumbosacral spondylosis  at level [  L4/L5 ] [ L5/ S1 ].  The patient presents today for lumbosacral RFTC.  The patient understands the risks and benefits of the procedure and wishes to proceed.  The patient was seen in the preoperative area.  Patient's consent was obtained and updated.  Vitals were taken.  Patient was then brought to the procedure suite and placed in a prone position.  The appropriate anatomic area was widely prepped with Chloraprep and draped in a sterile fashion.  Noninvasive monitoring per routine anesthesia protocol was " placed.  Under fluoroscopic guidance an AP view with caudad cephaled tilt was obtained.  A 20 guage RFTC cannula was passed through skin anesthetized with 1% Lidocaine without epinephrine.  The needle tip was guided to the superior medial aspect of the transverse process at [  L4 ][  L5 and  sacral ala ] bilaterally.   Motor stimulation was undertaken at 2.0 mAmps at 2 Hertz. At no point was motor stimulation or sensory stimulation noted in a radicular fashion.  Impedence range 200's. Prior to ablation, each level was anesthetized with 2mL of 1% Lidocaine without epinephrine. The medial branch nerves were then ablated at 80* C for 90 seconds each .  Following ablation, each level was injected with 1 mL of  expiration containing 1 mL of 40 mg Depo-Medrol and 4 mL of 0.25% bupivacaine and the RFTC cannula removed.  A sterile dressing was placed over the puncture site.     The patient tolerated the procedure with no complications. They were then brought to the post procedure area where they recovered nicely.      Discharge  The patient will be discharged home in stable condition.  Patient understands to contact the Center with any post procedure questions or concerns.  Discharge instructions given by nursing staff.

## 2024-09-18 RX ORDER — ROSUVASTATIN CALCIUM 40 MG/1
TABLET, COATED ORAL
Qty: 90 TABLET | Refills: 1 | Status: SHIPPED | OUTPATIENT
Start: 2024-09-18

## 2024-10-09 ENCOUNTER — TELEPHONE (OUTPATIENT)
Dept: PAIN MEDICINE | Facility: CLINIC | Age: 69
End: 2024-10-09
Payer: MEDICARE

## 2024-10-09 NOTE — TELEPHONE ENCOUNTER
Caller: Mildred Ayoub    Relationship to patient: Emergency Contact    Best call back number: [phone]*    Patient is needing: PT'S WIFE IS CALLING TO SEE IF DR WONG CAN FILL OUT A FORM STATING THAT THE PT IS MEDICALLY UNFIT TO TRAVEL DUE TO THE BACK PAIN.. HE WAS SUPPOSED TO LEAVE FOR VACATION 10/11/24 AND HE IS HAVING TOO MUCH BACK PAIN TO TRAVEL.. PLEASE ADVISE..

## 2024-10-10 NOTE — TELEPHONE ENCOUNTER
Okay to draft a note stating:     Due to Mr. ruiz chronic medical condition, is unable to sit or stand for prolonged period of time which presents him from traveling or tolerating travel.  If you have any question please contact our office.

## 2024-10-14 RX ORDER — CARVEDILOL 25 MG/1
25 TABLET ORAL 2 TIMES DAILY
Qty: 60 TABLET | Refills: 0 | Status: SHIPPED | OUTPATIENT
Start: 2024-10-14

## 2024-10-25 RX ORDER — ESCITALOPRAM OXALATE 20 MG/1
20 TABLET ORAL DAILY
Qty: 90 TABLET | Refills: 1 | Status: SHIPPED | OUTPATIENT
Start: 2024-10-25

## 2024-10-28 ENCOUNTER — LAB (OUTPATIENT)
Dept: LAB | Facility: HOSPITAL | Age: 69
End: 2024-10-28
Payer: MEDICARE

## 2024-10-28 ENCOUNTER — TRANSCRIBE ORDERS (OUTPATIENT)
Dept: LAB | Facility: HOSPITAL | Age: 69
End: 2024-10-28
Payer: MEDICARE

## 2024-10-28 DIAGNOSIS — I12.9 HYPERTENSIVE NEPHROPATHY: ICD-10-CM

## 2024-10-28 DIAGNOSIS — N25.81 SECONDARY HYPERPARATHYROIDISM OF RENAL ORIGIN: ICD-10-CM

## 2024-10-28 DIAGNOSIS — N40.0 ENLARGED PROSTATE: ICD-10-CM

## 2024-10-28 DIAGNOSIS — N18.31 CHRONIC KIDNEY DISEASE (CKD) STAGE G3A/A1, MODERATELY DECREASED GLOMERULAR FILTRATION RATE (GFR) BETWEEN 45-59 ML/MIN/1.73 SQUARE METER AND ALBUMINURIA CREATININE RATIO LESS THAN 30 MG/G (CMS/H*: ICD-10-CM

## 2024-10-28 DIAGNOSIS — R80.9 PROTEINURIA, UNSPECIFIED TYPE: ICD-10-CM

## 2024-10-28 DIAGNOSIS — N18.31 CHRONIC KIDNEY DISEASE (CKD) STAGE G3A/A1, MODERATELY DECREASED GLOMERULAR FILTRATION RATE (GFR) BETWEEN 45-59 ML/MIN/1.73 SQUARE METER AND ALBUMINURIA CREATININE RATIO LESS THAN 30 MG/G (CMS/H*: Primary | ICD-10-CM

## 2024-10-28 DIAGNOSIS — Z85.6 PERSONAL HISTORY OF UNSPECIFIED LEUKEMIA: ICD-10-CM

## 2024-10-28 LAB
25(OH)D3 SERPL-MCNC: 55 NG/ML (ref 30–100)
ALBUMIN SERPL-MCNC: 4.4 G/DL (ref 3.5–5.2)
ANION GAP SERPL CALCULATED.3IONS-SCNC: 7.3 MMOL/L (ref 5–15)
BUN SERPL-MCNC: 21 MG/DL (ref 8–23)
BUN/CREAT SERPL: 12.7 (ref 7–25)
CALCIUM SPEC-SCNC: 9.5 MG/DL (ref 8.6–10.5)
CHLORIDE SERPL-SCNC: 104 MMOL/L (ref 98–107)
CO2 SERPL-SCNC: 28.7 MMOL/L (ref 22–29)
CREAT SERPL-MCNC: 1.66 MG/DL (ref 0.76–1.27)
EGFRCR SERPLBLD CKD-EPI 2021: 45.2 ML/MIN/1.73
GLUCOSE SERPL-MCNC: 122 MG/DL (ref 65–99)
PHOSPHATE SERPL-MCNC: 3.8 MG/DL (ref 2.5–4.5)
POTASSIUM SERPL-SCNC: 4.7 MMOL/L (ref 3.5–5.2)
PTH-INTACT SERPL-MCNC: 92.6 PG/ML (ref 15–65)
SODIUM SERPL-SCNC: 140 MMOL/L (ref 136–145)

## 2024-10-28 PROCEDURE — 82306 VITAMIN D 25 HYDROXY: CPT

## 2024-10-28 PROCEDURE — 83970 ASSAY OF PARATHORMONE: CPT

## 2024-10-28 PROCEDURE — 80069 RENAL FUNCTION PANEL: CPT

## 2024-10-28 PROCEDURE — 36415 COLL VENOUS BLD VENIPUNCTURE: CPT

## 2024-11-05 ENCOUNTER — OFFICE VISIT (OUTPATIENT)
Dept: PAIN MEDICINE | Facility: CLINIC | Age: 69
End: 2024-11-05
Payer: MEDICARE

## 2024-11-05 VITALS
BODY MASS INDEX: 28.57 KG/M2 | OXYGEN SATURATION: 96 % | WEIGHT: 177 LBS | HEART RATE: 62 BPM | DIASTOLIC BLOOD PRESSURE: 98 MMHG | SYSTOLIC BLOOD PRESSURE: 158 MMHG | RESPIRATION RATE: 16 BRPM

## 2024-11-05 DIAGNOSIS — M46.1 SACROILIITIS: ICD-10-CM

## 2024-11-05 DIAGNOSIS — M79.2 NEUROPATHIC PAIN: ICD-10-CM

## 2024-11-05 DIAGNOSIS — M47.817 LUMBOSACRAL SPONDYLOSIS WITHOUT MYELOPATHY: ICD-10-CM

## 2024-11-05 DIAGNOSIS — G89.4 CHRONIC PAIN SYNDROME: Primary | ICD-10-CM

## 2024-11-06 NOTE — PROGRESS NOTES
Subjective   CC back pain  Anthony Ayoub is a 68 y.o. male with multiple comorbidity including CKD stage III, history of leukemia post bone marrow transplant 2016, chronic polyarthralgia, chronic back pain here for follow-up.    Repeat bilateral lumbar RFA last visit reports 70% relief of axial back pain.  However complains of bilateral SI pain/buttock pain, constant worse with prolonged sitting and activity.  Denies radicular pain.  Pain is below L5.  Had 70% relief of bilateral SI injection in the past, lasted over 3 months.    Chronic right knee pain.  He has seen Ortho in [place] and Dr. Montiel in [place].  Had steroid injection with marginal relief.  He would like to try gel injection.  Overall worsening polyarthralgia.  Chronic back pain mostly axial but radiating to both hips and both lower extremity with cramping weakness feeling in the legs constant but worse with standing walking or prolonged activity.  Denies saddle anesthesia, bladder incontinence.  Also complains of bilateral feet neuropathic pain.  EMG/NCS pending.  Participating in physical therapy with marginal relief.  He had tried Tylenol, topical anti-inflammatories without relief.  Back pain is impairing ADL, interfering with sleep.      Pain Assessment   Location of Pain: Lower Back, R Hip, L Hip,   Description of Pain: Dull/Aching, Throbbing, Stabbing  Previous Pain Rating :6  Current Pain Ratin  Aggravating Factors: Activity  Alleviating Factors: Rest, Medication  Pain onset over 12 weeks  Interferes with ADL's.   [place] back pain disability scale on chart    PEG Assessment   What number best describes your pain on average in the past week?4  What number best describes how, during the past week, pain has interfered with your enjoyment of life?3  What number best describes how, during the past week, pain has interfered with your general activity?  10    The following portions of the patient's history were reviewed and updated as  appropriate: allergies, current medications, past family history, past medical history, past social history, past surgical history and problem list.    Review of Systems   Musculoskeletal:  Positive for arthralgias and back pain.   All other systems reviewed and are negative.      Objective   Physical Exam  Vitals reviewed.   Constitutional:       General: He is not in acute distress.  Musculoskeletal:      Lumbar back: Tenderness present. Decreased range of motion. Positive right straight leg raise test and positive left straight leg raise test.      Comments: Lumbar loading positive, pain on extension of low back past 5 degrees.  TTP on the lumbar facets noted.  Positive Chad bilaterally, positive Gaenslen bilaterally, positive SI compression test bilaterally.       /98 (BP Location: Left arm, Patient Position: Sitting, Cuff Size: Adult)   Pulse 62   Resp 16   Wt 80.3 kg (177 lb)   SpO2 96%   BMI 28.57 kg/m²     PHQ 9 on chart  Opioid risk tool low risk    Assessment & Plan   Diagnoses and all orders for this visit:    1. Chronic pain syndrome (Primary)    2. Lumbosacral spondylosis without myelopathy    3. Sacroiliitis  -     SI Joint Injection    4. Neuropathic pain      Summary  Anthony Ayoub is a 68 y.o. male with multiple comorbidity including CKD stage III, history of leukemia post bone marrow transplant 2016, chronic polyarthralgia, chronic back pain here for follow-up.  Chronic pain from lumbar DDD spondylosis, and stenosis with neurogenic medication.  Polyarthralgia, right knee pain.  80% relief of neurogenic claudication symptoms with LESI.  70% relief of lumbar RFA     Repeat bilateral lumbar RFA last visit reports 70% relief of axial back pain.  However complains of bilateral SI pain/buttock pain, constant worse with prolonged sitting and activity.  Denies radicular pain.  Pain is below L5.  Had 70% relief of bilateral SI injection in the past, lasted over 3 months.  Will schedule  repeat therapeutic bilateral SI injections.  Risk and benefits discussed    RTC for procedure

## 2024-11-07 ENCOUNTER — TELEPHONE (OUTPATIENT)
Dept: PAIN MEDICINE | Facility: CLINIC | Age: 69
End: 2024-11-07
Payer: MEDICARE

## 2024-11-07 NOTE — TELEPHONE ENCOUNTER
Hub staff attempted to follow warm transfer process and was unsuccessful     Caller: [facility] [phone] PRESS OPTION 2    Relationship to patient:     Best call back number: [phone] (home)       Patient is needing: PHARMACY CANT READ THE QUANTITY ON THE RX SENT OVER BY DR WONG FOR  LICART PATCHES. PLEASE CONFIRM AND ADVISE.      [facility]  [facility]  [phone] PRESS OPTION 2

## 2024-11-11 ENCOUNTER — HOSPITAL ENCOUNTER (OUTPATIENT)
Dept: PAIN MEDICINE | Facility: HOSPITAL | Age: 69
Discharge: HOME | End: 2024-11-11
Payer: MEDICARE

## 2024-11-11 VITALS
DIASTOLIC BLOOD PRESSURE: 94 MMHG | WEIGHT: 177 LBS | BODY MASS INDEX: 28.45 KG/M2 | HEART RATE: 60 BPM | RESPIRATION RATE: 16 BRPM | HEIGHT: 66 IN | SYSTOLIC BLOOD PRESSURE: 151 MMHG | OXYGEN SATURATION: 95 % | TEMPERATURE: 97.1 F

## 2024-11-11 DIAGNOSIS — R52 PAIN: ICD-10-CM

## 2024-11-11 DIAGNOSIS — M46.1 SACROILIITIS: Primary | ICD-10-CM

## 2024-11-11 PROCEDURE — 77003 FLUOROGUIDE FOR SPINE INJECT: CPT

## 2024-11-11 PROCEDURE — 25010000002 METHYLPREDNISOLONE PER 40 MG: Performed by: ANESTHESIOLOGY

## 2024-11-11 PROCEDURE — 25510000001 IOPAMIDOL 41 % SOLUTION: Performed by: ANESTHESIOLOGY

## 2024-11-11 PROCEDURE — 25010000002 BUPIVACAINE (PF) 0.25 % SOLUTION: Performed by: ANESTHESIOLOGY

## 2024-11-11 RX ORDER — METHYLPREDNISOLONE ACETATE 40 MG/ML
40 INJECTION, SUSPENSION INTRA-ARTICULAR; INTRALESIONAL; INTRAMUSCULAR; SOFT TISSUE ONCE
Status: COMPLETED | OUTPATIENT
Start: 2024-11-11 | End: 2024-11-11

## 2024-11-11 RX ORDER — IOPAMIDOL 408 MG/ML
3 INJECTION, SOLUTION INTRATHECAL
Status: COMPLETED | OUTPATIENT
Start: 2024-11-11 | End: 2024-11-11

## 2024-11-11 RX ORDER — BUPIVACAINE HYDROCHLORIDE 2.5 MG/ML
10 INJECTION, SOLUTION EPIDURAL; INFILTRATION; INTRACAUDAL ONCE
Status: COMPLETED | OUTPATIENT
Start: 2024-11-11 | End: 2024-11-11

## 2024-11-11 RX ADMIN — BUPIVACAINE HYDROCHLORIDE 10 ML: 2.5 INJECTION, SOLUTION EPIDURAL; INFILTRATION; INTRACAUDAL; PERINEURAL at 11:36

## 2024-11-11 RX ADMIN — METHYLPREDNISOLONE ACETATE 40 MG: 40 INJECTION, SUSPENSION INTRA-ARTICULAR; INTRALESIONAL; INTRAMUSCULAR; INTRASYNOVIAL; SOFT TISSUE at 11:36

## 2024-11-11 RX ADMIN — IOPAMIDOL 3 ML: 408 INJECTION, SOLUTION INTRATHECAL at 11:36

## 2024-11-11 RX ADMIN — METHYLPREDNISOLONE ACETATE 40 MG: 40 INJECTION, SUSPENSION INTRA-ARTICULAR; INTRALESIONAL; INTRAMUSCULAR; INTRASYNOVIAL; SOFT TISSUE at 11:37

## 2024-11-12 ENCOUNTER — TELEPHONE (OUTPATIENT)
Dept: PAIN MEDICINE | Facility: HOSPITAL | Age: 69
End: 2024-11-12
Payer: MEDICARE

## 2024-11-12 NOTE — TELEPHONE ENCOUNTER
Attempted Post Procedure Phone Call.  Message left on voicemail to call with any questions or concerns.

## 2024-11-12 NOTE — PROCEDURES
"Subjective   CC back pain  Anthony Ayoub is a 68 y.o. male with sacroiliitis here for repeat bilateral SI injections.  No anticoagulation    Pain Assessment   Location of Pain: Lower Back, R Hip, L Hip, legs  Description of Pain: Dull/Aching, Throbbing, Stabbing  Previous Pain Rating :8  Current Pain Ratin  Aggravating Factors: Activity  Alleviating Factors: Rest, Medication  Pain onset over 12 weeks  Pain interferes with ADL's    The following portions of the patient's history were reviewed and updated as appropriate: allergies, current medications, past family history, past medical history, past social history, past surgical history and problem list.      Review of Systems  As in HPI  Objective   Physical Exam  Vitals reviewed.   Constitutional:       General: He is not in acute distress.  Pulmonary:      Effort: Pulmonary effort is normal.       /94   Pulse 60   Temp 97.1 °F (36.2 °C) (Skin)   Resp 16   Ht 167.6 cm (66\")   Wt 80.3 kg (177 lb)   SpO2 95%   BMI 28.57 kg/m²     Assessment & Plan    underwent bilateral SI injection.    RTC 4-6 weeks or as needed for repeat    DATE OF PROCEDURE:  2024    PREOPERATIVE DIAGNOSIS: sacroiliitis    POSTOPERATIVE DIAGNOSIS: same    PROCEDURE PERFORMED: Bilateral SACROILIAC JOINT INJECTION    The patient understands the risks and benefits of the procedure and wishes to proceed. The patient was seen in the preoperative area. Patient's consent was obtained and updated. Vitals were taken. Patient was then brought to the procedure suite and placed in prone position for sacroiliac joint injection. The appropriate anatomic area was widely prepped with Chloraprep and draped in a sterile fashion. Noninvasive monitoring per routine anesthesia protocol was placed. Under fluoroscopic guidance using an AP view, a 22 gauge curved tip spinal needle was passed through skin anesthetized with 1% Lidocaine without epinephrine. The needle tip was guided to the lower " pole of the joint using fluoroscopy. 1 mL of  preservative free contrast was injected into the joint to confirm location. A clear outline was obtained and 5 mL of steroid solution containing 4 mL 0.25% bupivacaine, and 1mL 40mg Depomedrol was injected on each side. The patient tolerated with no pierre-procedural complications.  A sterile dressing was placed over the puncture sites.

## 2024-11-15 RX ORDER — CARVEDILOL 25 MG/1
25 TABLET ORAL 2 TIMES DAILY
Qty: 60 TABLET | Refills: 0 | Status: SHIPPED | OUTPATIENT
Start: 2024-11-15

## 2024-11-25 ENCOUNTER — OFFICE VISIT (OUTPATIENT)
Dept: FAMILY MEDICINE CLINIC | Facility: CLINIC | Age: 69
End: 2024-11-25
Payer: MEDICARE

## 2024-11-25 VITALS
WEIGHT: 176 LBS | SYSTOLIC BLOOD PRESSURE: 151 MMHG | DIASTOLIC BLOOD PRESSURE: 90 MMHG | BODY MASS INDEX: 28.28 KG/M2 | TEMPERATURE: 99.3 F | OXYGEN SATURATION: 98 % | HEIGHT: 66 IN | HEART RATE: 66 BPM

## 2024-11-25 DIAGNOSIS — J06.9 UPPER RESPIRATORY TRACT INFECTION, UNSPECIFIED TYPE: Primary | ICD-10-CM

## 2024-11-25 DIAGNOSIS — J01.40 ACUTE NON-RECURRENT PANSINUSITIS: ICD-10-CM

## 2024-11-25 PROCEDURE — 87428 SARSCOV & INF VIR A&B AG IA: CPT

## 2024-11-25 PROCEDURE — 3044F HG A1C LEVEL LT 7.0%: CPT

## 2024-11-25 PROCEDURE — 99213 OFFICE O/P EST LOW 20 MIN: CPT

## 2024-11-25 PROCEDURE — 1159F MED LIST DOCD IN RCRD: CPT

## 2024-11-25 PROCEDURE — 1125F AMNT PAIN NOTED PAIN PRSNT: CPT

## 2024-11-25 PROCEDURE — 1160F RVW MEDS BY RX/DR IN RCRD: CPT

## 2024-11-25 PROCEDURE — 3080F DIAST BP >= 90 MM HG: CPT

## 2024-11-25 PROCEDURE — 3077F SYST BP >= 140 MM HG: CPT

## 2024-11-25 RX ORDER — CEPHALEXIN 500 MG/1
500 CAPSULE ORAL 2 TIMES DAILY
Qty: 20 CAPSULE | Refills: 0 | Status: SHIPPED | OUTPATIENT
Start: 2024-11-25 | End: 2024-12-05

## 2024-11-25 NOTE — PROGRESS NOTES
"Chief Complaint  URI (Cough productive at night, headache, fatigue, sinusitis. Symptoms started at least on Friday. Lives in [facility] area. )    Subjective        Anthony Ayoub presents to [facility]  History of Present Illness    Patient presents today with complaints of productive cough, headache, fatigue, sinus congestion. Symptoms started 3 to 4 days ago. Mucus is green. He denies SOA, malaise, body aches. He has been chilled and running a low grade fever. Grandson and wife had both been sick as well.    Objective   Vital Signs:  /90 (BP Location: Left arm, Patient Position: Sitting, Cuff Size: Large Adult)   Pulse 66   Temp 99.3 °F (37.4 °C) (Oral)   Ht 167.6 cm (66\")   Wt 79.8 kg (176 lb)   SpO2 98%   BMI 28.41 kg/m²   Estimated body mass index is 28.41 kg/m² as calculated from the following:    Height as of this encounter: 167.6 cm (66\").    Weight as of this encounter: 79.8 kg (176 lb).                  Physical Exam  Vitals reviewed.   Constitutional:       General: He is not in acute distress.     Appearance: Normal appearance. He is ill-appearing. He is not toxic-appearing or diaphoretic.   HENT:      Nose: Congestion present.      Right Sinus: Maxillary sinus tenderness and frontal sinus tenderness present.      Left Sinus: Maxillary sinus tenderness and frontal sinus tenderness present.   Cardiovascular:      Rate and Rhythm: Normal rate and regular rhythm.      Pulses: Normal pulses.      Heart sounds: Normal heart sounds.   Pulmonary:      Effort: Pulmonary effort is normal. No respiratory distress.      Breath sounds: Rhonchi present.   Lymphadenopathy:      Cervical: Cervical adenopathy present.   Skin:     General: Skin is warm and dry.      Capillary Refill: Capillary refill takes less than 2 seconds.   Neurological:      General: No focal deficit present.      Mental Status: He is alert and oriented to person, place, and time.   Psychiatric:         " Mood and Affect: Mood normal.         Behavior: Behavior normal.         Thought Content: Thought content normal.         Judgment: Judgment normal.        Result Review :                Assessment and Plan   Diagnoses and all orders for this visit:    1. Upper respiratory tract infection, unspecified type (Primary)  -     cephalexin (Keflex) 500 MG capsule; Take 1 capsule by mouth 2 (Two) Times a Day for 10 days.  Dispense: 20 capsule; Refill: 0    2. Acute non-recurrent pansinusitis  -     cephalexin (Keflex) 500 MG capsule; Take 1 capsule by mouth 2 (Two) Times a Day for 10 days.  Dispense: 20 capsule; Refill: 0             Follow Up   Return if symptoms worsen or fail to improve.  Patient was given instructions and counseling regarding his condition or for health maintenance advice. Please see specific information pulled into the AVS if appropriate.

## 2024-12-13 RX ORDER — CARVEDILOL 25 MG/1
25 TABLET ORAL 2 TIMES DAILY
Qty: 60 TABLET | Refills: 0 | Status: SHIPPED | OUTPATIENT
Start: 2024-12-13

## 2024-12-18 ENCOUNTER — OFFICE VISIT (OUTPATIENT)
Dept: FAMILY MEDICINE CLINIC | Facility: CLINIC | Age: 69
End: 2024-12-18
Payer: MEDICARE

## 2024-12-18 ENCOUNTER — LAB (OUTPATIENT)
Dept: FAMILY MEDICINE CLINIC | Facility: CLINIC | Age: 69
End: 2024-12-18
Payer: MEDICARE

## 2024-12-18 ENCOUNTER — HOSPITAL ENCOUNTER (OUTPATIENT)
Dept: GENERAL RADIOLOGY | Facility: HOSPITAL | Age: 69
Discharge: HOME | End: 2024-12-18
Admitting: NURSE PRACTITIONER
Payer: MEDICARE

## 2024-12-18 VITALS
HEART RATE: 61 BPM | TEMPERATURE: 97.8 F | BODY MASS INDEX: 28.41 KG/M2 | OXYGEN SATURATION: 95 % | DIASTOLIC BLOOD PRESSURE: 92 MMHG | WEIGHT: 176 LBS | SYSTOLIC BLOOD PRESSURE: 154 MMHG

## 2024-12-18 DIAGNOSIS — J06.9 UPPER RESPIRATORY TRACT INFECTION, UNSPECIFIED TYPE: ICD-10-CM

## 2024-12-18 DIAGNOSIS — J06.9 UPPER RESPIRATORY TRACT INFECTION, UNSPECIFIED TYPE: Primary | ICD-10-CM

## 2024-12-18 LAB
ALBUMIN SERPL-MCNC: 4.1 G/DL (ref 3.5–5.2)
ALBUMIN/GLOB SERPL: 1.6 G/DL
ALP SERPL-CCNC: 85 U/L (ref 39–117)
ALT SERPL W P-5'-P-CCNC: 26 U/L (ref 1–41)
ANION GAP SERPL CALCULATED.3IONS-SCNC: 7.2 MMOL/L (ref 5–15)
AST SERPL-CCNC: 24 U/L (ref 1–40)
BASOPHILS # BLD AUTO: 0.06 10*3/MM3 (ref 0–0.2)
BASOPHILS NFR BLD AUTO: 0.8 % (ref 0–1.5)
BILIRUB SERPL-MCNC: 0.7 MG/DL (ref 0–1.2)
BUN SERPL-MCNC: 21 MG/DL (ref 8–23)
BUN/CREAT SERPL: 10.8 (ref 7–25)
CALCIUM SPEC-SCNC: 9.8 MG/DL (ref 8.6–10.5)
CHLORIDE SERPL-SCNC: 105 MMOL/L (ref 98–107)
CO2 SERPL-SCNC: 26.8 MMOL/L (ref 22–29)
CREAT SERPL-MCNC: 1.94 MG/DL (ref 0.76–1.27)
DEPRECATED RDW RBC AUTO: 49.1 FL (ref 37–54)
EGFRCR SERPLBLD CKD-EPI 2021: 37.2 ML/MIN/1.73
EOSINOPHIL # BLD AUTO: 0.37 10*3/MM3 (ref 0–0.4)
EOSINOPHIL NFR BLD AUTO: 5.2 % (ref 0.3–6.2)
ERYTHROCYTE [DISTWIDTH] IN BLOOD BY AUTOMATED COUNT: 14.5 % (ref 12.3–15.4)
EXPIRATION DATE: NORMAL
FLUAV AG UPPER RESP QL IA.RAPID: NOT DETECTED
FLUBV AG UPPER RESP QL IA.RAPID: NOT DETECTED
GLOBULIN UR ELPH-MCNC: 2.6 GM/DL
GLUCOSE SERPL-MCNC: 120 MG/DL (ref 65–99)
HCT VFR BLD AUTO: 45.3 % (ref 37.5–51)
HGB BLD-MCNC: 15.2 G/DL (ref 13–17.7)
IMM GRANULOCYTES # BLD AUTO: 0.04 10*3/MM3 (ref 0–0.05)
IMM GRANULOCYTES NFR BLD AUTO: 0.6 % (ref 0–0.5)
INTERNAL CONTROL: NORMAL
LYMPHOCYTES # BLD AUTO: 2.02 10*3/MM3 (ref 0.7–3.1)
LYMPHOCYTES NFR BLD AUTO: 28.5 % (ref 19.6–45.3)
Lab: NORMAL
MCH RBC QN AUTO: 31.4 PG (ref 26.6–33)
MCHC RBC AUTO-ENTMCNC: 33.6 G/DL (ref 31.5–35.7)
MCV RBC AUTO: 93.6 FL (ref 79–97)
MONOCYTES # BLD AUTO: 0.86 10*3/MM3 (ref 0.1–0.9)
MONOCYTES NFR BLD AUTO: 12.1 % (ref 5–12)
NEUTROPHILS NFR BLD AUTO: 3.73 10*3/MM3 (ref 1.7–7)
NEUTROPHILS NFR BLD AUTO: 52.8 % (ref 42.7–76)
NRBC BLD AUTO-RTO: 0 /100 WBC (ref 0–0.2)
PLATELET # BLD AUTO: 196 10*3/MM3 (ref 140–450)
PMV BLD AUTO: 10.1 FL (ref 6–12)
POTASSIUM SERPL-SCNC: 5.2 MMOL/L (ref 3.5–5.2)
PROT SERPL-MCNC: 6.7 G/DL (ref 6–8.5)
RBC # BLD AUTO: 4.84 10*6/MM3 (ref 4.14–5.8)
SARS-COV-2 AG UPPER RESP QL IA.RAPID: NOT DETECTED
SODIUM SERPL-SCNC: 139 MMOL/L (ref 136–145)
WBC NRBC COR # BLD AUTO: 7.08 10*3/MM3 (ref 3.4–10.8)

## 2024-12-18 PROCEDURE — 1160F RVW MEDS BY RX/DR IN RCRD: CPT | Performed by: NURSE PRACTITIONER

## 2024-12-18 PROCEDURE — 1159F MED LIST DOCD IN RCRD: CPT | Performed by: NURSE PRACTITIONER

## 2024-12-18 PROCEDURE — 3044F HG A1C LEVEL LT 7.0%: CPT | Performed by: NURSE PRACTITIONER

## 2024-12-18 PROCEDURE — 71046 X-RAY EXAM CHEST 2 VIEWS: CPT

## 2024-12-18 PROCEDURE — 1125F AMNT PAIN NOTED PAIN PRSNT: CPT | Performed by: NURSE PRACTITIONER

## 2024-12-18 PROCEDURE — 80053 COMPREHEN METABOLIC PANEL: CPT | Performed by: NURSE PRACTITIONER

## 2024-12-18 PROCEDURE — 87428 SARSCOV & INF VIR A&B AG IA: CPT | Performed by: NURSE PRACTITIONER

## 2024-12-18 PROCEDURE — 85025 COMPLETE CBC W/AUTO DIFF WBC: CPT | Performed by: NURSE PRACTITIONER

## 2024-12-18 PROCEDURE — 3077F SYST BP >= 140 MM HG: CPT | Performed by: NURSE PRACTITIONER

## 2024-12-18 PROCEDURE — 99214 OFFICE O/P EST MOD 30 MIN: CPT | Performed by: NURSE PRACTITIONER

## 2024-12-18 PROCEDURE — 36415 COLL VENOUS BLD VENIPUNCTURE: CPT

## 2024-12-18 PROCEDURE — 3080F DIAST BP >= 90 MM HG: CPT | Performed by: NURSE PRACTITIONER

## 2024-12-18 RX ORDER — METHYLPREDNISOLONE 4 MG/1
TABLET ORAL
Qty: 21 TABLET | Refills: 0 | Status: SHIPPED | OUTPATIENT
Start: 2024-12-18

## 2024-12-18 NOTE — PROGRESS NOTES
Subjective     Anthony Ayoub is a 69 y.o. male.     History of Present Illness  Patient is here today with complaints of fever and chest congestion.  Patient states that his symptoms started on Sunday into Monday of this week  Patient was treated for an upper respiratory tract infection 2 to 3 weeks ago with Keflex.  Patient reports his symptoms resolved at that time but returned this week  He has been experiencing post nasal drip  He denies sore throat at this time.  He does not smoke  Last night he had a fever of 101.6.  He took tylenol prior to that.    He has a history of leukemia- was diagnosed in 2015  Had a bone marrow transplant in July 2016  He was in remission  He sees an oncologist at - saw them 3 mo ago          The following portions of the patient's history were reviewed and updated as appropriate: allergies, current medications, past family history, past medical history, past social history, past surgical history, and problem list.    Review of Systems   Constitutional:  Positive for chills, fatigue and fever.   HENT:  Positive for congestion and sinus pressure.    Respiratory:  Positive for cough. Negative for chest tightness and shortness of breath.    Cardiovascular:  Negative for chest pain and palpitations.   Neurological:  Positive for headache. Negative for dizziness.       Objective     /92 (BP Location: Left arm, Patient Position: Sitting, Cuff Size: Large Adult)   Pulse 61   Temp 97.8 °F (36.6 °C) (Tympanic)   Wt 79.8 kg (176 lb)   SpO2 95%   BMI 28.41 kg/m²     Current Outpatient Medications on File Prior to Visit   Medication Sig Dispense Refill    aspirin 81 MG EC tablet Take 1 tablet by mouth Daily.      calcitriol (ROCALTROL) 0.25 MCG capsule Take 1 capsule by mouth Every Other Day.      carvedilol (COREG) 25 MG tablet TAKE 1 TABLET BY MOUTH TWICE A DAY 60 tablet 0    escitalopram (LEXAPRO) 20 MG tablet TAKE 1 TABLET BY MOUTH EVERY DAY 90 tablet 1    ezetimibe (ZETIA)  10 MG tablet TAKE 1 TABLET BY MOUTH EVERY DAY 95 tablet 1    ferrous sulfate 325 (65 FE) MG EC tablet Take 1 tablet by mouth.      folic acid (FOLVITE) 1 MG tablet TAKE 1 TABLET BY MOUTH EVERY DAY 90 tablet 1    lisinopril (PRINIVIL,ZESTRIL) 40 MG tablet Take 1 tablet by mouth Daily.      pantoprazole (PROTONIX) 40 MG EC tablet Take 1 tablet by mouth Daily.      rosuvastatin (CRESTOR) 40 MG tablet TAKE 1 TABLET BY MOUTH EVERYDAY AT BEDTIME 90 tablet 1    terazosin (HYTRIN) 5 MG capsule Daily.      vitamin D (ERGOCALCIFEROL) 1.25 MG (01499 UT) capsule capsule TAKE 1 CAPSULE BY MOUTH 1 TIME PER WEEK. (Patient taking differently: Take 1 capsule by mouth Every 30 (Thirty) Days.) 12 capsule 1     No current facility-administered medications on file prior to visit.                 Physical Exam  Constitutional:       General: He is not in acute distress.     Appearance: Normal appearance. He is not ill-appearing.   HENT:      Head: Normocephalic and atraumatic.      Nose: Congestion present.      Mouth/Throat:      Pharynx: No oropharyngeal exudate or posterior oropharyngeal erythema.   Eyes:      Extraocular Movements: Extraocular movements intact.   Cardiovascular:      Rate and Rhythm: Normal rate and regular rhythm.      Heart sounds: No murmur heard.  Pulmonary:      Effort: Pulmonary effort is normal. No respiratory distress.      Breath sounds: Wheezing and rhonchi present.      Comments: Mild wheezing/rhonchi in right lobe  Skin:     General: Skin is warm and dry.   Neurological:      General: No focal deficit present.      Mental Status: He is alert and oriented to person, place, and time.   Psychiatric:         Mood and Affect: Mood normal.         Behavior: Behavior normal.         Thought Content: Thought content normal.         Judgment: Judgment normal.           Assessment & Plan     Diagnoses and all orders for this visit:    1. Upper respiratory tract infection, unspecified type (Primary)  Comments:  flu  and covid negative  get CXR  check CBC and CMP  steroid for wheezing in lungs (mild)  deep breathing   mucinex  Orders:  -     Cancel: Basic Metabolic Panel; Future  -     CBC & Differential  -     methylPREDNISolone (MEDROL) 4 MG dose pack; Take as directed on package instructions.  Dispense: 21 tablet; Refill: 0  -     XR Chest PA & Lateral; Future  -     Comprehensive metabolic panel; Future

## 2024-12-19 NOTE — PROGRESS NOTES
Patient wife Mildred notified of test results. Mildred verbalize understanding. Patient takes a baby aspirin. Can he still take this? Patient see Dr. Lawson for his kidneys. He is schedule in April. Do he need to get an earlier appointment? Please advise.

## 2024-12-19 NOTE — PROGRESS NOTES
Left message to return call to doctor's office at [facility] [place]. Either to get test results or message from provider.

## 2024-12-20 ENCOUNTER — TELEPHONE (OUTPATIENT)
Dept: FAMILY MEDICINE CLINIC | Facility: CLINIC | Age: 69
End: 2024-12-20
Payer: MEDICARE

## 2024-12-20 NOTE — TELEPHONE ENCOUNTER
Patient was seen on 12/18-given steroids.  Feeling worse-moving into chest.  Needs an inhaler or nebulizer treatment.  Has history of pneumonia.  May need antibiotic.  Sent call to MA extension

## 2024-12-26 ENCOUNTER — TELEPHONE (OUTPATIENT)
Dept: FAMILY MEDICINE CLINIC | Facility: CLINIC | Age: 69
End: 2024-12-26
Payer: MEDICARE

## 2024-12-26 NOTE — TELEPHONE ENCOUNTER
Caller: Mildred Ayoub    Relationship to patient: Emergency Contact    Best call back number: [phone]     Chief complaint: DIAGNOSED WITH PNEUMONIA AT  ON 12/21.  TOOK ALL ANTIBIOTICS AND NO BETTER.    Type of visit: SAME    Requested date: 12/26/24        Additional notes:UNABLE TO WARM TRANSFER.  PLEASE CALL AND SCHEDULE PATIENT.

## 2024-12-26 NOTE — TELEPHONE ENCOUNTER
12-26 called the patients wife and since there are no openings today she's taking him back to the urgent care. Will call for an appt.

## 2025-01-08 RX ORDER — CARVEDILOL 25 MG/1
25 TABLET ORAL 2 TIMES DAILY
Qty: 60 TABLET | Refills: 4 | Status: SHIPPED | OUTPATIENT
Start: 2025-01-08

## 2025-01-19 NOTE — PROGRESS NOTES
Encounter Date:01/29/2025    Last seen-7/23/2024      Patient ID: Anthony Ayoub is a 69 y.o. male.    Chief Complaint:  Status post CABG    Dyslipidemia  Hypertension  Renal dysfunction.     History of Present Illness  Since I have last seen, the patient has been without any chest discomfort ,shortness of breath, palpitations, dizziness or syncope.  Denies having any headache ,abdominal pain ,nausea, vomiting , diarrhea constipation, loss of weight or loss of appetite.  Denies having any excessive bruising ,hematuria or blood in the stool.    Review of all systems negative except as indicated.    Reviewed ROS.    Assessment and Plan         ]]]]]]]]]]]]]]]]]]  History  ========  -Status post CABG 2/22/2024-Dr. Garrett  CABG x 3 (LIMA to LAD SVG to diagonal and SVG to marginal branch)     - Preoperative unstable angina/possible non-STEMI  And severe and critical coronary artery disease      Cardiac catheterization 2/21/2024  Left ventricular angiogram was not performed due to pre-existing renal dysfunction.  Severe triple-vessel coronary artery disease.  Left main coronary artery has 40% disease  Left anterior descending artery is a large and long vessel that has ostial 99% disease.  Mid LAD has 80% disease.  Diagonal branches diffuse 99% disease proximally.  Circumflex coronary artery is a large and dominant vessel that provided multiple branches.  Circumflex coronary artery has 60 to 70% disease proximal to the first marginal branch.  First marginal branch has ostial 95% disease.  Second marginal branch is a relatively small caliber vessel that has 99% disease in the proximal segment that bifurcated into 2 branches.  Each branch has 90 to 95% disease.  There were 3 other marginal branches.  PDA has 99% disease in the proximal segment.  Second marginal branch has 60 to 70% ostial disease.  Right coronary artery is totally occluded in the proximal segment and distal vessel is filling through collaterals  from left circulation.  Small caliber vessel.  (Chronic since 2019).  Please see the report from [facility].     Echocardiogram.-2/20/2024  Structurally and functionally normal cardiac valves except for minimal mitral regurgitation.  Grade 1 diastolic dysfunction is present..  Left ventricular size and contractility is normal with ejection fraction of 60%.     -  hypertension dyslipidemia GERD osteoarthritis renal dysfunction.  BUN 15 creatinine 1.4     -CML.  Status post bone marrow transplantation  2016     - status post vasectomy and sinus surgery      -former smoker     - strong family history  for coronary artery disease.  Brother had CABG and father had myocardial infarction     -allergic to penicillin sulfa and doxycycline  ============  Plan  ==========  Status post CABG 2/22/2024-Dr. Garrett  Patient is not having any angina pectoris or congestive heart failure.  CABG x 3 (LIMA to LAD SVG to diagonal and SVG to marginal branch)      EKG showed sinus rhythm nonspecific ST-T wave abnormalities-4/23/2024  EKG was not performed today.  7/23/2024.      Chronic renal insufficiency-followed by Dr. Lawson.  23/1.37-2/25/2024     Dyslipidemia-continue Crestor.    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     Medications were reviewed and updated.  Current medications include aspirin Coreg 25 mg twice daily Lasix 20 mg daily insulin lisinopril 40 mg daily pantoprazole Crestor 40 mg a day terazosin.     Patient is off Plavix and amlodipine.    Follow-up in the office in 6 months.     Further plan will depend on patient's progress.     Reviewed and updated 1/29/2025.  ]]]]]]]]]]]]]]]]]                  Diagnosis Plan   1. Hx of CABG        2. Essential hypertension        3. Mixed hyperlipidemia        4. Dyslipidemia        5. Dyspnea on exertion        6. Stage 3 chronic kidney disease, unspecified whether stage 3a or 3b CKD        7.  Hypothyroidism, unspecified type        8. Benign essential hypertension        LAB RESULTS (LAST 7 DAYS)    CBC        BMP        CMP         BNP        TROPONIN        CoAg        Creatinine Clearance  CrCl cannot be calculated (Patient's most recent lab result is older than the maximum 30 days allowed.).    ABG        Radiology  No radiology results for the last day                The following portions of the patient's history were reviewed and updated as appropriate: allergies, current medications, past family history, past medical history, past social history, past surgical history, and problem list.    Review of Systems   Constitutional: Negative for malaise/fatigue.   Cardiovascular:  Negative for chest pain, dyspnea on exertion, leg swelling and palpitations.   Respiratory:  Negative for cough and shortness of breath.    Gastrointestinal:  Negative for abdominal pain, nausea and vomiting.   Neurological:  Negative for dizziness, focal weakness, headaches, light-headedness and numbness.   All other systems reviewed and are negative.      Current Outpatient Medications:     albuterol sulfate  (90 Base) MCG/ACT inhaler, Inhale 2 puffs 4 (Four) Times a Day., Disp: 18 g, Rfl: 0    aspirin 81 MG EC tablet, Take 1 tablet by mouth Daily., Disp: , Rfl:     benzonatate (TESSALON) 200 MG capsule, Take 1 capsule by mouth 3 (Three) Times a Day As Needed for Cough., Disp: 30 capsule, Rfl: 0    calcitriol (ROCALTROL) 0.25 MCG capsule, Take 1 capsule by mouth Every Other Day., Disp: , Rfl:     carvedilol (COREG) 25 MG tablet, Take 1 tablet by mouth 2 (Two) Times a Day., Disp: 60 tablet, Rfl: 4    escitalopram (LEXAPRO) 20 MG tablet, TAKE 1 TABLET BY MOUTH EVERY DAY, Disp: 90 tablet, Rfl: 1    ezetimibe (ZETIA) 10 MG tablet, TAKE 1 TABLET BY MOUTH EVERY DAY, Disp: 95 tablet, Rfl: 1    ferrous sulfate 325 (65 FE) MG EC tablet, Take 1 tablet by mouth., Disp: , Rfl:     Fluticasone-Salmeterol (ADVAIR/WIXELA) 500-50  MCG/ACT DISKUS, Inhale 1 puff 2 (Two) Times a Day., Disp: 60 each, Rfl: 0    folic acid (FOLVITE) 1 MG tablet, TAKE 1 TABLET BY MOUTH EVERY DAY, Disp: 90 tablet, Rfl: 1    lisinopril (PRINIVIL,ZESTRIL) 40 MG tablet, Take 1 tablet by mouth Daily., Disp: , Rfl:     pantoprazole (PROTONIX) 40 MG EC tablet, Take 1 tablet by mouth Daily., Disp: , Rfl:     rosuvastatin (CRESTOR) 40 MG tablet, TAKE 1 TABLET BY MOUTH EVERYDAY AT BEDTIME, Disp: 90 tablet, Rfl: 1    Spacer/Aero-Holding Chambers (AeroChamber MV) inhaler, Use as instructed, Disp: 1 each, Rfl: 0    terazosin (HYTRIN) 5 MG capsule, Daily., Disp: , Rfl:     vitamin D (ERGOCALCIFEROL) 1.25 MG (62321 UT) capsule capsule, TAKE 1 CAPSULE BY MOUTH 1 TIME PER WEEK. (Patient taking differently: Take 1 capsule by mouth Every 30 (Thirty) Days.), Disp: 12 capsule, Rfl: 1    Allergies   Allergen Reactions    Doxycycline Hyclate Other (See Comments)     Painful skin    Penicillins Rash    Sulfa Antibiotics Rash       Family History   Problem Relation Age of Onset    Hypertension Mother     Hyperlipidemia Mother     Heart disease Father     Heart attack Father     Hypertension Sister     Hypertension Brother     Heart disease Brother        Past Surgical History:   Procedure Laterality Date    BONE MARROW TRANSPLANT      CARDIAC CATHETERIZATION      CARDIAC CATHETERIZATION N/A 2/21/2024    Procedure: Coronary angiography;  Surgeon: Michelle Hernández MD;  Location: [facility] CATH INVASIVE LOCATION;  Service: Cardiovascular;  Laterality: N/A;    CARDIAC CATHETERIZATION N/A 2/21/2024    Procedure: Left Heart Cath;  Surgeon: Michelle Hernández MD;  Location: [facility] CATH INVASIVE LOCATION;  Service: Cardiovascular;  Laterality: N/A;    CHOLECYSTECTOMY N/A 4/23/2022    Procedure: CHOLECYSTECTOMY LAPAROSCOPIC;  Surgeon: Kale Acuna MD;  Location: [facility] MAIN OR;  Service: General;  Laterality: N/A;    CORONARY ARTERY BYPASS GRAFT N/A 2/22/2024    Procedure: CORONARY ARTERY BYPASS  GRAFTING, INTRAOPERATIVE TRANSESOPHAGEAL ECHOCARDIOGRAM;  Surgeon: Soham Garrett MD;  Location: [facility];  Service: Cardiothoracic;  Laterality: N/A;  CABG X 3 (2 vein grafts, 1 LIMA graft)    SINUS SURGERY      TONSILLECTOMY      VASECTOMY         Past Medical History:   Diagnosis Date    CHF (congestive heart failure)     Coronary artery disease     GERD (gastroesophageal reflux disease)     Hyperlipidemia     Hypertension     Leukemia     Low back pain     Sleep apnea        Family History   Problem Relation Age of Onset    Hypertension Mother     Hyperlipidemia Mother     Heart disease Father     Heart attack Father     Hypertension Sister     Hypertension Brother     Heart disease Brother        Social History     Socioeconomic History    Marital status:    Tobacco Use    Smoking status: Former     Current packs/day: 0.00     Average packs/day: 1 pack/day for 30.0 years (30.0 ttl pk-yrs)     Types: Cigarettes     Start date:      Quit date:      Years since quittin.0     Passive exposure: Past    Smokeless tobacco: Never   Vaping Use    Vaping status: Never Used   Substance and Sexual Activity    Alcohol use: No    Drug use: Defer    Sexual activity: Defer           ECG 12 Lead    Date/Time: 2025 11:14 AM  Performed by: Michelle Hernández MD    Authorized by: Michelle Hernández MD  Comparison: compared with previous ECG   Similar to previous ECG  Comparison to previous ECG: Normal sinus rhythm old inferior infarction 60/min nonspecific ST-T wave changes normal axis normal intervals no ectopy no significant change from previous EKG.        Objective:       Physical Exam    There were no vitals taken for this visit.  The patient is alert, oriented and in no distress.    Vital signs as noted above.    Head and neck revealed no carotid bruits or jugular venous distension.  No thyromegaly or lymphadenopathy is present.    Lungs clear.  No wheezing.  Breath sounds are normal  bilaterally.    Heart normal first and second heart sounds.  No murmur..  No pericardial rub is present.  No gallop is present.    Abdomen soft and nontender.  No organomegaly is present.    Extremities revealed good peripheral pulses without any pedal edema.    Skin warm and dry.    Musculoskeletal system is grossly normal.    CNS grossly normal.    Reviewed and updated.

## 2025-01-29 ENCOUNTER — OFFICE VISIT (OUTPATIENT)
Dept: CARDIOLOGY | Facility: CLINIC | Age: 70
End: 2025-01-29
Payer: MEDICARE

## 2025-01-29 VITALS
DIASTOLIC BLOOD PRESSURE: 99 MMHG | OXYGEN SATURATION: 98 % | BODY MASS INDEX: 28.77 KG/M2 | HEIGHT: 66 IN | HEART RATE: 64 BPM | SYSTOLIC BLOOD PRESSURE: 170 MMHG | WEIGHT: 179 LBS

## 2025-01-29 DIAGNOSIS — E03.9 HYPOTHYROIDISM, UNSPECIFIED TYPE: ICD-10-CM

## 2025-01-29 DIAGNOSIS — E78.5 DYSLIPIDEMIA: ICD-10-CM

## 2025-01-29 DIAGNOSIS — N18.30 STAGE 3 CHRONIC KIDNEY DISEASE, UNSPECIFIED WHETHER STAGE 3A OR 3B CKD: ICD-10-CM

## 2025-01-29 DIAGNOSIS — I10 BENIGN ESSENTIAL HYPERTENSION: ICD-10-CM

## 2025-01-29 DIAGNOSIS — R06.09 DYSPNEA ON EXERTION: ICD-10-CM

## 2025-01-29 DIAGNOSIS — Z95.1 HX OF CABG: Primary | ICD-10-CM

## 2025-01-29 DIAGNOSIS — I10 ESSENTIAL HYPERTENSION: ICD-10-CM

## 2025-01-29 DIAGNOSIS — E78.2 MIXED HYPERLIPIDEMIA: ICD-10-CM

## 2025-02-07 RX ORDER — ESCITALOPRAM OXALATE 20 MG/1
20 TABLET ORAL DAILY
Qty: 90 TABLET | Refills: 1 | Status: SHIPPED | OUTPATIENT
Start: 2025-02-07

## 2025-03-18 RX ORDER — EZETIMIBE 10 MG/1
10 TABLET ORAL DAILY
Qty: 95 TABLET | Refills: 1 | OUTPATIENT
Start: 2025-03-18

## 2025-03-18 RX ORDER — EZETIMIBE 10 MG/1
10 TABLET ORAL DAILY
Qty: 95 TABLET | Refills: 0 | Status: SHIPPED | OUTPATIENT
Start: 2025-03-18

## 2025-04-16 RX ORDER — CARVEDILOL 25 MG/1
25 TABLET ORAL 2 TIMES DAILY
Qty: 180 TABLET | Refills: 1 | Status: SHIPPED | OUTPATIENT
Start: 2025-04-16

## 2025-04-19 ENCOUNTER — TRANSCRIBE ORDERS (OUTPATIENT)
Dept: ADMINISTRATIVE | Facility: HOSPITAL | Age: 70
End: 2025-04-19
Payer: MEDICARE

## 2025-04-19 ENCOUNTER — LAB (OUTPATIENT)
Dept: LAB | Facility: HOSPITAL | Age: 70
End: 2025-04-19
Payer: MEDICARE

## 2025-04-19 DIAGNOSIS — R80.9 PROTEINURIA, UNSPECIFIED TYPE: ICD-10-CM

## 2025-04-19 DIAGNOSIS — N25.81 SECONDARY HYPERPARATHYROIDISM OF RENAL ORIGIN: ICD-10-CM

## 2025-04-19 DIAGNOSIS — N18.31 CHRONIC KIDNEY DISEASE (CKD) STAGE G3A/A1, MODERATELY DECREASED GLOMERULAR FILTRATION RATE (GFR) BETWEEN 45-59 ML/MIN/1.73 SQUARE METER AND ALBUMINURIA CREATININE RATIO LESS THAN 30 MG/G (CMS/H*: ICD-10-CM

## 2025-04-19 DIAGNOSIS — N18.31 CHRONIC KIDNEY DISEASE (CKD) STAGE G3A/A1, MODERATELY DECREASED GLOMERULAR FILTRATION RATE (GFR) BETWEEN 45-59 ML/MIN/1.73 SQUARE METER AND ALBUMINURIA CREATININE RATIO LESS THAN 30 MG/G (CMS/H*: Primary | ICD-10-CM

## 2025-04-19 DIAGNOSIS — N40.0 ENLARGED PROSTATE: ICD-10-CM

## 2025-04-19 DIAGNOSIS — I12.9 HYPERTENSIVE NEPHROPATHY: ICD-10-CM

## 2025-04-19 LAB
25(OH)D3 SERPL-MCNC: 45 NG/ML (ref 30–100)
ALBUMIN SERPL-MCNC: 4.3 G/DL (ref 3.5–5.2)
ANION GAP SERPL CALCULATED.3IONS-SCNC: 9.6 MMOL/L (ref 5–15)
BASOPHILS # BLD AUTO: 0.08 10*3/MM3 (ref 0–0.2)
BASOPHILS NFR BLD AUTO: 1.3 % (ref 0–1.5)
BUN SERPL-MCNC: 22 MG/DL (ref 8–23)
BUN/CREAT SERPL: 11.6 (ref 7–25)
CALCIUM SPEC-SCNC: 9.7 MG/DL (ref 8.6–10.5)
CHLORIDE SERPL-SCNC: 104 MMOL/L (ref 98–107)
CO2 SERPL-SCNC: 26.4 MMOL/L (ref 22–29)
CREAT SERPL-MCNC: 1.89 MG/DL (ref 0.76–1.27)
CREAT UR-MCNC: 170 MG/DL
DEPRECATED RDW RBC AUTO: 49.3 FL (ref 37–54)
EGFRCR SERPLBLD CKD-EPI 2021: 38.4 ML/MIN/1.73
EOSINOPHIL # BLD AUTO: 0.49 10*3/MM3 (ref 0–0.4)
EOSINOPHIL NFR BLD AUTO: 8.1 % (ref 0.3–6.2)
ERYTHROCYTE [DISTWIDTH] IN BLOOD BY AUTOMATED COUNT: 14.6 % (ref 12.3–15.4)
GLUCOSE SERPL-MCNC: 146 MG/DL (ref 65–99)
HCT VFR BLD AUTO: 40.8 % (ref 37.5–51)
HGB BLD-MCNC: 13.4 G/DL (ref 13–17.7)
IMM GRANULOCYTES # BLD AUTO: 0.02 10*3/MM3 (ref 0–0.05)
IMM GRANULOCYTES NFR BLD AUTO: 0.3 % (ref 0–0.5)
LYMPHOCYTES # BLD AUTO: 2.26 10*3/MM3 (ref 0.7–3.1)
LYMPHOCYTES NFR BLD AUTO: 37.4 % (ref 19.6–45.3)
MCH RBC QN AUTO: 30.5 PG (ref 26.6–33)
MCHC RBC AUTO-ENTMCNC: 32.8 G/DL (ref 31.5–35.7)
MCV RBC AUTO: 92.9 FL (ref 79–97)
MONOCYTES # BLD AUTO: 0.53 10*3/MM3 (ref 0.1–0.9)
MONOCYTES NFR BLD AUTO: 8.8 % (ref 5–12)
NEUTROPHILS NFR BLD AUTO: 2.66 10*3/MM3 (ref 1.7–7)
NEUTROPHILS NFR BLD AUTO: 44.1 % (ref 42.7–76)
NRBC BLD AUTO-RTO: 0 /100 WBC (ref 0–0.2)
PHOSPHATE SERPL-MCNC: 3.7 MG/DL (ref 2.5–4.5)
PLATELET # BLD AUTO: 218 10*3/MM3 (ref 140–450)
PMV BLD AUTO: 9.8 FL (ref 6–12)
POTASSIUM SERPL-SCNC: 4.5 MMOL/L (ref 3.5–5.2)
PROT ?TM UR-MCNC: 381 MG/DL
PROT/CREAT UR: 2241.2 MG/G CREA (ref 0–200)
PTH-INTACT SERPL-MCNC: 56.5 PG/ML (ref 15–65)
RBC # BLD AUTO: 4.39 10*6/MM3 (ref 4.14–5.8)
SODIUM SERPL-SCNC: 140 MMOL/L (ref 136–145)
WBC NRBC COR # BLD AUTO: 6.04 10*3/MM3 (ref 3.4–10.8)

## 2025-04-19 PROCEDURE — 82306 VITAMIN D 25 HYDROXY: CPT

## 2025-04-19 PROCEDURE — 80069 RENAL FUNCTION PANEL: CPT

## 2025-04-19 PROCEDURE — 84156 ASSAY OF PROTEIN URINE: CPT

## 2025-04-19 PROCEDURE — 85025 COMPLETE CBC W/AUTO DIFF WBC: CPT

## 2025-04-19 PROCEDURE — 83970 ASSAY OF PARATHORMONE: CPT

## 2025-04-19 PROCEDURE — 82570 ASSAY OF URINE CREATININE: CPT

## 2025-04-19 PROCEDURE — 36415 COLL VENOUS BLD VENIPUNCTURE: CPT

## 2025-04-22 ENCOUNTER — HOSPITAL ENCOUNTER (OUTPATIENT)
Dept: ULTRASOUND IMAGING | Facility: HOSPITAL | Age: 70
Discharge: HOME | End: 2025-04-22
Admitting: INTERNAL MEDICINE
Payer: MEDICARE

## 2025-04-22 ENCOUNTER — TRANSCRIBE ORDERS (OUTPATIENT)
Dept: ADMINISTRATIVE | Facility: HOSPITAL | Age: 70
End: 2025-04-22
Payer: MEDICARE

## 2025-04-22 DIAGNOSIS — I12.9 BENIGN HYPERTENSIVE RENAL DISEASE: ICD-10-CM

## 2025-04-22 DIAGNOSIS — N18.32 CHRONIC KIDNEY DISEASE (CKD) STAGE G3B/A1, MODERATELY DECREASED GLOMERULAR FILTRATION RATE (GFR) BETWEEN 30-44 ML/MIN/1.73 SQUARE METER AND ALBUMINURIA CREATININE RATIO LESS THAN 30 MG/G (CMS/H*: Primary | ICD-10-CM

## 2025-04-22 DIAGNOSIS — R80.9 PROTEINURIA, UNSPECIFIED TYPE: ICD-10-CM

## 2025-04-22 DIAGNOSIS — N18.32 CHRONIC KIDNEY DISEASE (CKD) STAGE G3B/A1, MODERATELY DECREASED GLOMERULAR FILTRATION RATE (GFR) BETWEEN 30-44 ML/MIN/1.73 SQUARE METER AND ALBUMINURIA CREATININE RATIO LESS THAN 30 MG/G (CMS/H*: ICD-10-CM

## 2025-04-22 PROCEDURE — 76775 US EXAM ABDO BACK WALL LIM: CPT

## 2025-05-01 ENCOUNTER — OFFICE VISIT (OUTPATIENT)
Dept: PAIN MEDICINE | Facility: CLINIC | Age: 70
End: 2025-05-01
Payer: MEDICARE

## 2025-05-01 VITALS
SYSTOLIC BLOOD PRESSURE: 158 MMHG | RESPIRATION RATE: 16 BRPM | OXYGEN SATURATION: 98 % | DIASTOLIC BLOOD PRESSURE: 84 MMHG | WEIGHT: 177 LBS | BODY MASS INDEX: 28.57 KG/M2 | HEART RATE: 65 BPM

## 2025-05-01 DIAGNOSIS — M46.1 SACROILIITIS: ICD-10-CM

## 2025-05-01 DIAGNOSIS — M47.817 LUMBOSACRAL SPONDYLOSIS WITHOUT MYELOPATHY: Primary | ICD-10-CM

## 2025-05-01 DIAGNOSIS — M25.50 POLYARTHRALGIA: ICD-10-CM

## 2025-05-01 NOTE — PROGRESS NOTES
Subjective   CC back pain  Anthony Ayoub is a 69 y.o. male with multiple comorbidity including CKD stage III, history of leukemia post bone marrow transplant 2016, chronic polyarthralgia, chronic back pain here for follow-up.    Complains of worsening axial back pain.  Constant worse with any activity.  Denies radicular pain.  Pain is interfering with ADL.  Marginal relief with heat or home exercise program/anti-inflammatories  Had 70 to 80% relief with lumbar RFA lasted over 6 months.  Last RFA September 2024.    Chronic right knee pain.  He has seen Ortho in [place] and Dr. Montiel in [place].  Had steroid injection with marginal relief.  He would like to try gel injection.  Overall worsening polyarthralgia.  Chronic back pain mostly axial but radiating to both hips and both lower extremity with cramping weakness feeling in the legs constant but worse with standing walking or prolonged activity.  Denies saddle anesthesia, bladder incontinence.  Also complains of bilateral feet neuropathic pain.  EMG/NCS pending.  Participating in physical therapy with marginal relief.  He had tried Tylenol, topical anti-inflammatories without relief.  Back pain is impairing ADL, interfering with sleep.      Pain Assessment   Location of Pain: Lower Back, R Hip, L Hip,   Description of Pain: Dull/Aching, Throbbing, Stabbing  Previous Pain Rating :6  Current Pain Rating: 10  Aggravating Factors: Activity  Alleviating Factors: Rest, Medication  Pain onset over 12 weeks  Interferes with ADL's.   [place] back pain disability scale on chart    PEG Assessment   What number best describes your pain on average in the past week?4  What number best describes how, during the past week, pain has interfered with your enjoyment of life?3  What number best describes how, during the past week, pain has interfered with your general activity?  10    The following portions of the patient's history were reviewed and updated as appropriate:  allergies, current medications, past family history, past medical history, past social history, past surgical history and problem list.    Review of Systems   Musculoskeletal:  Positive for arthralgias and back pain.   All other systems reviewed and are negative.      Objective   Physical Exam  Vitals reviewed.   Constitutional:       General: He is not in acute distress.  Musculoskeletal:      Lumbar back: Tenderness present. Decreased range of motion.      Comments: Lumbar loading positive, pain on extension of low back past 5 degrees.  TTP on the lumbar facets noted.  Positive Chad bilaterally, positive Gaenslen bilaterally, positive SI compression test bilaterally.       /84 (BP Location: Left arm, Patient Position: Sitting, Cuff Size: Adult)   Pulse 65   Resp 16   Wt 80.3 kg (177 lb)   SpO2 98%   BMI 28.57 kg/m²     PHQ 9 on chart  Opioid risk tool low risk    Assessment & Plan   Diagnoses and all orders for this visit:    1. Lumbosacral spondylosis without myelopathy (Primary)  -     Facet    2. Polyarthralgia    3. Sacroiliitis      Summary  Anthony Ayoub is a 69 y.o. male with multiple comorbidity including CKD stage III, history of leukemia post bone marrow transplant 2016, chronic polyarthralgia, chronic back pain here for follow-up.  Chronic pain from lumbar DDD spondylosis, and stenosis with neurogenic medication.  Polyarthralgia, right knee pain.  80% relief of neurogenic claudication symptoms with LESI.  70% relief of lumbar RFA   80% relief with bilateral SI injection lasting over 3 months    Complains of worsening axial back pain.  Constant worse with any activity.  Denies radicular pain.  Pain is interfering with ADL.  Marginal relief with heat or home exercise program/anti-inflammatories  Had 70 to 80% relief with lumbar RFA lasted over 6 months.  Last RFA September 2024.  Will schedule for repeat bilateral lumbar RFA at L4/5, L5/S1 without sedation.  Risks and benefits  discussed.    RTC for procedure

## 2025-05-03 PROBLEM — R05.1 ACUTE COUGH: Status: ACTIVE | Noted: 2025-05-03

## 2025-05-03 PROBLEM — R09.81 SINUS CONGESTION: Status: ACTIVE | Noted: 2025-05-03

## 2025-05-14 ENCOUNTER — HOSPITAL ENCOUNTER (OUTPATIENT)
Dept: PAIN MEDICINE | Facility: HOSPITAL | Age: 70
Discharge: HOME | End: 2025-05-14
Payer: MEDICARE

## 2025-05-14 VITALS
SYSTOLIC BLOOD PRESSURE: 156 MMHG | OXYGEN SATURATION: 97 % | RESPIRATION RATE: 16 BRPM | HEART RATE: 65 BPM | HEIGHT: 66 IN | WEIGHT: 175 LBS | TEMPERATURE: 97.1 F | DIASTOLIC BLOOD PRESSURE: 90 MMHG | BODY MASS INDEX: 28.12 KG/M2

## 2025-05-14 DIAGNOSIS — M47.817 LUMBOSACRAL SPONDYLOSIS WITHOUT MYELOPATHY: Primary | ICD-10-CM

## 2025-05-14 DIAGNOSIS — R52 PAIN: ICD-10-CM

## 2025-05-14 PROCEDURE — 25010000002 BUPIVACAINE (PF) 0.25 % SOLUTION: Performed by: ANESTHESIOLOGY

## 2025-05-14 PROCEDURE — 25010000002 LIDOCAINE PF 1% 1 % SOLUTION: Performed by: ANESTHESIOLOGY

## 2025-05-14 PROCEDURE — 77003 FLUOROGUIDE FOR SPINE INJECT: CPT

## 2025-05-14 PROCEDURE — 25010000002 METHYLPREDNISOLONE PER 40 MG: Performed by: ANESTHESIOLOGY

## 2025-05-14 RX ORDER — LIDOCAINE HYDROCHLORIDE 10 MG/ML
5 INJECTION, SOLUTION EPIDURAL; INFILTRATION; INTRACAUDAL; PERINEURAL ONCE
Status: COMPLETED | OUTPATIENT
Start: 2025-05-14 | End: 2025-05-14

## 2025-05-14 RX ORDER — BUPIVACAINE HYDROCHLORIDE 2.5 MG/ML
10 INJECTION, SOLUTION EPIDURAL; INFILTRATION; INTRACAUDAL; PERINEURAL ONCE
Status: COMPLETED | OUTPATIENT
Start: 2025-05-14 | End: 2025-05-14

## 2025-05-14 RX ORDER — METHYLPREDNISOLONE ACETATE 40 MG/ML
40 INJECTION, SUSPENSION INTRA-ARTICULAR; INTRALESIONAL; INTRAMUSCULAR; SOFT TISSUE ONCE
Status: COMPLETED | OUTPATIENT
Start: 2025-05-14 | End: 2025-05-14

## 2025-05-14 RX ADMIN — LIDOCAINE HYDROCHLORIDE 5 ML: 10 INJECTION, SOLUTION EPIDURAL; INFILTRATION; INTRACAUDAL; PERINEURAL at 13:29

## 2025-05-14 RX ADMIN — LIDOCAINE HYDROCHLORIDE 5 ML: 10 INJECTION, SOLUTION EPIDURAL; INFILTRATION; INTRACAUDAL; PERINEURAL at 13:30

## 2025-05-14 RX ADMIN — BUPIVACAINE HYDROCHLORIDE 10 ML: 2.5 INJECTION, SOLUTION EPIDURAL; INFILTRATION; INTRACAUDAL; PERINEURAL at 13:34

## 2025-05-14 RX ADMIN — LIDOCAINE HYDROCHLORIDE 5 ML: 10 INJECTION, SOLUTION EPIDURAL; INFILTRATION; INTRACAUDAL; PERINEURAL at 13:24

## 2025-05-14 RX ADMIN — METHYLPREDNISOLONE ACETATE 40 MG: 40 INJECTION, SUSPENSION INTRA-ARTICULAR; INTRALESIONAL; INTRAMUSCULAR; INTRASYNOVIAL; SOFT TISSUE at 13:34

## 2025-05-14 RX ADMIN — LIDOCAINE HYDROCHLORIDE 5 ML: 10 INJECTION, SOLUTION EPIDURAL; INFILTRATION; INTRACAUDAL; PERINEURAL at 13:39

## 2025-05-14 NOTE — DISCHARGE INSTRUCTIONS
Radiofrequency Lesioning, Care After    Refer to this sheet in the next few weeks. These instructions provide you with information about caring for yourself after your procedure. Your health care provider may also give you more specific instructions. Your treatment has been planned according to current medical practices, but problems sometimes occur. Call your health care provider if you have any problems or questions after your procedure.  What can I expect after the procedure?  After the procedure, it is common to have:  Pain from the burned nerve.  You may feel a burning sensation for up to 1-2 weeks after the procedure  Temporary numbness at the site  Your leg/legs may be weak or feel numb after the procedure until the numbing medication wears off.  When you are up and walking, have assistance to prevent falling.     Follow these instructions at home:  Take over-the-counter and prescription medicines only as told by your health care provider.  Return to your normal activities as told by your health care provider. Ask your health care provider what activities are safe for you.  Pay close attention to how you feel after the procedure. If you start to have pain, write down when it hurts and how it feels. This will help you and your health care provider to know if you need an additional treatment.  Check your needle insertion site every day for signs of infection. Watch for:  Redness, swelling, or pain.  Fluid, blood, or pus.  Keep all follow-up visits as told by your health care provider. This is important.  No driving for 24 hrs-make sure you have full sensation in your legs prior to driving.  Avoid using heat on the injection site for 24 hours. You may use ice intermittently if needed by placing a               towel between your skin and the ice bag and using the ice for 20 minutes 2-3 times a day.  Do not take baths, swim or use a hot tub for 24 hours.  Leave your band-aids on for 24 hours and keep them  dry.    Contact a health care provider if:  Your pain does not get better.  You have redness, swelling, or pain at the needle insertion site.  You have fluid, blood, or pus coming from the needle insertion site.  You have a fever over 101 degrees.  You have new numb.ness in your arm or leg after the procedure    Get help right away if:  You develop sudden, severe pain.  You develop numbness or tingling near the procedure site that does not go away.  This information is not intended to replace advice given to you by your health care provider. Make sure you discuss any questions you have with your health care provider.  Document Released: 08/16/2012 Document Revised: 05/25/2017 Document Reviewed: 01/25/2016  [employer] Patient Education © 2019 [employer]

## 2025-05-15 ENCOUNTER — TELEPHONE (OUTPATIENT)
Dept: PAIN MEDICINE | Facility: HOSPITAL | Age: 70
End: 2025-05-15
Payer: MEDICARE

## 2025-05-15 NOTE — TELEPHONE ENCOUNTER
Post procedure phone call completed. Spoke with wife who states patient is sleeping. Pt wife states they are doing good and denies questions or concerns. Patient wife states he has not complained of pain. Patient to call back if they have any questions or concerns.

## 2025-05-16 RX ORDER — FOLIC ACID 1 MG/1
1000 TABLET ORAL DAILY
Qty: 90 TABLET | Refills: 1 | Status: SHIPPED | OUTPATIENT
Start: 2025-05-16

## 2025-05-16 NOTE — PROCEDURES
"Subjective   CC back pain  Anthony Ayoub is a 69 y.o. male with lumbar spondylosis here for repeat  bilateral lumbar RFA    Pain Assessment   Location of Pain: Lower Back, R Hip, L Hip, legs  Description of Pain: Dull/Aching, Throbbing, Stabbing  Previous Pain Rating :8  Current Pain Ratin  Aggravating Factors: Activity  Alleviating Factors: Rest, Medication  Pain onset over 12 weeks  Pain interferes with ADL's    The following portions of the patient's history were reviewed and updated as appropriate: allergies, current medications, past family history, past medical history, past social history, past surgical history and problem list.      Review of Systems  As in HPI  Objective   Physical Exam  Vitals reviewed.   Constitutional:       General: He is not in acute distress.  Pulmonary:      Effort: Pulmonary effort is normal.       /90   Pulse 65   Temp 97.1 °F (36.2 °C) (Skin)   Resp 16   Ht 167.6 cm (66\")   Wt 79.4 kg (175 lb)   SpO2 97%   BMI 28.25 kg/m²     Assessment & Plan    underwent repeat bilateral lumbar RFA at L4/5, L5/S1.    RTC 4-6 weeks or as needed for repeat    DATE OF PROCEDURE:    2025     PREOPERATIVE DIAGNOSIS:   Lumbar spondylosis without myelopathy     POSTOPERATIVE DIAGNOSIS: same     PROCEDURE PERFORMED:  Bilateral lumbar Sacral RFTC at  L4/L5, L5/S1.     The patient presents with a history of lumbosacral spondylosis  at level [  L4/L5 ] [ L5/ S1 ].  The patient presents today for lumbosacral RFTC.  The patient understands the risks and benefits of the procedure and wishes to proceed.  The patient was seen in the preoperative area.  Patient's consent was obtained and updated.  Vitals were taken.  Patient was then brought to the procedure suite and placed in a prone position.  The appropriate anatomic area was widely prepped with Chloraprep and draped in a sterile fashion.  Noninvasive monitoring per routine anesthesia protocol was placed.  Under fluoroscopic " guidance an AP view with caudad cephaled tilt was obtained.  A 20 guage RFTC cannula was passed through skin anesthetized with 1% Lidocaine without epinephrine.  The needle tip was guided to the superior medial aspect of the transverse process at [  L4 ][  L5 and  sacral ala ] bilaterally.   Motor stimulation was undertaken at 2.0 mAmps at 2 Hertz. At no point was motor stimulation or sensory stimulation noted in a radicular fashion.  Impedence range 200's. Prior to ablation, each level was anesthetized with 2mL of 1% Lidocaine without epinephrine. The medial branch nerves were then ablated at 80* C for 90 seconds each .  Following ablation, each level was injected with 1 mL of  expiration containing 1 mL of 40 mg Depo-Medrol and 4 mL of 0.25% bupivacaine and the RFTC cannula removed.  A sterile dressing was placed over the puncture site.     The patient tolerated the procedure with no complications. They were then brought to the post procedure area where they recovered nicely.      Discharge  The patient will be discharged home in stable condition.  Patient understands to contact the Center with any post procedure questions or concerns.  Discharge instructions given by nursing staff.

## 2025-06-04 ENCOUNTER — LAB (OUTPATIENT)
Dept: FAMILY MEDICINE CLINIC | Facility: CLINIC | Age: 70
End: 2025-06-04
Payer: MEDICARE

## 2025-06-04 ENCOUNTER — OFFICE VISIT (OUTPATIENT)
Dept: FAMILY MEDICINE CLINIC | Facility: CLINIC | Age: 70
End: 2025-06-04
Payer: MEDICARE

## 2025-06-04 VITALS
HEART RATE: 71 BPM | DIASTOLIC BLOOD PRESSURE: 89 MMHG | WEIGHT: 176 LBS | OXYGEN SATURATION: 96 % | SYSTOLIC BLOOD PRESSURE: 157 MMHG | HEIGHT: 66 IN | BODY MASS INDEX: 28.28 KG/M2 | TEMPERATURE: 97.7 F
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
## 2025-06-04 DIAGNOSIS — S30.861A TICK BITE OF ABDOMEN, INITIAL ENCOUNTER: Primary | ICD-10-CM

## 2025-06-04 DIAGNOSIS — S30.861A TICK BITE OF ABDOMEN, INITIAL ENCOUNTER: ICD-10-CM
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
## 2025-06-04 LAB
ANION GAP SERPL CALCULATED.3IONS-SCNC: 9.6 MMOL/L (ref 5–15)
BASOPHILS # BLD AUTO: 0.04 10*3/MM3 (ref 0–0.2)
BASOPHILS NFR BLD AUTO: 0.7 % (ref 0–1.5)
BUN SERPL-MCNC: 25 MG/DL (ref 8–23)
BUN/CREAT SERPL: 13.7 (ref 7–25)
CALCIUM SPEC-SCNC: 9.6 MG/DL (ref 8.6–10.5)
CHLORIDE SERPL-SCNC: 108 MMOL/L (ref 98–107)
CO2 SERPL-SCNC: 23.4 MMOL/L (ref 22–29)
CREAT SERPL-MCNC: 1.83 MG/DL (ref 0.76–1.27)
DEPRECATED RDW RBC AUTO: 50.2 FL (ref 37–54)
EGFRCR SERPLBLD CKD-EPI 2021: 39.9 ML/MIN/1.73
EOSINOPHIL # BLD AUTO: 0.56 10*3/MM3 (ref 0–0.4)
EOSINOPHIL NFR BLD AUTO: 9.7 % (ref 0.3–6.2)
ERYTHROCYTE [DISTWIDTH] IN BLOOD BY AUTOMATED COUNT: 14.4 % (ref 12.3–15.4)
GLUCOSE SERPL-MCNC: 155 MG/DL (ref 65–99)
HCT VFR BLD AUTO: 41.4 % (ref 37.5–51)
HGB BLD-MCNC: 13.4 G/DL (ref 13–17.7)
IMM GRANULOCYTES # BLD AUTO: 0.01 10*3/MM3 (ref 0–0.05)
IMM GRANULOCYTES NFR BLD AUTO: 0.2 % (ref 0–0.5)
LYMPHOCYTES # BLD AUTO: 2.5 10*3/MM3 (ref 0.7–3.1)
LYMPHOCYTES NFR BLD AUTO: 43.4 % (ref 19.6–45.3)
MCH RBC QN AUTO: 31.1 PG (ref 26.6–33)
MCHC RBC AUTO-ENTMCNC: 32.4 G/DL (ref 31.5–35.7)
MCV RBC AUTO: 96.1 FL (ref 79–97)
MONOCYTES # BLD AUTO: 0.58 10*3/MM3 (ref 0.1–0.9)
MONOCYTES NFR BLD AUTO: 10.1 % (ref 5–12)
NEUTROPHILS NFR BLD AUTO: 2.07 10*3/MM3 (ref 1.7–7)
NEUTROPHILS NFR BLD AUTO: 35.9 % (ref 42.7–76)
NRBC BLD AUTO-RTO: 0 /100 WBC (ref 0–0.2)
PLATELET # BLD AUTO: 188 10*3/MM3 (ref 140–450)
PMV BLD AUTO: 10.5 FL (ref 6–12)
POTASSIUM SERPL-SCNC: 4.2 MMOL/L (ref 3.5–5.2)
RBC # BLD AUTO: 4.31 10*6/MM3 (ref 4.14–5.8)
SODIUM SERPL-SCNC: 141 MMOL/L (ref 136–145)
WBC NRBC COR # BLD AUTO: 5.76 10*3/MM3 (ref 3.4–10.8)

## 2025-06-04 PROCEDURE — 87468 ANAPLSMA PHGCYTOPHLM AMP PRB: CPT | Performed by: NURSE PRACTITIONER

## 2025-06-04 PROCEDURE — 87798 DETECT AGENT NOS DNA AMP: CPT | Performed by: NURSE PRACTITIONER

## 2025-06-04 PROCEDURE — 80048 BASIC METABOLIC PNL TOTAL CA: CPT | Performed by: NURSE PRACTITIONER

## 2025-06-04 PROCEDURE — 87484 EHRLICHA CHAFFEENSIS AMP PRB: CPT | Performed by: NURSE PRACTITIONER

## 2025-06-04 PROCEDURE — 86618 LYME DISEASE ANTIBODY: CPT | Performed by: NURSE PRACTITIONER

## 2025-06-04 PROCEDURE — 85025 COMPLETE CBC W/AUTO DIFF WBC: CPT | Performed by: NURSE PRACTITIONER

## 2025-06-04 PROCEDURE — 36415 COLL VENOUS BLD VENIPUNCTURE: CPT

## 2025-06-04 RX ORDER — AZITHROMYCIN 500 MG/1
500 TABLET, FILM COATED ORAL DAILY
Qty: 7 TABLET | Refills: 0 | Status: SHIPPED | OUTPATIENT
Start: 2025-06-04 | End: 2025-06-11

## 2025-06-04 NOTE — PROGRESS NOTES
Subjective   Anthony Ayoub is a 69 y.o. male.       HPI   Pt is homero today with concern of tick bites. He has removed two ticks from his right waistline over the past two days.  He was able to remove those ticks completely.  He reports itching in the areas with mild redness.  He reports he has not been feeling well in general for several weeks; increased fatigue; low energy, some diarrhea.  No fevers.  He reports he has had at least 15 tick bites in the past couple of months.      The following portions of the patient's history were reviewed and updated as appropriate: allergies, current medications, past family history, past medical history, past social history, past surgical history, and problem list.    Review of Systems   Constitutional:  Positive for fatigue. Negative for chills and fever.   Respiratory:  Negative for cough and shortness of breath.    Cardiovascular:  Negative for chest pain and palpitations.   Gastrointestinal:  Positive for diarrhea. Negative for constipation, nausea and vomiting.   Genitourinary:  Negative for dysuria, frequency and urgency.   Neurological:  Negative for dizziness, weakness, light-headedness and headache.   Psychiatric/Behavioral:  Negative for depressed mood. The patient is not nervous/anxious.        Objective   Physical Exam  Vitals reviewed.   Constitutional:       General: He is not in acute distress.     Appearance: Normal appearance.   Cardiovascular:      Rate and Rhythm: Normal rate and regular rhythm.      Pulses: Normal pulses.      Heart sounds: Normal heart sounds. No murmur heard.  Pulmonary:      Effort: Pulmonary effort is normal. No respiratory distress.      Breath sounds: Normal breath sounds. No wheezing, rhonchi or rales.   Chest:      Chest wall: No tenderness.   Skin:     General: Skin is warm and dry.          Neurological:      General: No focal deficit present.      Mental Status: He is alert and oriented to person, place, and time.    Psychiatric:         Mood and Affect: Mood normal.                  Procedures   Assessment & Plan   Diagnoses and all orders for this visit:    1. Tick bite of abdomen, initial encounter (Primary)  Comments:  Labs ordered.   Pt has allergies to PCN and doxycycline.   Given Zithromax.   Reviewed warning S/S; call for worsening.  Orders:  -     CBC w AUTO Differential; Future  -     Tick Panel; Future  -     Basic metabolic panel; Future  -     azithromycin (Zithromax) 500 MG tablet; Take 1 tablet by mouth Daily for 7 days.  Dispense: 7 tablet; Refill: 0

## 2025-06-06 LAB — B BURGDOR IGG+IGM SER QL IA: NEGATIVE

## 2025-06-09 LAB
A PHAGOCYTOPH DNA BLD QL NAA+PROBE: NEGATIVE
EHRLICHIA DNA SPEC QL NAA+PROBE: NEGATIVE

## 2025-06-10 LAB — RICKETTSIA RICKETTSII DNA, RT: NOT DETECTED

## 2025-06-15 RX ORDER — EZETIMIBE 10 MG/1
10 TABLET ORAL DAILY
Qty: 95 TABLET | Refills: 3 | Status: SHIPPED | OUTPATIENT
Start: 2025-06-15

## 2025-06-27 ENCOUNTER — TRANSCRIBE ORDERS (OUTPATIENT)
Dept: ADMINISTRATIVE | Facility: HOSPITAL | Age: 70
End: 2025-06-27
Payer: MEDICARE

## 2025-06-27 ENCOUNTER — LAB (OUTPATIENT)
Dept: LAB | Facility: HOSPITAL | Age: 70
End: 2025-06-27
Payer: MEDICARE

## 2025-06-27 DIAGNOSIS — N40.0 ENLARGED PROSTATE: ICD-10-CM

## 2025-06-27 DIAGNOSIS — R80.9 PROTEINURIA, UNSPECIFIED TYPE: ICD-10-CM

## 2025-06-27 DIAGNOSIS — N25.81 SECONDARY HYPERPARATHYROIDISM OF RENAL ORIGIN: ICD-10-CM

## 2025-06-27 DIAGNOSIS — Z12.9 SCREENING FOR CANCER: ICD-10-CM

## 2025-06-27 DIAGNOSIS — I12.9 HYPERTENSIVE NEPHROPATHY: ICD-10-CM

## 2025-06-27 DIAGNOSIS — N18.32 CHRONIC KIDNEY DISEASE (CKD) STAGE G3B/A1, MODERATELY DECREASED GLOMERULAR FILTRATION RATE (GFR) BETWEEN 30-44 ML/MIN/1.73 SQUARE METER AND ALBUMINURIA CREATININE RATIO LESS THAN 30 MG/G (CMS/H*: ICD-10-CM

## 2025-06-27 DIAGNOSIS — N18.30 STAGE 3 CHRONIC KIDNEY DISEASE, UNSPECIFIED WHETHER STAGE 3A OR 3B CKD: ICD-10-CM

## 2025-06-27 DIAGNOSIS — N18.30 STAGE 3 CHRONIC KIDNEY DISEASE, UNSPECIFIED WHETHER STAGE 3A OR 3B CKD: Primary | ICD-10-CM

## 2025-06-27 LAB
ALBUMIN SERPL-MCNC: 4.1 G/DL (ref 3.5–5.2)
ANION GAP SERPL CALCULATED.3IONS-SCNC: 9.2 MMOL/L (ref 5–15)
BACTERIA UR QL AUTO: NORMAL /HPF
BILIRUB UR QL STRIP: NEGATIVE
BUN SERPL-MCNC: 23 MG/DL (ref 8–23)
BUN/CREAT SERPL: 12.4 (ref 7–25)
C3 SERPL-MCNC: 153 MG/DL (ref 82–167)
C4 SERPL-MCNC: 27 MG/DL (ref 14–44)
CALCIUM SPEC-SCNC: 9.4 MG/DL (ref 8.6–10.5)
CHLORIDE SERPL-SCNC: 106 MMOL/L (ref 98–107)
CLARITY UR: CLEAR
CO2 SERPL-SCNC: 23.8 MMOL/L (ref 22–29)
COLOR UR: YELLOW
CREAT SERPL-MCNC: 1.85 MG/DL (ref 0.76–1.27)
CREAT UR-MCNC: 125.1 MG/DL
EGFRCR SERPLBLD CKD-EPI 2021: 39.4 ML/MIN/1.73
GLUCOSE SERPL-MCNC: 134 MG/DL (ref 65–99)
GLUCOSE UR STRIP-MCNC: NEGATIVE MG/DL
HGB UR QL STRIP.AUTO: NEGATIVE
HYALINE CASTS UR QL AUTO: NORMAL /LPF
KETONES UR QL STRIP: NEGATIVE
LEUKOCYTE ESTERASE UR QL STRIP.AUTO: NEGATIVE
NITRITE UR QL STRIP: NEGATIVE
PH UR STRIP.AUTO: 6 [PH] (ref 5–8)
PHOSPHATE SERPL-MCNC: 2.9 MG/DL (ref 2.5–4.5)
POTASSIUM SERPL-SCNC: 4.6 MMOL/L (ref 3.5–5.2)
PROT ?TM UR-MCNC: 204.3 MG/DL
PROT UR QL STRIP: ABNORMAL
PROT/CREAT UR: 1633.1 MG/G CREA (ref 0–200)
PSA SERPL-MCNC: 5.7 NG/ML (ref 0–4)
RBC # UR STRIP: NORMAL /HPF
REF LAB TEST METHOD: NORMAL
SODIUM SERPL-SCNC: 139 MMOL/L (ref 136–145)
SP GR UR STRIP: 1.02 (ref 1–1.03)
SQUAMOUS #/AREA URNS HPF: NORMAL /HPF
UROBILINOGEN UR QL STRIP: ABNORMAL
WBC # UR STRIP: NORMAL /HPF

## 2025-06-27 PROCEDURE — 86160 COMPLEMENT ANTIGEN: CPT

## 2025-06-27 PROCEDURE — 84165 PROTEIN E-PHORESIS SERUM: CPT

## 2025-06-27 PROCEDURE — 86037 ANCA TITER EACH ANTIBODY: CPT

## 2025-06-27 PROCEDURE — 84155 ASSAY OF PROTEIN SERUM: CPT

## 2025-06-27 PROCEDURE — 84153 ASSAY OF PSA TOTAL: CPT

## 2025-06-27 PROCEDURE — 86038 ANTINUCLEAR ANTIBODIES: CPT

## 2025-06-27 PROCEDURE — 83516 IMMUNOASSAY NONANTIBODY: CPT

## 2025-06-27 PROCEDURE — 80069 RENAL FUNCTION PANEL: CPT

## 2025-06-27 PROCEDURE — 36415 COLL VENOUS BLD VENIPUNCTURE: CPT

## 2025-06-27 PROCEDURE — 84156 ASSAY OF PROTEIN URINE: CPT

## 2025-06-27 PROCEDURE — 82570 ASSAY OF URINE CREATININE: CPT

## 2025-06-27 PROCEDURE — 81001 URINALYSIS AUTO W/SCOPE: CPT

## 2025-06-30 LAB
ALBUMIN SERPL ELPH-MCNC: 3.6 G/DL (ref 2.9–4.4)
ALBUMIN/GLOB SERPL: 1.5 {RATIO} (ref 0.7–1.7)
ALPHA1 GLOB SERPL ELPH-MCNC: 0.2 G/DL (ref 0–0.4)
ALPHA2 GLOB SERPL ELPH-MCNC: 0.8 G/DL (ref 0.4–1)
ANA SER QL: NEGATIVE
B-GLOBULIN SERPL ELPH-MCNC: 0.9 G/DL (ref 0.7–1.3)
GAMMA GLOB SERPL ELPH-MCNC: 0.5 G/DL (ref 0.4–1.8)
GLOBULIN SER CALC-MCNC: 2.4 G/DL (ref 2.2–3.9)
LABORATORY COMMENT REPORT: NORMAL
M PROTEIN SERPL ELPH-MCNC: NORMAL G/DL
PROT SERPL-MCNC: 6 G/DL (ref 6–8.5)

## 2025-07-01 LAB
C-ANCA TITR SER IF: NORMAL TITER
MYELOPEROXIDASE AB SER IA-ACNC: <0.2 UNITS (ref 0–0.9)
P-ANCA ATYPICAL TITR SER IF: NORMAL TITER
P-ANCA TITR SER IF: NORMAL TITER
PROTEINASE3 AB SER IA-ACNC: <0.2 UNITS (ref 0–0.9)

## 2025-07-18 ENCOUNTER — NURSE NAVIGATOR (OUTPATIENT)
Dept: ONCOLOGY | Facility: CLINIC | Age: 70
End: 2025-07-18
Payer: MEDICARE

## 2025-07-18 NOTE — PROGRESS NOTES
Patient and wife called after receiving lung nodule letter. Wanted to see if anything as been ordered yet for his scan. They discussed his medical history and symptoms and I encouraged them to call PCP or medical oncology ([facility] [place]). They have sent a message to PCP

## 2025-07-20 DIAGNOSIS — R91.1 LUNG NODULE: Primary | ICD-10-CM

## 2025-07-20 RX ORDER — ROSUVASTATIN CALCIUM 40 MG/1
40 TABLET, COATED ORAL
Qty: 90 TABLET | Refills: 1 | Status: SHIPPED | OUTPATIENT
Start: 2025-07-20

## 2025-07-24 ENCOUNTER — OFFICE VISIT (OUTPATIENT)
Dept: FAMILY MEDICINE CLINIC | Facility: CLINIC | Age: 70
End: 2025-07-24
Payer: MEDICARE

## 2025-07-24 VITALS
SYSTOLIC BLOOD PRESSURE: 122 MMHG | TEMPERATURE: 98.2 F | HEIGHT: 66 IN | OXYGEN SATURATION: 98 % | HEART RATE: 59 BPM | BODY MASS INDEX: 27.77 KG/M2 | WEIGHT: 172.8 LBS | DIASTOLIC BLOOD PRESSURE: 79 MMHG

## 2025-07-24 DIAGNOSIS — G89.29 CHRONIC MIDLINE LOW BACK PAIN WITHOUT SCIATICA: Primary | ICD-10-CM

## 2025-07-24 DIAGNOSIS — K52.9 CHRONIC DIARRHEA: ICD-10-CM

## 2025-07-24 DIAGNOSIS — M54.50 CHRONIC MIDLINE LOW BACK PAIN WITHOUT SCIATICA: Primary | ICD-10-CM

## 2025-07-24 PROCEDURE — 3074F SYST BP LT 130 MM HG: CPT | Performed by: FAMILY MEDICINE

## 2025-07-24 PROCEDURE — 1125F AMNT PAIN NOTED PAIN PRSNT: CPT | Performed by: FAMILY MEDICINE

## 2025-07-24 PROCEDURE — 3078F DIAST BP <80 MM HG: CPT | Performed by: FAMILY MEDICINE

## 2025-07-24 PROCEDURE — 99213 OFFICE O/P EST LOW 20 MIN: CPT | Performed by: FAMILY MEDICINE

## 2025-07-24 RX ORDER — ERGOCALCIFEROL 1.25 MG/1
50000 CAPSULE, LIQUID FILLED ORAL
Qty: 12 CAPSULE | Refills: 1 | Status: CANCELLED | OUTPATIENT
Start: 2025-07-24

## 2025-07-24 RX ORDER — ERGOCALCIFEROL 1.25 MG/1
50000 CAPSULE, LIQUID FILLED ORAL
COMMUNITY
Start: 2025-07-24

## 2025-07-24 RX ORDER — DICYCLOMINE HCL 20 MG
20 TABLET ORAL EVERY 6 HOURS
Qty: 120 TABLET | Refills: 1 | Status: SHIPPED | OUTPATIENT
Start: 2025-07-24

## 2025-07-24 NOTE — PROGRESS NOTES
"Subjective   Anthony Ayoub is a 69 y.o. male.     History of Present Illness  Anthony Ayoub is in for some worsening back issues. It is midline and chronic. It has not responded to pain management injections. He also has had some diarrhea issues of late, but no blood or incontinence.  There is no history of chest pain or dyspnea. There is no history of issue with bladder dysfunction. There is no history of dizziness or lightheadedness. There is no history of issue with sleep or mood. There is no history of issue with present medication.              /79 (BP Location: Left arm, Patient Position: Sitting, Cuff Size: Large Adult)   Pulse 59   Temp 98.2 °F (36.8 °C) (Temporal)   Ht 167.6 cm (65.98\")   Wt 78.4 kg (172 lb 12.8 oz)   SpO2 98%   BMI 27.90 kg/m²       Chief Complaint   Patient presents with    back issues            Current Outpatient Medications:     aspirin 81 MG EC tablet, Take 1 tablet by mouth Daily., Disp: , Rfl:     calcitriol (ROCALTROL) 0.25 MCG capsule, Take 1 capsule by mouth Every Other Day., Disp: , Rfl:     carvedilol (COREG) 25 MG tablet, TAKE 1 TABLET BY MOUTH TWICE A DAY, Disp: 180 tablet, Rfl: 1    escitalopram (LEXAPRO) 20 MG tablet, Take 1 tablet by mouth Daily., Disp: 90 tablet, Rfl: 1    ezetimibe (ZETIA) 10 MG tablet, TAKE 1 TABLET BY MOUTH EVERY DAY, Disp: 95 tablet, Rfl: 3    ferrous sulfate 325 (65 FE) MG EC tablet, Take 1 tablet by mouth., Disp: , Rfl:     folic acid (FOLVITE) 1 MG tablet, Take 1 tablet by mouth Daily., Disp: 90 tablet, Rfl: 1    lisinopril (PRINIVIL,ZESTRIL) 40 MG tablet, Take 1 tablet by mouth Daily., Disp: , Rfl:     rosuvastatin (CRESTOR) 40 MG tablet, TAKE 1 TABLET BY MOUTH EVERYDAY AT BEDTIME, Disp: 90 tablet, Rfl: 1    terazosin (HYTRIN) 5 MG capsule, Take 2 capsules by mouth Every Night., Disp: , Rfl:     vitamin D (ERGOCALCIFEROL) 1.25 MG (42466 UT) capsule capsule, Take 1 capsule by mouth Every 30 (Thirty) Days., Disp: , Rfl:     " dicyclomine (BENTYL) 20 MG tablet, Take 1 tablet by mouth Every 6 (Six) Hours., Disp: 120 tablet, Rfl: 1    riFAXIMin (Xifaxan) 550 MG tablet, Take 1 tablet by mouth Every 8 (Eight) Hours for 10 days., Disp: 30 tablet, Rfl: 0            The following portions of the patient's history were reviewed and updated as appropriate: allergies, current medications, past family history, past medical history, past social history, past surgical history, and problem list.    Review of Systems   Constitutional:  Positive for fatigue. Negative for activity change and fever.   HENT:  Negative for congestion, sinus pressure, sinus pain, sore throat and trouble swallowing.    Eyes:  Negative for visual disturbance.   Respiratory:  Negative for chest tightness, shortness of breath and wheezing.    Cardiovascular:  Negative for chest pain.   Gastrointestinal:  Positive for diarrhea. Negative for abdominal distention, abdominal pain, constipation, nausea and vomiting.   Genitourinary:  Negative for difficulty urinating and dysuria.   Musculoskeletal:  Positive for arthralgias and back pain. Negative for neck pain.   Psychiatric/Behavioral:  Negative for agitation, dysphoric mood, hallucinations and suicidal ideas. The patient is not nervous/anxious.        Objective   Physical Exam  Vitals and nursing note reviewed.   Constitutional:       Appearance: Normal appearance.   Cardiovascular:      Rate and Rhythm: Normal rate.      Heart sounds: Normal heart sounds.   Pulmonary:      Effort: Pulmonary effort is normal.      Breath sounds: No wheezing.   Abdominal:      General: Bowel sounds are normal.      Palpations: Abdomen is soft.      Tenderness: There is no abdominal tenderness. There is no guarding or rebound.      Hernia: No hernia is present.   Musculoskeletal:      Cervical back: Neck supple.      Right lower leg: No edema.      Left lower leg: No edema.   Lymphadenopathy:      Cervical: No cervical adenopathy.   Neurological:       Mental Status: He is alert and oriented to person, place, and time. Mental status is at baseline.      Comments: Negative straight leg raise bilaterally   Psychiatric:         Mood and Affect: Mood normal.           Assessment & Plan   Problems Addressed this Visit    None  Visit Diagnoses         Chronic midline low back pain without sciatica    -  Primary    Relevant Orders    MRI Lumbar Spine Without Contrast      Chronic diarrhea              Diagnoses         Codes Comments      Chronic midline low back pain without sciatica    -  Primary ICD-10-CM: M54.50, G89.29  ICD-9-CM: 724.2, 338.29       Chronic diarrhea     ICD-10-CM: K52.9  ICD-9-CM: 787.91           I wanted to try him on some Xifaxan but insurance refused  I will try him on some Bentyl and then will try getting an MRI for the lower back issues   I may have to refer to GI for the diarrhea issues

## 2025-08-15 ENCOUNTER — HOSPITAL ENCOUNTER (OUTPATIENT)
Dept: CT IMAGING | Facility: HOSPITAL | Age: 70
Discharge: HOME | End: 2025-08-15
Payer: MEDICARE

## 2025-08-15 ENCOUNTER — HOSPITAL ENCOUNTER (OUTPATIENT)
Dept: MRI IMAGING | Facility: HOSPITAL | Age: 70
Discharge: HOME | End: 2025-08-15
Payer: MEDICARE

## 2025-08-15 DIAGNOSIS — G89.29 CHRONIC MIDLINE LOW BACK PAIN WITHOUT SCIATICA: ICD-10-CM

## 2025-08-15 DIAGNOSIS — R91.1 LUNG NODULE: ICD-10-CM

## 2025-08-15 DIAGNOSIS — M54.50 CHRONIC MIDLINE LOW BACK PAIN WITHOUT SCIATICA: ICD-10-CM

## 2025-08-15 PROCEDURE — 72148 MRI LUMBAR SPINE W/O DYE: CPT

## 2025-08-15 PROCEDURE — 71250 CT THORAX DX C-: CPT

## 2025-08-19 RX ORDER — DICYCLOMINE HCL 20 MG
20 TABLET ORAL EVERY 6 HOURS
Qty: 120 TABLET | Refills: 5 | Status: SHIPPED | OUTPATIENT
Start: 2025-08-19

## 2025-08-22 DIAGNOSIS — M54.50 CHRONIC MIDLINE LOW BACK PAIN WITHOUT SCIATICA: Primary | ICD-10-CM

## 2025-08-22 DIAGNOSIS — G89.29 CHRONIC MIDLINE LOW BACK PAIN WITHOUT SCIATICA: Primary | ICD-10-CM

## (undated) DEVICE — SUT SILK 0 CT1 CR8 18IN C021D

## (undated) DEVICE — SOL IRR H2O BTL 1000ML STRL

## (undated) DEVICE — BLAKE SILICONE DRAIN, 19 FR ROUND, HUBLESS WITH 1/4" BENDABLE TROCAR: Brand: BLAKE

## (undated) DEVICE — TBG INSUFL LAP HIFLO W/12MM CONN 10FT

## (undated) DEVICE — KT TRANSD TRUWAVE VAMP PLS 3TRANSD

## (undated) DEVICE — SUT VIC 0/0 UR6 27IN DYED J603H

## (undated) DEVICE — CATH THOR THERMO/CONFRM PVC RT/ANGL TPR 28F 5EYE

## (undated) DEVICE — ELECTRD BLD EZ CLN STD 6.5IN

## (undated) DEVICE — PK OPN HEART WHT WRP 50

## (undated) DEVICE — SOL NACL 0.9PCT 1000ML

## (undated) DEVICE — LAPAROSCOPIC GAS CONDITIONING DEVICE.: Brand: INSUFLOW

## (undated) DEVICE — SENSR CERBRL O2 PK/2

## (undated) DEVICE — CONNECT Y INTERSEPT W/LL 3/8 X 3/8 X 3/8IN

## (undated) DEVICE — SUT PROLN 3/0 V7 D/A 36IN 8976H

## (undated) DEVICE — PK PERFUS CUST W/CARDIOPLEGIA

## (undated) DEVICE — Device

## (undated) DEVICE — UNDERGLV SURG BIOGEL INDICATOR LTX PF 7

## (undated) DEVICE — KT CATH CV ACC MAC 2L SFTY 9F 4 1/2IN

## (undated) DEVICE — DRSNG BIOPATCH ANTIMICROB 7MM 1IN

## (undated) DEVICE — TBG SXN CONN/F UNIV 1/4IN 12FT LF STRL

## (undated) DEVICE — DRN WND CH RND FUL/FLUT NO/TROC 3/8IN 28F

## (undated) DEVICE — ENDOPATH XCEL WITH OPTIVIEW TECHNOLOGY UNIVERSAL TROCAR STABILITY SLEEVES: Brand: ENDOPATH XCEL OPTIVIEW

## (undated) DEVICE — HEMOCONCENTRATOR PERFUS LPS06

## (undated) DEVICE — SPNG GZ AVANT 6PLY 4X4IN STRL PK/2

## (undated) DEVICE — SYS PERFUS SEP PLATLT W TIPS CUST

## (undated) DEVICE — BG PRESS INFUS W/STPCK 500CC

## (undated) DEVICE — SLV SCD CALF HEMOFORCE DVT THERP REPROC MD

## (undated) DEVICE — DRSNG WND BORDR/ADHS NONADHR/GZ LF 4X4IN STRL

## (undated) DEVICE — SUT SILK 2 SUTUPAK TIE 60IN SA8H 2STRAND

## (undated) DEVICE — DRP SURG ULTRAGUARD 53X77IN

## (undated) DEVICE — CATH ART RADL 20GA 1 3/4IN LF

## (undated) DEVICE — SHEATH INTRO PINN CORNRY .038 5F10CM

## (undated) DEVICE — SUT PROLN 5/0 V5 36IN 8934H

## (undated) DEVICE — PUNCH SURG AORT ROT 4MM STRL

## (undated) DEVICE — CATH TDILUT SWANGANZ VIP 7.5F 110CM

## (undated) DEVICE — SUT PDS 0 CT-1 Z340H

## (undated) DEVICE — SUT SILK 4/0 TIES 18IN A183H

## (undated) DEVICE — BG BLD SYS

## (undated) DEVICE — DRN WND EVAC BULB 100CC

## (undated) DEVICE — DRSNG TEGADERM 4 2 3/4X3 1/4IN

## (undated) DEVICE — SUT PROLN 4/0 V7 36IN 8975H

## (undated) DEVICE — ENDOPATH 5MM CURVED SCISSORS WITH MONOPOLAR CAUTERY: Brand: ENDOPATH

## (undated) DEVICE — CATH DIAG IMPULSE FR4 5F 100CM

## (undated) DEVICE — KT SURG TURNOVER 050

## (undated) DEVICE — SUT PROLN 4/0 V5 36IN 8935H

## (undated) DEVICE — SYS VASOVIEW HEMOPRO ENDOSCOPIC HARVST VESL

## (undated) DEVICE — SUT SILK 2/0 SH CR8 18IN CR8 C012D

## (undated) DEVICE — SOL IRR NACL 0.9PCT BT 1000ML

## (undated) DEVICE — LIGAMAX 5 MM ENDOSCOPIC MULTIPLE CLIP APPLIER
Type: IMPLANTABLE DEVICE | Site: ABDOMEN | Status: NON-FUNCTIONAL
Brand: LIGAMAX

## (undated) DEVICE — GLV SURG BIOGEL LTX PF 7 1/2

## (undated) DEVICE — SUT ETHLN 2/0 PS 18IN 585H

## (undated) DEVICE — CATH DIAG IMPULSE FL4 5F 100CM

## (undated) DEVICE — ENDOPATH XCEL BLADELESS TROCARS WITH STABILITY SLEEVES: Brand: ENDOPATH XCEL

## (undated) DEVICE — CANN AORT ROOT DLP VNT/8IN 14G 7F

## (undated) DEVICE — UNDYED BRAIDED (POLYGLACTIN 910), SYNTHETIC ABSORBABLE SUTURE: Brand: COATED VICRYL

## (undated) DEVICE — CABL BIPOL W/ALLGTR CLIP/SM 12FT

## (undated) DEVICE — SUT PROLN 6/0 RB2 D/A 30IN 8711H

## (undated) DEVICE — BNDG ELAS ELITE V/CLOSE 4IN 5YD LF STRL

## (undated) DEVICE — TBG PRESS TRANSDUCER TRUWAVE M/FML CONNECT 36IN

## (undated) DEVICE — SUT PROLN 7/0 BV1 D/A 30IN 8703H

## (undated) DEVICE — MARKR CORNRY ANASTOMARK NOHLDR DIST

## (undated) DEVICE — DRSNG TEGADERM ADV SECUR 3 1/2X4 1/2IN

## (undated) DEVICE — PK TRY HEART CATH 50

## (undated) DEVICE — SUP ARMBRD HANDAID ART/LINE 9IN

## (undated) DEVICE — SYR LL TP 10ML STRL

## (undated) DEVICE — GW PTFE EMERALD HEPCOAT FC J TIP STD .035 3MM 150CM

## (undated) DEVICE — PK ATS CUST W CARDIOTOMY RESEVOIR

## (undated) DEVICE — SUT VIC 2/0 CT1 36IN UD VCP945H

## (undated) DEVICE — SUT PROLN 5/0 RB2 D/A 30IN 8710H

## (undated) DEVICE — ELECTRD DEFIB M/FUNC PROPADZ STRL 2PK

## (undated) DEVICE — ENDOPATH XCEL WITH OPTIVIEW TECHNOLOGY BLADELESS TROCARS WITH STABILITY SLEEVES: Brand: ENDOPATH XCEL OPTIVIEW

## (undated) DEVICE — GLV SURG BIOGEL LTX PF 7

## (undated) DEVICE — CANN ART EOPA 3D NV W/CONN 20F

## (undated) DEVICE — BNDG ELAS ELITE V/CLOSE 6IN 5YD LF STRL

## (undated) DEVICE — SUT SILK 2/0 TIES 18IN A185H

## (undated) DEVICE — NDL PERC 1PRT THNWALL W/BASEPLT 18G 7CM

## (undated) DEVICE — ST IV BLD W/HANDPUMP YSITE 125IN

## (undated) DEVICE — GENERAL LAPAROSCOPY CDS: Brand: MEDLINE INDUSTRIES, INC.

## (undated) DEVICE — ADHS SKIN PREMIERPRO EXOFIN TOPICAL HI/VISC .5ML

## (undated) DEVICE — BLANKT WARM UPPR/BDY ARM/OUT 57X196CM

## (undated) DEVICE — BLOWER MISTER CLEARVIEW W/TBG

## (undated) DEVICE — SOL IRRIG NACL 1000ML

## (undated) DEVICE — BLD SCLPL BEAVR MINI STR 2BVL 180D LF